# Patient Record
Sex: FEMALE | Race: WHITE | NOT HISPANIC OR LATINO | Employment: OTHER | URBAN - METROPOLITAN AREA
[De-identification: names, ages, dates, MRNs, and addresses within clinical notes are randomized per-mention and may not be internally consistent; named-entity substitution may affect disease eponyms.]

---

## 2018-03-14 ENCOUNTER — OFFICE VISIT (OUTPATIENT)
Dept: URGENT CARE | Facility: CLINIC | Age: 78
End: 2018-03-14
Payer: MEDICARE

## 2018-03-14 VITALS
SYSTOLIC BLOOD PRESSURE: 153 MMHG | RESPIRATION RATE: 18 BRPM | HEIGHT: 64 IN | HEART RATE: 103 BPM | TEMPERATURE: 99 F | OXYGEN SATURATION: 96 % | WEIGHT: 110 LBS | BODY MASS INDEX: 18.78 KG/M2 | DIASTOLIC BLOOD PRESSURE: 95 MMHG

## 2018-03-14 DIAGNOSIS — R06.2 WHEEZING: Primary | ICD-10-CM

## 2018-03-14 DIAGNOSIS — R03.0 ELEVATED BP WITHOUT DIAGNOSIS OF HYPERTENSION: ICD-10-CM

## 2018-03-14 PROCEDURE — 94640 AIRWAY INHALATION TREATMENT: CPT | Mod: S$GLB,,, | Performed by: FAMILY MEDICINE

## 2018-03-14 PROCEDURE — 99214 OFFICE O/P EST MOD 30 MIN: CPT | Mod: 25,S$GLB,, | Performed by: NURSE PRACTITIONER

## 2018-03-14 RX ORDER — ALBUTEROL SULFATE 0.83 MG/ML
2.5 SOLUTION RESPIRATORY (INHALATION)
Status: COMPLETED | OUTPATIENT
Start: 2018-03-14 | End: 2018-03-14

## 2018-03-14 RX ORDER — ALBUTEROL SULFATE 90 UG/1
2 AEROSOL, METERED RESPIRATORY (INHALATION) EVERY 6 HOURS PRN
Qty: 18 G | Refills: 0 | Status: SHIPPED | OUTPATIENT
Start: 2018-03-14 | End: 2018-07-05 | Stop reason: SDUPTHER

## 2018-03-14 RX ORDER — IPRATROPIUM BROMIDE 0.5 MG/2.5ML
0.5 SOLUTION RESPIRATORY (INHALATION)
Status: COMPLETED | OUTPATIENT
Start: 2018-03-14 | End: 2018-03-14

## 2018-03-14 RX ADMIN — ALBUTEROL SULFATE 2.5 MG: 0.83 SOLUTION RESPIRATORY (INHALATION) at 03:03

## 2018-03-14 RX ADMIN — IPRATROPIUM BROMIDE 0.5 MG: 0.5 SOLUTION RESPIRATORY (INHALATION) at 03:03

## 2018-03-14 NOTE — PATIENT INSTRUCTIONS
Bronchospasm (Adult)    Bronchospasm occurs when the airways (bronchial tubes) go into spasm and contract. This makes it hard to breathe and causes wheezing (a high-pitched whistling sound). Bronchospasm can also cause frequent coughing without wheezing.  Bronchospasm is due to irritation, inflammation, or allergic reaction of the airways. People with asthma get bronchospasm. However, not everyone with bronchospasm has asthma.  Being exposed to harmful fumes, a recent case of bronchitis, exercise, or a flare-up of chronic obstructive pulmonary disease (COPD) may cause the airways to spasm. An episode of bronchospasm may last 7 to 14 days. Medicine may be prescribed to relax the airways and prevent wheezing. Antibiotics will be prescribed only if your healthcare provider thinks there is a bacterial infection. Antibiotics do not help a viral infection.  Home care  · Drink lots of water or other fluids (at least 10 glasses a day) during an attack. This will loosen lung secretions and make it easier to breathe. If you have heart or kidney disease, check with your doctor before you drink extra fluids.  · Take prescribed medicine exactly at the times advised. If you take an inhaled medicine to help with breathing, do not use it more than once every 4 hours, unless told to do so. If prescribed an antibiotic or prednisone, take all of the medicine, even if you are feeling better after a few days.  · Do not smoke. Also avoid being exposed to secondhand smoke.  · If you were given an inhaler, use it exactly as directed. If you need to use it more often than prescribed, your condition may be getting worse. Contact your healthcare provider.  Follow-up care  Follow up with your healthcare provider, or as advised.  Note: If you are age 65 or older, have a chronic lung disease or condition that affects your immune system, or you smoke, we recommend getting pneumococcal vaccinations, as well as an influenza vaccination (flu shot)  every autumn. Ask your healthcare provider about this.  When to seek medical advice  Call your healthcare provider right away if any of these occur:  · You need to use your inhalers more often than usual.  · You develop a fever of 100.4°F (38°C) or higher.  · You are coughing up lots of dark-colored sputum (mucus).  · You do not start to improve within 24 hours.  Call 911, or get immediate medical care  Contact emergency services if any of these occur:  · Coughing up bloody sputum (mucus)  · Chest pain with each breath  · Increased wheezing or shortness of breath  Date Last Reviewed: 9/13/2015  © 5782-6035 [email or web address]. [address] [place] [address], [place] [address]. All rights reserved. This information is not intended as a substitute for professional medical care. Always follow your healthcare professional's instructions.    Please follow up with PCP

## 2018-03-14 NOTE — PROGRESS NOTES
"Subjective:       Patient ID: Sangita Logan is a 77 y.o. female.    Vitals:  height is 5' 4" (1.626 m) and weight is 49.9 kg (110 lb). Her temperature is 98.9 °F (37.2 °C). Her blood pressure is 153/95 (abnormal) and her pulse is 103. Her respiration is 18 and oxygen saturation is 96%.     Chief Complaint: Shortness of Breath (4 months ago)    Pt is c/o having SOB off and on for the last few months. Pt was seen at this clinic when it was [facility]. Pt denied having a cough, just a slight wheeze and SOB. Pt was prescribed Ventolin inhaler at her last visit and finally ran out of the medication. She want to get a refill on that medication if possible. Pt denied being seen by a specialist or a PCP. Pt denied having pneumonia, bronchitis, COPD and Asthma. Pt does smoke less than a 1/2 pack of cigarettes a day. Pt reports running out of the alb a week ago and feeling more SOB ever since.      Shortness of Breath   This is a chronic problem. The current episode started more than 1 month ago (4 months ago). The problem occurs intermittently. The problem has been unchanged. Associated symptoms include wheezing. Pertinent negatives include no abdominal pain, chest pain, ear pain, fever, headaches, leg swelling, sore throat or sputum production.     Review of Systems   Constitution: Negative for chills, fever and malaise/fatigue.   HENT: Negative for congestion, ear pain, hoarse voice and sore throat.    Eyes: Negative for discharge and redness.   Cardiovascular: Negative for chest pain, dyspnea on exertion and leg swelling.   Respiratory: Positive for shortness of breath and wheezing. Negative for cough and sputum production.    Musculoskeletal: Negative for myalgias.   Gastrointestinal: Negative for abdominal pain and nausea.   Neurological: Negative for headaches.       Objective:      Physical Exam   Constitutional: She is oriented to person, place, and time. She appears well-developed and well-nourished. She is " cooperative.  Non-toxic appearance. She does not appear ill. No distress.   HENT:   Head: Normocephalic and atraumatic.   Right Ear: Hearing, tympanic membrane, external ear and ear canal normal.   Left Ear: Hearing, tympanic membrane, external ear and ear canal normal.   Nose: Nose normal. No mucosal edema, rhinorrhea or nasal deformity. No epistaxis. Right sinus exhibits no maxillary sinus tenderness and no frontal sinus tenderness. Left sinus exhibits no maxillary sinus tenderness and no frontal sinus tenderness.   Mouth/Throat: Uvula is midline, oropharynx is clear and moist and mucous membranes are normal. No trismus in the jaw. Normal dentition. No uvula swelling. No posterior oropharyngeal erythema.   Eyes: Conjunctivae, EOM and lids are normal. Pupils are equal, round, and reactive to light. No scleral icterus.   Sclera clear bilat   Neck: Trachea normal, normal range of motion, full passive range of motion without pain and phonation normal. Neck supple.   Cardiovascular: Normal rate, regular rhythm, normal heart sounds, intact distal pulses and normal pulses.    Pulmonary/Chest: Effort normal. No accessory muscle usage. No respiratory distress. She has decreased breath sounds in the right upper field, the right middle field, the right lower field, the left upper field, the left middle field and the left lower field. She has wheezes (moderate insp and exp heard in all lung fileds) in the right upper field, the right middle field, the right lower field, the left upper field, the left middle field and the left lower field. She has no rhonchi. She has no rales.   Post duo neb: Lungs opened up and breath sounds are now audible with mild insp wheeze   Abdominal: Soft. Normal appearance and bowel sounds are normal. She exhibits no distension. There is no tenderness.   Musculoskeletal: Normal range of motion. She exhibits no edema or deformity.   Neurological: She is alert and oriented to person, place, and time.  She exhibits normal muscle tone. Coordination normal.   Skin: Skin is warm, dry and intact. She is not diaphoretic. No pallor.   Psychiatric: She has a normal mood and affect. Her speech is normal and behavior is normal. Judgment and thought content normal. Cognition and memory are normal.   Nursing note and vitals reviewed.      Assessment:       1. Wheezing    2. Elevated BP without diagnosis of hypertension        Plan:       Pt was told to follow up with PCP if symptoms worsen or dont improve  Wheezing  -     albuterol nebulizer solution 2.5 mg; Take 3 mLs (2.5 mg total) by nebulization one time.  -     ipratropium 0.02 % nebulizer solution 0.5 mg; Take 2.5 mLs (0.5 mg total) by nebulization one time.  -     albuterol 90 mcg/actuation inhaler; Inhale 2 puffs into the lungs every 6 (six) hours as needed for Wheezing. Rescue  Dispense: 18 g; Refill: 0    Elevated BP without diagnosis of hypertension    pt states she walked far to get to the clinic and that her BP is not normally elevated

## 2018-07-05 ENCOUNTER — OFFICE VISIT (OUTPATIENT)
Dept: URGENT CARE | Facility: CLINIC | Age: 78
End: 2018-07-05
Payer: MEDICARE

## 2018-07-05 VITALS
SYSTOLIC BLOOD PRESSURE: 150 MMHG | WEIGHT: 110 LBS | OXYGEN SATURATION: 97 % | HEIGHT: 64 IN | TEMPERATURE: 98 F | HEART RATE: 98 BPM | BODY MASS INDEX: 18.78 KG/M2 | RESPIRATION RATE: 19 BRPM | DIASTOLIC BLOOD PRESSURE: 92 MMHG

## 2018-07-05 DIAGNOSIS — R06.02 SOB (SHORTNESS OF BREATH): ICD-10-CM

## 2018-07-05 DIAGNOSIS — Z76.89 ENCOUNTER TO ESTABLISH CARE WITH NEW DOCTOR: ICD-10-CM

## 2018-07-05 DIAGNOSIS — R03.0 ELEVATED BLOOD PRESSURE READING WITHOUT DIAGNOSIS OF HYPERTENSION: ICD-10-CM

## 2018-07-05 DIAGNOSIS — R93.89 ABNORMAL CHEST X-RAY: ICD-10-CM

## 2018-07-05 DIAGNOSIS — R06.2 WHEEZING: Primary | ICD-10-CM

## 2018-07-05 DIAGNOSIS — F17.200 TOBACCO DEPENDENCE: ICD-10-CM

## 2018-07-05 PROCEDURE — 99214 OFFICE O/P EST MOD 30 MIN: CPT | Mod: S$GLB,,, | Performed by: FAMILY MEDICINE

## 2018-07-05 PROCEDURE — 94640 AIRWAY INHALATION TREATMENT: CPT | Mod: S$GLB,,, | Performed by: EMERGENCY MEDICINE

## 2018-07-05 PROCEDURE — 96372 THER/PROPH/DIAG INJ SC/IM: CPT | Mod: ,,, | Performed by: EMERGENCY MEDICINE

## 2018-07-05 RX ORDER — ALBUTEROL SULFATE 0.83 MG/ML
2.5 SOLUTION RESPIRATORY (INHALATION)
Status: COMPLETED | OUTPATIENT
Start: 2018-07-05 | End: 2018-07-05

## 2018-07-05 RX ORDER — ALBUTEROL SULFATE 90 UG/1
2 AEROSOL, METERED RESPIRATORY (INHALATION) EVERY 6 HOURS PRN
Qty: 18 G | Refills: 0 | Status: SHIPPED | OUTPATIENT
Start: 2018-07-05 | End: 2021-11-11

## 2018-07-05 RX ORDER — IPRATROPIUM BROMIDE 0.5 MG/2.5ML
0.5 SOLUTION RESPIRATORY (INHALATION)
Status: COMPLETED | OUTPATIENT
Start: 2018-07-05 | End: 2018-07-05

## 2018-07-05 RX ADMIN — IPRATROPIUM BROMIDE 0.5 MG: 0.5 SOLUTION RESPIRATORY (INHALATION) at 01:07

## 2018-07-05 RX ADMIN — ALBUTEROL SULFATE 2.5 MG: 0.83 SOLUTION RESPIRATORY (INHALATION) at 01:07

## 2018-07-05 NOTE — PATIENT INSTRUCTIONS
PLEASE READ YOUR DISCHARGE INSTRUCTIONS ENTIRELY AS IT CONTAINS IMPORTANT INFORMATION.    Use the albuterol for shortness of breath and wheezing.     Please go to the emergency room if you experience shortness of breath or wheezing not relieved with the inhaler, chest pain, funny heart beats, headache, blurred vision, weakness in one arm or leg, slurred speech, numbness, inability to walk or talk, confusion, or any new symptoms.     A referral to primary care and pulmonology has been placed for you. You will be contacted in the next day or two about scheduling an appointment. Please call [phone] if you are not contacted.     Get a blood pressure cuff at a drug store or take your blood pressure there and log the time and readings and bring with you to your doctor's appointment    A referral to the smoking cessation clinic has been placed for you, please consider quitting smoking.         You must understand that you have received an Urgent Care treatment only and that you may be released before all of your medical problems are known or treated.    Bronchitis with Wheezing (Viral or Bacterial: Adult)    Bronchitis is an infection of the air passages. It often occurs during a cold and is usually caused by a virus. Symptoms include cough with mucus (phlegm) and low-grade fever. This illness is contagious during the first few days and is spread through the air by coughing and sneezing, or by direct contact (touching the sick person and then touching your own eyes, nose, or mouth).  If there is a lot of inflammation, air flow is restricted. The air passages may also go into spasm, especially if you have asthma. This causes wheezing and difficulty breathing even in people who do not have asthma.  Bronchitis usually lasts 7 to 14 days. The wheezing should improve with treatment during the first week. An inhaler is often prescribed to relax the air passages and stop wheezing. Antibiotics will be prescribed if your  doctor thinks there is also a secondary bacterial infection.  Home care  · If symptoms are severe, rest at home for the first 2 to 3 days. When you go back to your usual activities, don't let yourself get too tired.  · Do not smoke. Also avoid being exposed to secondhand smoke.  · You may use over-the-counter medicine to control fever or pain, unless another medicine was prescribed. Note: If you have chronic liver or kidney disease or have ever had a stomach ulcer or gastrointestinal bleeding, talk with your healthcare provider before using these medicines. Also talk to your provider if you are taking medicine to prevent blood clots.) Aspirin should never be given to anyone younger than 18 years of age who is ill with a viral infection or fever. It may cause severe liver or brain damage.  · Your appetite may be poor, so a light diet is fine. Avoid dehydration by drinking 6 to 8 glasses of fluids per day (such as water, soft drinks, sports drinks, juices, tea, or soup). Extra fluids will help loosen secretions in the nose and lungs.  · Over-the-counter cough, cold, and sore-throat medicines will not shorten the length of the illness, but they may be helpful to reduce symptoms. (Note: Do not use decongestants if you have high blood pressure.)  · If you were given an inhaler, use it exactly as directed. If you need to use it more often than prescribed, your condition may be worsening. If this happens, contact your healthcare provider.  · If prescribed, finish all antibiotic medicine, even if you are feeling better after only a few days.  Follow-up care  Follow up with your healthcare provider, or as advised. If you had an X-ray or ECG (electrocardiogram), a specialist will review it. You will be notified of any new findings that may affect your care.  Note: If you are age 65 or older, or if you have a chronic lung disease or condition that affects your immune system, or you smoke, talk to your healthcare provider about  "having a pneumococcal vaccinations and a yearly influenza vaccination (flu shot).  When to seek medical advice  Call your healthcare provider right away if any of these occur:  · Fever of 100.4°F (38°C) or higher  · Coughing up increasing amounts of colored sputum  · Weakness, drowsiness, headache, facial pain, ear pain, or a stiff neck  Call 911, or get immediate medical care  Contact emergency services right away if any of these occur.  · Coughing up blood  · Worsening weakness, drowsiness, headache, or stiff neck  · Increased wheezing not helped with medication, shortness of breath, or pain with breathing  Date Last Reviewed: 9/13/2015 © 2000-2017 "[employer]. [address] [place] [address], [place] [address]. All rights reserved. This information is not intended as a substitute for professional medical care. Always follow your healthcare professional's instructions.        Risk Factors for Heart Disease  A risk factor is something that increases your chance of having heart disease. Heart disease (also called coronary artery disease) involves damage to the heart arteries. These blood vessels need to work well to provide the oxygen your heart needs to pump blood to the rest of your body. Things like smoking or high cholesterol levels can damage arteries. You cant control some risk factors, such as age and a family history of heart disease. But there are many things you can control to reduce your risk for heart disease.    Unhealthy cholesterol levels  Cholesterol is a fat-like substance in your blood. It can build up along the artery walls. This is called plaque. Over time, plaque narrows the arteries and reduces blood flow to your heart or brain. If a blood clot forms or a piece of the plaque breaks off, it can completely block the artery and cause a heart attack or stroke. Your risk of heart disease goes up if you have high levels of LDL ("bad") cholesterol or triglycerides (another fatty substance that " "can build up). Youre also at risk if you have low HDL cholesterol ("good") cholesterol. HDL helps clear the bad cholesterol away. You're at risk if you have:  HDL cholesterol 50 mg/dL or lower; LDL cholesterol 100 mg/dL; or triglycerides of 150 mg/dL or higher.  Smoking  This is the most important risk factor you can change. Smoking causes inflammation and damages the smooth muscle that lines the arteries making them less flexible. It also raises your blood pressure, causing further damage to the artery lining. Smoking also increases your risk that your blood may clot, block an artery, and cause a heart attack or stroke. Smoking also damages your lungs, which affects the delivery of oxygen to the body. If you smoke, you are 2 to 4 times more likely to develop coronary artery disease. If you smoke, it's never too late to help your heart. Ask your doctor about nicotine replacement products and smoking cessation support.  High blood pressure  High blood pressure occurs when blood pushes too hard against artery walls. This causes damage to the artery walls and the formation of scar tissue as it heals. This makes the arteries stiff and weak. Plaque sticks to the scarred tissue narrowing and hardening the arteries. High blood pressure also causes your heart to work harder to get blood out to the body. High blood pressure raises your risk of heart attack, also known as acute myocardial infarction, or AMI, and especially stroke. The brain tissue is especially sensitive to high blood pressure damage. You're at risk if your blood pressure is 120/80 or higher.  Negative emotions  Chronic stress, pent-up anger, and other negative emotions have been linked to heart disease. This occurs because stress increases the levels of a hormone that increase the demand on your heart. Over time, these emotions could raise your heart disease risk.  Metabolic syndrome  This is caused by a combination of certain risk factors. It puts you at " extra high risk of heart disease, stroke, and diabetes. You have metabolic syndrome if you have 3 or more of the following: low HDL cholesterol; high triglycerides; high blood pressure; high blood sugar; extra weight around the waist.  Diabetes  Diabetes occurs when you have high levels of sugar (glucose) in your blood. This can damage arteries if not kept under control. Having diabetes also makes you more likely to have a silent heart attack--one without any symptoms.  Excess weight  Excess weight makes other risk factors, such as diabetes, more likely. Excess weight around the waist or stomach increases your heart disease risk the most.  Lack of physical activity  When youre not active, youre more likely to develop diabetes, high blood pressure, high cholesterol levels, and excess weight.     Most people with heart disease have more than one risk factor.   Date Last Reviewed: 3/28/2016  © 6210-2167 [employer]. [address] [place] [address]. All rights reserved. This information is not intended as a substitute for professional medical care. Always follow your healthcare professional's instructions.        Chronic Lung Disease: Tips for Quitting Smoking  Cigarette smoke damages lung tissue and irritates airways, which makes breathing harder. Smoking also damages cilia (tiny hairs) in the airways, so the cilia cant do their job of clearing mucus, dirt, and germs from the lungs. Its never too late to quit smoking. Your health will start to improve on the same day you stub out your last cigarette.    You dont have to quit alone  You may be more likely to quit for good if you seek support from others.  · Talk with your healthcare provider about your plans to quit. Ask about medicines that can help. Some contain nicotine and some do not. Some are available by prescription. You can buy others over-the-counter. These medicines help control the desire to use tobacco and the uncomfortable  symptoms people have when they try to quit. Others gradually lessen the level of nicotine in the body. Your healthcare provider can tell you about all the choices available including:  ¨ Oral medicines such as bupropion or varenicline  ¨ Nicotine replacement therapy such as gum, lozenge, a patch, inhaler, or nasal spray  · Join a support group or get advice from an ex-smoker.  · Ask other smokers in your household to quit with you.   Tips for quitting smoking  There isnt one right way to stop smoking. Everyone quits in his or her own way. Some of these tips may help:  · Make a list of reasons you want to quit. Keep this list and read it often.  · Pick a date to quit smoking. Then stick to it.  · List the things that make you want to smoke. Think of ways to avoid these triggers.  · Set goals for yourself, such as going for a week without smoking. Reward yourself when you meet your goals.  · If you dont quit the first time, keep trying! Many people have to try more than once before they stop smoking for good.     For more information  · smokefree.gov/txus-lz-re-expert  · National [employer] [place] Smoking Quitline: 877-44U-QUIT (149-072-7668)   Date Last Reviewed: 5/1/2016  © 7448-6116 [employer]. [address] [place] [address], [place] [address]. All rights reserved. This information is not intended as a substitute for professional medical care. Always follow your healthcare professional's instructions.        Discharge Instructions: Taking Your Blood Pressure  Blood pressure is the force of blood as it moves from the heart through the blood vessels. You can take your own blood pressure reading using a digital monitor. Take readings as often as your healthcare provider instructs. Take your readings each time in the same way, using the same arm. Here are guidelines for taking your blood pressure.  The American Heart Association (AHA) recommends purchasing a blood pressure monitor that is validated and approved  by the Association for the Advancement of Medical Instrumentation, the [place] Hypertension Society, and the International Protocol for the Validation of Automated BP Measuring Devices. If the blood pressure monitor is for a senior adult, a pregnant woman, or a child, make certain it is validated for use with such a population. For the most reliable readings, the AHA recommends an automatic, cuff-style, upper arm (bicep) monitor. The readings from finger and wrist monitors are not as reliable as the upper arm monitor.        Step 1. Relax    · Wait at least a half hour after smoking, eating, or exercising. Do not drink coffee, tea, soda, or other caffeinated beverages before checking your blood pressure.   · Sit comfortably at a table. Place the monitor near you.  · Rest for a few minutes before you begin.        Step 2. Wrap the cuff    · Place your arm on the table, palm up. Put your arm in a position that is level with your heart. Wrap the cuff around your upper arm, about an inch above your elbow. Its best to wrap the cuff on bare skin, not over clothing.  · Make sure your cuff fits. If it doesnt wrap around your upper arm, order a larger cuff. A cuff that is too large or too small can result in an inaccurate blood pressure reading.           Step 3. Inflate the cuff    · Pump the cuff until the scale reads 200. If you have a self-inflating cuff, push the button that starts the pump.  · The cuff will tighten, then loosen.  · The numbers will change. When they stop changing, your blood pressure reading will appear.  · If you get a reading that is too high or too low for you, relax for a few minutes. Then do the test again.    Step 4. Write down the results  · Write down your blood pressure numbers. Jak the date and time. Keep your results in one place, such as a notebook.  · Remove the cuff from your arm. Turn off the machine.  · Take the readings with you to your medical appointments.  · If you start a new  blood pressure medicine, or change a blood pressure medicine dose, note the day you started the new drug or dosage on your blood pressure recording sheet. This will help your healthcare provider monitor the effect of medication changes.     Date Last Reviewed: 4/27/2016  © 6830-5464 [employer]. [address] [place] [address]. All rights reserved. This information is not intended as a substitute for professional medical care. Always follow your healthcare professional's instructions.

## 2018-07-05 NOTE — PROGRESS NOTES
"Subjective:       Patient ID: Sangita Logan is a 77 y.o. female.    Vitals:  height is 5' 4" (1.626 m) and weight is 49.9 kg (110 lb). Her oral temperature is 97.6 °F (36.4 °C). Her blood pressure is 150/92 (abnormal) and her pulse is 98. Her respiration is 19 and oxygen saturation is 97%.     Chief Complaint: Shortness of Breath (always sob and has been here before for same symptoms)    Pt states she is always short of breath unless she is taking the albuterol inhaler. She says she has been seen here before for the same reason. She does not have a primary doctor and has not seen a pcp in 5+ years. She has been out of the inhaler for 3 weeks. Sob not worse with exertion, no orthopnea, no leg swelling, no calf pain, no recent travel/hospitalizations/immobilizations. No chest pain, headache, blurred vision, palpitations. No fever or cough. She does smoke "but not a lot".       Shortness of Breath   This is a recurrent problem. The current episode started more than 1 year ago. The problem occurs constantly. Associated symptoms include wheezing. Pertinent negatives include no abdominal pain, chest pain, claudication, ear pain, fever, headaches, hemoptysis, leg swelling, orthopnea, sore throat or sputum production. Nothing aggravates the symptoms. Her past medical history is significant for chronic lung disease.     Review of Systems   Constitution: Negative for chills, fever and malaise/fatigue.   HENT: Negative for congestion, ear pain, hoarse voice and sore throat.    Eyes: Negative for discharge and redness.   Cardiovascular: Negative for chest pain, claudication, dyspnea on exertion, leg swelling, orthopnea and palpitations.   Respiratory: Positive for shortness of breath and wheezing. Negative for cough, hemoptysis and sputum production.    Musculoskeletal: Negative for myalgias.   Gastrointestinal: Negative for abdominal pain and nausea.   Neurological: Negative for headaches.       Objective:    "   Physical Exam   Constitutional: She is oriented to person, place, and time. She appears well-developed and well-nourished. She is cooperative.  Non-toxic appearance. She does not appear ill. No distress.   HENT:   Head: Normocephalic and atraumatic.   Right Ear: Hearing, tympanic membrane, external ear and ear canal normal.   Left Ear: Hearing, tympanic membrane, external ear and ear canal normal.   Nose: Nose normal. No mucosal edema, rhinorrhea or nasal deformity. No epistaxis. Right sinus exhibits no maxillary sinus tenderness and no frontal sinus tenderness. Left sinus exhibits no maxillary sinus tenderness and no frontal sinus tenderness.   Mouth/Throat: Uvula is midline, oropharynx is clear and moist and mucous membranes are normal. No trismus in the jaw. Normal dentition. No uvula swelling. No posterior oropharyngeal erythema.   Eyes: Conjunctivae and lids are normal. No scleral icterus.   Sclera clear bilat   Neck: Trachea normal, full passive range of motion without pain and phonation normal. Neck supple.   Cardiovascular: Normal rate, regular rhythm, normal heart sounds, intact distal pulses and normal pulses.    No murmur heard.  Pulses:       Radial pulses are 2+ on the right side, and 2+ on the left side.   Pulmonary/Chest: Effort normal. No accessory muscle usage. No tachypnea. No respiratory distress (able to speak in complete sentences without difficulty, no acute distress). She has decreased breath sounds. She has wheezes in the right lower field and the left lower field.   Post treatment: more air movement, still mild wheezing   Abdominal: Soft. Normal appearance and bowel sounds are normal. She exhibits no distension. There is no tenderness.   Musculoskeletal: Normal range of motion. She exhibits no edema or deformity.        Right lower leg: She exhibits no edema.        Left lower leg: She exhibits no edema.   Neurological: She is alert and oriented to person, place, and time. She exhibits  normal muscle tone. Coordination normal.   Skin: Skin is warm, dry and intact. She is not diaphoretic. No pallor.   Psychiatric: She has a normal mood and affect. Her speech is normal and behavior is normal. Judgment and thought content normal. Cognition and memory are normal.   Nursing note and vitals reviewed.    X-ray Chest Pa And Lateral    Result Date: 7/5/2018  EXAMINATION: XR CHEST PA AND LATERAL CLINICAL HISTORY: Shortness of breath TECHNIQUE: PA and lateral views of the chest were performed. COMPARISON: Chest radiograph 08/17/2015 FINDINGS: Heart is not enlarged.  Calcific atherosclerosis of the thoracic aorta.  Mediastinal contours and hilar regions are unchanged.  Pulmonary vasculature is within normal limits.  The lungs are symmetrically deeply inflated with slight increased lucency of the upper zones suggesting sequela of underlying COPD.  No focal consolidation, pleural effusion or pneumothorax.  Included osseous structures appear grossly stable without acute process seen.     Pulmonary hyperinflation suggesting sequela of underlying COPD, without focal consolidation.  Correlate with smoking or asthma history. Electronically signed by: Daron Harrison MD Date:    07/05/2018 Time:    13:47    Assessment:       1. Wheezing    2. SOB (shortness of breath)    3. Tobacco dependence    4. Elevated blood pressure reading without diagnosis of hypertension    5. Encounter to establish care with new doctor    6. Abnormal chest x-ray        Plan:         Wheezing  -     albuterol 90 mcg/actuation inhaler; Inhale 2 puffs into the lungs every 6 (six) hours as needed for Wheezing. Rescue  Dispense: 18 g; Refill: 0  -     Ambulatory referral to Pulmonology    SOB (shortness of breath)  -     albuterol nebulizer solution 2.5 mg; Take 3 mLs (2.5 mg total) by nebulization one time.  -     ipratropium 0.02 % nebulizer solution 0.5 mg; Take 2.5 mLs (0.5 mg total) by nebulization one time.  -     X-Ray Chest PA And Lateral;  Future; Expected date: 07/05/2018  -     albuterol 90 mcg/actuation inhaler; Inhale 2 puffs into the lungs every 6 (six) hours as needed for Wheezing. Rescue  Dispense: 18 g; Refill: 0  -     Ambulatory referral to Pulmonology    Tobacco dependence  -     Ambulatory referral to Smoking Cessation Program    Elevated blood pressure reading without diagnosis of hypertension  -     Ambulatory referral to Internal Medicine    Encounter to establish care with new doctor  -     Ambulatory referral to Internal Medicine    Abnormal chest x-ray  Comments:  hyperinflation - possible COPD  Orders:  -     Ambulatory referral to Pulmonology    Ddx included: CHF, COPD, pneumothorax, PE, pneumonia, bronchitis  Subjective improvement in sx post duoneb treatment. No difficulty breathing when walking around clinic, no acute distress.   Spoke with pt at length about HTN and results of her chest xray. Informed she has urgent referrals placed to primary care for bp management and pulmonology for copd evaluation. Will hold off on starting bp meds until seen by primary care as she is asx at this time and has never been diagnosed with HTN.   Advised pt smoking cessation program referral placed for her.   ER precautions discussed and pt understands.       Patient Instructions     PLEASE READ YOUR DISCHARGE INSTRUCTIONS ENTIRELY AS IT CONTAINS IMPORTANT INFORMATION.    Use the albuterol for shortness of breath and wheezing.     Please go to the emergency room if you experience shortness of breath or wheezing not relieved with the inhaler, chest pain, funny heart beats, headache, blurred vision, weakness in one arm or leg, slurred speech, numbness, inability to walk or talk, confusion, or any new symptoms.     A referral to primary care and pulmonology has been placed for you. You will be contacted in the next day or two about scheduling an appointment. Please call [phone] if you are not contacted.     Get a blood pressure cuff at a  drug store or take your blood pressure there and log the time and readings and bring with you to your doctor's appointment    A referral to the smoking cessation clinic has been placed for you, please consider quitting smoking.         You must understand that you have received an Urgent Care treatment only and that you may be released before all of your medical problems are known or treated.    Bronchitis with Wheezing (Viral or Bacterial: Adult)    Bronchitis is an infection of the air passages. It often occurs during a cold and is usually caused by a virus. Symptoms include cough with mucus (phlegm) and low-grade fever. This illness is contagious during the first few days and is spread through the air by coughing and sneezing, or by direct contact (touching the sick person and then touching your own eyes, nose, or mouth).  If there is a lot of inflammation, air flow is restricted. The air passages may also go into spasm, especially if you have asthma. This causes wheezing and difficulty breathing even in people who do not have asthma.  Bronchitis usually lasts 7 to 14 days. The wheezing should improve with treatment during the first week. An inhaler is often prescribed to relax the air passages and stop wheezing. Antibiotics will be prescribed if your doctor thinks there is also a secondary bacterial infection.  Home care  · If symptoms are severe, rest at home for the first 2 to 3 days. When you go back to your usual activities, don't let yourself get too tired.  · Do not smoke. Also avoid being exposed to secondhand smoke.  · You may use over-the-counter medicine to control fever or pain, unless another medicine was prescribed. Note: If you have chronic liver or kidney disease or have ever had a stomach ulcer or gastrointestinal bleeding, talk with your healthcare provider before using these medicines. Also talk to your provider if you are taking medicine to prevent blood clots.) Aspirin should never be given  to anyone younger than 18 years of age who is ill with a viral infection or fever. It may cause severe liver or brain damage.  · Your appetite may be poor, so a light diet is fine. Avoid dehydration by drinking 6 to 8 glasses of fluids per day (such as water, soft drinks, sports drinks, juices, tea, or soup). Extra fluids will help loosen secretions in the nose and lungs.  · Over-the-counter cough, cold, and sore-throat medicines will not shorten the length of the illness, but they may be helpful to reduce symptoms. (Note: Do not use decongestants if you have high blood pressure.)  · If you were given an inhaler, use it exactly as directed. If you need to use it more often than prescribed, your condition may be worsening. If this happens, contact your healthcare provider.  · If prescribed, finish all antibiotic medicine, even if you are feeling better after only a few days.  Follow-up care  Follow up with your healthcare provider, or as advised. If you had an X-ray or ECG (electrocardiogram), a specialist will review it. You will be notified of any new findings that may affect your care.  Note: If you are age 65 or older, or if you have a chronic lung disease or condition that affects your immune system, or you smoke, talk to your healthcare provider about having a pneumococcal vaccinations and a yearly influenza vaccination (flu shot).  When to seek medical advice  Call your healthcare provider right away if any of these occur:  · Fever of 100.4°F (38°C) or higher  · Coughing up increasing amounts of colored sputum  · Weakness, drowsiness, headache, facial pain, ear pain, or a stiff neck  Call 911, or get immediate medical care  Contact emergency services right away if any of these occur.  · Coughing up blood  · Worsening weakness, drowsiness, headache, or stiff neck  · Increased wheezing not helped with medication, shortness of breath, or pain with breathing  Date Last Reviewed: 9/13/2015  © 8751-8992 [employer]. [address] [place] [address], [place] [address]. All rights reserved. This information is not intended as a substitute for professional medical care. Always follow your healthcare professional's instructions.        Risk Factors for Heart Disease  A risk factor is something that increases your chance of having heart disease. Heart disease (also called coronary artery disease) involves damage to the heart arteries. These blood vessels need to work well to provide the oxygen your heart needs to pump blood to the rest of your body. Things like smoking or high cholesterol levels can damage arteries. You cant control some risk factors, such as age and a family history of heart disease. But there are many things you can control to reduce your risk for heart disease.    Unhealthy cholesterol levels  Cholesterol is a fat-like substance in your blood. It can build up along the artery walls. This is called plaque. Over time, plaque narrows the arteries and reduces blood flow to your heart or brain. If a blood clot forms or a piece of the plaque breaks off, it can completely block the artery and cause a heart attack or stroke. Your risk of heart disease goes up if you have high levels of LDL ("bad") cholesterol or triglycerides (another fatty substance that can build up). Youre also at risk if you have low HDL cholesterol ("good") cholesterol. HDL helps clear the bad cholesterol away. You're at risk if you have:  HDL cholesterol 50 mg/dL or lower; LDL cholesterol 100 mg/dL; or triglycerides of 150 mg/dL or higher.  Smoking  This is the most important risk factor you can change. Smoking causes inflammation and damages the smooth muscle that lines the arteries making them less flexible. It also raises your blood pressure, causing further damage to the artery lining. Smoking also increases your risk that your blood may clot, block an artery, and cause a heart attack or stroke. Smoking also damages your lungs, which " affects the delivery of oxygen to the body. If you smoke, you are 2 to 4 times more likely to develop coronary artery disease. If you smoke, it's never too late to help your heart. Ask your doctor about nicotine replacement products and smoking cessation support.  High blood pressure  High blood pressure occurs when blood pushes too hard against artery walls. This causes damage to the artery walls and the formation of scar tissue as it heals. This makes the arteries stiff and weak. Plaque sticks to the scarred tissue narrowing and hardening the arteries. High blood pressure also causes your heart to work harder to get blood out to the body. High blood pressure raises your risk of heart attack, also known as acute myocardial infarction, or AMI, and especially stroke. The brain tissue is especially sensitive to high blood pressure damage. You're at risk if your blood pressure is 120/80 or higher.  Negative emotions  Chronic stress, pent-up anger, and other negative emotions have been linked to heart disease. This occurs because stress increases the levels of a hormone that increase the demand on your heart. Over time, these emotions could raise your heart disease risk.  Metabolic syndrome  This is caused by a combination of certain risk factors. It puts you at extra high risk of heart disease, stroke, and diabetes. You have metabolic syndrome if you have 3 or more of the following: low HDL cholesterol; high triglycerides; high blood pressure; high blood sugar; extra weight around the waist.  Diabetes  Diabetes occurs when you have high levels of sugar (glucose) in your blood. This can damage arteries if not kept under control. Having diabetes also makes you more likely to have a silent heart attack--one without any symptoms.  Excess weight  Excess weight makes other risk factors, such as diabetes, more likely. Excess weight around the waist or stomach increases your heart disease risk the most.  Lack of physical  activity  When youre not active, youre more likely to develop diabetes, high blood pressure, high cholesterol levels, and excess weight.     Most people with heart disease have more than one risk factor.   Date Last Reviewed: 3/28/2016  © 0347-2559 [employer]. [address] [place] [address]. All rights reserved. This information is not intended as a substitute for professional medical care. Always follow your healthcare professional's instructions.        Chronic Lung Disease: Tips for Quitting Smoking  Cigarette smoke damages lung tissue and irritates airways, which makes breathing harder. Smoking also damages cilia (tiny hairs) in the airways, so the cilia cant do their job of clearing mucus, dirt, and germs from the lungs. Its never too late to quit smoking. Your health will start to improve on the same day you stub out your last cigarette.    You dont have to quit alone  You may be more likely to quit for good if you seek support from others.  · Talk with your healthcare provider about your plans to quit. Ask about medicines that can help. Some contain nicotine and some do not. Some are available by prescription. You can buy others over-the-counter. These medicines help control the desire to use tobacco and the uncomfortable symptoms people have when they try to quit. Others gradually lessen the level of nicotine in the body. Your healthcare provider can tell you about all the choices available including:  ¨ Oral medicines such as bupropion or varenicline  ¨ Nicotine replacement therapy such as gum, lozenge, a patch, inhaler, or nasal spray  · Join a support group or get advice from an ex-smoker.  · Ask other smokers in your household to quit with you.   Tips for quitting smoking  There isnt one right way to stop smoking. Everyone quits in his or her own way. Some of these tips may help:  · Make a list of reasons you want to quit. Keep this list and read it often.  · Pick a date  to quit smoking. Then stick to it.  · List the things that make you want to smoke. Think of ways to avoid these triggers.  · Set goals for yourself, such as going for a week without smoking. Reward yourself when you meet your goals.  · If you dont quit the first time, keep trying! Many people have to try more than once before they stop smoking for good.     For more information  · smokefree.gov/sarz-jw-rg-expert  · [place] [facility] [place] Smoking Quitline: 877-44U-QUIT (125-934-6840)   Date Last Reviewed: 5/1/2016 © 2000-2017 [employer]. [address] [place] [address], [place] [address]. All rights reserved. This information is not intended as a substitute for professional medical care. Always follow your healthcare professional's instructions.        Discharge Instructions: Taking Your Blood Pressure  Blood pressure is the force of blood as it moves from the heart through the blood vessels. You can take your own blood pressure reading using a digital monitor. Take readings as often as your healthcare provider instructs. Take your readings each time in the same way, using the same arm. Here are guidelines for taking your blood pressure.  The American Heart Association (AHA) recommends purchasing a blood pressure monitor that is validated and approved by the Association for the Advancement of Medical Instrumentation, the [place] Hypertension Society, and the International Protocol for the Validation of Automated BP Measuring Devices. If the blood pressure monitor is for a senior adult, a pregnant woman, or a child, make certain it is validated for use with such a population. For the most reliable readings, the AHA recommends an automatic, cuff-style, upper arm (bicep) monitor. The readings from finger and wrist monitors are not as reliable as the upper arm monitor.        Step 1. Relax    · Wait at least a half hour after smoking, eating, or exercising. Do not drink coffee, tea, soda, or other caffeinated  beverages before checking your blood pressure.   · Sit comfortably at a table. Place the monitor near you.  · Rest for a few minutes before you begin.        Step 2. Wrap the cuff    · Place your arm on the table, palm up. Put your arm in a position that is level with your heart. Wrap the cuff around your upper arm, about an inch above your elbow. Its best to wrap the cuff on bare skin, not over clothing.  · Make sure your cuff fits. If it doesnt wrap around your upper arm, order a larger cuff. A cuff that is too large or too small can result in an inaccurate blood pressure reading.           Step 3. Inflate the cuff    · Pump the cuff until the scale reads 200. If you have a self-inflating cuff, push the button that starts the pump.  · The cuff will tighten, then loosen.  · The numbers will change. When they stop changing, your blood pressure reading will appear.  · If you get a reading that is too high or too low for you, relax for a few minutes. Then do the test again.    Step 4. Write down the results  · Write down your blood pressure numbers. Jak the date and time. Keep your results in one place, such as a notebook.  · Remove the cuff from your arm. Turn off the machine.  · Take the readings with you to your medical appointments.  · If you start a new blood pressure medicine, or change a blood pressure medicine dose, note the day you started the new drug or dosage on your blood pressure recording sheet. This will help your healthcare provider monitor the effect of medication changes.     Date Last Reviewed: 4/27/2016 © 2000-2016 [employer]. [address] [place] [address], [place] [address]. All rights reserved. This information is not intended as a substitute for professional medical care. Always follow your healthcare professional's instructions.

## 2018-07-09 ENCOUNTER — TELEPHONE (OUTPATIENT)
Dept: OCCUPATIONAL MEDICINE | Facility: CLINIC | Age: 78
End: 2018-07-09

## 2018-07-11 ENCOUNTER — TELEPHONE (OUTPATIENT)
Dept: INTERNAL MEDICINE | Facility: CLINIC | Age: 78
End: 2018-07-11

## 2018-07-11 ENCOUNTER — OFFICE VISIT (OUTPATIENT)
Dept: INTERNAL MEDICINE | Facility: CLINIC | Age: 78
End: 2018-07-11
Payer: MEDICARE

## 2018-07-11 ENCOUNTER — LAB VISIT (OUTPATIENT)
Dept: LAB | Facility: HOSPITAL | Age: 78
End: 2018-07-11
Payer: MEDICARE

## 2018-07-11 VITALS
HEIGHT: 64 IN | DIASTOLIC BLOOD PRESSURE: 88 MMHG | TEMPERATURE: 98 F | HEART RATE: 91 BPM | SYSTOLIC BLOOD PRESSURE: 142 MMHG | BODY MASS INDEX: 19.12 KG/M2 | OXYGEN SATURATION: 98 % | WEIGHT: 112 LBS

## 2018-07-11 DIAGNOSIS — I10 HYPERTENSION, UNSPECIFIED TYPE: ICD-10-CM

## 2018-07-11 DIAGNOSIS — J44.9 CHRONIC OBSTRUCTIVE PULMONARY DISEASE, UNSPECIFIED COPD TYPE: ICD-10-CM

## 2018-07-11 DIAGNOSIS — I10 HYPERTENSION, UNSPECIFIED TYPE: Primary | ICD-10-CM

## 2018-07-11 LAB
ALBUMIN SERPL BCP-MCNC: 4.1 G/DL
ALP SERPL-CCNC: 133 U/L
ALT SERPL W/O P-5'-P-CCNC: 18 U/L
ANION GAP SERPL CALC-SCNC: 7 MMOL/L
AST SERPL-CCNC: 23 U/L
BASOPHILS # BLD AUTO: 0.01 K/UL
BASOPHILS NFR BLD: 0.2 %
BILIRUB SERPL-MCNC: 0.9 MG/DL
BUN SERPL-MCNC: 8 MG/DL
CALCIUM SERPL-MCNC: 9.7 MG/DL
CHLORIDE SERPL-SCNC: 102 MMOL/L
CO2 SERPL-SCNC: 31 MMOL/L
CREAT SERPL-MCNC: 0.8 MG/DL
DIFFERENTIAL METHOD: ABNORMAL
EOSINOPHIL # BLD AUTO: 0.1 K/UL
EOSINOPHIL NFR BLD: 0.9 %
ERYTHROCYTE [DISTWIDTH] IN BLOOD BY AUTOMATED COUNT: 12.5 %
EST. GFR  (AFRICAN AMERICAN): >60 ML/MIN/1.73 M^2
EST. GFR  (NON AFRICAN AMERICAN): >60 ML/MIN/1.73 M^2
GLUCOSE SERPL-MCNC: 106 MG/DL
HCT VFR BLD AUTO: 44.7 %
HGB BLD-MCNC: 15.2 G/DL
LYMPHOCYTES # BLD AUTO: 1.7 K/UL
LYMPHOCYTES NFR BLD: 30.1 %
MCH RBC QN AUTO: 32.6 PG
MCHC RBC AUTO-ENTMCNC: 34 G/DL
MCV RBC AUTO: 96 FL
MONOCYTES # BLD AUTO: 1.4 K/UL
MONOCYTES NFR BLD: 23.7 %
NEUTROPHILS # BLD AUTO: 2.5 K/UL
NEUTROPHILS NFR BLD: 44.1 %
PLATELET # BLD AUTO: 137 K/UL
PMV BLD AUTO: 11.9 FL
POTASSIUM SERPL-SCNC: 4 MMOL/L
PROT SERPL-MCNC: 7.1 G/DL
RBC # BLD AUTO: 4.66 M/UL
SODIUM SERPL-SCNC: 140 MMOL/L
WBC # BLD AUTO: 5.75 K/UL

## 2018-07-11 PROCEDURE — 99999 PR PBB SHADOW E&M-EST. PATIENT-LVL IV: CPT | Mod: PBBFAC,,, | Performed by: INTERNAL MEDICINE

## 2018-07-11 PROCEDURE — 99214 OFFICE O/P EST MOD 30 MIN: CPT | Mod: PBBFAC | Performed by: INTERNAL MEDICINE

## 2018-07-11 PROCEDURE — 36415 COLL VENOUS BLD VENIPUNCTURE: CPT

## 2018-07-11 PROCEDURE — 85025 COMPLETE CBC W/AUTO DIFF WBC: CPT

## 2018-07-11 PROCEDURE — 80053 COMPREHEN METABOLIC PANEL: CPT

## 2018-07-11 PROCEDURE — 99214 OFFICE O/P EST MOD 30 MIN: CPT | Mod: S$PBB,,, | Performed by: INTERNAL MEDICINE

## 2018-07-11 RX ORDER — LISINOPRIL 10 MG/1
10 TABLET ORAL DAILY
Qty: 90 TABLET | Refills: 3 | Status: SHIPPED | OUTPATIENT
Start: 2018-07-11 | End: 2019-07-03 | Stop reason: SDUPTHER

## 2018-07-11 NOTE — PROGRESS NOTES
"Subjective:       Patient ID: Sangita Logan is a 77 y.o. female.    Chief Complaint: Hypertension    Hypertension   This is a new problem. Episode onset: unknown. Progression since onset: unknown. The problem is uncontrolled. Associated symptoms include shortness of breath. Pertinent negatives include no chest pain, headaches, malaise/fatigue, orthopnea, palpitations, peripheral edema or PND. (Has "asthma" and CXR consistent with COPD) Risk factors for coronary artery disease include smoking/tobacco exposure. Past treatments include nothing.     Review of Systems   Constitutional: Negative for activity change, chills, fatigue, fever and malaise/fatigue.   HENT: Negative for congestion, ear pain, nosebleeds, postnasal drip, sinus pressure and sore throat.    Eyes: Negative.  Negative for visual disturbance.   Respiratory: Positive for shortness of breath. Negative for cough, chest tightness and wheezing.    Cardiovascular: Negative for chest pain, palpitations, orthopnea and PND.   Gastrointestinal: Negative for abdominal pain, diarrhea, nausea and vomiting.   Genitourinary: Negative for difficulty urinating, dysuria, frequency and urgency.   Musculoskeletal: Negative for arthralgias and neck stiffness.   Skin: Negative for rash.   Neurological: Negative for dizziness, weakness and headaches.   Psychiatric/Behavioral: Negative for sleep disturbance. The patient is not nervous/anxious.        Objective:      Physical Exam   Constitutional: She is oriented to person, place, and time. She appears well-developed and well-nourished.  Non-toxic appearance. No distress.   HENT:   Head: Normocephalic and atraumatic.   Right Ear: Tympanic membrane, external ear and ear canal normal.   Left Ear: Tympanic membrane, external ear and ear canal normal.   Eyes: EOM are normal. Pupils are equal, round, and reactive to light. No scleral icterus.   Neck: Normal range of motion. Neck supple. No thyromegaly present. "   Cardiovascular: Normal rate, regular rhythm and normal heart sounds.    Pulmonary/Chest: Effort normal. She has decreased breath sounds in the right lower field and the left lower field. She has wheezes in the right upper field, the right middle field and the right lower field. She has no rhonchi. She has no rales.   Abdominal: Soft. Bowel sounds are normal. She exhibits no mass. There is no tenderness. There is no rebound.   Musculoskeletal: Normal range of motion.   Lymphadenopathy:     She has no cervical adenopathy.   Neurological: She is alert and oriented to person, place, and time. She has normal reflexes. She displays normal reflexes. No cranial nerve deficit. She exhibits normal muscle tone. Coordination normal.   Skin: Skin is warm and dry.   Psychiatric: She has a normal mood and affect. Her behavior is normal.       Assessment:       1. Hypertension, unspecified type    2. Chronic obstructive pulmonary disease, unspecified COPD type        Plan:   [place] was seen today for hypertension.    Diagnoses and all orders for this visit:    Hypertension, unspecified type  -     CBC auto differential; Future  -     Comprehensive metabolic panel; Future    Chronic obstructive pulmonary disease, unspecified COPD type  -     Ambulatory consult to Pulmonology    Other orders  -     lisinopril 10 MG tablet; Take 1 tablet (10 mg total) by mouth once daily.

## 2018-08-07 ENCOUNTER — TELEPHONE (OUTPATIENT)
Dept: PULMONOLOGY | Facility: CLINIC | Age: 78
End: 2018-08-07

## 2018-08-07 DIAGNOSIS — R06.02 SOB (SHORTNESS OF BREATH): Primary | ICD-10-CM

## 2018-08-17 ENCOUNTER — HOSPITAL ENCOUNTER (OUTPATIENT)
Dept: PULMONOLOGY | Facility: CLINIC | Age: 78
Discharge: HOME | End: 2018-08-17
Payer: MEDICARE

## 2018-08-17 ENCOUNTER — OFFICE VISIT (OUTPATIENT)
Dept: PULMONOLOGY | Facility: CLINIC | Age: 78
End: 2018-08-17
Payer: MEDICARE

## 2018-08-17 VITALS
DIASTOLIC BLOOD PRESSURE: 84 MMHG | OXYGEN SATURATION: 99 % | HEART RATE: 91 BPM | HEIGHT: 63 IN | WEIGHT: 113 LBS | SYSTOLIC BLOOD PRESSURE: 152 MMHG | BODY MASS INDEX: 20.02 KG/M2

## 2018-08-17 DIAGNOSIS — J44.9 CHRONIC OBSTRUCTIVE PULMONARY DISEASE, UNSPECIFIED COPD TYPE: Primary | ICD-10-CM

## 2018-08-17 DIAGNOSIS — R06.02 SOB (SHORTNESS OF BREATH): ICD-10-CM

## 2018-08-17 LAB
PRE FEV1 FVC: 56
PRE FEV1: 1.08
PRE FVC: 1.93
PREDICTED FEV1 FVC: 77
PREDICTED FEV1: 1.96
PREDICTED FVC: 2.59

## 2018-08-17 PROCEDURE — 94010 BREATHING CAPACITY TEST: CPT | Mod: PBBFAC | Performed by: INTERNAL MEDICINE

## 2018-08-17 PROCEDURE — 94010 BREATHING CAPACITY TEST: CPT | Mod: 26,S$PBB,, | Performed by: INTERNAL MEDICINE

## 2018-08-17 PROCEDURE — 99204 OFFICE O/P NEW MOD 45 MIN: CPT | Mod: 25,S$PBB,, | Performed by: INTERNAL MEDICINE

## 2018-08-17 PROCEDURE — 99213 OFFICE O/P EST LOW 20 MIN: CPT | Mod: PBBFAC,25 | Performed by: INTERNAL MEDICINE

## 2018-08-17 PROCEDURE — 99999 PR PBB SHADOW E&M-EST. PATIENT-LVL III: CPT | Mod: PBBFAC,,, | Performed by: INTERNAL MEDICINE

## 2018-08-17 PROCEDURE — 94729 DIFFUSING CAPACITY: CPT | Mod: PBBFAC | Performed by: INTERNAL MEDICINE

## 2018-08-17 PROCEDURE — 94729 DIFFUSING CAPACITY: CPT | Mod: 26,S$PBB,, | Performed by: INTERNAL MEDICINE

## 2018-08-17 NOTE — LETTER
August 17, 2018      Sushant Griffith, NP  [address] [facility] [place] [address]           [facility]monary [facility]  [address]  [place] [address]  Phone: [phone]          Patient: Sangita Logan   MR Number: [id number]   YOB: 1940   Date of Visit: 8/17/2018       Dear Sushant Griffith:    Thank you for referring Sangita Logan to me for evaluation. Attached you will find relevant portions of my assessment and plan of care.    If you have questions, please do not hesitate to call me. I look forward to following Sangita Logan along with you.    Sincerely,    Barbara Trevino MD    Enclosure  CC:  No Recipients    If you would like to receive this communication electronically, please contact [email or web address] or [phone] to request more information on [employer] access.    For providers and/or their staff who would like to refer a patient to [facility], please contact us through our one-stop-shop provider referral line, [facility], at [phone].    If you feel you have received this communication in error or would no longer like to receive these types of communications, please e-mail [email or web address]

## 2018-08-17 NOTE — PROGRESS NOTES
Subjective:       Patient ID: Sangita Logan is a 77 y.o. female.    Chief Complaint: Shortness of Breath and Wheezing    77 year old female with sinus disease who presents for follow up for dyspnea on exertion. Patient is a current 1 pack day smoker(50+pack/years). Patient states she has been to urgent care multiple times for SOB.   She is currently on albuterol.   Urgent care suggested she see Pulmonary.   Some clear sputum production.   Non productive cough.   Denies hemoptysis or unintentional weight loss.   She has quit smoking many times but restarts.       Review of Systems   Constitutional: Negative for weight loss, weight gain, activity change and appetite change.   HENT: Negative for postnasal drip and congestion.    Respiratory: Positive for cough, sputum production and dyspnea on extertion. Negative for apnea and somnolence.    Cardiovascular: Negative for chest pain and leg swelling.   Endocrine: Negative for cold intolerance and heat intolerance.    Musculoskeletal: Negative for arthralgias and back pain.   Skin: Negative for rash.   Gastrointestinal: Negative for abdominal pain and abdominal distention.   Neurological: Negative for dizziness and headaches.   Psychiatric/Behavioral: Negative for confusion. The patient is not nervous/anxious.        ,  Past Medical History:   Diagnosis Date    Osteoporosis     Sinus disease      Past Surgical History:   Procedure Laterality Date    Breast implants removal  2012    SINUS SURGERY      TONSILLECTOMY      Age 1     Social History     Socioeconomic History    Marital status:      Spouse name: Not on file    Number of children: Not on file    Years of education: Not on file    Highest education level: Not on file   Social Needs    Financial resource strain: Not on file    Food insecurity - worry: Not on file    Food insecurity - inability: Not on file    Transportation needs - medical: Not on file    Transportation needs -  non-medical: Not on file   Occupational History    Not on file   Tobacco Use    Smoking status: Current Every Day Smoker     Packs/day: 0.25     Years: 30.00     Pack years: 7.50     Types: Cigarettes    Smokeless tobacco: Never Used    Tobacco comment: 2 packs a week   Substance and Sexual Activity    Alcohol use: Yes     Comment: socially    Drug use: Yes     Types: Marijuana    Sexual activity: Not on file   Other Topics Concern    Not on file   Social History Narrative    Not on file     Family History   Problem Relation Age of Onset    No Known Problems Mother     No Known Problems Father     Blindness Neg Hx     Glaucoma Neg Hx     Macular degeneration Neg Hx     Retinal detachment Neg Hx      Review of patient's allergies indicates:   Allergen Reactions    No known drug allergies        Objective:      Physical Exam   Constitutional: She is oriented to person, place, and time. She appears well-developed and well-nourished. No distress.   HENT:   Head: Normocephalic.   Nose: Nose normal.   Neck: Normal range of motion. Neck supple.   Cardiovascular: Normal rate and regular rhythm.   Pulmonary/Chest: Normal expansion, symmetric chest wall expansion and breath sounds normal. She has no wheezes. She has no rhonchi.   Abdominal: Soft. Bowel sounds are normal. She exhibits no distension. There is no tenderness.   Musculoskeletal: She exhibits no edema or deformity.   Neurological: She is alert and oriented to person, place, and time.   Skin: Skin is warm and dry. She is not diaphoretic.   Psychiatric: She has a normal mood and affect. Her behavior is normal.   Nursing note and vitals reviewed.    Personal Diagnostic Review  Pulmonary function tests: FEV1: 1.08  (55 % predicted), FVC:  1.93 (76 % predicted), FEV1/FVC:  56, DLCO: 14.2 (87 % predicted)  No flowsheet data found.      Assessment:       1. Chronic obstructive pulmonary disease, unspecified COPD type        Outpatient Encounter Medications  as of 8/17/2018   Medication Sig Dispense Refill    albuterol 90 mcg/actuation inhaler Inhale 2 puffs into the lungs every 6 (six) hours as needed for Wheezing. Rescue 18 g 0    lisinopril 10 MG tablet Take 1 tablet (10 mg total) by mouth once daily. 90 tablet 3    umeclidinium-vilanterol (ANORO ELLIPTA) 62.5-25 mcg/actuation DsDv Inhale 1 puff into the lungs once daily. Controller 1 each 5     No facility-administered encounter medications on file as of 8/17/2018.      No orders of the defined types were placed in this encounter.    Plan:       77 year old female with     1)COPD- Patient with moderate obstruction on spirometry with normal DLCO. Patient continues to smoke. Given her symptoms, patient is GOLD group B with persistent symptoms.   -LAMA/LABA(Anoro) started.   -Albuterol PRN    2)Smoking Cessation-  Patient does not want to enroll in smoking cessation at this time. Counsled on cessation.     Follow up in 6 months with PFTs and 6 minute walk.     Barbara Trevino MD

## 2019-01-04 ENCOUNTER — TELEPHONE (OUTPATIENT)
Dept: PULMONOLOGY | Facility: CLINIC | Age: 79
End: 2019-01-04

## 2019-01-04 NOTE — TELEPHONE ENCOUNTER
----- Message from Mimi Bermudez sent at 1/4/2019 12:49 PM CST -----  Contact:   Pt      [phone]  Needs Advice    Reason for call:   Schedule  An follow up appt with the          Communication Preference:  Phone     Additional Information:

## 2019-01-04 NOTE — TELEPHONE ENCOUNTER
Spoke with patient informed her that currently Dr. Trevino does not have any appointments at the moment and his 2019 schedule has not been completely open. Patient will be placed on the wait list for when something becomes available.

## 2019-01-25 DIAGNOSIS — R06.02 SOB (SHORTNESS OF BREATH): Primary | ICD-10-CM

## 2019-02-27 ENCOUNTER — OFFICE VISIT (OUTPATIENT)
Dept: PULMONOLOGY | Facility: CLINIC | Age: 79
End: 2019-02-27
Payer: MEDICARE

## 2019-02-27 ENCOUNTER — HOSPITAL ENCOUNTER (OUTPATIENT)
Dept: PULMONOLOGY | Facility: CLINIC | Age: 79
Discharge: HOME | End: 2019-02-27
Payer: MEDICARE

## 2019-02-27 VITALS
BODY MASS INDEX: 20.86 KG/M2 | SYSTOLIC BLOOD PRESSURE: 120 MMHG | DIASTOLIC BLOOD PRESSURE: 78 MMHG | WEIGHT: 117.75 LBS | BODY MASS INDEX: 20.73 KG/M2 | HEIGHT: 63 IN | OXYGEN SATURATION: 96 % | WEIGHT: 117 LBS | HEIGHT: 63 IN | HEART RATE: 90 BPM

## 2019-02-27 DIAGNOSIS — R06.02 SOB (SHORTNESS OF BREATH): ICD-10-CM

## 2019-02-27 DIAGNOSIS — J44.9 CHRONIC OBSTRUCTIVE PULMONARY DISEASE, UNSPECIFIED COPD TYPE: ICD-10-CM

## 2019-02-27 DIAGNOSIS — Z71.6 ENCOUNTER FOR TOBACCO USE CESSATION COUNSELING: ICD-10-CM

## 2019-02-27 LAB
POST FEV1 FVC: 0.57
POST FEV1: 1.3
POST FVC: 2.3
PRE FEV1 FVC: 58
PRE FEV1: 1.12
PRE FVC: 1.93
PREDICTED FEV1 FVC: 77
PREDICTED FEV1: 1.94
PREDICTED FVC: 2.57

## 2019-02-27 PROCEDURE — 99999 PR PBB SHADOW E&M-EST. PATIENT-LVL III: ICD-10-PCS | Mod: PBBFAC,,, | Performed by: INTERNAL MEDICINE

## 2019-02-27 PROCEDURE — 94060 EVALUATION OF WHEEZING: CPT | Mod: 26,S$PBB,, | Performed by: INTERNAL MEDICINE

## 2019-02-27 PROCEDURE — 94729 PR C02/MEMBANE DIFFUSE CAPACITY: ICD-10-PCS | Mod: 26,S$PBB,, | Performed by: INTERNAL MEDICINE

## 2019-02-27 PROCEDURE — 94618 PULMONARY STRESS TESTING: CPT | Mod: PBBFAC | Performed by: INTERNAL MEDICINE

## 2019-02-27 PROCEDURE — 99213 OFFICE O/P EST LOW 20 MIN: CPT | Mod: PBBFAC,25 | Performed by: INTERNAL MEDICINE

## 2019-02-27 PROCEDURE — 94060 PR EVAL OF BRONCHOSPASM: ICD-10-PCS | Mod: 26,S$PBB,, | Performed by: INTERNAL MEDICINE

## 2019-02-27 PROCEDURE — 99214 OFFICE O/P EST MOD 30 MIN: CPT | Mod: 25,S$PBB,, | Performed by: INTERNAL MEDICINE

## 2019-02-27 PROCEDURE — 94729 DIFFUSING CAPACITY: CPT | Mod: PBBFAC | Performed by: INTERNAL MEDICINE

## 2019-02-27 PROCEDURE — 94729 DIFFUSING CAPACITY: CPT | Mod: 26,S$PBB,, | Performed by: INTERNAL MEDICINE

## 2019-02-27 PROCEDURE — 94060 EVALUATION OF WHEEZING: CPT | Mod: PBBFAC | Performed by: INTERNAL MEDICINE

## 2019-02-27 PROCEDURE — 99406 BEHAV CHNG SMOKING 3-10 MIN: CPT | Mod: S$PBB,,, | Performed by: INTERNAL MEDICINE

## 2019-02-27 PROCEDURE — 99406 PR TOBACCO USE CESSATION INTERMEDIATE 3-10 MINUTES: ICD-10-PCS | Mod: S$PBB,,, | Performed by: INTERNAL MEDICINE

## 2019-02-27 PROCEDURE — 99214 PR OFFICE/OUTPT VISIT, EST, LEVL IV, 30-39 MIN: ICD-10-PCS | Mod: 25,S$PBB,, | Performed by: INTERNAL MEDICINE

## 2019-02-27 PROCEDURE — 99999 PR PBB SHADOW E&M-EST. PATIENT-LVL III: CPT | Mod: PBBFAC,,, | Performed by: INTERNAL MEDICINE

## 2019-02-27 NOTE — PROGRESS NOTES
"Subjective:       Patient ID: Sangita Logan is a 78 y.o. female.    Chief Complaint: Follow-up and COPD    77 year old female with sinus disease who presents for follow up for dyspnea on exertion. Patient is a current 1 pack day smoker(50+pack/years). Patient states she has been to urgent care multiple times for SOB.   She is currently on albuterol.   Urgent care suggested she see Pulmonary.   Some clear sputum production.   Non productive cough.   Denies hemoptysis or unintentional weight loss.   She has quit smoking many times but restarts.     Interval hx: Patient doing well. No respiratory complaints.   She continues to smoke 3 cigarettes a day.   Anoro ran out 1 week ago.   Denies dyspnea on exertion.       Review of Systems   Respiratory: Negative for sputum production, shortness of breath, wheezing and dyspnea on extertion.        Objective:       Vitals:    02/27/19 1043   BP: 120/78   Pulse: 90   SpO2: 96%   Weight: 53.1 kg (117 lb)   Height: 5' 3" (1.6 m)       Physical Exam   Constitutional: She appears well-developed and well-nourished.   Cardiovascular: Normal rate and regular rhythm.   Pulmonary/Chest: Normal expansion, symmetric chest wall expansion and breath sounds normal. She has no wheezes. She has no rhonchi.   Nursing note and vitals reviewed.    Personal Diagnostic Review  Pulmonary function tests: Moderate obstruction. Normal DLCO. Similar to her last PFT in August 2018.   No desaturation on 6 minute walk.   No flowsheet data found.      Assessment:       No diagnosis found.    Outpatient Encounter Medications as of 2/27/2019   Medication Sig Dispense Refill    lisinopril 10 MG tablet Take 1 tablet (10 mg total) by mouth once daily. 90 tablet 3    albuterol 90 mcg/actuation inhaler Inhale 2 puffs into the lungs every 6 (six) hours as needed for Wheezing. Rescue 18 g 0    umeclidinium-vilanterol (ANORO ELLIPTA) 62.5-25 mcg/actuation DsDv Inhale 1 puff into the lungs once daily. " Controller 1 each 5    [DISCONTINUED] umeclidinium-vilanterol (ANORO ELLIPTA) 62.5-25 mcg/actuation DsDv Inhale 1 puff into the lungs once daily. Controller 1 each 5     No facility-administered encounter medications on file as of 2/27/2019.      No orders of the defined types were placed in this encounter.      Plan:       COPD (chronic obstructive pulmonary disease)  Patient with stable moderate COPD. Patient continues to smoke but has decreased amount significantly.   -Continue Anoro.     Encounter for tobacco use cessation counseling  Patient continues to decrease amount of smoking. Down to 3 cigarettes.   Counseled for greater than 5 minutes.     Anoro refilled.     Follow up  1 year.     Barbara Trevino MD

## 2019-02-27 NOTE — ASSESSMENT & PLAN NOTE
Patient continues to decrease amount of smoking. Down to 3 cigarettes.   Counseled for greater than 5 minutes.

## 2019-02-27 NOTE — ASSESSMENT & PLAN NOTE
Patient with stable moderate COPD. Patient continues to smoke but has decreased amount significantly.   -Continue Anoro.

## 2019-02-27 NOTE — PROCEDURES
Sangita Logan is a 78 y.o.  female patient, who presents for a 6 minute walk test ordered by Barbara Trevino MD.  The diagnosis is Qualify for Oxygen; COPD..  The patient's BMI is 20.9 kg/m2.  Predicted distance (lower limit of normal) is 281.18 meters.      Test Results:    The test was completed without stopping.  The total time walked was 360 seconds.  During walking, the patient reported:  No complaints.  The patient used no assistive devices during testing.     02/27/2019---------Distance: 396.24 meters (1300 feet)     O2 Sat % Supplemental Oxygen Heart Rate Blood Pressure Rony Scale   Pre-exercise  (Resting) 97 % Room Air 86 bpm 150/72 mmHg 1   During Exercise 98 % Room Air 105 bpm 153/73 mmHg 2   Post-exercise  (Recovery) 98 % Room Air  93 bpm       Recovery Time:  68 seconds    Performing nurse/tech:  Narendra VARGHESE      PREVIOUS STUDY:   The patient has not had a previous study.      CLINICAL INTERPRETATION:  Six minute walk distance is 396.24 meters (1300 feet) with light dyspnea.  During exercise, there was no desaturation while breathing room air.  Both blood pressure and heart rate remained stable with walking.  Hypertension was present prior to exercise.  The patient did not report non-pulmonary symptoms during exercise.  No previous study performed.  Based upon age and body mass index, exercise capacity is normal.

## 2019-07-03 ENCOUNTER — OFFICE VISIT (OUTPATIENT)
Dept: URGENT CARE | Facility: CLINIC | Age: 79
End: 2019-07-03
Payer: MEDICARE

## 2019-07-03 VITALS
RESPIRATION RATE: 16 BRPM | SYSTOLIC BLOOD PRESSURE: 137 MMHG | HEART RATE: 98 BPM | BODY MASS INDEX: 20.73 KG/M2 | DIASTOLIC BLOOD PRESSURE: 85 MMHG | TEMPERATURE: 97 F | WEIGHT: 117 LBS | HEIGHT: 63 IN | OXYGEN SATURATION: 98 %

## 2019-07-03 DIAGNOSIS — L98.9 SKIN LESION OF FACE: ICD-10-CM

## 2019-07-03 DIAGNOSIS — T16.2XXA FOREIGN BODY IN AURICLE OF LEFT EAR, INITIAL ENCOUNTER: ICD-10-CM

## 2019-07-03 DIAGNOSIS — H61.22 IMPACTED CERUMEN OF LEFT EAR: Primary | ICD-10-CM

## 2019-07-03 PROCEDURE — 99214 OFFICE O/P EST MOD 30 MIN: CPT | Mod: 25,S$GLB,, | Performed by: NURSE PRACTITIONER

## 2019-07-03 PROCEDURE — 69209 EAR CERUMEN REMOVAL: ICD-10-PCS | Mod: LT,S$GLB,, | Performed by: NURSE PRACTITIONER

## 2019-07-03 PROCEDURE — 69209 REMOVE IMPACTED EAR WAX UNI: CPT | Mod: LT,S$GLB,, | Performed by: NURSE PRACTITIONER

## 2019-07-03 PROCEDURE — 99214 PR OFFICE/OUTPT VISIT, EST, LEVL IV, 30-39 MIN: ICD-10-PCS | Mod: 25,S$GLB,, | Performed by: NURSE PRACTITIONER

## 2019-07-03 NOTE — PATIENT INSTRUCTIONS
Earwax Removal    The ear canal makes earwax from the canals lining. The ears make wax to lubricate and protect the ear canal. The ear canal is the tube that connects the middle ear to the outside of the ear. The wax protects the ear from bacteria, infection, and damage from water or trauma.  The wax that forms in the canal naturally moves toward the outside of the ear and falls out. In some cases, the ear may make too much wax. If the wax causes problems or keeps the healthcare provider from seeing into the ear, the extra wax may be removed.  Too much wax can affect your hearing. It can cause itching. In rare cases, it can be painful. Earwax should not be removed unless it is causing a problem. You should not stick objects into your ear to remove wax unless told to do so by your healthcare provider.  Healthcare providers can remove earwax safely. It is important to stay still during the procedure to avoid damage to the ear canal. But removing earwax generally doesnt hurt. You will not usually need anesthesia or pain medicine when the provider removes the earwax.  A number of conditions lead to earwax buildup. These include some skin problems, a narrow ear canal, or ears that make too much earwax. Using cotton swabs in the canal pushes earwax deeper into the ear and contributes to the buildup of earwax.  Home care  · The healthcare provider may recommend mineral oil or an over-the-counter eardrop to use at home to soften the earwax. Use these products only if the provider recommends them. Use these products only if the provider recommends them. Carefully follow the instructions given.  · Dont use mineral oil or OTC eardrops if you might have an ear infection or a ruptured eardrum. Tell your healthcare provider right away if you have diabetes or an immune disorder.  · Dont use cotton swabs in your ears. Cotton swabs may push wax deeper into the ear canal or damage the eardrum. Use cotton gauze or a wet  washcloth  to gently remove wax on the outside of the ear and around the opening to the ear canal.  · Don't use any probing device or object such as cotton-tipped swabs or aster pins to clean the inside of your ears.  · Dont use ear candles to clean your ears. Candling can be dangerous. It can burn the ear canal. It can also make the condition worse instead of better.  · Dont use cold water to rinse the ear. This will make you dizzy. If your provider tells you to rinse your ear, use only warm water or follow his or her instructions.  · Check the ear for signs of infection or irritation listed below under When to seek medical advice.  Steps for using eardrops  1. Warm the medicine bottle by rubbing it between your hands for a few minutes.  2. Lie down on your side, with the affected ear up.  3. Place the recommended number of drops in the ear. Wet a cotton ball with the medicine. Gently put the cotton ball into the ear opening.  Follow-up care  Follow up with your healthcare provider, or as directed.  When to seek medical advice  Call the provider right away if you have:  · Ear pain that gets worse  · Fever of 100.4F°F (38°C) or higher, or as directed by your healthcare provider  · Worsening wax buildup  · Severe pain, dizziness, or nausea  · Bleeding from the ear  · Hearing problems  · Signs of irritation from the eardrops, such as burning, stinging, or swelling and tenderness  · Foul-smelling fluid draining from the ear  · Swelling, redness, or tenderness of the outer ear  · Headache, neck pain, or stiff neck  Date Last Reviewed: 3/22/2015  © 7249-9339 [employer]. [address] [place] [address], [place] [address]. All rights reserved. This information is not intended as a substitute for professional medical care. Always follow your healthcare professional's instructions.

## 2019-07-03 NOTE — PROGRESS NOTES
"Subjective:       Patient ID: Sangita Logan is a 78 y.o. female.    Vitals:  height is 5' 3" (1.6 m) and weight is 53.1 kg (117 lb). Her temperature is 97.2 °F (36.2 °C). Her blood pressure is 137/85 and her pulse is 98. Her respiration is 16 and oxygen saturation is 98%.     Chief Complaint: Ear Fullness    Pt is complaining of bilateral ear fullness. This occurred 2 months ago. She stated her ear is now tender. Denies using anything OTC.     She also also has a mole on her left cheek that has been changing. She noticed it a year ago. It started off as a black freckle and is now raised and growing.     Ear Fullness    There is pain in both ears. This is a new problem. The current episode started more than 1 month ago. The problem occurs constantly. The problem has been gradually worsening. There has been no fever. The pain is at a severity of 2/10. The pain is mild. Pertinent negatives include no abdominal pain, coughing, diarrhea, ear discharge, headaches, hearing loss, neck pain, rash, rhinorrhea, sore throat or vomiting. She has tried nothing for the symptoms. There is no history of a chronic ear infection, hearing loss or a tympanostomy tube.       Constitution: Negative for chills, fatigue and fever.   HENT: Negative for ear discharge, hearing loss, congestion and sore throat.         Ear fullness   Neck: Negative for neck pain and painful lymph nodes.   Cardiovascular: Negative for chest pain and leg swelling.   Eyes: Negative for double vision and blurred vision.   Respiratory: Negative for cough and shortness of breath.    Gastrointestinal: Negative for abdominal pain, nausea, vomiting and diarrhea.   Genitourinary: Negative for dysuria, frequency, urgency and history of kidney stones.   Musculoskeletal: Negative for joint pain, joint swelling, muscle cramps and muscle ache.   Skin: Positive for color change. Negative for pale, rash and bruising.   Allergic/Immunologic: Negative for seasonal " allergies.   Neurological: Negative for dizziness, history of vertigo, light-headedness, passing out and headaches.   Hematologic/Lymphatic: Negative for swollen lymph nodes.   Psychiatric/Behavioral: Negative for nervous/anxious, sleep disturbance and depression. The patient is not nervous/anxious.        Objective:      Physical Exam   Constitutional: She is oriented to person, place, and time. She appears well-developed and well-nourished. She is cooperative.  Non-toxic appearance. She does not appear ill. No distress.   HENT:   Head: Normocephalic and atraumatic.       Right Ear: Hearing, tympanic membrane, external ear and ear canal normal.   Left Ear: Hearing and external ear normal. There is drainage.   Nose: Nose normal. No mucosal edema, rhinorrhea or nasal deformity. No epistaxis. Right sinus exhibits no maxillary sinus tenderness and no frontal sinus tenderness. Left sinus exhibits no maxillary sinus tenderness and no frontal sinus tenderness.   Mouth/Throat: Uvula is midline, oropharynx is clear and moist and mucous membranes are normal. No trismus in the jaw. Normal dentition. No uvula swelling. No posterior oropharyngeal erythema.   Cerumen impaction of left canal   Eyes: Conjunctivae and lids are normal. No scleral icterus.   Sclera clear bilat   Neck: Trachea normal, full passive range of motion without pain and phonation normal. Neck supple.   Cardiovascular: Normal rate, regular rhythm, normal heart sounds, intact distal pulses and normal pulses.   Pulmonary/Chest: Effort normal and breath sounds normal. No respiratory distress.   Abdominal: Normal appearance.   Musculoskeletal: Normal range of motion. She exhibits no edema or deformity.   Neurological: She is alert and oriented to person, place, and time. She exhibits normal muscle tone. Coordination normal.   Skin: Skin is warm, dry and intact. She is not diaphoretic. No pallor.   Psychiatric: She has a normal mood and affect. Her speech is  normal and behavior is normal. Judgment and thought content normal. Cognition and memory are normal.   Nursing note and vitals reviewed.      Left canal free of debris/cerumen, but there is a gray mass still present we are unable to dislodge.  Referral placed to ENT for examination.     Assessment:       1. Impacted cerumen of left ear    2. Skin lesion of face    3. Foreign body in auricle of left ear, initial encounter        Plan:         Impacted cerumen of left ear  -     Ear wax removal    Skin lesion of face  -     Ambulatory referral to Dermatology    Foreign body in auricle of left ear, initial encounter  -     Ambulatory referral to ENT      Patient Instructions     Earwax Removal    The ear canal makes earwax from the canals lining. The ears make wax to lubricate and protect the ear canal. The ear canal is the tube that connects the middle ear to the outside of the ear. The wax protects the ear from bacteria, infection, and damage from water or trauma.  The wax that forms in the canal naturally moves toward the outside of the ear and falls out. In some cases, the ear may make too much wax. If the wax causes problems or keeps the healthcare provider from seeing into the ear, the extra wax may be removed.  Too much wax can affect your hearing. It can cause itching. In rare cases, it can be painful. Earwax should not be removed unless it is causing a problem. You should not stick objects into your ear to remove wax unless told to do so by your healthcare provider.  Healthcare providers can remove earwax safely. It is important to stay still during the procedure to avoid damage to the ear canal. But removing earwax generally doesnt hurt. You will not usually need anesthesia or pain medicine when the provider removes the earwax.  A number of conditions lead to earwax buildup. These include some skin problems, a narrow ear canal, or ears that make too much earwax. Using cotton swabs in the canal pushes earwax  deeper into the ear and contributes to the buildup of earwax.  Home care  · The healthcare provider may recommend mineral oil or an over-the-counter eardrop to use at home to soften the earwax. Use these products only if the provider recommends them. Use these products only if the provider recommends them. Carefully follow the instructions given.  · Dont use mineral oil or OTC eardrops if you might have an ear infection or a ruptured eardrum. Tell your healthcare provider right away if you have diabetes or an immune disorder.  · Dont use cotton swabs in your ears. Cotton swabs may push wax deeper into the ear canal or damage the eardrum. Use cotton gauze or a wet washcloth  to gently remove wax on the outside of the ear and around the opening to the ear canal.  · Don't use any probing device or object such as cotton-tipped swabs or aster pins to clean the inside of your ears.  · Dont use ear candles to clean your ears. Candling can be dangerous. It can burn the ear canal. It can also make the condition worse instead of better.  · Dont use cold water to rinse the ear. This will make you dizzy. If your provider tells you to rinse your ear, use only warm water or follow his or her instructions.  · Check the ear for signs of infection or irritation listed below under When to seek medical advice.  Steps for using eardrops  1. Warm the medicine bottle by rubbing it between your hands for a few minutes.  2. Lie down on your side, with the affected ear up.  3. Place the recommended number of drops in the ear. Wet a cotton ball with the medicine. Gently put the cotton ball into the ear opening.  Follow-up care  Follow up with your healthcare provider, or as directed.  When to seek medical advice  Call the provider right away if you have:  · Ear pain that gets worse  · Fever of 100.4F°F (38°C) or higher, or as directed by your healthcare provider  · Worsening wax buildup  · Severe pain, dizziness, or nausea  · Bleeding  from the ear  · Hearing problems  · Signs of irritation from the eardrops, such as burning, stinging, or swelling and tenderness  · Foul-smelling fluid draining from the ear  · Swelling, redness, or tenderness of the outer ear  · Headache, neck pain, or stiff neck  Date Last Reviewed: 3/22/2015  © 7874-7596 [employer]. [address] [place] [address]. All rights reserved. This information is not intended as a substitute for professional medical care. Always follow your healthcare professional's instructions.

## 2019-07-03 NOTE — PROCEDURES
"Ear Cerumen Removal  Date/Time: 7/3/2019 5:25 PM  Performed by: Buster Murillo NP  Authorized by: Buster Murillo NP     Time out: Immediately prior to procedure a "time out" was called to verify the correct patient, procedure, equipment, support staff and site/side marked as required.    Consent Done?:  Yes (Verbal)    Local anesthetic:  None  Ceruminolytics applied: Ceruminolytics applied prior to the procedure    Medication Used:  Debrox  Location details:  Left ear  Procedure type: irrigation    Cerumen  Removal Results:  Cerumen completely removed  Patient tolerance:  Patient tolerated the procedure well with no immediate complications     Cerumen removed from left canal, but gray mass still present and unable to dislodge.      "

## 2019-07-05 RX ORDER — LISINOPRIL 10 MG/1
TABLET ORAL
Qty: 90 TABLET | Refills: 0 | Status: SHIPPED | OUTPATIENT
Start: 2019-07-05 | End: 2020-06-05 | Stop reason: SDUPTHER

## 2019-07-06 ENCOUNTER — TELEPHONE (OUTPATIENT)
Dept: URGENT CARE | Facility: CLINIC | Age: 79
End: 2019-07-06

## 2019-07-30 ENCOUNTER — OFFICE VISIT (OUTPATIENT)
Dept: OTOLARYNGOLOGY | Facility: CLINIC | Age: 79
End: 2019-07-30
Payer: MEDICARE

## 2019-07-30 VITALS — HEIGHT: 63 IN | WEIGHT: 109.81 LBS | BODY MASS INDEX: 19.46 KG/M2

## 2019-07-30 DIAGNOSIS — H61.22 HEARING LOSS DUE TO CERUMEN IMPACTION, LEFT: Primary | ICD-10-CM

## 2019-07-30 PROCEDURE — 69210 EAR CERUMEN REMOVAL: ICD-10-PCS | Mod: S$PBB,,, | Performed by: OTOLARYNGOLOGY

## 2019-07-30 PROCEDURE — 99499 NO LOS: ICD-10-PCS | Mod: S$PBB,,, | Performed by: OTOLARYNGOLOGY

## 2019-07-30 PROCEDURE — 99999 PR PBB SHADOW E&M-EST. PATIENT-LVL II: CPT | Mod: PBBFAC,,, | Performed by: OTOLARYNGOLOGY

## 2019-07-30 PROCEDURE — 99999 PR PBB SHADOW E&M-EST. PATIENT-LVL II: ICD-10-PCS | Mod: PBBFAC,,, | Performed by: OTOLARYNGOLOGY

## 2019-07-30 PROCEDURE — 69210 REMOVE IMPACTED EAR WAX UNI: CPT | Mod: PBBFAC | Performed by: OTOLARYNGOLOGY

## 2019-07-30 PROCEDURE — 99499 UNLISTED E&M SERVICE: CPT | Mod: S$PBB,,, | Performed by: OTOLARYNGOLOGY

## 2019-07-30 PROCEDURE — 99212 OFFICE O/P EST SF 10 MIN: CPT | Mod: PBBFAC | Performed by: OTOLARYNGOLOGY

## 2019-07-30 NOTE — LETTER
July 30, 2019      Buster Murillo NP  [address]  [place] [address]  [facility] [address]           [facility] - Otorhinolaryngology  [address]  [facility] [address]  Phone: [phone]  Fax: [phone]          Patient: Sangita Logan   MR Number: [id number]   YOB: 1940   Date of Visit: 7/30/2019       Dear Buster Murillo:    Thank you for referring Sangita Logan to me for evaluation. Attached you will find relevant portions of my assessment and plan of care.    If you have questions, please do not hesitate to call me. I look forward to following Sangita Logan along with you.    Sincerely,    Candido Melendez MD    Enclosure  CC:  No Recipients    If you would like to receive this communication electronically, please contact [email or web address] or [phone] to request more information on [employer] access.    For providers and/or their staff who would like to refer a patient to [facility], please contact us through our one-stop-shop provider referral line, [facility], at [phone].    If you feel you have received this communication in error or would no longer like to receive these types of communications, please e-mail [email or web address]

## 2019-07-30 NOTE — PROCEDURES
Ear Cerumen Removal  Date/Time: 7/30/2019 8:49 AM  Performed by: Candido Melendez MD  Authorized by: Candido Melendez MD     Consent Done?:  Yes (Verbal)  Location details:  Left ear  Procedure type: curette    Cerumen  Removal Results:  Cerumen completely removed  Patient tolerance:  Patient tolerated the procedure well with no immediate complications     Binocular microscopy used to examine ear canal and tympanic membrane.  There was excess cerumen on the right side and an impaction of cerumen on the left.  This was removed using a curette and other microinstrumentation.

## 2019-08-01 ENCOUNTER — OFFICE VISIT (OUTPATIENT)
Dept: DERMATOLOGY | Facility: CLINIC | Age: 79
End: 2019-08-01
Payer: MEDICARE

## 2019-08-01 DIAGNOSIS — D22.9 BENIGN NEVUS: ICD-10-CM

## 2019-08-01 DIAGNOSIS — Z12.83 SCREENING EXAM FOR SKIN CANCER: ICD-10-CM

## 2019-08-01 DIAGNOSIS — D48.5 NEOPLASM OF UNCERTAIN BEHAVIOR OF SKIN: Primary | ICD-10-CM

## 2019-08-01 DIAGNOSIS — L81.4 LENTIGO: ICD-10-CM

## 2019-08-01 DIAGNOSIS — L57.0 AK (ACTINIC KERATOSIS): ICD-10-CM

## 2019-08-01 DIAGNOSIS — L82.1 SK (SEBORRHEIC KERATOSIS): ICD-10-CM

## 2019-08-01 PROCEDURE — 99999 PR PBB SHADOW E&M-EST. PATIENT-LVL II: CPT | Mod: PBBFAC,,, | Performed by: DERMATOLOGY

## 2019-08-01 PROCEDURE — 17000 DESTRUCT PREMALG LESION: CPT | Mod: 59,PBBFAC | Performed by: DERMATOLOGY

## 2019-08-01 PROCEDURE — 88305 TISSUE SPECIMEN TO PATHOLOGY, DERMATOLOGY: ICD-10-PCS | Mod: 26,,, | Performed by: PATHOLOGY

## 2019-08-01 PROCEDURE — 88305 TISSUE EXAM BY PATHOLOGIST: CPT | Performed by: PATHOLOGY

## 2019-08-01 PROCEDURE — 17000 PR DESTRUCTION(LASER SURGERY,CRYOSURGERY,CHEMOSURGERY),PREMALIGNANT LESIONS,FIRST LESION: ICD-10-PCS | Mod: 59,S$PBB,, | Performed by: DERMATOLOGY

## 2019-08-01 PROCEDURE — 88342 TISSUE SPECIMEN TO PATHOLOGY, DERMATOLOGY: ICD-10-PCS | Mod: 26,,, | Performed by: PATHOLOGY

## 2019-08-01 PROCEDURE — 88342 IMHCHEM/IMCYTCHM 1ST ANTB: CPT | Mod: 26,,, | Performed by: PATHOLOGY

## 2019-08-01 PROCEDURE — 99212 OFFICE O/P EST SF 10 MIN: CPT | Mod: PBBFAC,25 | Performed by: DERMATOLOGY

## 2019-08-01 PROCEDURE — 17003 DESTRUCT PREMALG LES 2-14: CPT | Mod: 59,PBBFAC | Performed by: DERMATOLOGY

## 2019-08-01 PROCEDURE — 88305 TISSUE EXAM BY PATHOLOGIST: CPT | Mod: 26,,, | Performed by: PATHOLOGY

## 2019-08-01 PROCEDURE — 17000 DESTRUCT PREMALG LESION: CPT | Mod: 59,S$PBB,, | Performed by: DERMATOLOGY

## 2019-08-01 PROCEDURE — 99999 PR PBB SHADOW E&M-EST. PATIENT-LVL II: ICD-10-PCS | Mod: PBBFAC,,, | Performed by: DERMATOLOGY

## 2019-08-01 PROCEDURE — 11102 TANGNTL BX SKIN SINGLE LES: CPT | Mod: PBBFAC,59 | Performed by: DERMATOLOGY

## 2019-08-01 PROCEDURE — 88342 IMHCHEM/IMCYTCHM 1ST ANTB: CPT | Performed by: PATHOLOGY

## 2019-08-01 PROCEDURE — 17003 DESTRUCT PREMALG LES 2-14: CPT | Mod: 59,S$PBB,, | Performed by: DERMATOLOGY

## 2019-08-01 PROCEDURE — 11102 TANGNTL BX SKIN SINGLE LES: CPT | Mod: S$PBB,,, | Performed by: DERMATOLOGY

## 2019-08-01 PROCEDURE — 11102 PR TANGENTIAL BIOPSY, SKIN, SINGLE LESION: ICD-10-PCS | Mod: S$PBB,,, | Performed by: DERMATOLOGY

## 2019-08-01 PROCEDURE — 17003 DESTRUCTION, PREMALIGNANT LESIONS; SECOND THROUGH 14 LESIONS: ICD-10-PCS | Mod: 59,S$PBB,, | Performed by: DERMATOLOGY

## 2019-08-01 PROCEDURE — 99202 PR OFFICE/OUTPT VISIT, NEW, LEVL II, 15-29 MIN: ICD-10-PCS | Mod: 25,S$PBB,, | Performed by: DERMATOLOGY

## 2019-08-01 PROCEDURE — 99202 OFFICE O/P NEW SF 15 MIN: CPT | Mod: 25,S$PBB,, | Performed by: DERMATOLOGY

## 2019-08-01 NOTE — PROGRESS NOTES
Subjective:       Patient ID:  Sangita Logan is a 78 y.o. female who presents for   Chief Complaint   Patient presents with    Skin Check     Patient complains of lesion(s)  Location: left infraorbital  Duration: 1 year  Symptoms: growing  Relieving factors/Previous treatments: none    No h/o nmsc    ++ sun exposure in youth    Wants UBSE        Review of Systems   Skin: Negative for daily sunscreen use, activity-related sunscreen use and recent sunburn.   Hematologic/Lymphatic: Does not bruise/bleed easily.        Objective:    Physical Exam   Constitutional: She appears well-developed and well-nourished. No distress.   Neurological: She is alert and oriented to person, place, and time. She is not disoriented.   Psychiatric: She has a normal mood and affect.   Skin:   Areas Examined (abnormalities noted in diagram):   Head / Face Inspection Performed  Neck Inspection Performed  Chest / Axilla Inspection Performed  Back Inspection Performed  RUE Inspected  LUE Inspection Performed  Nails and Digits Inspection Performed                       Diagram Legend     Erythematous scaling macule/papule c/w actinic keratosis       Vascular papule c/w angioma      Pigmented verrucoid papule/plaque c/w seborrheic keratosis      Yellow umbilicated papule c/w sebaceous hyperplasia      Irregularly shaped tan macule c/w lentigo     1-2 mm smooth white papules consistent with Milia      Movable subcutaneous cyst with punctum c/w epidermal inclusion cyst      Subcutaneous movable cyst c/w pilar cyst      Firm pink to brown papule c/w dermatofibroma      Pedunculated fleshy papule(s) c/w skin tag(s)      Evenly pigmented macule c/w junctional nevus     Mildly variegated pigmented, slightly irregular-bordered macule c/w mildly atypical nevus      Flesh colored to evenly pigmented papule c/w intradermal nevus       Pink pearly papule/plaque c/w basal cell carcinoma      Erythematous hyperkeratotic cursted plaque c/w SCC       Surgical scar with no sign of skin cancer recurrence      Open and closed comedones      Inflammatory papules and pustules      Verrucoid papule consistent consistent with wart     Erythematous eczematous patches and plaques     Dystrophic onycholytic nail with subungual debris c/w onychomycosis     Umbilicated papule    Erythematous-base heme-crusted tan verrucoid plaque consistent with inflamed seborrheic keratosis     Erythematous Silvery Scaling Plaque c/w Psoriasis     See annotation          Assessment / Plan:      Pathology Orders:     Normal Orders This Visit    Tissue Specimen To Pathology, Dermatology     Questions:    Directional Terms:  Other(comment)    Clinical Information:  r/o MM vs atypical nevus    Specific Site:  left post shoulder        Neoplasm of uncertain behavior of skin  -     Tissue Specimen To Pathology, Dermatology    Shave biopsy procedure note:    Shave biopsy performed after verbal consent including risk of infection, scar, recurrence, need for additional treatment of site. Area prepped with alcohol, anesthetized with approximately 1.0cc of 1% lidocaine with epinephrine. Lesional tissue shaved with razor blade. Hemostasis achieved with application of aluminum chloride followed by hyfrecation. No complications. Dressing applied. Wound care explained.        AK (actinic keratosis)  Cryosurgery Procedure Note    Verbal consent from the patient is obtained including, but not limited to, risk of hypopigmentation/hyperpigmentation, scar, recurrence of lesion. The patient is aware of the precancerous quality and need for treatment of these lesions. Liquid nitrogen cryosurgery is applied to the 4 actinic keratoses, as detailed in the physical exam, to produce a freeze injury. The patient is aware that blisters may form and is instructed on wound care with gentle cleansing and use of vaseline ointment to keep moist until healed. The patient is supplied a handout on cryosurgery and is  instructed to call if lesions do not completely resolve.      SK (seborrheic keratosis)  These are benign inherited growths without a malignant potential. Reassurance given to patient. No treatment is necessary.   Treatment of benign, asymptomatic lesions may be considered cosmetic.       Lentigo  This is a benign hyperpigmented sun induced lesion. Daily sun protection will reduce the number of new lesions. Treatment of these benign lesions are considered cosmetic.      Screening exam for skin cancer  Upper body skin examination performed today including at least 6 points as noted in physical examination. Suspicious lesions noted.      Benign nevus  Reassurance given to patient. No treatment is necessary.                Follow up in about 1 year (around 8/1/2020).

## 2019-08-01 NOTE — PATIENT INSTRUCTIONS
" Shave Biopsy Wound Care    Your doctor has performed a shave biopsy today.  A band aid and vaseline ointment has been placed over the site.  This should remain in place for 24 hours.  It is recommended that you keep the area dry for the first 24 hours.  After 24 hours, you may remove the band aid and wash the area with warm soap and water and apply Vaseline jelly.  Many patients prefer to use Neosporin or Bacitracin ointment.  This is acceptable; however, know that you can develop an allergy to this medication even if you have used it safely for years.  It is important to keep the area moist.  Letting it dry out and get air slows healing time, and will worsen the scar.  Band aid is optional after first 24 hours.      If you notice increasing redness, tenderness, pain, or yellow drainage at the biopsy site, please notify your doctor.  These are signs of an infection.    If your biopsy site is bleeding, apply firm pressure for 15 minutes straight.  Repeat for another 15 minutes, if it is still bleeding.   If the surgical site continues to bleed, then please contact your doctor.      For [facility] users:   You will receive a [facility] notification after the pathologist has finished reviewing your biopsy specimen. Pathology results, however, will not be released online so you will see a "no content" message. Once your dermatologist reviews and clinically correlates your biopsy results, you will either receive a letter in the mail with the results of a phone call from your doctor's office if further explanation or treatment is warranted.       [address] [place] [address]/ [phone] FAX/ [email or web address]      CRYOSURGERY      Your doctor has used a method called cryosurgery to treat your skin condition. Cryosurgery refers to the use of very cold substances to treat a variety of skin conditions such as warts, pre-skin cancers, molluscum contagiosum, sun spots, and several benign " growths. The substance we use in cryosurgery is liquid nitrogen and is so cold (-195 degrees Celsius) that is burns when administered.     Following treatment in the office, the skin may immediately burn and become red. You may find the area around the lesion is affected as well. It is sometimes necessary to treat not only the lesion, but a small area of the surrounding normal skin to achieve a good response.     A blister, and even a blood filled blister, may form after treatment.   This is a normal response. If the blister is painful, it is acceptable to sterilize a needle and with rubbing alcohol and gently pop the blister. It is important that you gently wash the area with soap and warm water as the blister fluid may contain wart virus if a wart was treated. Do no remove the roof of the blister.     The area treated can take anywhere from 1-3 weeks to heal. Healing time depends on the kind skin lesion treated, the location, and how aggressively the lesion was treated. It is recommended that the areas treated are covered with Vaseline or bacitracin ointment and a band-aid. If a band-aid is not practical, just ointment applied several times per day will do. Keeping these areas moist will speed the healing time.    Treatment with liquid nitrogen can leave a scar. In dark skin, it may be a light or dark scar, in light skin it may be a white or pink scar. These will generally fade with time, but may never go away completely.     If you have any concerns after your treatment, please feel free to call the office.       [facility] [place] [address]/ [phone] FAX/ [email or web address]    SEBORRHEIC KERATOSES        What causes seborrheic keratoses?    Seborrheic keratoses are harmless, common skin growths that first appear during adult life.  As time goes by, more growths appear.  Some persons have a very large number of them.  Seborrheic keratoses appear on both covered and uncovered parts  of the body; they are not caused by sunlight.  The tendency to develop seborrheic keratoses is inherited.    Seborrheic keratoses are harmless and never become malignant.  They begin as slightly raised, light brown spots.  Gradually they thicken and take on a rough wartlike surface.  They slowly darken and may turn black.  These color changes are harmless.  Seborrheic keratoses are superficial and look as if they were stuck on the skin.  Persons who have had several seborrheic keratoses can usually recognize this type of benign growth.  However, if you are concerned or unsure about any growth, consult me.    Treatment    Seborrheic keratoses can easily be removed in the office.  The only reason for removing a seborrheic keratosis is your wish to get rid of it.

## 2019-08-05 ENCOUNTER — PROCEDURE VISIT (OUTPATIENT)
Dept: DERMATOLOGY | Facility: CLINIC | Age: 79
End: 2019-08-05
Payer: MEDICARE

## 2019-08-05 DIAGNOSIS — Z41.1 ELECTIVE PROCEDURE FOR UNACCEPTABLE COSMETIC APPEARANCE: Primary | ICD-10-CM

## 2019-08-05 PROCEDURE — 17110 DESTRUCTION B9 LES UP TO 14: CPT | Mod: CSM,S$PBB,, | Performed by: DERMATOLOGY

## 2019-08-05 PROCEDURE — 17110 PR DESTRUCTION BENIGN LESIONS UP TO 14: ICD-10-PCS | Mod: CSM,S$PBB,, | Performed by: DERMATOLOGY

## 2019-08-05 PROCEDURE — 99499 NO LOS: ICD-10-PCS | Mod: ,,, | Performed by: DERMATOLOGY

## 2019-08-05 PROCEDURE — 99499 UNLISTED E&M SERVICE: CPT | Mod: ,,, | Performed by: DERMATOLOGY

## 2019-08-05 RX ORDER — MUPIROCIN 20 MG/G
OINTMENT TOPICAL
Qty: 30 G | Refills: 1 | Status: SHIPPED | OUTPATIENT
Start: 2019-08-05 | End: 2021-07-01

## 2019-08-05 NOTE — PROGRESS NOTES
Here for cosmetic destruction of seborrheic keratoses on left cheek.    Procedure note for destruction via hyfrecation and curettage:    Verbal consent obtained. Risks of recurrence and scarring discussed.   1 lesions cleaned with alcohol and anesthetized with 1% lidocaine with epinephrine. Areas then lightly hyfrecated and curetted to remove gross lesion. Hemostasis achieved with aluminum chloride application. No complications.   Areas dressed with Vaseline jelly and bandage. Wound care discussed. $100    Apply bactroban oint bid    Pt tolerated well    F/u prn

## 2019-08-05 NOTE — PATIENT INSTRUCTIONS

## 2020-04-01 ENCOUNTER — HOSPITAL ENCOUNTER (EMERGENCY)
Facility: HOSPITAL | Age: 80
Discharge: HOME | End: 2020-04-01
Attending: EMERGENCY MEDICINE
Payer: MEDICARE

## 2020-04-01 VITALS
OXYGEN SATURATION: 96 % | SYSTOLIC BLOOD PRESSURE: 162 MMHG | RESPIRATION RATE: 16 BRPM | BODY MASS INDEX: 19.31 KG/M2 | HEART RATE: 94 BPM | TEMPERATURE: 98 F | DIASTOLIC BLOOD PRESSURE: 82 MMHG | WEIGHT: 109 LBS

## 2020-04-01 DIAGNOSIS — R00.0 TACHYCARDIA: Primary | ICD-10-CM

## 2020-04-01 DIAGNOSIS — R00.2 PALPITATIONS: ICD-10-CM

## 2020-04-01 LAB
ALBUMIN SERPL BCP-MCNC: 4.3 G/DL (ref 3.5–5.2)
ALP SERPL-CCNC: 137 U/L (ref 55–135)
ALT SERPL W/O P-5'-P-CCNC: 22 U/L (ref 10–44)
ANION GAP SERPL CALC-SCNC: 15 MMOL/L (ref 8–16)
AST SERPL-CCNC: 30 U/L (ref 10–40)
BASOPHILS # BLD AUTO: 0.02 K/UL (ref 0–0.2)
BASOPHILS NFR BLD: 0.6 % (ref 0–1.9)
BILIRUB SERPL-MCNC: 0.4 MG/DL (ref 0.1–1)
BUN SERPL-MCNC: 6 MG/DL (ref 8–23)
CALCIUM SERPL-MCNC: 9.1 MG/DL (ref 8.7–10.5)
CHLORIDE SERPL-SCNC: 103 MMOL/L (ref 95–110)
CO2 SERPL-SCNC: 25 MMOL/L (ref 23–29)
CREAT SERPL-MCNC: 0.9 MG/DL (ref 0.5–1.4)
DIFFERENTIAL METHOD: ABNORMAL
EOSINOPHIL # BLD AUTO: 0 K/UL (ref 0–0.5)
EOSINOPHIL NFR BLD: 0 % (ref 0–8)
ERYTHROCYTE [DISTWIDTH] IN BLOOD BY AUTOMATED COUNT: 12.2 % (ref 11.5–14.5)
EST. GFR  (AFRICAN AMERICAN): >60 ML/MIN/1.73 M^2
EST. GFR  (NON AFRICAN AMERICAN): >60 ML/MIN/1.73 M^2
GLUCOSE SERPL-MCNC: 148 MG/DL (ref 70–110)
HCT VFR BLD AUTO: 47.7 % (ref 37–48.5)
HGB BLD-MCNC: 15.6 G/DL (ref 12–16)
IMM GRANULOCYTES # BLD AUTO: 0.03 K/UL (ref 0–0.04)
IMM GRANULOCYTES NFR BLD AUTO: 0.8 % (ref 0–0.5)
LYMPHOCYTES # BLD AUTO: 1.7 K/UL (ref 1–4.8)
LYMPHOCYTES NFR BLD: 46.5 % (ref 18–48)
MAGNESIUM SERPL-MCNC: 1.7 MG/DL (ref 1.6–2.6)
MCH RBC QN AUTO: 32.6 PG (ref 27–31)
MCHC RBC AUTO-ENTMCNC: 32.7 G/DL (ref 32–36)
MCV RBC AUTO: 100 FL (ref 82–98)
MONOCYTES # BLD AUTO: 0.8 K/UL (ref 0.3–1)
MONOCYTES NFR BLD: 23.3 % (ref 4–15)
NEUTROPHILS # BLD AUTO: 1 K/UL (ref 1.8–7.7)
NEUTROPHILS NFR BLD: 28.8 % (ref 38–73)
NRBC BLD-RTO: 0 /100 WBC
PLATELET # BLD AUTO: 155 K/UL (ref 150–350)
PMV BLD AUTO: 12.3 FL (ref 9.2–12.9)
POTASSIUM SERPL-SCNC: 3.7 MMOL/L (ref 3.5–5.1)
PROT SERPL-MCNC: 7.4 G/DL (ref 6–8.4)
RBC # BLD AUTO: 4.79 M/UL (ref 4–5.4)
SODIUM SERPL-SCNC: 143 MMOL/L (ref 136–145)
TSH SERPL DL<=0.005 MIU/L-ACNC: 3.59 UIU/ML (ref 0.4–4)
WBC # BLD AUTO: 3.61 K/UL (ref 3.9–12.7)

## 2020-04-01 PROCEDURE — 99291 PR CRITICAL CARE, E/M 30-74 MINUTES: ICD-10-PCS | Mod: ,,, | Performed by: EMERGENCY MEDICINE

## 2020-04-01 PROCEDURE — 93010 EKG 12-LEAD: ICD-10-PCS | Mod: 76,,, | Performed by: INTERNAL MEDICINE

## 2020-04-01 PROCEDURE — 83735 ASSAY OF MAGNESIUM: CPT

## 2020-04-01 PROCEDURE — 93005 ELECTROCARDIOGRAM TRACING: CPT

## 2020-04-01 PROCEDURE — 93010 ELECTROCARDIOGRAM REPORT: CPT | Mod: 76,,, | Performed by: INTERNAL MEDICINE

## 2020-04-01 PROCEDURE — 25000242 PHARM REV CODE 250 ALT 637 W/ HCPCS: Performed by: PHYSICIAN ASSISTANT

## 2020-04-01 PROCEDURE — 99291 CRITICAL CARE FIRST HOUR: CPT | Mod: ,,, | Performed by: EMERGENCY MEDICINE

## 2020-04-01 PROCEDURE — 85025 COMPLETE CBC W/AUTO DIFF WBC: CPT

## 2020-04-01 PROCEDURE — 99285 EMERGENCY DEPT VISIT HI MDM: CPT | Mod: 25

## 2020-04-01 PROCEDURE — 93010 ELECTROCARDIOGRAM REPORT: CPT | Mod: ,,, | Performed by: INTERNAL MEDICINE

## 2020-04-01 PROCEDURE — 25000003 PHARM REV CODE 250: Performed by: PHYSICIAN ASSISTANT

## 2020-04-01 PROCEDURE — 63600175 PHARM REV CODE 636 W HCPCS: Performed by: PHYSICIAN ASSISTANT

## 2020-04-01 PROCEDURE — 84443 ASSAY THYROID STIM HORMONE: CPT

## 2020-04-01 PROCEDURE — 80053 COMPREHEN METABOLIC PANEL: CPT

## 2020-04-01 RX ORDER — CHLORDIAZEPOXIDE HYDROCHLORIDE 25 MG/1
50 CAPSULE, GELATIN COATED ORAL ONCE
Status: COMPLETED | OUTPATIENT
Start: 2020-04-01 | End: 2020-04-01

## 2020-04-01 RX ORDER — METOPROLOL SUCCINATE 25 MG/1
25 TABLET, EXTENDED RELEASE ORAL DAILY
Qty: 30 TABLET | Refills: 0 | Status: SHIPPED | OUTPATIENT
Start: 2020-04-02 | End: 2020-06-05 | Stop reason: SDUPTHER

## 2020-04-01 RX ORDER — METOPROLOL SUCCINATE 25 MG/1
25 TABLET, EXTENDED RELEASE ORAL ONCE
Status: COMPLETED | OUTPATIENT
Start: 2020-04-01 | End: 2020-04-01

## 2020-04-01 RX ORDER — ALBUTEROL SULFATE 90 UG/1
2 AEROSOL, METERED RESPIRATORY (INHALATION) ONCE
Status: COMPLETED | OUTPATIENT
Start: 2020-04-01 | End: 2020-04-01

## 2020-04-01 RX ADMIN — CHLORDIAZEPOXIDE HYDROCHLORIDE 50 MG: 25 CAPSULE ORAL at 05:04

## 2020-04-01 RX ADMIN — METOPROLOL SUCCINATE 25 MG: 25 TABLET, EXTENDED RELEASE ORAL at 07:04

## 2020-04-01 RX ADMIN — ALBUTEROL SULFATE 2 PUFF: 90 AEROSOL, METERED RESPIRATORY (INHALATION) at 05:04

## 2020-04-01 RX ADMIN — SODIUM CHLORIDE 1000 ML: 0.9 INJECTION, SOLUTION INTRAVENOUS at 04:04

## 2020-04-01 NOTE — DISCHARGE INSTRUCTIONS
Stay hydrated by drinking 2 L of water on a daily basis.  Consider slowly decreasing the amount of alcohol over the course of the next few weeks.  You were given your 1st dose of beta-blocker to lower your heart rate in the emergency department.  Start taking metoprolol 25 mg on a daily basis starting tomorrow.  Cardiology will call you and schedule a follow-up appointment.  If you do not hear from them, please call and follow up closely for your palpitations.  Consider calling primary care physician and establishing care with a family doctor.  Return promptly to the emergency department for new or worsening symptoms.  No future appointments.  Our goal in the emergency department is to always give you outstanding care and exceptional service. You may receive a survey by mail or e-mail in the next week regarding your experience in our ED. We would greatly appreciate your completing and returning the survey. Your feedback provides us with a way to recognize our staff who give very good care and it helps us learn how to improve when your experience was below our aspiration of excellence.

## 2020-04-01 NOTE — ED PROVIDER NOTES
Encounter Date: 4/1/2020       History     Chief Complaint   Patient presents with    Tachycardia     ran out of medications; feeling shaky; denies covid symptoms     5:11 PM  Patient is 79-year-old female who presents the ED with tremors and palpitations.  Patient states this morning, she started with tremors.  She started feeling her heart pounding 2 hr ago.  Denies ever having any chest pain.  Has a history of COPD and endorses a chronic and at baseline dry cough.  Denies any shortness of breath.  No fever, chills, diaphoresis.  States she feels anxious.     Admits to almost daily alcohol use and drinks approximately 3-6 drinks on a daily basis for the past year.  Denies any increase or decrease use.    Smokes tobacco.   Denies any illicit drug use besides smoking marijuana.    Denies any new medications.    Has not had look lisinopril in 3 months.  Has not used her COPD inhaler in 3 weeks.        Review of patient's allergies indicates:   Allergen Reactions    No known drug allergies      Past Medical History:   Diagnosis Date    Osteoporosis     Sinus disease      Past Surgical History:   Procedure Laterality Date    Breast implants removal  2012    SINUS SURGERY      TONSILLECTOMY      Age 1     Family History   Problem Relation Age of Onset    No Known Problems Mother     No Known Problems Father     Blindness Neg Hx     Glaucoma Neg Hx     Macular degeneration Neg Hx     Retinal detachment Neg Hx      Social History     Tobacco Use    Smoking status: Current Every Day Smoker     Packs/day: 0.25     Years: 30.00     Pack years: 7.50     Types: Cigarettes    Smokeless tobacco: Never Used    Tobacco comment: 2 packs a week   Substance Use Topics    Alcohol use: Yes     Comment: socially    Drug use: Yes     Types: Marijuana     Review of Systems   Constitutional: Negative for chills and fever.   HENT: Negative for sore throat.    Respiratory: Positive for cough (baseline). Negative for  shortness of breath.    Cardiovascular: Positive for palpitations. Negative for chest pain.   Gastrointestinal: Negative for abdominal pain, nausea and vomiting.   Genitourinary: Negative for dysuria and frequency.   Musculoskeletal: Negative for back pain.   Skin: Negative for rash.   Neurological: Positive for tremors. Negative for weakness.   Hematological: Does not bruise/bleed easily.   Psychiatric/Behavioral: The patient is nervous/anxious.        Physical Exam     Initial Vitals [04/01/20 1551]   BP Pulse Resp Temp SpO2   (!) 157/90 (!) 180 20 98 °F (36.7 °C) 97 %      MAP       --         Physical Exam    Vitals reviewed.  Constitutional: She appears well-developed and well-nourished. She is not diaphoretic. She appears cachectic. No distress. Face mask in place.   HENT:   Head: Normocephalic and atraumatic.   Nose: Nose normal.   Eyes: Conjunctivae and EOM are normal.   Neck: Normal range of motion.   Cardiovascular: Tachycardia present.  Exam reveals no friction rub.    No murmur heard.  Pulmonary/Chest: No tachypnea. No respiratory distress. She has no decreased breath sounds. She has wheezes. She has no rales.   Abdominal: Soft. She exhibits no distension. There is no tenderness. There is no rigidity, no rebound and no guarding.   Musculoskeletal: Normal range of motion.   Neurological: She is alert and oriented to person, place, and time. She has normal strength.   Skin: Skin is warm and dry. No erythema. No pallor.   Decreased skin turgor.   Psychiatric: Thought content normal. Her mood appears anxious.         ED Course   Procedures  Labs Reviewed   CBC W/ AUTO DIFFERENTIAL - Abnormal; Notable for the following components:       Result Value    WBC 3.61 (*)     Mean Corpuscular Volume 100 (*)     Mean Corpuscular Hemoglobin 32.6 (*)     Immature Granulocytes 0.8 (*)     Gran # (ANC) 1.0 (*)     Gran% 28.8 (*)     Mono% 23.3 (*)     All other components within normal limits   COMPREHENSIVE METABOLIC  PANEL - Abnormal; Notable for the following components:    Glucose 148 (*)     BUN, Bld 6 (*)     Alkaline Phosphatase 137 (*)     All other components within normal limits   MAGNESIUM   TSH        ECG Results          EKG 12-lead (Final result)  Result time 04/02/20 08:17:48    Final result by Interface, Lab In [facility] (04/02/20 08:17:48)                 Narrative:    Test Reason : R00.0,    Vent. Rate : 105 BPM     Atrial Rate : 105 BPM     P-R Int : 136 ms          QRS Dur : 104 ms      QT Int : 370 ms       P-R-T Axes : 071 047 050 degrees     QTc Int : 489 ms    Sinus tachycardia with Premature atrial complexes  Right bundle branch block  Abnormal ECG  When compared with ECG of 01-APR-2020 15:54,  Sinus rhythm has replaced Atrial fibrillation  Vent. rate has decreased BY  73 BPM    Confirmed by ODALYS CHIRINOS MD ([id number]) on 4/2/2020 8:17:41 AM    Referred By: [email or web address]   SELF           Confirmed By:ODALYS CHIRINOS MD                             EKG 12-lead (Final result)  Result time 04/02/20 08:07:55    Final result by Interface, Lab In [facility] (04/02/20 08:07:55)                 Narrative:    Test Reason : R00.0,    Vent. Rate : 178 BPM     Atrial Rate : 178 BPM     P-R Int : 000 ms          QRS Dur : 136 ms      QT Int : 242 ms       P-R-T Axes : 000 -44 -03 degrees     QTc Int : 416 ms    Atrial fibrillation with rapid ventricular response  Left axis deviation  Right bundle branch block  Abnormal ECG  When compared with ECG of 17-AUG-2015 11:39,  Atrial fibrillation has replaced Sinus rhythm  Vent. rate has increased BY  89 BPM  QRS duration has increased  ST now depressed in Anterior leads    Confirmed by ODALYS CHIRINOS MD ([id number]) on 4/2/2020 8:07:46 AM    Referred By: [email or web address]   SELF           Confirmed By:ODALYS CHIRINOS MD                            Imaging Results          X-Ray Chest AP Portable (Final result)  Result time 04/01/20 17:26:23    Final result by Bruce Bonilla MD (04/01/20 17:26:23)                  Impression:      1. Grossly stable chronic interstitial findings, no large focal consolidation.      Electronically signed by: Bruce Bonilla MD  Date:    04/01/2020  Time:    17:26             Narrative:    EXAMINATION:  XR CHEST AP PORTABLE    CLINICAL HISTORY:  Palpitations    TECHNIQUE:  Single frontal view of the chest was performed.    COMPARISON:  07/05/2018    FINDINGS:  The cardiomediastinal silhouette is not enlarged noting calcification of the aorta..  There is no pleural effusion.  The trachea is midline.  The lungs are symmetrically expanded bilaterally with coarse interstitial attenuation, similar to the previous examination.  No large focal consolidation seen.  There is no pneumothorax.  The osseous structures are remarkable for degenerative changes noting osteopenia..                                 Medical Decision Making:   History:   Old Medical Records: I decided to obtain old medical records.  Old Records Summarized: records from clinic visits and records from previous admission(s).  Initial Assessment:   Patient is 79-year-old female who presents the ED with tremors and palpitations onset today.  Admits the chronic alcohol use, but denies any increase or decrease use.  Differential Diagnosis:   Includes but is not limited to dehydration, electrolyte abnormalities, anemia, DT, withdrawal, intoxication.  Clinical Tests:   Lab Tests: Reviewed and Ordered  Radiological Study: Ordered and Reviewed  Medical Tests: Ordered and Reviewed  ED Management:  Will initiate workup, hydrate, give Librium, and continue monitor.    Initial EKG with SVT at 178 beats per minute.  Right bundle branch block noted on previous EKG.    Patient spontaneously converted to sinus tachycardia around 110s during my history taking.  She briefly had SVT again for approximately 2 minutes before spontaneously converting back again into sinus tachycardia at around 100s beats per minute. Was AOx3 but felt anxious.      CBC with no leukocytosis.  No anemia.  CMP without electrolyte abnormalities.  No kidney injury.  No hyperbilirubinemia or transaminitis.  Magnesium within normal limits.  TSH within normal limits.  Chest x-ray shows grossly stable chronic interstitial findings.  No large focal consolidation.    Repeat eCG with sinus tach with PACs at 105 bpm. RBBB again seen. No STEMI.     Patient reassessed.  She reports improvement in her symptoms.  No new symptoms while here.  Her vitals remained stable.  She is in NAD.  Case was discussed with Cardiology on-call via spectra link.  He agrees and does not recommend that this patient needs inpatient evaluation or treatment.  He did recommend low-dose metoprolol daily and close follow-up via telemedicine if possible.  Ambulatory referral placed.  Patient was updated with results and plan of care.  I recommend that she decrease alcohol use over the next several weeks month and establish care with a PCP.  Return to ED precautions were given for signs and symptoms as discussed, and she voices her understanding.  All questions were answered.  Patient is comfortable with plan and stable for discharge.    Other:   I have discussed this case with another health care provider.    I have reviewed patient's chart and discussed this case with my supervising MD.     Mavis Troy PA-C  Emergent Department  [facility]  [facility] #[phone] or #[phone]                        No future appointments.             Clinical Impression:       ICD-10-CM ICD-9-CM   1. Tachycardia R00.0 785.0   2. Palpitations R00.2 785.1         Disposition:   Disposition: Discharged  Condition: Stable     ED Disposition Condition    Discharge Stable        ED Prescriptions     Medication Sig Dispense Start Date End Date Auth. Provider    metoprolol succinate (TOPROL-XL) 25 MG 24 hr tablet Take 1 tablet (25 mg total) by mouth once daily. 30 tablet 4/2/2020 5/2/2020 Mavis Troy PA-C     umeclidinium-vilanteroL (ANORO ELLIPTA) 62.5-25 mcg/actuation DsDv Inhale 1 puff into the lungs once daily. Controller 1 each 4/1/2020  Mavis Troy PA-C        Follow-up Information     Follow up With Specialties Details Why Contact Info Additional Information    [facility] - Cardiology Cardiology Schedule an appointment as soon as possible for a visit   [address] [address] [phone] 3rd floor    [facility] - Internal Medicine Internal Medicine Schedule an appointment as soon as possible for a visit   [address] [address] [phone] [facility] Bldg.    [facility] Emergency Medicine  If symptoms worsen [address] [address] [phone]                                      Mavis Troy PA-C  04/02/20 0945

## 2020-04-01 NOTE — ED TRIAGE NOTES
Virginia Gage Logan, a 79 y.o. female presents to the ED via personal transportation with CC palpitations. On arrival patient HR 170s. Connected to cont cardiac monitor, cont pulse ox, auto BP cuff. Edel TREVIZO at bedside. Care handoff.

## 2020-04-01 NOTE — ED NOTES
Pt identifiers Sangita Logan were checked and are correct  LOC: The patient is awake, alert, aware of environment with an appropriate affect. Oriented x4, speaking appropriately  APPEARANCE: Pt resting comfortably, in no acute distress, pt is clean and well groomed, clothing properly fastened  SKIN: Skin warm, dry and intact, normal skin turgor, moist mucus membranes  RESPIRATORY: Airway is open and patent, respirations are spontaneous, Coarse wheezing auscultated to all lung fields   CARDIAC: Radial pulses strong and irregular ,no peripheral edema noted, capillary refill < 3 seconds, bilateral radial pulses 2+  ABDOMEN: Soft, nontender, nondistended. Bowel sounds present to all four quad of abd on auscultation  NEUROLOGIC:  facial expression is symmetrical, patient moving all extremities spontaneously, normal sensation in all extremities when touched with a finger.  Follows all commands appropriately  MUSCULOSKELETAL: No obvious deformities.

## 2020-04-02 ENCOUNTER — PES CALL (OUTPATIENT)
Dept: ADMINISTRATIVE | Facility: CLINIC | Age: 80
End: 2020-04-02

## 2020-06-05 ENCOUNTER — OFFICE VISIT (OUTPATIENT)
Dept: CARDIOLOGY | Facility: CLINIC | Age: 80
End: 2020-06-05
Payer: MEDICARE

## 2020-06-05 VITALS
HEART RATE: 97 BPM | HEIGHT: 64 IN | BODY MASS INDEX: 18.33 KG/M2 | WEIGHT: 107.38 LBS | DIASTOLIC BLOOD PRESSURE: 81 MMHG | SYSTOLIC BLOOD PRESSURE: 140 MMHG

## 2020-06-05 DIAGNOSIS — I10 HYPERTENSION, UNSPECIFIED TYPE: ICD-10-CM

## 2020-06-05 DIAGNOSIS — R00.0 TACHYCARDIA: Primary | ICD-10-CM

## 2020-06-05 DIAGNOSIS — I48.0 PAROXYSMAL ATRIAL FIBRILLATION: ICD-10-CM

## 2020-06-05 PROCEDURE — 99999 PR PBB SHADOW E&M-EST. PATIENT-LVL IV: ICD-10-PCS | Mod: PBBFAC,GC,, | Performed by: STUDENT IN AN ORGANIZED HEALTH CARE EDUCATION/TRAINING PROGRAM

## 2020-06-05 PROCEDURE — 99204 PR OFFICE/OUTPT VISIT, NEW, LEVL IV, 45-59 MIN: ICD-10-PCS | Mod: 25,GC,S$GLB, | Performed by: STUDENT IN AN ORGANIZED HEALTH CARE EDUCATION/TRAINING PROGRAM

## 2020-06-05 PROCEDURE — 93000 EKG 12-LEAD: ICD-10-PCS | Mod: S$GLB,,, | Performed by: INTERNAL MEDICINE

## 2020-06-05 PROCEDURE — 93000 ELECTROCARDIOGRAM COMPLETE: CPT | Mod: S$GLB,,, | Performed by: INTERNAL MEDICINE

## 2020-06-05 PROCEDURE — 99204 OFFICE O/P NEW MOD 45 MIN: CPT | Mod: 25,GC,S$GLB, | Performed by: STUDENT IN AN ORGANIZED HEALTH CARE EDUCATION/TRAINING PROGRAM

## 2020-06-05 PROCEDURE — 99999 PR PBB SHADOW E&M-EST. PATIENT-LVL IV: CPT | Mod: PBBFAC,GC,, | Performed by: STUDENT IN AN ORGANIZED HEALTH CARE EDUCATION/TRAINING PROGRAM

## 2020-06-05 RX ORDER — METOPROLOL SUCCINATE 25 MG/1
25 TABLET, EXTENDED RELEASE ORAL DAILY
Qty: 90 TABLET | Refills: 3 | Status: SHIPPED | OUTPATIENT
Start: 2020-06-05 | End: 2020-12-24 | Stop reason: SDUPTHER

## 2020-06-05 RX ORDER — METOPROLOL SUCCINATE 25 MG/1
25 TABLET, EXTENDED RELEASE ORAL DAILY
Qty: 90 TABLET | Refills: 3 | Status: SHIPPED | OUTPATIENT
Start: 2020-06-05 | End: 2020-06-05 | Stop reason: SDUPTHER

## 2020-06-05 RX ORDER — LISINOPRIL 10 MG/1
10 TABLET ORAL DAILY
Qty: 90 TABLET | Refills: 3 | Status: SHIPPED | OUTPATIENT
Start: 2020-06-05 | End: 2020-12-24 | Stop reason: SDUPTHER

## 2020-06-05 NOTE — PROGRESS NOTES
Subjective:    Patient ID:  Sangita Logan is a 79 y.o. female who presents for evaluation of Tachycardia and Medication Management      78 y/o F with history of HTN, COPD, tobacco use, presenting for evaluation of AF w RVR v SVT on recent ED visit. Patient reports no known cardiac history though has 50 pack year smoking history , COPD, continues to smoke ~ 1/2 ppd. Presented to ED 4/1/2020 with complaints of palpitations and b/l UE tremors. ECG at that time with SVT , spontaneously converted. Conversion not captured on ECG. TSH wnl. Patient reports that she has been drinking a little more than usual (currently 2-3 drinks daily, usually 1-2). No events since that time. Denies chest pain, palpitations or shortness of breath. ECG today in clinic with NSR and chronic RBBB.      Review of Systems   Constitution: Negative. Negative for chills and fever.   HENT: Negative.    Eyes: Negative.    Cardiovascular: Negative for chest pain, claudication and paroxysmal nocturnal dyspnea.   Respiratory: Negative for cough, shortness of breath and wheezing.    Endocrine: Negative.    Hematologic/Lymphatic: Does not bruise/bleed easily.   Skin: Negative for nail changes and rash.   Musculoskeletal: Negative.  Negative for back pain.   Gastrointestinal: Negative for abdominal pain, melena, nausea and vomiting.   Neurological: Negative for dizziness and headaches.   Psychiatric/Behavioral: Negative for altered mental status, depression and substance abuse.   Allergic/Immunologic: Negative.         Objective:    Physical Exam   Constitutional: She is oriented to person, place, and time. She appears well-developed and well-nourished.   HENT:   Head: Normocephalic and atraumatic.   Eyes: Pupils are equal, round, and reactive to light. Conjunctivae and EOM are normal.   Neck: No JVD present.   Cardiovascular: Normal rate, regular rhythm, normal heart sounds and intact distal pulses. Exam reveals no gallop and no friction  rub.   No murmur heard.  Pulmonary/Chest: Effort normal. No stridor. She has no wheezes. She has no rales. She exhibits no tenderness.   Abdominal: Soft. Bowel sounds are normal. She exhibits no distension and no mass. There is no tenderness. There is no guarding.   Musculoskeletal: She exhibits no edema, tenderness or deformity.   Neurological: She is alert and oriented to person, place, and time.   Skin: Skin is warm and dry. No rash noted. No erythema.   Psychiatric: She has a normal mood and affect.     ECG 4/1/2020:  bpm, RBBB  ECG 6/5/2020: NSR, RBBB          Assessment:         78 y/o F with history of HTN, COPD, tobacco use, presenting for evaluation of AF w RVR v SVT on recent ED visit. Patient currently asymptomatic, currently in NSR.     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    Case discussed with attending, Dr. Quick with final attestation to follow.      Deshawn Mobley MD  PGY-4, Cardiology  Pager [phone]

## 2020-06-05 NOTE — PATIENT INSTRUCTIONS
- Will refill metoprolol and lisinopril at current dosages  - Please attempt carotid massage or bearing down if you feel palpitations again. If do not resolve with those, or worsening lightheadedness, dizziness or chest pain, please come to ER.

## 2020-06-09 NOTE — PROGRESS NOTES
I have personally seen and examined the patient. All labs and studies were reviewed. I agree with the fellows findings and plan as above. Discussed option of ablation if symptoms recur. Will continue with beta blocker for now.

## 2020-10-06 ENCOUNTER — HOSPITAL ENCOUNTER (EMERGENCY)
Facility: HOSPITAL | Age: 80
Discharge: HOME | End: 2020-10-06
Attending: EMERGENCY MEDICINE
Payer: MEDICARE

## 2020-10-06 VITALS
HEART RATE: 92 BPM | SYSTOLIC BLOOD PRESSURE: 136 MMHG | BODY MASS INDEX: 18.19 KG/M2 | WEIGHT: 106 LBS | DIASTOLIC BLOOD PRESSURE: 74 MMHG | TEMPERATURE: 98 F | RESPIRATION RATE: 18 BRPM | OXYGEN SATURATION: 100 %
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
## 2020-10-06 DIAGNOSIS — Z78.9 ALCOHOL USE: ICD-10-CM

## 2020-10-06 DIAGNOSIS — S20.222A CONTUSION OF LEFT SIDE OF BACK, INITIAL ENCOUNTER: Primary | ICD-10-CM

## 2020-10-06 DIAGNOSIS — S22.32XA CLOSED FRACTURE OF ONE RIB OF LEFT SIDE, INITIAL ENCOUNTER: ICD-10-CM

## 2020-10-06 DIAGNOSIS — R06.02 SOB (SHORTNESS OF BREATH): ICD-10-CM

## 2020-10-06 LAB
ALBUMIN SERPL BCP-MCNC: 3.9 G/DL (ref 3.5–5.2)
ALP SERPL-CCNC: 118 U/L (ref 55–135)
ALT SERPL W/O P-5'-P-CCNC: 31 U/L (ref 10–44)
ANION GAP SERPL CALC-SCNC: 12 MMOL/L (ref 8–16)
AST SERPL-CCNC: 37 U/L (ref 10–40)
BASOPHILS # BLD AUTO: 0.02 K/UL (ref 0–0.2)
BASOPHILS NFR BLD: 0.3 % (ref 0–1.9)
BILIRUB SERPL-MCNC: 1.1 MG/DL (ref 0.1–1)
BILIRUB UR QL STRIP: NEGATIVE
BUN SERPL-MCNC: 12 MG/DL (ref 8–23)
CALCIUM SERPL-MCNC: 9 MG/DL (ref 8.7–10.5)
CHLORIDE SERPL-SCNC: 96 MMOL/L (ref 95–110)
CLARITY UR REFRACT.AUTO: CLEAR
CO2 SERPL-SCNC: 26 MMOL/L (ref 23–29)
COLOR UR AUTO: YELLOW
CREAT SERPL-MCNC: 0.9 MG/DL (ref 0.5–1.4)
CTP QC/QA: YES
DIFFERENTIAL METHOD: ABNORMAL
EOSINOPHIL # BLD AUTO: 0 K/UL (ref 0–0.5)
EOSINOPHIL NFR BLD: 0 % (ref 0–8)
ERYTHROCYTE [DISTWIDTH] IN BLOOD BY AUTOMATED COUNT: 12.3 % (ref 11.5–14.5)
EST. GFR  (AFRICAN AMERICAN): >60 ML/MIN/1.73 M^2
EST. GFR  (NON AFRICAN AMERICAN): >60 ML/MIN/1.73 M^2
GLUCOSE SERPL-MCNC: 102 MG/DL (ref 70–110)
GLUCOSE UR QL STRIP: NEGATIVE
HCT VFR BLD AUTO: 39.6 % (ref 37–48.5)
HGB BLD-MCNC: 13.8 G/DL (ref 12–16)
HGB UR QL STRIP: NEGATIVE
IMM GRANULOCYTES # BLD AUTO: 0.13 K/UL (ref 0–0.04)
IMM GRANULOCYTES NFR BLD AUTO: 2 % (ref 0–0.5)
KETONES UR QL STRIP: NEGATIVE
LEUKOCYTE ESTERASE UR QL STRIP: NEGATIVE
LIPASE SERPL-CCNC: 36 U/L (ref 4–60)
LYMPHOCYTES # BLD AUTO: 2 K/UL (ref 1–4.8)
LYMPHOCYTES NFR BLD: 30.2 % (ref 18–48)
MCH RBC QN AUTO: 34.4 PG (ref 27–31)
MCHC RBC AUTO-ENTMCNC: 34.8 G/DL (ref 32–36)
MCV RBC AUTO: 99 FL (ref 82–98)
MONOCYTES # BLD AUTO: 1.9 K/UL (ref 0.3–1)
MONOCYTES NFR BLD: 28.1 % (ref 4–15)
NEUTROPHILS # BLD AUTO: 2.6 K/UL (ref 1.8–7.7)
NEUTROPHILS NFR BLD: 39.4 % (ref 38–73)
NITRITE UR QL STRIP: NEGATIVE
NRBC BLD-RTO: 0 /100 WBC
PH UR STRIP: 6 [PH] (ref 5–8)
PLATELET # BLD AUTO: 136 K/UL (ref 150–350)
PMV BLD AUTO: 12 FL (ref 9.2–12.9)
POTASSIUM SERPL-SCNC: 3.7 MMOL/L (ref 3.5–5.1)
PROT SERPL-MCNC: 6.8 G/DL (ref 6–8.4)
PROT UR QL STRIP: NEGATIVE
RBC # BLD AUTO: 4.01 M/UL (ref 4–5.4)
SARS-COV-2 RDRP RESP QL NAA+PROBE: NEGATIVE
SODIUM SERPL-SCNC: 134 MMOL/L (ref 136–145)
SP GR UR STRIP: 1.03 (ref 1–1.03)
TROPONIN I SERPL DL<=0.01 NG/ML-MCNC: <0.006 NG/ML (ref 0–0.03)
URN SPEC COLLECT METH UR: NORMAL
WBC # BLD AUTO: 6.58 K/UL (ref 3.9–12.7)

## 2020-10-06 PROCEDURE — 84484 ASSAY OF TROPONIN QUANT: CPT

## 2020-10-06 PROCEDURE — 25000242 PHARM REV CODE 250 ALT 637 W/ HCPCS: Performed by: PHYSICIAN ASSISTANT

## 2020-10-06 PROCEDURE — 99284 EMERGENCY DEPT VISIT MOD MDM: CPT | Mod: ,,, | Performed by: PHYSICIAN ASSISTANT

## 2020-10-06 PROCEDURE — 93010 EKG 12-LEAD: ICD-10-PCS | Mod: ,,, | Performed by: INTERNAL MEDICINE

## 2020-10-06 PROCEDURE — 99284 PR EMERGENCY DEPT VISIT,LEVEL IV: ICD-10-PCS | Mod: ,,, | Performed by: PHYSICIAN ASSISTANT

## 2020-10-06 PROCEDURE — 99285 EMERGENCY DEPT VISIT HI MDM: CPT | Mod: 25

## 2020-10-06 PROCEDURE — 83690 ASSAY OF LIPASE: CPT

## 2020-10-06 PROCEDURE — 80053 COMPREHEN METABOLIC PANEL: CPT

## 2020-10-06 PROCEDURE — 85025 COMPLETE CBC W/AUTO DIFF WBC: CPT

## 2020-10-06 PROCEDURE — 25500020 PHARM REV CODE 255: Performed by: EMERGENCY MEDICINE

## 2020-10-06 PROCEDURE — 94640 AIRWAY INHALATION TREATMENT: CPT

## 2020-10-06 PROCEDURE — 93010 ELECTROCARDIOGRAM REPORT: CPT | Mod: ,,, | Performed by: INTERNAL MEDICINE

## 2020-10-06 PROCEDURE — 81003 URINALYSIS AUTO W/O SCOPE: CPT

## 2020-10-06 PROCEDURE — U0002 COVID-19 LAB TEST NON-CDC: HCPCS | Performed by: PHYSICIAN ASSISTANT

## 2020-10-06 PROCEDURE — 93005 ELECTROCARDIOGRAM TRACING: CPT

## 2020-10-06 RX ORDER — IPRATROPIUM BROMIDE AND ALBUTEROL SULFATE 2.5; .5 MG/3ML; MG/3ML
3 SOLUTION RESPIRATORY (INHALATION)
Status: COMPLETED | OUTPATIENT
Start: 2020-10-06 | End: 2020-10-06

## 2020-10-06 RX ADMIN — IOHEXOL 75 ML: 350 INJECTION, SOLUTION INTRAVENOUS at 11:10

## 2020-10-06 RX ADMIN — IPRATROPIUM BROMIDE AND ALBUTEROL SULFATE 3 ML: .5; 2.5 SOLUTION RESPIRATORY (INHALATION) at 10:10

## 2020-10-06 NOTE — ED TRIAGE NOTES
"Sangita Logan, a 79 y.o. female presents to the ED w/ complaint of large hematoma to left flank. Patient unsure of how she got it.  Patient denies any pain with inspiration     Triage note:  Chief Complaint   Patient presents with    Back Pain     Pt states "I bumped my left side and its really sore".  Pt states pain has gradually worsened.  Denies known specific injury     Review of patient's allergies indicates:   Allergen Reactions    No known drug allergies      Past Medical History:   Diagnosis Date    Osteoporosis     Sinus disease      LOC: Patient name and date of birth verified. The patient is awake, alert and aware of environment with an appropriate affect, the patient is oriented x 3 and speaking appropriately.   APPEARANCE: Patient resting comfortably, patient is clean and well groomed, patient's clothing is properly fastened.  SKIN: The skin is warm and dry, color consistent with ethnicity, patient has normal skin turgor and dry mucus membranes, skin intact, no breakdown or back left flank bruising noted.  MUSCULOSKELETAL: Patient moving all extremities well, no obvious swelling or deformities noted.   RESPIRATORY: Respirations are spontaneous, patient has a normal effort and rate, no accessory muscle use noted.  CARDIAC: Patient has a normal rate and rhythm, no periphreal edema noted, capillary refill < 3 seconds.  ABDOMEN: Soft and non tender to palpation, no distention noted. Bowel sounds present in all four quadrants.  NEUROLOGIC: Eyes open spontaneously, behavior appropriate to situation, follows commands, facial expression symmetrical, bilateral hand grasp equal and even, purposeful motor response noted, normal sensation in all extremities when touched with a finger.      "

## 2020-10-06 NOTE — DISCHARGE INSTRUCTIONS
Use ice packs and Tylenol for your back pain and management of your rib fracture  Follow-up with your primary care provider for further management  Return to the emergency room for new, worsening, or concerning symptoms.

## 2020-10-06 NOTE — ED NOTES
"ED  (SW) entered pt's CCR area with permission. Pt is sitting in reclining chair reading a book. Pt confirmed identifiers. Pt's mood and affect are congruent. Pt's speech is clear. Pt is alert and oriented to person, place, day, situation. Pt appears well groomed. Clothes are neat.     Pt reports that she has a bruise on her back and is uncertain how it arrived. Pt reports that the "bruise just showed up in the morning about 4 days a go and the pain got worse this morning." Pt reports that she has not fallen recently and if she has she does not remember. Pt reports that she was home alone drinking the night before she discovered the bruise but does not remember falling or hitting anything.Pt lives in her home with her son. Conducted elderly abuse assessment no abuse indicated. Pt is independent in all ADLs.     ED Physician notified.     SW will follow up as needed.     - Eloise Wong, [facility]   [phone]            "

## 2020-10-06 NOTE — ED PROVIDER NOTES
"Encounter Date: 10/6/2020       History     Chief Complaint   Patient presents with    Back Pain     Pt states "I bumped my left side and its really sore".  Pt states pain has gradually worsened.  Denies known specific injury     HPI   Sangita Logan is a 79 y.o. female with medical history of HTN, tobacco use (6 cig/day), osteoporosis, sinus disease presenting to the ED with the chief complaint of back pain. Patient reports 4 days of left flank pain and ecchymosis. Reports concerns that she may have "bumped" into something but does not recall a specific injury. Reports worsening pain which prompted ED visit today. No recent MVA, falls. She denies blood thinner use. Additionally notes feeling acute on chronic SOB. Reports being out of her Anoro inhaler for the past few days. She denies fever, chest pain, cough, abdominal pain, nausea, vomiting, urinary or bowel movement changes.     Review of patient's allergies indicates:   Allergen Reactions    No known drug allergies      Past Medical History:   Diagnosis Date    Osteoporosis     Sinus disease      Past Surgical History:   Procedure Laterality Date    Breast implants removal  2012    SINUS SURGERY      TONSILLECTOMY      Age 1     Family History   Problem Relation Age of Onset    No Known Problems Mother     No Known Problems Father     Blindness Neg Hx     Glaucoma Neg Hx     Macular degeneration Neg Hx     Retinal detachment Neg Hx      Social History     Tobacco Use    Smoking status: Current Every Day Smoker     Packs/day: 0.25     Years: 30.00     Pack years: 7.50     Types: Cigarettes    Smokeless tobacco: Never Used    Tobacco comment: 2 packs a week   Substance Use Topics    Alcohol use: Yes     Comment: socially    Drug use: Yes     Types: Marijuana     Review of Systems   Constitutional: Negative for fever.   HENT: Negative for sore throat.    Respiratory: Positive for shortness of breath.    Cardiovascular: Negative for chest " pain.   Gastrointestinal: Negative for nausea.   Genitourinary: Positive for flank pain. Negative for dysuria.   Musculoskeletal: Negative for back pain.   Skin: Negative for rash.   Neurological: Negative for weakness.   Hematological: Does not bruise/bleed easily.       Physical Exam     Initial Vitals [10/06/20 0918]   BP Pulse Resp Temp SpO2   (!) 151/76 94 20 97.8 °F (36.6 °C) 96 %      MAP       --         Physical Exam    Constitutional: She appears well-developed and well-nourished. She is not diaphoretic. No distress.   HENT:   Head: Normocephalic and atraumatic.   Mouth/Throat: Oropharynx is clear and moist. No oropharyngeal exudate.   Eyes: EOM are normal. Pupils are equal, round, and reactive to light.   Neck: Normal range of motion. Neck supple.   Cardiovascular: Normal rate and regular rhythm.   Pulmonary/Chest: She has wheezes.   Speaking in short sentences   Abdominal: Soft. There is no abdominal tenderness.   Musculoskeletal:      Comments: No midline spinal tenderness. Full AROM and PROM of extremities.   Neurological: She is alert and oriented to person, place, and time. She has normal strength. No cranial nerve deficit or sensory deficit.   Skin: Skin is warm and dry. No rash noted. No erythema.   Large, tender ecchymosis to L flank. See picture below.           ED Course   Procedures  Labs Reviewed   CBC W/ AUTO DIFFERENTIAL - Abnormal; Notable for the following components:       Result Value    Mean Corpuscular Volume 99 (*)     Mean Corpuscular Hemoglobin 34.4 (*)     Platelets 136 (*)     Immature Granulocytes 2.0 (*)     Immature Grans (Abs) 0.13 (*)     Mono # 1.9 (*)     Mono% 28.1 (*)     All other components within normal limits   COMPREHENSIVE METABOLIC PANEL - Abnormal; Notable for the following components:    Sodium 134 (*)     Total Bilirubin 1.1 (*)     All other components within normal limits   LIPASE   URINALYSIS, REFLEX TO URINE CULTURE    Narrative:     Specimen Source->Urine    TROPONIN I   SARS-COV-2 RDRP GENE     EKG Readings: (Independently Interpreted)   Initial Reading: No STEMI. Previous EKG: Compared with most recent EKG Rhythm: Normal Sinus Rhythm. Heart Rate: 92. Conduction: RBBB.     ECG Results          EKG 12-lead (Final result)  Result time 10/06/20 16:30:29    Final result by Interface, Lab In [facility] (10/06/20 16:30:29)                 Narrative:    Test Reason : R06.02,    Vent. Rate : 082 BPM     Atrial Rate : 082 BPM     P-R Int : 144 ms          QRS Dur : 108 ms      QT Int : 416 ms       P-R-T Axes : 056 065 -20 degrees     QTc Int : 486 ms    Normal sinus rhythm  Right bundle branch block  Abnormal ECG  When compared with ECG of 05-JUN-2020 15:51,  No significant change was found  Confirmed by Lexy Quick MD ([id number]) on 10/6/2020 4:30:23 PM    Referred By: [email or web address]   SELF           Confirmed By:Lexy Quick MD                            Imaging Results          CT Abdomen Pelvis With Contrast (Final result)  Result time 10/06/20 11:28:42    Final result by Juaquin Sidhu MD (10/06/20 11:28:42)                 Impression:      Mild soft tissue swelling surrounding the left 11th rib laterally with possible nondisplaced fracture.  Correlate for any point tenderness in this location.    No CT evidence of solid organ injury.    Generalized osteopenia with multiple mild compression fractures which are likely remote although the lower lumbar levels were not included on prior exams.    Extensive diverticulosis without evidence of acute diverticulitis.    Extensive atherosclerosis with irregular calcified and noncalcified plaque of the abdominal aorta.    Right femoral hernia containing loops of bowel.  No bowel obstruction.      Electronically signed by: Juaquin Sidhu MD  Date:    10/06/2020  Time:    11:28             Narrative:    EXAMINATION:  CT ABDOMEN PELVIS WITH CONTRAST    CLINICAL HISTORY:  Abdominal trauma, blunt;    TECHNIQUE:  Low dose axial images, sagittal  and coronal reformations were obtained from the lung bases to the pubic symphysis following the IV administration of 75 mL of Omnipaque 350 and the oral administration of 30 mL of Omnipaque 350.    COMPARISON:  Radiograph of the same day    FINDINGS:  Visualized portions of the lung bases and heart show no acute abnormalities.  There are small fat containing Bochdalek hernias.    Liver is normal in contour and attenuation.  Gallbladder shows no radiopaque stones or inflammatory changes.  No biliary ductal dilatation.  Portal vein is patent.  Stomach, spleen, pancreas and adrenal glands demonstrate no significant abnormality.    Small and large bowel show no evidence of obstruction.  There is extensive colonic diverticulosis without evidence of acute diverticulitis.  Appendix demonstrates no significant abnormality.  Right femoral hernia containing loops of bowel.  No evidence of proximal bowel obstruction.    Kidneys are normal in contour and attenuation.  No hydronephrosis.  Urinary bladder, uterus and adnexal structures show no significant abnormalities.    Abdominal aorta demonstrates extensive calcified and noncalcified atherosclerosis with shaggy mural thrombus within the abdominal aorta.  Major aortic branch vessels are patent.    There is generalized osteopenia.  There is scoliosis and multiple mild compression fractures involving T12, L3 and L4.  The T12 and L3 fractures were likely present on prior radiograph of 2015.  There is some soft tissue thickening surrounding the left 11th rib laterally just posterior to the spleen with possible nondisplaced fracture.  No evidence of displaced fracture or bone destruction.                               X-Ray Chest AP Portable (Final result)  Result time 10/06/20 10:31:15    Final result by Leda Kim MD (10/06/20 10:31:15)                 Impression:      No acute disease identified in this patient with shortness of breath.  I suspect obstructive airways  disease and note calcific atherosclerosis in the thoracic aorta.      Electronically signed by: Leda Kim MD  Date:    10/06/2020  Time:    10:31             Narrative:    EXAMINATION:  XR CHEST AP PORTABLE    CLINICAL HISTORY:  Shortness of breath    TECHNIQUE:  Single frontal view of the chest was performed.    COMPARISON:  04/01/2020.  07/05/2018.    FINDINGS:  The patient has S shaped thoracolumbar scoliosis as observed previously.  This results in slight left posterior oblique rotation on current and prior studies.    Thoracic aorta is atherosclerotic and tortuous.  Metallic foreign body projects over the right scapula, also present on earlier studies dating back to at least 07/05/2018.    Cardiac silhouette and pulmonary vascular distribution reveal no convincing evidence of disease.    The lungs are deeply and symmetrically inflated similar to 07/05/2018.  Obstructive airways disease is not excluded.    I detect no acute pulmonary disease and no pleural fluid, cardiac decompensation, pneumothorax, pneumomediastinum, pneumoperitoneum or significant osseous abnormality.                                 Medical Decision Making:   History:   Old Medical Records: I decided to obtain old medical records.  Old Records Summarized: records from clinic visits.  Clinical Tests:   Lab Tests: Ordered and Reviewed  Radiological Study: Ordered and Reviewed  Medical Tests: Ordered and Reviewed  Other:   I have discussed this case with another health care provider.       <> Summary of the Discussion: Social Work       APC / Resident Notes:   79 y.o. female with medical history of HTN, tobacco use (6 cig/day), osteoporosis, sinus disease presenting to the ED c/o 4 days of left flank pain and ecchymosis. Denies recalling inciting trauma. Additionally notes SOB and being out of her Anoro inhaler for a few days. Reports feeling safe at home and denies concerns for abuse. DDx includes but not limited to contusion,  intra-abdominal hematoma, pancreatitis, thrombocytopenia, COPD exacerbation, cardiac arrhythmia, ACS, viral syndrome, pneumonia.      CT showing mild soft tissue swelling surrounding the left 11th rib laterally with possible nondisplaced fracture. Diffuse osteopenia and likely remote compression fractures noted. No emergent findings on laboratory work. SOB improved with duo-nebs and she is sleeping comfortably on reassessment. CXR suggestive of obstructive disease, no acute processes. Discussed with social work regarding her injury as she does not recall any specific trauma. SW evaluated and not concern for elder abuse. Patient believes she may have consumed too much ETOH during the football game the other day and must have sustained a fall. She is neurovascular intact and do not suspect head injury. Advised Tylenol and Ice packs for the contusion and rib fracture. Education regarding ETOH use provided. Advised outpatient follow-up with PCP for further evaluation. Patient expresses understanding and agreeable to the plan. Return to ED precautions given for new, worsening, or concerning symptoms. I have discussed the care of this patient with my supervising physician.         Attending Attestation:     Physician Attestation Statement for NP/PA:   I have conducted a face to face encounter with this patient in addition to the NP/PA, due to NP/PA Request    Other NP/PA Attestation Additions:      Medical Decision Making: Agree with PA note and management above.  Patient subsequently stated that she was drinking during football (she doesn't drink every day) and it is possible she injured herself.  No e/o head injury.  Pain controlled.  Safe for d/c with outpatient f/u.                            Clinical Impression:     ICD-10-CM ICD-9-CM   1. Contusion of left side of back, initial encounter  S20.222A 922.31   2. SOB (shortness of breath)  R06.02 786.05   3. Fall, initial encounter  W19.XXXA E888.9   4. Closed fracture  of one rib of left side, initial encounter  S22.32XA 807.01   5. Alcohol use  Z72.89 V49.89                      Disposition:   Disposition: Discharged  Condition: Stable     ED Disposition Condition    Discharge Stable        ED Prescriptions     None        Follow-up Information     Follow up With Specialties Details Why Contact Info    Ash Dumont MD Family Medicine   [address] [place] [id number]  [phone]                                         Daron Cavanaugh PA-C  10/06/20 1547       Jorge Kim MD  10/06/20 0817

## 2020-12-21 ENCOUNTER — HOSPITAL ENCOUNTER (EMERGENCY)
Facility: HOSPITAL | Age: 80
Discharge: HOME | End: 2020-12-21
Attending: EMERGENCY MEDICINE
Payer: MEDICARE

## 2020-12-21 VITALS
BODY MASS INDEX: 18.1 KG/M2 | TEMPERATURE: 99 F | WEIGHT: 106 LBS | DIASTOLIC BLOOD PRESSURE: 87 MMHG | SYSTOLIC BLOOD PRESSURE: 177 MMHG | RESPIRATION RATE: 20 BRPM | OXYGEN SATURATION: 96 % | HEIGHT: 64 IN | HEART RATE: 108 BPM

## 2020-12-21 DIAGNOSIS — R25.1 OCCASIONAL TREMORS: ICD-10-CM

## 2020-12-21 DIAGNOSIS — R00.0 TACHYCARDIA: ICD-10-CM

## 2020-12-21 LAB
ALBUMIN SERPL BCP-MCNC: 3.5 G/DL (ref 3.5–5.2)
ALP SERPL-CCNC: 173 U/L (ref 55–135)
ALT SERPL W/O P-5'-P-CCNC: 23 U/L (ref 10–44)
ANION GAP SERPL CALC-SCNC: 10 MMOL/L (ref 8–16)
AST SERPL-CCNC: 45 U/L (ref 10–40)
BASOPHILS # BLD AUTO: 0.01 K/UL (ref 0–0.2)
BASOPHILS NFR BLD: 0.3 % (ref 0–1.9)
BILIRUB SERPL-MCNC: 0.7 MG/DL (ref 0.1–1)
BUN SERPL-MCNC: 8 MG/DL (ref 8–23)
CALCIUM SERPL-MCNC: 8.6 MG/DL (ref 8.7–10.5)
CHLORIDE SERPL-SCNC: 103 MMOL/L (ref 95–110)
CO2 SERPL-SCNC: 26 MMOL/L (ref 23–29)
CREAT SERPL-MCNC: 0.8 MG/DL (ref 0.5–1.4)
DIFFERENTIAL METHOD: ABNORMAL
EOSINOPHIL # BLD AUTO: 0 K/UL (ref 0–0.5)
EOSINOPHIL NFR BLD: 0 % (ref 0–8)
ERYTHROCYTE [DISTWIDTH] IN BLOOD BY AUTOMATED COUNT: 12.3 % (ref 11.5–14.5)
EST. GFR  (AFRICAN AMERICAN): >60 ML/MIN/1.73 M^2
EST. GFR  (NON AFRICAN AMERICAN): >60 ML/MIN/1.73 M^2
GLUCOSE SERPL-MCNC: 108 MG/DL (ref 70–110)
HCT VFR BLD AUTO: 42.9 % (ref 37–48.5)
HGB BLD-MCNC: 14.4 G/DL (ref 12–16)
IMM GRANULOCYTES # BLD AUTO: 0.03 K/UL (ref 0–0.04)
IMM GRANULOCYTES NFR BLD AUTO: 0.9 % (ref 0–0.5)
LYMPHOCYTES # BLD AUTO: 0.7 K/UL (ref 1–4.8)
LYMPHOCYTES NFR BLD: 21 % (ref 18–48)
MCH RBC QN AUTO: 33.8 PG (ref 27–31)
MCHC RBC AUTO-ENTMCNC: 33.6 G/DL (ref 32–36)
MCV RBC AUTO: 101 FL (ref 82–98)
MONOCYTES # BLD AUTO: 1 K/UL (ref 0.3–1)
MONOCYTES NFR BLD: 28.2 % (ref 4–15)
NEUTROPHILS # BLD AUTO: 1.7 K/UL (ref 1.8–7.7)
NEUTROPHILS NFR BLD: 49.6 % (ref 38–73)
NRBC BLD-RTO: 0 /100 WBC
PLATELET # BLD AUTO: 123 K/UL (ref 150–350)
PMV BLD AUTO: 11.9 FL (ref 9.2–12.9)
POTASSIUM SERPL-SCNC: 4.3 MMOL/L (ref 3.5–5.1)
PROT SERPL-MCNC: 6.5 G/DL (ref 6–8.4)
RBC # BLD AUTO: 4.26 M/UL (ref 4–5.4)
SODIUM SERPL-SCNC: 139 MMOL/L (ref 136–145)
TSH SERPL DL<=0.005 MIU/L-ACNC: 2.42 UIU/ML (ref 0.4–4)
WBC # BLD AUTO: 3.48 K/UL (ref 3.9–12.7)

## 2020-12-21 PROCEDURE — 93010 ELECTROCARDIOGRAM REPORT: CPT | Mod: ,,, | Performed by: INTERNAL MEDICINE

## 2020-12-21 PROCEDURE — 99284 PR EMERGENCY DEPT VISIT,LEVEL IV: ICD-10-PCS | Mod: ,,, | Performed by: EMERGENCY MEDICINE

## 2020-12-21 PROCEDURE — 96374 THER/PROPH/DIAG INJ IV PUSH: CPT

## 2020-12-21 PROCEDURE — 84443 ASSAY THYROID STIM HORMONE: CPT

## 2020-12-21 PROCEDURE — 63600175 PHARM REV CODE 636 W HCPCS: Performed by: STUDENT IN AN ORGANIZED HEALTH CARE EDUCATION/TRAINING PROGRAM

## 2020-12-21 PROCEDURE — 93005 ELECTROCARDIOGRAM TRACING: CPT

## 2020-12-21 PROCEDURE — 80053 COMPREHEN METABOLIC PANEL: CPT

## 2020-12-21 PROCEDURE — 99284 EMERGENCY DEPT VISIT MOD MDM: CPT | Mod: 25

## 2020-12-21 PROCEDURE — 85025 COMPLETE CBC W/AUTO DIFF WBC: CPT

## 2020-12-21 PROCEDURE — 99284 EMERGENCY DEPT VISIT MOD MDM: CPT | Mod: ,,, | Performed by: EMERGENCY MEDICINE

## 2020-12-21 PROCEDURE — 93010 EKG 12-LEAD: ICD-10-PCS | Mod: ,,, | Performed by: INTERNAL MEDICINE

## 2020-12-21 RX ORDER — DIAZEPAM 10 MG/2ML
2 INJECTION INTRAMUSCULAR
Status: COMPLETED | OUTPATIENT
Start: 2020-12-21 | End: 2020-12-21

## 2020-12-21 RX ADMIN — DIAZEPAM 2 MG: 10 INJECTION, SOLUTION INTRAMUSCULAR; INTRAVENOUS at 12:12

## 2020-12-21 NOTE — ED NOTES
"80 F arrived to ED w/ Tremors (" I just shake all the time in my arms and I just figured I was old and thats why but yesterday they got worse"> Denies CP, SOB, dizziness, weakness, fever or chills) Pt alert and oriented x4, VSS,  Airway intact, respirations even and nonlabored, no bowel or bladder incontinence. +2 pulses to all four extremities, no edema or deformities noted. Pt remains on cardiac monitor, nibp, pulse oximeter.  Side rails up x 2, bed in lowest position and locked, call button within reach. Room door closed but curtain open.    Physical Exam  Constitutional:       Appearance: He is not toxic-appearing.  HENT:     Head: Normocephalic and atraumatic.     Mouth/Throat:     Mouth: Mucous membranes are moist.  Eyes:     Extraocular Movements: Extraocular movements intact.     Conjunctiva/sclera: Conjunctivae normal.     Pupils: Pupils are equal, round, and reactive to light.  Neck:     Vascular: No JVD.  Cardiovascular:     Rate and Rhythm: Normal rate and regular rhythm.     Heart sounds: Normal heart sounds. No murmur. No friction rub. No gallop.    Pulmonary:     Effort: Pulmonary effort is normal. No respiratory distress.     Breath sounds: Normal breath sounds. No wheezing or rales.  Abdominal:     General: Bowel sounds are normal. There is no distension.     Palpations: Abdomen is soft.     Tenderness: There is no tenderness reported. There is no guarding or rebound.     Hernia: No hernia is present.  Skin:     General: Skin is warm and dry.     Findings: No rash.  Neurological:     General: No focal deficit present.     Mental Status: He is alert and oriented to person, place, and time.     Cranial Nerves: No cranial nerve deficit.     Sensory: No sensory deficit.    "

## 2020-12-21 NOTE — DISCHARGE INSTRUCTIONS
It was a pleasure seeing you in the emergency department today.  Please follow up with neurology and your PCP concerning your tremors.

## 2020-12-21 NOTE — ED PROVIDER NOTES
"Encounter Date: 12/21/2020       History     Chief Complaint   Patient presents with    Tremors     " I just shake all the time in my arms and I just figured I was old and thats why but yesterday they got worse"> Denies CP, SOB, dizziness, weakness, fever or chills     Ms. Logan is an 80-year-old female, with past medical history of COPD , alcohol use disorder,  trigeminal neuralgia, palpitations and tremors who presents emergency department due to worsening tremors.  She states that she has had tremors for the past 6 months but this morning her tremors worsened. She states her hands were constantly shaking and the tremors worsened whenever she tried to use them. She was also having shaking in her legs and so she decided to come to emergency department.  She says the last time her tremors were very bothersome was in April when she was also having palpitations.  She had come to the emergency department at that time and was given metoprolol which she states she has been taking.  The medication had been helping her tremors until this morning where it did not seem to make a difference.  She states that she drinks 3 glasses of bourbon and coke 4 days a week and she last drank 2 days ago.  She denies any history of Parkinson's disease but has not seen her primary care doctor for the tremors.         Review of patient's allergies indicates:   Allergen Reactions    No known drug allergies      Past Medical History:   Diagnosis Date    Osteoporosis     Sinus disease      Past Surgical History:   Procedure Laterality Date    Breast implants removal  2012    SINUS SURGERY      TONSILLECTOMY      Age 1     Family History   Problem Relation Age of Onset    No Known Problems Mother     No Known Problems Father     Blindness Neg Hx     Glaucoma Neg Hx     Macular degeneration Neg Hx     Retinal detachment Neg Hx      Social History     Tobacco Use    Smoking status: Current Every Day Smoker     Packs/day: 0.25     " Years: 30.00     Pack years: 7.50     Types: Cigarettes    Smokeless tobacco: Never Used    Tobacco comment: 2 packs a week   Substance Use Topics    Alcohol use: Yes     Comment: socially    Drug use: Yes     Types: Marijuana     Review of Systems   Constitutional: Negative for activity change, appetite change, chills, fatigue and fever.   HENT: Negative for congestion, sinus pressure, sinus pain and sore throat.    Eyes: Negative for photophobia and visual disturbance.   Respiratory: Negative for cough, choking, chest tightness, shortness of breath and wheezing.    Cardiovascular: Negative for chest pain, palpitations and leg swelling.   Gastrointestinal: Negative for abdominal pain, constipation, diarrhea, nausea and vomiting.   Genitourinary: Negative for difficulty urinating, flank pain and urgency.   Musculoskeletal: Negative for arthralgias, back pain, gait problem, joint swelling and myalgias.   Neurological: Positive for tremors. Negative for dizziness, seizures, weakness, light-headedness and headaches.       Physical Exam     Initial Vitals [12/21/20 1012]   BP Pulse Resp Temp SpO2   (!) 177/87 108 20 98.8 °F (37.1 °C) 96 %      MAP       --         Physical Exam    Nursing note and vitals reviewed.  Constitutional: She appears well-developed and well-nourished. She is not diaphoretic. No distress.   HENT:   Head: Normocephalic and atraumatic.   Eyes: EOM are normal. Pupils are equal, round, and reactive to light.   Neck: Normal range of motion. No thyromegaly present. No tracheal deviation present. No JVD present.   Cardiovascular: Normal rate, regular rhythm, normal heart sounds and intact distal pulses. Exam reveals no gallop and no friction rub.    No murmur heard.  Pulmonary/Chest: Breath sounds normal. No stridor. No respiratory distress. She has no wheezes. She has no rhonchi. She has no rales. She exhibits no tenderness.   Abdominal: Soft. Bowel sounds are normal. She exhibits no distension  and no mass. There is no abdominal tenderness. There is no rebound and no guarding.   Musculoskeletal: Normal range of motion. No tenderness or edema.   Lymphadenopathy:     She has no cervical adenopathy.   Neurological: She is alert and oriented to person, place, and time. She has normal strength. She displays tremor. She displays no atrophy. No cranial nerve deficit or sensory deficit. She exhibits normal muscle tone. She displays a negative Romberg sign. Coordination and gait normal.   Bilateral tremors in upper and lower extremities noted  Tremor worse when asked to reach out to object.    Skin: Skin is warm. Capillary refill takes less than 2 seconds.         ED Course   Procedures  Labs Reviewed   CBC W/ AUTO DIFFERENTIAL - Abnormal; Notable for the following components:       Result Value    WBC 3.48 (*)      (*)     MCH 33.8 (*)     Platelets 123 (*)     Immature Granulocytes 0.9 (*)     Gran # (ANC) 1.7 (*)     Lymph # 0.7 (*)     Mono % 28.2 (*)     All other components within normal limits   COMPREHENSIVE METABOLIC PANEL - Abnormal; Notable for the following components:    Calcium 8.6 (*)     Alkaline Phosphatase 173 (*)     AST 45 (*)     All other components within normal limits   TSH          Imaging Results    None          Medical Decision Making:   Initial Assessment:   Ms. Logan is an 80-year-old female, with past medical history of COPD , alcohol use disorder,  trigeminal neuralgia, palpitations and tremors who presents emergency department due to worsening tremors.  Differential Diagnosis:   Essential tremor  Parkinson's disease  Alcohol withdrawal  Thyroid disease  Clinical Tests:   Lab Tests: Ordered and Reviewed  ED Management:  Patient was examined and was stable on exam.  Labs were ordered including a TSH which was normal, CMP which was significant for an elevated AST and CBC which was non-significant.  Patient's EKG did not show any acute findings.  She was given 2 mg of diazepam  and on reassessment he had tremors which had significantly improved.  An ambulatory referral was put in for Neurology.  Patient was advised to follow-up with neurology as well as her primary care doctor to further workup her tremors.  She was discharged in stable condition and return precautions were given.                    ED Course as of Dec 21 1345   Mon Dec 21, 2020   1248 TSH: 2.420 [GM]   1248 BUN: 8 [GM]   1248 Creatinine: 0.8 [GM]   1248 WBC(!): 3.48 [GM]   1248 Hemoglobin: 14.4 [GM]   1248 Hematocrit: 42.9 [GM]      ED Course User Index  [GM] Yolanda Lara MD            Clinical Impression:       ICD-10-CM ICD-9-CM   1. Occasional tremors  R25.1 781.0   2. Tachycardia  R00.0 785.0                          ED Disposition Condition    Discharge Stable        ED Prescriptions     None        Follow-up Information     Follow up With Specialties Details Why Contact Info    Ash Dumont MD Family Medicine Schedule an appointment as soon as possible for a visit in 3 days  [address] [place] [id number] [phone]                                         Rivas Lowe MD  Resident  12/21/20 0903

## 2020-12-24 ENCOUNTER — OFFICE VISIT (OUTPATIENT)
Dept: PRIMARY CARE CLINIC | Facility: CLINIC | Age: 80
End: 2020-12-24
Payer: MEDICARE

## 2020-12-24 VITALS
HEART RATE: 88 BPM | TEMPERATURE: 98 F | BODY MASS INDEX: 18.08 KG/M2 | OXYGEN SATURATION: 96 % | HEIGHT: 64 IN | DIASTOLIC BLOOD PRESSURE: 80 MMHG | WEIGHT: 105.94 LBS | SYSTOLIC BLOOD PRESSURE: 130 MMHG | RESPIRATION RATE: 18 BRPM

## 2020-12-24 DIAGNOSIS — I10 BENIGN ESSENTIAL HTN: ICD-10-CM

## 2020-12-24 DIAGNOSIS — F10.10 ALCOHOL ABUSE: ICD-10-CM

## 2020-12-24 DIAGNOSIS — Z76.89 ENCOUNTER TO ESTABLISH CARE: Primary | ICD-10-CM

## 2020-12-24 DIAGNOSIS — Z78.0 ASYMPTOMATIC MENOPAUSE: ICD-10-CM

## 2020-12-24 DIAGNOSIS — Z12.31 BREAST CANCER SCREENING BY MAMMOGRAM: ICD-10-CM

## 2020-12-24 DIAGNOSIS — R00.2 PALPITATIONS: ICD-10-CM

## 2020-12-24 DIAGNOSIS — Z23 NEED FOR VACCINATION: ICD-10-CM

## 2020-12-24 DIAGNOSIS — G25.2 RESTING TREMOR: ICD-10-CM

## 2020-12-24 PROCEDURE — 99214 OFFICE O/P EST MOD 30 MIN: CPT | Mod: S$GLB,,, | Performed by: STUDENT IN AN ORGANIZED HEALTH CARE EDUCATION/TRAINING PROGRAM

## 2020-12-24 PROCEDURE — 99999 PR PBB SHADOW E&M-EST. PATIENT-LVL V: CPT | Mod: PBBFAC,,, | Performed by: STUDENT IN AN ORGANIZED HEALTH CARE EDUCATION/TRAINING PROGRAM

## 2020-12-24 PROCEDURE — 99999 PR PBB SHADOW E&M-EST. PATIENT-LVL V: ICD-10-PCS | Mod: PBBFAC,,, | Performed by: STUDENT IN AN ORGANIZED HEALTH CARE EDUCATION/TRAINING PROGRAM

## 2020-12-24 PROCEDURE — 99214 PR OFFICE/OUTPT VISIT, EST, LEVL IV, 30-39 MIN: ICD-10-PCS | Mod: S$GLB,,, | Performed by: STUDENT IN AN ORGANIZED HEALTH CARE EDUCATION/TRAINING PROGRAM

## 2020-12-24 RX ORDER — LISINOPRIL 10 MG/1
10 TABLET ORAL DAILY
Qty: 90 TABLET | Refills: 3 | Status: ON HOLD | OUTPATIENT
Start: 2020-12-24 | End: 2021-10-28 | Stop reason: HOSPADM

## 2020-12-24 RX ORDER — METOPROLOL SUCCINATE 25 MG/1
25 TABLET, EXTENDED RELEASE ORAL DAILY
Qty: 90 TABLET | Refills: 3 | Status: CANCELLED | OUTPATIENT
Start: 2020-12-24 | End: 2021-01-23

## 2020-12-24 RX ORDER — METOPROLOL SUCCINATE 25 MG/1
25 TABLET, EXTENDED RELEASE ORAL DAILY
Qty: 90 TABLET | Refills: 3 | Status: SHIPPED | OUTPATIENT
Start: 2020-12-24 | End: 2021-02-05

## 2020-12-24 NOTE — PATIENT INSTRUCTIONS
Essential Tremor (ET)  What is essential tremor?  Essential tremor (ET) is a neurological disorder that causes your hands, head, trunk, voice, or legs to shake rhythmically. It is often confused with Parkinson disease.  ET is the most common trembling disorder that people have. Everyone has some ET, but the movements usually cannot be seen or felt. When tremors are noticeable, the condition is classified as ET.  ET is most common among people older than age 65, but it can affect people at any age.  What causes ET?  ET can occur in different people for different reasons:  · Familial essential tremor. In most people, the condition seems to be passed down from a parent to a child. If your parent has ET, there is a 50% chance that you or your children will inherit the gene responsible for the condition.  · Essential tremor related to another disorder. Sometimes, a tremor is a symptom of another neurological disorder, such as Parkinson disease or dystonia. Sometimes, ET is mistaken for these other diseases when they are not present. A healthcare providers careful diagnosis is extremely important.  The cause of ET isnt known. However, one theory suggests that your cerebellum and other parts of your brain are not communicating correctly. The cerebellum is a part of the brain that controls muscle coordination.  What are the symptoms of ET?  If you have ET, you will have shaking and trembling at different times and in different situations, but some characteristics are common to all. Here is what you might typically experience:  · Tremors occur when you move and are less noticeable when you rest.  · Certain medicines, caffeine, or stress can make your tremors worse.  · Tremor may improve with ingestion of a small amount of alcohol (such as wine)  · Tremors get worse as you age.  · Tremors dont affect both sides of your body in the same way.  Here are different signs of essential tremor:  · Tremors that are most obvious  in your hands  · Difficulty doing tasks with your hands, such as writing or using tools  · Shaking or quivering sound in your voice  · Uncontrollable head-nodding  · In rare instances, tremors in your legs or feet  How is ET diagnosed?  Your rapid, uncontrollable trembling, as well as questions about your medical and family history, can help your healthcare provider determine if you have familial ET. He or she will probably need to rule out other conditions that could cause shaking or trembling. For example, tremors could be symptoms of diseases, such as hyperthyroidism. Your healthcare provider might test you for those, as well.  In some cases, the tremors might be related to other factors. To find out for certain, your healthcare provider may have you try to:  · Abstain from heavy alcohol use; if youre an alcoholic, trembling is a common symptom.  · Cut out cigarette smoking.  · Avoid caffeine.  · Avoid certain medicines  How is ET treated?   Propanolol and primidone are 2 medicines often prescribed to treat ET. Propanolol blocks the stimulating action of neurotransmitters to calm your trembling. Primidone is a common antiseizure medicine that also controls the actions of neurotransmitters.  Gabapentin and topiramate are 2 other antiseizure medicines that are sometimes prescribed. In some cases, tranquilizers like alprazolam or clonazepam might be suggested.  For ET in your hands, botulinum toxin (Botox) injections have shown some promise in easing the trembling. They work by weakening the surrounding muscles around your hands. For severe tremors, a stimulating device (deep brain stimulator) surgically implanted in your brain may help.  Can ET be prevented?   The specific cause of ET is not known, so scientists are not sure how the condition can be prevented.  Living with ET  ET is usually not dangerous, but it can certainly be frustrating if you have to deal with it. Certain factors can make tremors worse, so  the following steps may help to decrease tremors:  · Avoid alcohol, cigarettes, and caffeine  · Avoid stressful situations as much as possible  · Use relaxation techniques, such as yoga, deep-breathing exercises, or biofeedback  · Check with your healthcare provider to see if any medicines youre taking could be making your tremors worse.  Talk with your healthcare provider about other options, such as surgery, if ET starts to affect your quality of life.  When should I call my healthcare provider?  If you have been diagnosed with ET, talk with your healthcare provider about when you might need to call. He or she will likely advise you to call if your tremors become worse, or if you develop new neurologic symptoms, such as numbness or weakness.  Key points about essential tremors  · ET is a neurological disorder that causes your hands, head, trunk, voice, or legs to shake rhythmically. The cause is not known, but it is often passed down from a parent to a child.  · ET is sometimes confused with other types of tremor, so getting the right diagnosis is important.  · Tremors tend to be worse during movement than when at rest. The tremors are usually not dangerous, but they can get worse over time.  · Avoiding things that might make tremors worse, such as stress, caffeine, and certain medicines, may be helpful.  · Medicines can also help control or limit tremors in some people. Severe tremors can sometimes be treated with surgery.  Next steps  Tips to help you get the most from a visit to your healthcare provider:  · Know the reason for your visit and what you want to happen.  · Before your visit, write down questions you want answered.  · Bring someone with you to help you ask questions and remember what your provider tells you.  · At the visit, write down the name of a new diagnosis, and any new medicines, treatments, or tests. Also write down any new instructions your provider gives you.  · Know why a new medicine or  treatment is prescribed, and how it will help you. Also know what the side effects are.  · Ask if your condition can be treated in other ways.  · Know why a test or procedure is recommended and what the results could mean.  · Know what to expect if you do not take the medicine or have the test or procedure.  · If you have a follow-up appointment, write down the date, time, and purpose for that visit.  · Know how you can contact your provider if you have questions.  © 8735-8932 [employer]. [address] [place] [address], [place] [address]. All rights reserved. This information is not intended as a substitute for professional medical care. Always follow your healthcare professional's instructions.

## 2020-12-24 NOTE — PROGRESS NOTES
Subjective:       Patient ID: Sangita Logan is a 80 y.o. female.    Chief Complaint: Tremors (pt said she has been having tremors went to ER, but they could not diganose why.)    HPI   80 y.o. female presents to [facility] with complaints of tremor. Weak and shaky at times in the past. Severe shakes when seen in ED 12/21/2020. Is on metoprolol for HTN in the past per patient, not for tremor. TSH was normal at the time. Last had alcohol 12/20/2020. Normally drinks about 4 days per week 3-4 bourbon and cokes in the evening. Has never had withdrawal symptoms in the past when stopping.    No family history of tremor or parkinson's disease. Tremor is always present, steady and doesn't change with activity. Feels like tremor is better in the evening and uncertain if related to drinking. Believes may make it a little. 12/21/2020 legs had tremor which is unusual for patient. Never misses metoprolol.      Allergies: NKDA  Medical History: HTN, COPD, tremor, alcohol use, trigeminal neuralgia, palpitations  Medications: albuterol inhaler, lisinopril 10 mg, metoprolol succinate 25 mg daily, MVI, Anoro ellipta 62.5-25 mcg/actuation daily  Family History: Breast cancer in sister, desires mammogram  Social History: Smoker, current. No desire to quit. Alcohol use up to 4 drinks four times weekly    Fasting?: Not fasting  Last tetanus vaccine?:   DEXA?:  Pneumococcal vaccine?:   Flu vaccine?: Done at [facility]        Review of Systems   Constitutional: Negative for chills, diaphoresis, fatigue and fever.   HENT: Negative for congestion, sinus pressure, sneezing and sore throat.    Respiratory: Negative for cough and shortness of breath.    Cardiovascular: Negative for chest pain and palpitations.   Gastrointestinal: Negative for abdominal pain, diarrhea, nausea and vomiting.   Skin: Negative for rash and wound.   Neurological: Negative for dizziness, weakness, numbness and headaches.       Objective:      Vitals:     "12/24/20 1111   BP: 130/80   BP Location: Right arm   Patient Position: Sitting   BP Method: Medium (Manual)   Pulse: 88   Resp: 18   Temp: 98 °F (36.7 °C)   TempSrc: Temporal   SpO2: 96%   Weight: 48.1 kg (105 lb 14.9 oz)   Height: 5' 4" (1.626 m)     Physical Exam        Lab Results   Component Value Date     12/21/2020    K 4.3 12/21/2020     12/21/2020    CO2 26 12/21/2020    BUN 8 12/21/2020    CREATININE 0.8 12/21/2020    ANIONGAP 10 12/21/2020     No results found for: HGBA1C  No results found for: BNP, BNPTRIAGEBLO    Lab Results   Component Value Date    WBC 3.48 (L) 12/21/2020    HGB 14.4 12/21/2020    HCT 42.9 12/21/2020     (L) 12/21/2020    GRAN 1.7 (L) 12/21/2020    GRAN 49.6 12/21/2020     No results found for: CHOL, HDL, LDLCALC, TRIG       Current Outpatient Medications:     albuterol 90 mcg/actuation inhaler, Inhale 2 puffs into the lungs every 6 (six) hours as needed for Wheezing. Rescue, Disp: 18 g, Rfl: 0    lisinopriL 10 MG tablet, Take 1 tablet (10 mg total) by mouth once daily., Disp: 90 tablet, Rfl: 3    metoprolol succinate (TOPROL-XL) 25 MG 24 hr tablet, Take 1 tablet (25 mg total) by mouth once daily., Disp: 90 tablet, Rfl: 3    multivitamin-Ca-iron-minerals (MULTIPLE VITAMIN, WOMENS) Tab, Take by mouth., Disp: , Rfl:     mupirocin (BACTROBAN) 2 % ointment, AAA left cheek bid, Disp: 30 g, Rfl: 1    umeclidinium-vilanteroL (ANORO ELLIPTA) 62.5-25 mcg/actuation DsDv, Inhale 1 puff into the lungs once daily. Controller, Disp: 1 each, Rfl: 3        Assessment:       1. Encounter to establish care    2. Resting tremor    3. Alcohol abuse    4. Breast cancer screening by mammogram    5. Need for vaccination    6. Benign essential HTN    7. Palpitations    8. Asymptomatic menopause           Plan:   Encounter to establish care    Resting tremor  -     Ambulatory referral/consult to Neurology; Future; Expected date: 12/31/2020  - Explained potential causes and likely " essential tremor with improvement in evening when alcohol is used  - Possible relation to recent alcohol cessation and informed patient about risks associated with abrupt cessation  - Will refer to Neurology with no concerning neurologic findings on exam today    Alcohol abuse  -     Echo Color Flow Doppler? Yes; Future  - Informed patient cardiomegaly secondary to chronic alcohol abuse increases risk for electrical conductance abnormalities  - EKG recent ED visit reviewed and Holter Monitor ordered    Breast cancer screening by mammogram  -     Mammo Digital Screening Bilat; Future; Expected date: 12/24/2020    Need for vaccination  -     Will obtain following Holidays as patient does not want any unwell feelings during [place]  - Risk vs benefit discussed with patient    Benign essential HTN  -     lisinopriL 10 MG tablet; Take 1 tablet (10 mg total) by mouth once daily.  Dispense: 90 tablet; Refill: 3  -     metoprolol succinate (TOPROL-XL) 25 MG 24 hr tablet; Take 1 tablet (25 mg total) by mouth once daily.  Dispense: 90 tablet; Refill: 3    Palpitations  -     Echo Color Flow Doppler? Yes; Future  -     Holter monitor - 48 hour; Future  - TSH, CMP, CBC recent ED visit reviewed without identifiable causative factor    Asymptomatic menopause  -     DXA Bone Density Spine And Hip; Future; Expected date: 12/24/2020    RTC in one month for health maintenance and vaccines

## 2021-01-11 ENCOUNTER — TELEPHONE (OUTPATIENT)
Dept: NEUROLOGY | Facility: CLINIC | Age: 81
End: 2021-01-11

## 2021-01-14 ENCOUNTER — IMMUNIZATION (OUTPATIENT)
Dept: PRIMARY CARE CLINIC | Facility: CLINIC | Age: 81
End: 2021-01-14
Payer: MEDICARE

## 2021-01-14 ENCOUNTER — HOSPITAL ENCOUNTER (OUTPATIENT)
Dept: RADIOLOGY | Facility: HOSPITAL | Age: 81
Discharge: HOME | End: 2021-01-14
Attending: STUDENT IN AN ORGANIZED HEALTH CARE EDUCATION/TRAINING PROGRAM
Payer: MEDICARE

## 2021-01-14 VITALS — BODY MASS INDEX: 17.93 KG/M2 | HEIGHT: 64 IN | WEIGHT: 105 LBS

## 2021-01-14 DIAGNOSIS — Z12.31 BREAST CANCER SCREENING BY MAMMOGRAM: ICD-10-CM

## 2021-01-14 DIAGNOSIS — Z23 NEED FOR VACCINATION: ICD-10-CM

## 2021-01-14 PROCEDURE — 77067 SCR MAMMO BI INCL CAD: CPT | Mod: TC,PN

## 2021-01-14 PROCEDURE — 91300 COVID-19, MRNA, LNP-S, PF, 30 MCG/0.3 ML DOSE VACCINE: CPT | Mod: PBBFAC | Performed by: EMERGENCY MEDICINE

## 2021-01-14 PROCEDURE — 77067 SCR MAMMO BI INCL CAD: CPT | Mod: 26,,, | Performed by: RADIOLOGY

## 2021-01-14 PROCEDURE — 77067 MAMMO DIGITAL SCREENING BILAT: ICD-10-PCS | Mod: 26,,, | Performed by: RADIOLOGY

## 2021-01-19 ENCOUNTER — TELEPHONE (OUTPATIENT)
Dept: PRIMARY CARE CLINIC | Facility: CLINIC | Age: 81
End: 2021-01-19

## 2021-01-21 ENCOUNTER — NURSE TRIAGE (OUTPATIENT)
Dept: ADMINISTRATIVE | Facility: CLINIC | Age: 81
End: 2021-01-21

## 2021-01-21 DIAGNOSIS — J44.9 CHRONIC OBSTRUCTIVE PULMONARY DISEASE, UNSPECIFIED COPD TYPE: Primary | ICD-10-CM

## 2021-01-21 RX ORDER — UMECLIDINIUM BROMIDE AND VILANTEROL TRIFENATATE 62.5; 25 UG/1; UG/1
1 POWDER RESPIRATORY (INHALATION) DAILY
Qty: 1 EACH | Refills: 1 | Status: SHIPPED | OUTPATIENT
Start: 2021-01-21 | End: 2021-03-15 | Stop reason: SDUPTHER

## 2021-01-25 DIAGNOSIS — Z13.820 SCREENING FOR OSTEOPOROSIS: Primary | ICD-10-CM

## 2021-01-25 RX ORDER — ALENDRONATE SODIUM 70 MG/1
70 TABLET ORAL
Qty: 12 TABLET | Refills: 3 | Status: SHIPPED | OUTPATIENT
Start: 2021-01-25 | End: 2021-11-11

## 2021-02-04 ENCOUNTER — IMMUNIZATION (OUTPATIENT)
Dept: PRIMARY CARE CLINIC | Facility: CLINIC | Age: 81
End: 2021-02-04
Payer: MEDICARE

## 2021-02-04 DIAGNOSIS — Z23 NEED FOR VACCINATION: Primary | ICD-10-CM

## 2021-02-04 PROCEDURE — 91300 COVID-19, MRNA, LNP-S, PF, 30 MCG/0.3 ML DOSE VACCINE: CPT | Mod: PBBFAC | Performed by: EMERGENCY MEDICINE

## 2021-02-04 PROCEDURE — 0002A COVID-19, MRNA, LNP-S, PF, 30 MCG/0.3 ML DOSE VACCINE: CPT | Mod: PBBFAC | Performed by: EMERGENCY MEDICINE

## 2021-02-05 ENCOUNTER — OFFICE VISIT (OUTPATIENT)
Dept: PRIMARY CARE CLINIC | Facility: CLINIC | Age: 81
End: 2021-02-05
Payer: MEDICARE

## 2021-02-05 VITALS
RESPIRATION RATE: 19 BRPM | HEIGHT: 64 IN | SYSTOLIC BLOOD PRESSURE: 124 MMHG | BODY MASS INDEX: 18.01 KG/M2 | OXYGEN SATURATION: 96 % | HEART RATE: 87 BPM | WEIGHT: 105.5 LBS | TEMPERATURE: 99 F | DIASTOLIC BLOOD PRESSURE: 78 MMHG

## 2021-02-05 DIAGNOSIS — M81.0 AGE-RELATED OSTEOPOROSIS WITHOUT CURRENT PATHOLOGICAL FRACTURE: ICD-10-CM

## 2021-02-05 DIAGNOSIS — I49.1 PAC (PREMATURE ATRIAL CONTRACTION): ICD-10-CM

## 2021-02-05 DIAGNOSIS — G25.0 ESSENTIAL TREMOR: Primary | ICD-10-CM

## 2021-02-05 PROCEDURE — 99214 OFFICE O/P EST MOD 30 MIN: CPT | Mod: S$GLB,,, | Performed by: STUDENT IN AN ORGANIZED HEALTH CARE EDUCATION/TRAINING PROGRAM

## 2021-02-05 PROCEDURE — 3078F DIAST BP <80 MM HG: CPT | Mod: CPTII,S$GLB,, | Performed by: STUDENT IN AN ORGANIZED HEALTH CARE EDUCATION/TRAINING PROGRAM

## 2021-02-05 PROCEDURE — 1101F PT FALLS ASSESS-DOCD LE1/YR: CPT | Mod: CPTII,S$GLB,, | Performed by: STUDENT IN AN ORGANIZED HEALTH CARE EDUCATION/TRAINING PROGRAM

## 2021-02-05 PROCEDURE — 3074F PR MOST RECENT SYSTOLIC BLOOD PRESSURE < 130 MM HG: ICD-10-PCS | Mod: CPTII,S$GLB,, | Performed by: STUDENT IN AN ORGANIZED HEALTH CARE EDUCATION/TRAINING PROGRAM

## 2021-02-05 PROCEDURE — 1126F PR PAIN SEVERITY QUANTIFIED, NO PAIN PRESENT: ICD-10-PCS | Mod: S$GLB,,, | Performed by: STUDENT IN AN ORGANIZED HEALTH CARE EDUCATION/TRAINING PROGRAM

## 2021-02-05 PROCEDURE — 1159F MED LIST DOCD IN RCRD: CPT | Mod: S$GLB,,, | Performed by: STUDENT IN AN ORGANIZED HEALTH CARE EDUCATION/TRAINING PROGRAM

## 2021-02-05 PROCEDURE — 99214 PR OFFICE/OUTPT VISIT, EST, LEVL IV, 30-39 MIN: ICD-10-PCS | Mod: S$GLB,,, | Performed by: STUDENT IN AN ORGANIZED HEALTH CARE EDUCATION/TRAINING PROGRAM

## 2021-02-05 PROCEDURE — 3074F SYST BP LT 130 MM HG: CPT | Mod: CPTII,S$GLB,, | Performed by: STUDENT IN AN ORGANIZED HEALTH CARE EDUCATION/TRAINING PROGRAM

## 2021-02-05 PROCEDURE — 3288F PR FALLS RISK ASSESSMENT DOCUMENTED: ICD-10-PCS | Mod: CPTII,S$GLB,, | Performed by: STUDENT IN AN ORGANIZED HEALTH CARE EDUCATION/TRAINING PROGRAM

## 2021-02-05 PROCEDURE — 1126F AMNT PAIN NOTED NONE PRSNT: CPT | Mod: S$GLB,,, | Performed by: STUDENT IN AN ORGANIZED HEALTH CARE EDUCATION/TRAINING PROGRAM

## 2021-02-05 PROCEDURE — 3078F PR MOST RECENT DIASTOLIC BLOOD PRESSURE < 80 MM HG: ICD-10-PCS | Mod: CPTII,S$GLB,, | Performed by: STUDENT IN AN ORGANIZED HEALTH CARE EDUCATION/TRAINING PROGRAM

## 2021-02-05 PROCEDURE — 3288F FALL RISK ASSESSMENT DOCD: CPT | Mod: CPTII,S$GLB,, | Performed by: STUDENT IN AN ORGANIZED HEALTH CARE EDUCATION/TRAINING PROGRAM

## 2021-02-05 PROCEDURE — 1101F PR PT FALLS ASSESS DOC 0-1 FALLS W/OUT INJ PAST YR: ICD-10-PCS | Mod: CPTII,S$GLB,, | Performed by: STUDENT IN AN ORGANIZED HEALTH CARE EDUCATION/TRAINING PROGRAM

## 2021-02-05 PROCEDURE — 1159F PR MEDICATION LIST DOCUMENTED IN MEDICAL RECORD: ICD-10-PCS | Mod: S$GLB,,, | Performed by: STUDENT IN AN ORGANIZED HEALTH CARE EDUCATION/TRAINING PROGRAM

## 2021-02-05 PROCEDURE — 99999 PR PBB SHADOW E&M-EST. PATIENT-LVL IV: CPT | Mod: PBBFAC,,, | Performed by: STUDENT IN AN ORGANIZED HEALTH CARE EDUCATION/TRAINING PROGRAM

## 2021-02-05 PROCEDURE — 99999 PR PBB SHADOW E&M-EST. PATIENT-LVL IV: ICD-10-PCS | Mod: PBBFAC,,, | Performed by: STUDENT IN AN ORGANIZED HEALTH CARE EDUCATION/TRAINING PROGRAM

## 2021-02-05 RX ORDER — METOPROLOL SUCCINATE 50 MG/1
50 TABLET, EXTENDED RELEASE ORAL DAILY
Qty: 30 TABLET | Refills: 11 | Status: ON HOLD | OUTPATIENT
Start: 2021-02-05 | End: 2021-10-28 | Stop reason: HOSPADM

## 2021-03-15 DIAGNOSIS — J44.9 CHRONIC OBSTRUCTIVE PULMONARY DISEASE, UNSPECIFIED COPD TYPE: ICD-10-CM

## 2021-03-15 RX ORDER — UMECLIDINIUM BROMIDE AND VILANTEROL TRIFENATATE 62.5; 25 UG/1; UG/1
1 POWDER RESPIRATORY (INHALATION) DAILY
Qty: 1 EACH | Refills: 1 | Status: SHIPPED | OUTPATIENT
Start: 2021-03-15 | End: 2021-03-15

## 2021-03-15 RX ORDER — UMECLIDINIUM BROMIDE AND VILANTEROL TRIFENATATE 62.5; 25 UG/1; UG/1
1 POWDER RESPIRATORY (INHALATION) DAILY
Qty: 60 EACH | Refills: 5 | Status: SHIPPED | OUTPATIENT
Start: 2021-03-15 | End: 2021-03-16 | Stop reason: SDUPTHER

## 2021-03-16 DIAGNOSIS — J44.9 CHRONIC OBSTRUCTIVE PULMONARY DISEASE, UNSPECIFIED COPD TYPE: ICD-10-CM

## 2021-03-16 RX ORDER — UMECLIDINIUM BROMIDE AND VILANTEROL TRIFENATATE 62.5; 25 UG/1; UG/1
1 POWDER RESPIRATORY (INHALATION) DAILY
Qty: 60 EACH | Refills: 5 | Status: SHIPPED | OUTPATIENT
Start: 2021-03-16 | End: 2021-03-17

## 2021-03-17 ENCOUNTER — TELEPHONE (OUTPATIENT)
Dept: PRIMARY CARE CLINIC | Facility: CLINIC | Age: 81
End: 2021-03-17

## 2021-03-17 DIAGNOSIS — J44.9 CHRONIC OBSTRUCTIVE PULMONARY DISEASE, UNSPECIFIED COPD TYPE: ICD-10-CM

## 2021-03-17 RX ORDER — TIOTROPIUM BROMIDE AND OLODATEROL 3.124; 2.736 UG/1; UG/1
2 SPRAY, METERED RESPIRATORY (INHALATION) DAILY
Qty: 4 G | Refills: 11 | Status: SHIPPED | OUTPATIENT
Start: 2021-03-17 | End: 2021-04-19 | Stop reason: SDUPTHER

## 2021-04-19 DIAGNOSIS — J44.9 CHRONIC OBSTRUCTIVE PULMONARY DISEASE, UNSPECIFIED COPD TYPE: ICD-10-CM

## 2021-04-19 RX ORDER — TIOTROPIUM BROMIDE AND OLODATEROL 3.124; 2.736 UG/1; UG/1
2 SPRAY, METERED RESPIRATORY (INHALATION) DAILY
Qty: 4 G | Refills: 11 | Status: SHIPPED | OUTPATIENT
Start: 2021-04-19 | End: 2021-05-05

## 2021-05-05 ENCOUNTER — OFFICE VISIT (OUTPATIENT)
Dept: PRIMARY CARE CLINIC | Facility: CLINIC | Age: 81
End: 2021-05-05
Payer: MEDICARE

## 2021-05-05 VITALS
OXYGEN SATURATION: 98 % | RESPIRATION RATE: 16 BRPM | DIASTOLIC BLOOD PRESSURE: 74 MMHG | WEIGHT: 105.81 LBS | TEMPERATURE: 99 F | HEART RATE: 80 BPM | SYSTOLIC BLOOD PRESSURE: 136 MMHG | HEIGHT: 64 IN | BODY MASS INDEX: 18.06 KG/M2

## 2021-05-05 DIAGNOSIS — Z13.6 ENCOUNTER FOR SCREENING FOR CARDIOVASCULAR DISORDERS: ICD-10-CM

## 2021-05-05 DIAGNOSIS — R40.4 TRANSIENT ALTERATION OF AWARENESS: Primary | ICD-10-CM

## 2021-05-05 DIAGNOSIS — R41.3 MEMORY CHANGES: ICD-10-CM

## 2021-05-05 DIAGNOSIS — J44.9 CHRONIC OBSTRUCTIVE PULMONARY DISEASE, UNSPECIFIED COPD TYPE: ICD-10-CM

## 2021-05-05 PROCEDURE — 99214 PR OFFICE/OUTPT VISIT, EST, LEVL IV, 30-39 MIN: ICD-10-PCS | Mod: S$GLB,,, | Performed by: STUDENT IN AN ORGANIZED HEALTH CARE EDUCATION/TRAINING PROGRAM

## 2021-05-05 PROCEDURE — 1101F PT FALLS ASSESS-DOCD LE1/YR: CPT | Mod: CPTII,S$GLB,, | Performed by: STUDENT IN AN ORGANIZED HEALTH CARE EDUCATION/TRAINING PROGRAM

## 2021-05-05 PROCEDURE — 1101F PR PT FALLS ASSESS DOC 0-1 FALLS W/OUT INJ PAST YR: ICD-10-PCS | Mod: CPTII,S$GLB,, | Performed by: STUDENT IN AN ORGANIZED HEALTH CARE EDUCATION/TRAINING PROGRAM

## 2021-05-05 PROCEDURE — 99999 PR PBB SHADOW E&M-EST. PATIENT-LVL V: CPT | Mod: PBBFAC,,, | Performed by: STUDENT IN AN ORGANIZED HEALTH CARE EDUCATION/TRAINING PROGRAM

## 2021-05-05 PROCEDURE — 1126F PR PAIN SEVERITY QUANTIFIED, NO PAIN PRESENT: ICD-10-PCS | Mod: S$GLB,,, | Performed by: STUDENT IN AN ORGANIZED HEALTH CARE EDUCATION/TRAINING PROGRAM

## 2021-05-05 PROCEDURE — 1159F MED LIST DOCD IN RCRD: CPT | Mod: S$GLB,,, | Performed by: STUDENT IN AN ORGANIZED HEALTH CARE EDUCATION/TRAINING PROGRAM

## 2021-05-05 PROCEDURE — 3288F FALL RISK ASSESSMENT DOCD: CPT | Mod: CPTII,S$GLB,, | Performed by: STUDENT IN AN ORGANIZED HEALTH CARE EDUCATION/TRAINING PROGRAM

## 2021-05-05 PROCEDURE — 99214 OFFICE O/P EST MOD 30 MIN: CPT | Mod: S$GLB,,, | Performed by: STUDENT IN AN ORGANIZED HEALTH CARE EDUCATION/TRAINING PROGRAM

## 2021-05-05 PROCEDURE — 1159F PR MEDICATION LIST DOCUMENTED IN MEDICAL RECORD: ICD-10-PCS | Mod: S$GLB,,, | Performed by: STUDENT IN AN ORGANIZED HEALTH CARE EDUCATION/TRAINING PROGRAM

## 2021-05-05 PROCEDURE — 99999 PR PBB SHADOW E&M-EST. PATIENT-LVL V: ICD-10-PCS | Mod: PBBFAC,,, | Performed by: STUDENT IN AN ORGANIZED HEALTH CARE EDUCATION/TRAINING PROGRAM

## 2021-05-05 PROCEDURE — 99499 RISK ADDL DX/OHS AUDIT: ICD-10-PCS | Mod: S$GLB,,, | Performed by: STUDENT IN AN ORGANIZED HEALTH CARE EDUCATION/TRAINING PROGRAM

## 2021-05-05 PROCEDURE — 1126F AMNT PAIN NOTED NONE PRSNT: CPT | Mod: S$GLB,,, | Performed by: STUDENT IN AN ORGANIZED HEALTH CARE EDUCATION/TRAINING PROGRAM

## 2021-05-05 PROCEDURE — 99499 UNLISTED E&M SERVICE: CPT | Mod: S$GLB,,, | Performed by: STUDENT IN AN ORGANIZED HEALTH CARE EDUCATION/TRAINING PROGRAM

## 2021-05-05 PROCEDURE — 3288F PR FALLS RISK ASSESSMENT DOCUMENTED: ICD-10-PCS | Mod: CPTII,S$GLB,, | Performed by: STUDENT IN AN ORGANIZED HEALTH CARE EDUCATION/TRAINING PROGRAM

## 2021-05-05 RX ORDER — FLUTICASONE FUROATE AND VILANTEROL 100; 25 UG/1; UG/1
1 POWDER RESPIRATORY (INHALATION) DAILY
Qty: 30 EACH | Refills: 11 | Status: SHIPPED | OUTPATIENT
Start: 2021-05-05 | End: 2021-07-22 | Stop reason: SDUPTHER

## 2021-05-14 ENCOUNTER — OFFICE VISIT (OUTPATIENT)
Dept: NEUROLOGY | Facility: CLINIC | Age: 81
End: 2021-05-14
Payer: MEDICARE

## 2021-05-14 DIAGNOSIS — R41.9 COGNITIVE COMPLAINTS: Primary | ICD-10-CM

## 2021-05-14 PROCEDURE — 98968 PH1 ASSMT&MGMT NQHP 21-30: CPT | Mod: 95,,, | Performed by: PSYCHIATRY & NEUROLOGY

## 2021-05-14 PROCEDURE — 98968 PR NONPHYSICIAN TELEPHONE ASSESSMENT 21-30 MIN: ICD-10-PCS | Mod: 95,,, | Performed by: PSYCHIATRY & NEUROLOGY

## 2021-05-14 PROCEDURE — 99499 UNLISTED E&M SERVICE: CPT | Mod: 95,,, | Performed by: PSYCHIATRY & NEUROLOGY

## 2021-05-14 PROCEDURE — 99499 NO LOS: ICD-10-PCS | Mod: 95,,, | Performed by: PSYCHIATRY & NEUROLOGY

## 2021-05-17 DIAGNOSIS — N17.9 AKI (ACUTE KIDNEY INJURY): Primary | ICD-10-CM

## 2021-06-11 ENCOUNTER — OFFICE VISIT (OUTPATIENT)
Dept: NEUROLOGY | Facility: CLINIC | Age: 81
End: 2021-06-11
Payer: MEDICARE

## 2021-06-11 DIAGNOSIS — R41.3 MEMORY CHANGES: ICD-10-CM

## 2021-06-11 DIAGNOSIS — R40.4 TRANSIENT ALTERATION OF AWARENESS: ICD-10-CM

## 2021-06-11 DIAGNOSIS — R41.9 COGNITIVE COMPLAINTS: ICD-10-CM

## 2021-06-11 PROCEDURE — 96132 NRPSYC TST EVAL PHYS/QHP 1ST: CPT | Mod: S$GLB,,, | Performed by: PSYCHIATRY & NEUROLOGY

## 2021-06-11 PROCEDURE — 96133 NRPSYC TST EVAL PHYS/QHP EA: CPT | Mod: S$GLB,,, | Performed by: PSYCHIATRY & NEUROLOGY

## 2021-06-11 PROCEDURE — 99499 UNLISTED E&M SERVICE: CPT | Mod: S$GLB,,, | Performed by: PSYCHIATRY & NEUROLOGY

## 2021-06-11 PROCEDURE — 96138 PSYCL/NRPSYC TECH 1ST: CPT | Mod: S$GLB,,, | Performed by: PSYCHIATRY & NEUROLOGY

## 2021-06-11 PROCEDURE — 96139 PSYCL/NRPSYC TST TECH EA: CPT | Mod: S$GLB,,, | Performed by: PSYCHIATRY & NEUROLOGY

## 2021-06-11 PROCEDURE — 96138 PR PSYCH/NEUROPSYCH TEST ADMIN/SCORING, BY TECH, 2+ TESTS, 1ST 30 MIN: ICD-10-PCS | Mod: S$GLB,,, | Performed by: PSYCHIATRY & NEUROLOGY

## 2021-06-11 PROCEDURE — 96132 PR NEUROPSYCHOLOGIC TEST EVAL SVCS, 1ST HR: ICD-10-PCS | Mod: S$GLB,,, | Performed by: PSYCHIATRY & NEUROLOGY

## 2021-06-11 PROCEDURE — 96139 PR PSYCH/NEUROPSYCH TEST ADMIN/SCORING, BY TECH, 2+ TESTS, EA ADDTL 30 MIN: ICD-10-PCS | Mod: S$GLB,,, | Performed by: PSYCHIATRY & NEUROLOGY

## 2021-06-11 PROCEDURE — 96133 PR NEUROPSYCHOLOGIC TEST EVAL SVCS, EA ADDTL HR: ICD-10-PCS | Mod: S$GLB,,, | Performed by: PSYCHIATRY & NEUROLOGY

## 2021-06-11 PROCEDURE — 99499 NO LOS: ICD-10-PCS | Mod: S$GLB,,, | Performed by: PSYCHIATRY & NEUROLOGY

## 2021-06-22 ENCOUNTER — TELEPHONE (OUTPATIENT)
Dept: NEUROLOGY | Facility: CLINIC | Age: 81
End: 2021-06-22

## 2021-06-22 ENCOUNTER — OFFICE VISIT (OUTPATIENT)
Dept: NEUROLOGY | Facility: CLINIC | Age: 81
End: 2021-06-22
Payer: MEDICARE

## 2021-06-22 DIAGNOSIS — R41.9 COGNITIVE COMPLAINTS: ICD-10-CM

## 2021-06-22 DIAGNOSIS — R41.3 MEMORY CHANGES: ICD-10-CM

## 2021-06-22 DIAGNOSIS — R40.4 TRANSIENT ALTERATION OF AWARENESS: Primary | ICD-10-CM

## 2021-06-22 PROCEDURE — 99499 NO LOS: ICD-10-PCS | Mod: 95,,, | Performed by: PSYCHIATRY & NEUROLOGY

## 2021-06-22 PROCEDURE — 99499 UNLISTED E&M SERVICE: CPT | Mod: 95,,, | Performed by: PSYCHIATRY & NEUROLOGY

## 2021-07-01 ENCOUNTER — OFFICE VISIT (OUTPATIENT)
Dept: PRIMARY CARE CLINIC | Facility: CLINIC | Age: 81
End: 2021-07-01
Payer: MEDICARE

## 2021-07-01 VITALS
WEIGHT: 109 LBS | BODY MASS INDEX: 18.61 KG/M2 | RESPIRATION RATE: 18 BRPM | HEART RATE: 79 BPM | TEMPERATURE: 98 F | HEIGHT: 64 IN | OXYGEN SATURATION: 98 % | DIASTOLIC BLOOD PRESSURE: 84 MMHG | SYSTOLIC BLOOD PRESSURE: 136 MMHG

## 2021-07-01 DIAGNOSIS — R40.4 TRANSIENT ALTERATION OF AWARENESS: ICD-10-CM

## 2021-07-01 DIAGNOSIS — Z09 FOLLOW UP: Primary | ICD-10-CM

## 2021-07-01 DIAGNOSIS — Z78.9 ALCOHOL USE: ICD-10-CM

## 2021-07-01 PROCEDURE — 3288F PR FALLS RISK ASSESSMENT DOCUMENTED: ICD-10-PCS | Mod: CPTII,S$GLB,, | Performed by: STUDENT IN AN ORGANIZED HEALTH CARE EDUCATION/TRAINING PROGRAM

## 2021-07-01 PROCEDURE — 1126F AMNT PAIN NOTED NONE PRSNT: CPT | Mod: S$GLB,,, | Performed by: STUDENT IN AN ORGANIZED HEALTH CARE EDUCATION/TRAINING PROGRAM

## 2021-07-01 PROCEDURE — 99213 OFFICE O/P EST LOW 20 MIN: CPT | Mod: S$GLB,,, | Performed by: STUDENT IN AN ORGANIZED HEALTH CARE EDUCATION/TRAINING PROGRAM

## 2021-07-01 PROCEDURE — 99213 PR OFFICE/OUTPT VISIT, EST, LEVL III, 20-29 MIN: ICD-10-PCS | Mod: S$GLB,,, | Performed by: STUDENT IN AN ORGANIZED HEALTH CARE EDUCATION/TRAINING PROGRAM

## 2021-07-01 PROCEDURE — 1101F PT FALLS ASSESS-DOCD LE1/YR: CPT | Mod: CPTII,S$GLB,, | Performed by: STUDENT IN AN ORGANIZED HEALTH CARE EDUCATION/TRAINING PROGRAM

## 2021-07-01 PROCEDURE — 99999 PR PBB SHADOW E&M-EST. PATIENT-LVL IV: CPT | Mod: PBBFAC,,, | Performed by: STUDENT IN AN ORGANIZED HEALTH CARE EDUCATION/TRAINING PROGRAM

## 2021-07-01 PROCEDURE — 3288F FALL RISK ASSESSMENT DOCD: CPT | Mod: CPTII,S$GLB,, | Performed by: STUDENT IN AN ORGANIZED HEALTH CARE EDUCATION/TRAINING PROGRAM

## 2021-07-01 PROCEDURE — 1159F PR MEDICATION LIST DOCUMENTED IN MEDICAL RECORD: ICD-10-PCS | Mod: S$GLB,,, | Performed by: STUDENT IN AN ORGANIZED HEALTH CARE EDUCATION/TRAINING PROGRAM

## 2021-07-01 PROCEDURE — 99999 PR PBB SHADOW E&M-EST. PATIENT-LVL IV: ICD-10-PCS | Mod: PBBFAC,,, | Performed by: STUDENT IN AN ORGANIZED HEALTH CARE EDUCATION/TRAINING PROGRAM

## 2021-07-01 PROCEDURE — 1159F MED LIST DOCD IN RCRD: CPT | Mod: S$GLB,,, | Performed by: STUDENT IN AN ORGANIZED HEALTH CARE EDUCATION/TRAINING PROGRAM

## 2021-07-01 PROCEDURE — 1126F PR PAIN SEVERITY QUANTIFIED, NO PAIN PRESENT: ICD-10-PCS | Mod: S$GLB,,, | Performed by: STUDENT IN AN ORGANIZED HEALTH CARE EDUCATION/TRAINING PROGRAM

## 2021-07-01 PROCEDURE — 1101F PR PT FALLS ASSESS DOC 0-1 FALLS W/OUT INJ PAST YR: ICD-10-PCS | Mod: CPTII,S$GLB,, | Performed by: STUDENT IN AN ORGANIZED HEALTH CARE EDUCATION/TRAINING PROGRAM

## 2021-07-22 DIAGNOSIS — J44.9 CHRONIC OBSTRUCTIVE PULMONARY DISEASE, UNSPECIFIED COPD TYPE: ICD-10-CM

## 2021-07-22 RX ORDER — FLUTICASONE FUROATE AND VILANTEROL 100; 25 UG/1; UG/1
1 POWDER RESPIRATORY (INHALATION) DAILY
Qty: 30 EACH | Refills: 11 | Status: SHIPPED | OUTPATIENT
Start: 2021-07-22 | End: 2022-07-13 | Stop reason: SDUPTHER

## 2021-10-21 ENCOUNTER — HOSPITAL ENCOUNTER (INPATIENT)
Facility: HOSPITAL | Age: 81
LOS: 7 days | Discharge: HOME | DRG: 378 | End: 2021-10-28
Attending: EMERGENCY MEDICINE | Admitting: EMERGENCY MEDICINE
Payer: MEDICARE

## 2021-10-21 DIAGNOSIS — J44.9 CHRONIC OBSTRUCTIVE PULMONARY DISEASE, UNSPECIFIED COPD TYPE: Chronic | ICD-10-CM

## 2021-10-21 DIAGNOSIS — I74.10 AORTIC MURAL THROMBUS: ICD-10-CM

## 2021-10-21 DIAGNOSIS — R07.9 CHEST PAIN: ICD-10-CM

## 2021-10-21 DIAGNOSIS — I10 PRIMARY HYPERTENSION: Chronic | ICD-10-CM

## 2021-10-21 DIAGNOSIS — R94.31 QT PROLONGATION: ICD-10-CM

## 2021-10-21 DIAGNOSIS — K57.32 DIVERTICULITIS OF LARGE INTESTINE WITHOUT PERFORATION OR ABSCESS WITHOUT BLEEDING: Primary | ICD-10-CM

## 2021-10-21 PROBLEM — F10.90 ALCOHOL USE: Chronic | Status: ACTIVE | Noted: 2021-10-21

## 2021-10-21 PROBLEM — Z78.9 ALCOHOL USE: Chronic | Status: ACTIVE | Noted: 2021-10-21

## 2021-10-21 LAB
ALBUMIN SERPL BCP-MCNC: 3.5 G/DL (ref 3.5–5.2)
ALP SERPL-CCNC: 132 U/L (ref 55–135)
ALT SERPL W/O P-5'-P-CCNC: 14 U/L (ref 10–44)
ANION GAP SERPL CALC-SCNC: 12 MMOL/L (ref 8–16)
AST SERPL-CCNC: 31 U/L (ref 10–40)
BASOPHILS # BLD AUTO: 0.03 K/UL (ref 0–0.2)
BASOPHILS NFR BLD: 0.2 % (ref 0–1.9)
BILIRUB SERPL-MCNC: 0.8 MG/DL (ref 0.1–1)
BILIRUB UR QL STRIP: NEGATIVE
BUN SERPL-MCNC: 10 MG/DL (ref 6–30)
BUN SERPL-MCNC: 8 MG/DL (ref 8–23)
CALCIUM SERPL-MCNC: 9.1 MG/DL (ref 8.7–10.5)
CHLORIDE SERPL-SCNC: 101 MMOL/L (ref 95–110)
CHLORIDE SERPL-SCNC: 101 MMOL/L (ref 95–110)
CLARITY UR REFRACT.AUTO: CLEAR
CO2 SERPL-SCNC: 25 MMOL/L (ref 23–29)
COLOR UR AUTO: ABNORMAL
CREAT SERPL-MCNC: 0.8 MG/DL (ref 0.5–1.4)
CREAT SERPL-MCNC: 0.8 MG/DL (ref 0.5–1.4)
CTP QC/QA: YES
DIFFERENTIAL METHOD: ABNORMAL
EOSINOPHIL # BLD AUTO: 0 K/UL (ref 0–0.5)
EOSINOPHIL NFR BLD: 0.1 % (ref 0–8)
ERYTHROCYTE [DISTWIDTH] IN BLOOD BY AUTOMATED COUNT: 12.3 % (ref 11.5–14.5)
EST. GFR  (AFRICAN AMERICAN): >60 ML/MIN/1.73 M^2
EST. GFR  (NON AFRICAN AMERICAN): >60 ML/MIN/1.73 M^2
GLUCOSE SERPL-MCNC: 139 MG/DL (ref 70–110)
GLUCOSE SERPL-MCNC: 140 MG/DL (ref 70–110)
GLUCOSE UR QL STRIP: NEGATIVE
HCT VFR BLD AUTO: 39.2 % (ref 37–48.5)
HCT VFR BLD CALC: 38 %PCV (ref 36–54)
HGB BLD-MCNC: 13.1 G/DL (ref 12–16)
HGB UR QL STRIP: NEGATIVE
IMM GRANULOCYTES # BLD AUTO: 0.22 K/UL (ref 0–0.04)
IMM GRANULOCYTES NFR BLD AUTO: 1.7 % (ref 0–0.5)
KETONES UR QL STRIP: ABNORMAL
LACTATE SERPL-SCNC: 2.3 MMOL/L (ref 0.5–2.2)
LACTATE SERPL-SCNC: 2.4 MMOL/L (ref 0.5–2.2)
LEUKOCYTE ESTERASE UR QL STRIP: NEGATIVE
LIPASE SERPL-CCNC: 22 U/L (ref 4–60)
LYMPHOCYTES # BLD AUTO: 0.7 K/UL (ref 1–4.8)
LYMPHOCYTES NFR BLD: 5.5 % (ref 18–48)
MCH RBC QN AUTO: 33.6 PG (ref 27–31)
MCHC RBC AUTO-ENTMCNC: 33.4 G/DL (ref 32–36)
MCV RBC AUTO: 101 FL (ref 82–98)
MONOCYTES # BLD AUTO: 2.1 K/UL (ref 0.3–1)
MONOCYTES NFR BLD: 16.2 % (ref 4–15)
NEUTROPHILS # BLD AUTO: 10 K/UL (ref 1.8–7.7)
NEUTROPHILS NFR BLD: 76.3 % (ref 38–73)
NITRITE UR QL STRIP: NEGATIVE
NRBC BLD-RTO: 0 /100 WBC
PH UR STRIP: 5 [PH] (ref 5–8)
PLATELET # BLD AUTO: 129 K/UL (ref 150–450)
PMV BLD AUTO: 12 FL (ref 9.2–12.9)
POC IONIZED CALCIUM: 1.04 MMOL/L (ref 1.06–1.42)
POC TCO2 (MEASURED): 26 MMOL/L (ref 23–29)
POTASSIUM BLD-SCNC: 4.3 MMOL/L (ref 3.5–5.1)
POTASSIUM SERPL-SCNC: 3.7 MMOL/L (ref 3.5–5.1)
PROT SERPL-MCNC: 6.4 G/DL (ref 6–8.4)
PROT UR QL STRIP: NEGATIVE
RBC # BLD AUTO: 3.9 M/UL (ref 4–5.4)
SAMPLE: ABNORMAL
SARS-COV-2 RDRP RESP QL NAA+PROBE: NEGATIVE
SODIUM BLD-SCNC: 138 MMOL/L (ref 136–145)
SODIUM SERPL-SCNC: 138 MMOL/L (ref 136–145)
SP GR UR STRIP: 1.02 (ref 1–1.03)
URN SPEC COLLECT METH UR: ABNORMAL
WBC # BLD AUTO: 13.11 K/UL (ref 3.9–12.7)

## 2021-10-21 PROCEDURE — 25000003 PHARM REV CODE 250: Mod: HCNC

## 2021-10-21 PROCEDURE — 80053 COMPREHEN METABOLIC PANEL: CPT | Mod: HCNC | Performed by: EMERGENCY MEDICINE

## 2021-10-21 PROCEDURE — 96376 TX/PRO/DX INJ SAME DRUG ADON: CPT | Mod: HCNC

## 2021-10-21 PROCEDURE — 99285 PR EMERGENCY DEPT VISIT,LEVEL V: ICD-10-PCS | Mod: CS,,, | Performed by: EMERGENCY MEDICINE

## 2021-10-21 PROCEDURE — 87040 BLOOD CULTURE FOR BACTERIA: CPT | Mod: 59,HCNC | Performed by: EMERGENCY MEDICINE

## 2021-10-21 PROCEDURE — U0002 COVID-19 LAB TEST NON-CDC: HCPCS | Mod: HCNC | Performed by: EMERGENCY MEDICINE

## 2021-10-21 PROCEDURE — 63600175 PHARM REV CODE 636 W HCPCS: Mod: HCNC

## 2021-10-21 PROCEDURE — 99285 EMERGENCY DEPT VISIT HI MDM: CPT | Mod: 25,HCNC

## 2021-10-21 PROCEDURE — 99223 PR INITIAL HOSPITAL CARE,LEVL III: ICD-10-PCS | Mod: HCNC,AI,GC, | Performed by: STUDENT IN AN ORGANIZED HEALTH CARE EDUCATION/TRAINING PROGRAM

## 2021-10-21 PROCEDURE — 96375 TX/PRO/DX INJ NEW DRUG ADDON: CPT | Mod: HCNC

## 2021-10-21 PROCEDURE — 25000003 PHARM REV CODE 250: Mod: HCNC | Performed by: EMERGENCY MEDICINE

## 2021-10-21 PROCEDURE — 99223 1ST HOSP IP/OBS HIGH 75: CPT | Mod: HCNC,AI,GC, | Performed by: STUDENT IN AN ORGANIZED HEALTH CARE EDUCATION/TRAINING PROGRAM

## 2021-10-21 PROCEDURE — 83605 ASSAY OF LACTIC ACID: CPT | Mod: HCNC | Performed by: EMERGENCY MEDICINE

## 2021-10-21 PROCEDURE — 99285 EMERGENCY DEPT VISIT HI MDM: CPT | Mod: CS,,, | Performed by: EMERGENCY MEDICINE

## 2021-10-21 PROCEDURE — 85025 COMPLETE CBC W/AUTO DIFF WBC: CPT | Mod: HCNC | Performed by: EMERGENCY MEDICINE

## 2021-10-21 PROCEDURE — 25000003 PHARM REV CODE 250: Mod: HCNC | Performed by: STUDENT IN AN ORGANIZED HEALTH CARE EDUCATION/TRAINING PROGRAM

## 2021-10-21 PROCEDURE — 96374 THER/PROPH/DIAG INJ IV PUSH: CPT | Mod: HCNC

## 2021-10-21 PROCEDURE — 63600175 PHARM REV CODE 636 W HCPCS: Mod: HCNC | Performed by: EMERGENCY MEDICINE

## 2021-10-21 PROCEDURE — 83690 ASSAY OF LIPASE: CPT | Mod: HCNC | Performed by: EMERGENCY MEDICINE

## 2021-10-21 PROCEDURE — 25500020 PHARM REV CODE 255: Mod: HCNC | Performed by: EMERGENCY MEDICINE

## 2021-10-21 PROCEDURE — 20600001 HC STEP DOWN PRIVATE ROOM: Mod: HCNC

## 2021-10-21 PROCEDURE — 81003 URINALYSIS AUTO W/O SCOPE: CPT | Mod: HCNC | Performed by: EMERGENCY MEDICINE

## 2021-10-21 RX ORDER — ONDANSETRON 8 MG/1
8 TABLET, ORALLY DISINTEGRATING ORAL EVERY 8 HOURS PRN
Status: DISCONTINUED | OUTPATIENT
Start: 2021-10-21 | End: 2021-10-28 | Stop reason: HOSPADM

## 2021-10-21 RX ORDER — HYDROCODONE BITARTRATE AND ACETAMINOPHEN 5; 325 MG/1; MG/1
1 TABLET ORAL EVERY 6 HOURS PRN
Status: DISCONTINUED | OUTPATIENT
Start: 2021-10-21 | End: 2021-10-28

## 2021-10-21 RX ORDER — ALBUTEROL SULFATE 90 UG/1
2 AEROSOL, METERED RESPIRATORY (INHALATION) EVERY 6 HOURS PRN
Status: DISCONTINUED | OUTPATIENT
Start: 2021-10-21 | End: 2021-10-21

## 2021-10-21 RX ORDER — IBUPROFEN 200 MG
1 TABLET ORAL DAILY
Status: DISCONTINUED | OUTPATIENT
Start: 2021-10-22 | End: 2021-10-28 | Stop reason: HOSPADM

## 2021-10-21 RX ORDER — SODIUM CHLORIDE 0.9 % (FLUSH) 0.9 %
10 SYRINGE (ML) INJECTION EVERY 12 HOURS PRN
Status: DISCONTINUED | OUTPATIENT
Start: 2021-10-21 | End: 2021-10-28 | Stop reason: HOSPADM

## 2021-10-21 RX ORDER — MORPHINE SULFATE 4 MG/ML
4 INJECTION, SOLUTION INTRAMUSCULAR; INTRAVENOUS
Status: COMPLETED | OUTPATIENT
Start: 2021-10-21 | End: 2021-10-21

## 2021-10-21 RX ORDER — ACETAMINOPHEN 325 MG/1
650 TABLET ORAL EVERY 8 HOURS PRN
Status: DISCONTINUED | OUTPATIENT
Start: 2021-10-21 | End: 2021-10-21

## 2021-10-21 RX ORDER — PROMETHAZINE HYDROCHLORIDE 12.5 MG/1
25 TABLET ORAL EVERY 6 HOURS PRN
Status: DISCONTINUED | OUTPATIENT
Start: 2021-10-21 | End: 2021-10-28 | Stop reason: HOSPADM

## 2021-10-21 RX ORDER — NALOXONE HCL 0.4 MG/ML
0.02 VIAL (ML) INJECTION
Status: DISCONTINUED | OUTPATIENT
Start: 2021-10-21 | End: 2021-10-28 | Stop reason: HOSPADM

## 2021-10-21 RX ORDER — POTASSIUM CHLORIDE 20 MEQ/1
40 TABLET, EXTENDED RELEASE ORAL ONCE
Status: COMPLETED | OUTPATIENT
Start: 2021-10-21 | End: 2021-10-21

## 2021-10-21 RX ORDER — MORPHINE SULFATE 2 MG/ML
2 INJECTION, SOLUTION INTRAMUSCULAR; INTRAVENOUS
Status: COMPLETED | OUTPATIENT
Start: 2021-10-21 | End: 2021-10-21

## 2021-10-21 RX ORDER — TALC
6 POWDER (GRAM) TOPICAL NIGHTLY PRN
Status: DISCONTINUED | OUTPATIENT
Start: 2021-10-21 | End: 2021-10-21

## 2021-10-21 RX ORDER — MORPHINE SULFATE 2 MG/ML
2 INJECTION, SOLUTION INTRAMUSCULAR; INTRAVENOUS EVERY 4 HOURS PRN
Status: DISCONTINUED | OUTPATIENT
Start: 2021-10-21 | End: 2021-10-28

## 2021-10-21 RX ORDER — SODIUM CHLORIDE 0.9 % (FLUSH) 0.9 %
10 SYRINGE (ML) INJECTION
Status: DISCONTINUED | OUTPATIENT
Start: 2021-10-21 | End: 2021-10-28 | Stop reason: HOSPADM

## 2021-10-21 RX ORDER — METOPROLOL SUCCINATE 50 MG/1
50 TABLET, EXTENDED RELEASE ORAL DAILY
Status: DISCONTINUED | OUTPATIENT
Start: 2021-10-22 | End: 2021-10-26

## 2021-10-21 RX ORDER — TALC
6 POWDER (GRAM) TOPICAL NIGHTLY PRN
Status: DISCONTINUED | OUTPATIENT
Start: 2021-10-21 | End: 2021-10-28 | Stop reason: HOSPADM

## 2021-10-21 RX ORDER — ALBUTEROL SULFATE 90 UG/1
2 AEROSOL, METERED RESPIRATORY (INHALATION) EVERY 6 HOURS PRN
Status: DISCONTINUED | OUTPATIENT
Start: 2021-10-21 | End: 2021-10-28 | Stop reason: HOSPADM

## 2021-10-21 RX ORDER — FLUTICASONE FUROATE AND VILANTEROL 100; 25 UG/1; UG/1
1 POWDER RESPIRATORY (INHALATION) DAILY
Status: DISCONTINUED | OUTPATIENT
Start: 2021-10-22 | End: 2021-10-28 | Stop reason: HOSPADM

## 2021-10-21 RX ORDER — ONDANSETRON 2 MG/ML
4 INJECTION INTRAMUSCULAR; INTRAVENOUS
Status: COMPLETED | OUTPATIENT
Start: 2021-10-21 | End: 2021-10-21

## 2021-10-21 RX ORDER — ENOXAPARIN SODIUM 100 MG/ML
40 INJECTION SUBCUTANEOUS EVERY 24 HOURS
Status: DISCONTINUED | OUTPATIENT
Start: 2021-10-21 | End: 2021-10-24

## 2021-10-21 RX ORDER — LISINOPRIL 10 MG/1
10 TABLET ORAL DAILY
Status: DISCONTINUED | OUTPATIENT
Start: 2021-10-22 | End: 2021-10-26

## 2021-10-21 RX ORDER — ACETAMINOPHEN 325 MG/1
650 TABLET ORAL EVERY 4 HOURS PRN
Status: DISCONTINUED | OUTPATIENT
Start: 2021-10-21 | End: 2021-10-28 | Stop reason: HOSPADM

## 2021-10-21 RX ADMIN — ONDANSETRON 4 MG: 2 INJECTION INTRAMUSCULAR; INTRAVENOUS at 05:10

## 2021-10-21 RX ADMIN — SODIUM CHLORIDE, SODIUM LACTATE, POTASSIUM CHLORIDE, AND CALCIUM CHLORIDE 1000 ML: .6; .31; .03; .02 INJECTION, SOLUTION INTRAVENOUS at 11:10

## 2021-10-21 RX ADMIN — MORPHINE SULFATE 2 MG: 2 INJECTION, SOLUTION INTRAMUSCULAR; INTRAVENOUS at 05:10

## 2021-10-21 RX ADMIN — HYDROCODONE BITARTRATE AND ACETAMINOPHEN 1 TABLET: 5; 325 TABLET ORAL at 11:10

## 2021-10-21 RX ADMIN — IOHEXOL 75 ML: 350 INJECTION, SOLUTION INTRAVENOUS at 05:10

## 2021-10-21 RX ADMIN — MORPHINE SULFATE 4 MG: 4 INJECTION INTRAVENOUS at 06:10

## 2021-10-21 RX ADMIN — PIPERACILLIN AND TAZOBACTAM 4.5 G: 4; .5 INJECTION, POWDER, LYOPHILIZED, FOR SOLUTION INTRAVENOUS; PARENTERAL at 06:10

## 2021-10-21 RX ADMIN — SODIUM CHLORIDE, SODIUM LACTATE, POTASSIUM CHLORIDE, AND CALCIUM CHLORIDE 1482 ML: .6; .31; .03; .02 INJECTION, SOLUTION INTRAVENOUS at 06:10

## 2021-10-21 RX ADMIN — POTASSIUM CHLORIDE 40 MEQ: 1500 TABLET, EXTENDED RELEASE ORAL at 07:10

## 2021-10-22 LAB
ALBUMIN SERPL BCP-MCNC: 2.8 G/DL (ref 3.5–5.2)
ALP SERPL-CCNC: 102 U/L (ref 55–135)
ALT SERPL W/O P-5'-P-CCNC: 14 U/L (ref 10–44)
ANION GAP SERPL CALC-SCNC: 9 MMOL/L (ref 8–16)
AST SERPL-CCNC: 22 U/L (ref 10–40)
BASOPHILS # BLD AUTO: 0.02 K/UL (ref 0–0.2)
BASOPHILS NFR BLD: 0 % (ref 0–1.9)
BASOPHILS NFR BLD: 0.1 % (ref 0–1.9)
BILIRUB SERPL-MCNC: 0.9 MG/DL (ref 0.1–1)
BUN SERPL-MCNC: 8 MG/DL (ref 8–23)
CALCIUM SERPL-MCNC: 8.5 MG/DL (ref 8.7–10.5)
CHLORIDE SERPL-SCNC: 100 MMOL/L (ref 95–110)
CO2 SERPL-SCNC: 25 MMOL/L (ref 23–29)
CREAT SERPL-MCNC: 0.8 MG/DL (ref 0.5–1.4)
DIFFERENTIAL METHOD: ABNORMAL
DIFFERENTIAL METHOD: ABNORMAL
EOSINOPHIL # BLD AUTO: 0 K/UL (ref 0–0.5)
EOSINOPHIL NFR BLD: 0 % (ref 0–8)
EOSINOPHIL NFR BLD: 0 % (ref 0–8)
ERYTHROCYTE [DISTWIDTH] IN BLOOD BY AUTOMATED COUNT: 12.5 % (ref 11.5–14.5)
ERYTHROCYTE [DISTWIDTH] IN BLOOD BY AUTOMATED COUNT: 12.5 % (ref 11.5–14.5)
EST. GFR  (AFRICAN AMERICAN): >60 ML/MIN/1.73 M^2
EST. GFR  (NON AFRICAN AMERICAN): >60 ML/MIN/1.73 M^2
GLUCOSE SERPL-MCNC: 109 MG/DL (ref 70–110)
HCT VFR BLD AUTO: 31.8 % (ref 37–48.5)
HCT VFR BLD AUTO: 33.6 % (ref 37–48.5)
HGB BLD-MCNC: 11 G/DL (ref 12–16)
HGB BLD-MCNC: 11 G/DL (ref 12–16)
IMM GRANULOCYTES # BLD AUTO: 0.18 K/UL (ref 0–0.04)
IMM GRANULOCYTES # BLD AUTO: ABNORMAL K/UL (ref 0–0.04)
IMM GRANULOCYTES NFR BLD AUTO: 0.9 % (ref 0–0.5)
IMM GRANULOCYTES NFR BLD AUTO: ABNORMAL % (ref 0–0.5)
LYMPHOCYTES # BLD AUTO: 1.1 K/UL (ref 1–4.8)
LYMPHOCYTES NFR BLD: 10 % (ref 18–48)
LYMPHOCYTES NFR BLD: 5.9 % (ref 18–48)
MAGNESIUM SERPL-MCNC: 1.5 MG/DL (ref 1.6–2.6)
MCH RBC QN AUTO: 33 PG (ref 27–31)
MCH RBC QN AUTO: 34.1 PG (ref 27–31)
MCHC RBC AUTO-ENTMCNC: 32.7 G/DL (ref 32–36)
MCHC RBC AUTO-ENTMCNC: 34.6 G/DL (ref 32–36)
MCV RBC AUTO: 101 FL (ref 82–98)
MCV RBC AUTO: 99 FL (ref 82–98)
MONOCYTES # BLD AUTO: 5.3 K/UL (ref 0.3–1)
MONOCYTES NFR BLD: 22 % (ref 4–15)
MONOCYTES NFR BLD: 27.5 % (ref 4–15)
NEUTROPHILS # BLD AUTO: 12.6 K/UL (ref 1.8–7.7)
NEUTROPHILS NFR BLD: 65.6 % (ref 38–73)
NEUTROPHILS NFR BLD: 68 % (ref 38–73)
NRBC BLD-RTO: 0 /100 WBC
NRBC BLD-RTO: 0 /100 WBC
PHOSPHATE SERPL-MCNC: 2.7 MG/DL (ref 2.7–4.5)
PLATELET # BLD AUTO: 111 K/UL (ref 150–450)
PLATELET # BLD AUTO: 111 K/UL (ref 150–450)
PLATELET BLD QL SMEAR: ABNORMAL
PLATELET BLD QL SMEAR: ABNORMAL
PMV BLD AUTO: 12 FL (ref 9.2–12.9)
PMV BLD AUTO: 12.2 FL (ref 9.2–12.9)
POCT GLUCOSE: 124 MG/DL (ref 70–110)
POCT GLUCOSE: 125 MG/DL (ref 70–110)
POCT GLUCOSE: 138 MG/DL (ref 70–110)
POTASSIUM SERPL-SCNC: 4 MMOL/L (ref 3.5–5.1)
PROT SERPL-MCNC: 5.3 G/DL (ref 6–8.4)
RBC # BLD AUTO: 3.23 M/UL (ref 4–5.4)
RBC # BLD AUTO: 3.33 M/UL (ref 4–5.4)
SMUDGE CELLS BLD QL SMEAR: PRESENT
SODIUM SERPL-SCNC: 134 MMOL/L (ref 136–145)
TOXIC GRANULES BLD QL SMEAR: PRESENT
WBC # BLD AUTO: 15.77 K/UL (ref 3.9–12.7)
WBC # BLD AUTO: 19.17 K/UL (ref 3.9–12.7)
WBC TOXIC VACUOLES BLD QL SMEAR: PRESENT

## 2021-10-22 PROCEDURE — 36415 COLL VENOUS BLD VENIPUNCTURE: CPT | Mod: HCNC | Performed by: STUDENT IN AN ORGANIZED HEALTH CARE EDUCATION/TRAINING PROGRAM

## 2021-10-22 PROCEDURE — 84100 ASSAY OF PHOSPHORUS: CPT | Mod: HCNC

## 2021-10-22 PROCEDURE — 85025 COMPLETE CBC W/AUTO DIFF WBC: CPT | Mod: 91,HCNC

## 2021-10-22 PROCEDURE — 25000003 PHARM REV CODE 250: Mod: HCNC

## 2021-10-22 PROCEDURE — 63600175 PHARM REV CODE 636 W HCPCS: Mod: HCNC

## 2021-10-22 PROCEDURE — 99232 SBSQ HOSP IP/OBS MODERATE 35: CPT | Mod: HCNC,GC,, | Performed by: STUDENT IN AN ORGANIZED HEALTH CARE EDUCATION/TRAINING PROGRAM

## 2021-10-22 PROCEDURE — 80053 COMPREHEN METABOLIC PANEL: CPT | Mod: HCNC

## 2021-10-22 PROCEDURE — 99232 PR SUBSEQUENT HOSPITAL CARE,LEVL II: ICD-10-PCS | Mod: HCNC,GC,, | Performed by: STUDENT IN AN ORGANIZED HEALTH CARE EDUCATION/TRAINING PROGRAM

## 2021-10-22 PROCEDURE — 36415 COLL VENOUS BLD VENIPUNCTURE: CPT | Mod: HCNC

## 2021-10-22 PROCEDURE — 83735 ASSAY OF MAGNESIUM: CPT | Mod: HCNC

## 2021-10-22 PROCEDURE — 20600001 HC STEP DOWN PRIVATE ROOM: Mod: HCNC

## 2021-10-22 PROCEDURE — 85025 COMPLETE CBC W/AUTO DIFF WBC: CPT | Mod: HCNC | Performed by: STUDENT IN AN ORGANIZED HEALTH CARE EDUCATION/TRAINING PROGRAM

## 2021-10-22 RX ORDER — MAGNESIUM SULFATE HEPTAHYDRATE 40 MG/ML
2 INJECTION, SOLUTION INTRAVENOUS ONCE
Status: COMPLETED | OUTPATIENT
Start: 2021-10-22 | End: 2021-10-22

## 2021-10-22 RX ORDER — SODIUM CHLORIDE, SODIUM LACTATE, POTASSIUM CHLORIDE, CALCIUM CHLORIDE 600; 310; 30; 20 MG/100ML; MG/100ML; MG/100ML; MG/100ML
INJECTION, SOLUTION INTRAVENOUS CONTINUOUS
Status: DISCONTINUED | OUTPATIENT
Start: 2021-10-22 | End: 2021-10-24

## 2021-10-22 RX ADMIN — LISINOPRIL 10 MG: 10 TABLET ORAL at 08:10

## 2021-10-22 RX ADMIN — HYDROCODONE BITARTRATE AND ACETAMINOPHEN 1 TABLET: 5; 325 TABLET ORAL at 04:10

## 2021-10-22 RX ADMIN — HYDROCODONE BITARTRATE AND ACETAMINOPHEN 1 TABLET: 5; 325 TABLET ORAL at 08:10

## 2021-10-22 RX ADMIN — PIPERACILLIN AND TAZOBACTAM 4.5 G: 4; .5 INJECTION, POWDER, LYOPHILIZED, FOR SOLUTION INTRAVENOUS; PARENTERAL at 12:10

## 2021-10-22 RX ADMIN — MAGNESIUM SULFATE IN WATER 2 G: 40 INJECTION, SOLUTION INTRAVENOUS at 08:10

## 2021-10-22 RX ADMIN — PIPERACILLIN AND TAZOBACTAM 4.5 G: 4; .5 INJECTION, POWDER, LYOPHILIZED, FOR SOLUTION INTRAVENOUS; PARENTERAL at 03:10

## 2021-10-22 RX ADMIN — PIPERACILLIN AND TAZOBACTAM 4.5 G: 4; .5 INJECTION, POWDER, LYOPHILIZED, FOR SOLUTION INTRAVENOUS; PARENTERAL at 09:10

## 2021-10-22 RX ADMIN — METOPROLOL SUCCINATE 50 MG: 50 TABLET, EXTENDED RELEASE ORAL at 08:10

## 2021-10-23 PROBLEM — K57.33 DIVERTICULITIS OF LARGE INTESTINE WITHOUT PERFORATION OR ABSCESS WITH BLEEDING: Status: ACTIVE | Noted: 2021-10-21

## 2021-10-23 LAB
ALBUMIN SERPL BCP-MCNC: 2.6 G/DL (ref 3.5–5.2)
ALP SERPL-CCNC: 116 U/L (ref 55–135)
ALT SERPL W/O P-5'-P-CCNC: 13 U/L (ref 10–44)
ANION GAP SERPL CALC-SCNC: 13 MMOL/L (ref 8–16)
AST SERPL-CCNC: 27 U/L (ref 10–40)
BASOPHILS # BLD AUTO: 0.01 K/UL (ref 0–0.2)
BASOPHILS NFR BLD: 0.1 % (ref 0–1.9)
BILIRUB SERPL-MCNC: 0.9 MG/DL (ref 0.1–1)
BUN SERPL-MCNC: 6 MG/DL (ref 8–23)
CALCIUM SERPL-MCNC: 8.5 MG/DL (ref 8.7–10.5)
CHLORIDE SERPL-SCNC: 96 MMOL/L (ref 95–110)
CO2 SERPL-SCNC: 23 MMOL/L (ref 23–29)
CREAT SERPL-MCNC: 0.7 MG/DL (ref 0.5–1.4)
DIFFERENTIAL METHOD: ABNORMAL
EOSINOPHIL # BLD AUTO: 0 K/UL (ref 0–0.5)
EOSINOPHIL NFR BLD: 0 % (ref 0–8)
ERYTHROCYTE [DISTWIDTH] IN BLOOD BY AUTOMATED COUNT: 12.5 % (ref 11.5–14.5)
EST. GFR  (AFRICAN AMERICAN): >60 ML/MIN/1.73 M^2
EST. GFR  (NON AFRICAN AMERICAN): >60 ML/MIN/1.73 M^2
GLUCOSE SERPL-MCNC: 94 MG/DL (ref 70–110)
HCT VFR BLD AUTO: 32.9 % (ref 37–48.5)
HGB BLD-MCNC: 10.9 G/DL (ref 12–16)
IMM GRANULOCYTES # BLD AUTO: 0.71 K/UL (ref 0–0.04)
IMM GRANULOCYTES NFR BLD AUTO: 3.9 % (ref 0–0.5)
LYMPHOCYTES # BLD AUTO: 1.3 K/UL (ref 1–4.8)
LYMPHOCYTES NFR BLD: 7.3 % (ref 18–48)
MAGNESIUM SERPL-MCNC: 1.9 MG/DL (ref 1.6–2.6)
MCH RBC QN AUTO: 33.7 PG (ref 27–31)
MCHC RBC AUTO-ENTMCNC: 33.1 G/DL (ref 32–36)
MCV RBC AUTO: 102 FL (ref 82–98)
MONOCYTES # BLD AUTO: 3.7 K/UL (ref 0.3–1)
MONOCYTES NFR BLD: 20.1 % (ref 4–15)
NEUTROPHILS # BLD AUTO: 12.7 K/UL (ref 1.8–7.7)
NEUTROPHILS NFR BLD: 68.6 % (ref 38–73)
NRBC BLD-RTO: 0 /100 WBC
PHOSPHATE SERPL-MCNC: 2.4 MG/DL (ref 2.7–4.5)
PLATELET # BLD AUTO: 93 K/UL (ref 150–450)
PLATELET BLD QL SMEAR: ABNORMAL
PMV BLD AUTO: 12.3 FL (ref 9.2–12.9)
POTASSIUM SERPL-SCNC: 3.8 MMOL/L (ref 3.5–5.1)
PROT SERPL-MCNC: 5.3 G/DL (ref 6–8.4)
RBC # BLD AUTO: 3.23 M/UL (ref 4–5.4)
SODIUM SERPL-SCNC: 132 MMOL/L (ref 136–145)
WBC # BLD AUTO: 18.42 K/UL (ref 3.9–12.7)

## 2021-10-23 PROCEDURE — 83735 ASSAY OF MAGNESIUM: CPT | Mod: HCNC

## 2021-10-23 PROCEDURE — 20600001 HC STEP DOWN PRIVATE ROOM: Mod: HCNC

## 2021-10-23 PROCEDURE — 25000003 PHARM REV CODE 250: Mod: HCNC

## 2021-10-23 PROCEDURE — 25000242 PHARM REV CODE 250 ALT 637 W/ HCPCS: Mod: HCNC

## 2021-10-23 PROCEDURE — 63600175 PHARM REV CODE 636 W HCPCS: Mod: HCNC | Performed by: STUDENT IN AN ORGANIZED HEALTH CARE EDUCATION/TRAINING PROGRAM

## 2021-10-23 PROCEDURE — 99232 PR SUBSEQUENT HOSPITAL CARE,LEVL II: ICD-10-PCS | Mod: HCNC,GC,, | Performed by: STUDENT IN AN ORGANIZED HEALTH CARE EDUCATION/TRAINING PROGRAM

## 2021-10-23 PROCEDURE — 25000003 PHARM REV CODE 250: Mod: HCNC | Performed by: STUDENT IN AN ORGANIZED HEALTH CARE EDUCATION/TRAINING PROGRAM

## 2021-10-23 PROCEDURE — 99232 SBSQ HOSP IP/OBS MODERATE 35: CPT | Mod: HCNC,GC,, | Performed by: STUDENT IN AN ORGANIZED HEALTH CARE EDUCATION/TRAINING PROGRAM

## 2021-10-23 PROCEDURE — 80053 COMPREHEN METABOLIC PANEL: CPT | Mod: HCNC

## 2021-10-23 PROCEDURE — 84100 ASSAY OF PHOSPHORUS: CPT | Mod: HCNC

## 2021-10-23 PROCEDURE — 27000207 HC ISOLATION: Mod: HCNC

## 2021-10-23 PROCEDURE — 94640 AIRWAY INHALATION TREATMENT: CPT | Mod: HCNC

## 2021-10-23 PROCEDURE — 63600175 PHARM REV CODE 636 W HCPCS: Mod: HCNC

## 2021-10-23 PROCEDURE — 36415 COLL VENOUS BLD VENIPUNCTURE: CPT | Mod: HCNC

## 2021-10-23 PROCEDURE — 85025 COMPLETE CBC W/AUTO DIFF WBC: CPT | Mod: HCNC

## 2021-10-23 RX ADMIN — HYDROCODONE BITARTRATE AND ACETAMINOPHEN 1 TABLET: 5; 325 TABLET ORAL at 09:10

## 2021-10-23 RX ADMIN — METOPROLOL SUCCINATE 50 MG: 50 TABLET, EXTENDED RELEASE ORAL at 09:10

## 2021-10-23 RX ADMIN — HYDROCODONE BITARTRATE AND ACETAMINOPHEN 1 TABLET: 5; 325 TABLET ORAL at 08:10

## 2021-10-23 RX ADMIN — PIPERACILLIN AND TAZOBACTAM 4.5 G: 4; .5 INJECTION, POWDER, LYOPHILIZED, FOR SOLUTION INTRAVENOUS; PARENTERAL at 04:10

## 2021-10-23 RX ADMIN — FLUTICASONE FUROATE AND VILANTEROL TRIFENATATE 1 PUFF: 100; 25 POWDER RESPIRATORY (INHALATION) at 09:10

## 2021-10-23 RX ADMIN — PIPERACILLIN AND TAZOBACTAM 4.5 G: 4; .5 INJECTION, POWDER, LYOPHILIZED, FOR SOLUTION INTRAVENOUS; PARENTERAL at 08:10

## 2021-10-23 RX ADMIN — PIPERACILLIN AND TAZOBACTAM 4.5 G: 4; .5 INJECTION, POWDER, LYOPHILIZED, FOR SOLUTION INTRAVENOUS; PARENTERAL at 12:10

## 2021-10-23 RX ADMIN — LISINOPRIL 10 MG: 10 TABLET ORAL at 09:10

## 2021-10-23 RX ADMIN — VANCOMYCIN HYDROCHLORIDE 1250 MG: 1.25 INJECTION, POWDER, LYOPHILIZED, FOR SOLUTION INTRAVENOUS at 09:10

## 2021-10-24 LAB
ALBUMIN SERPL BCP-MCNC: 2.4 G/DL (ref 3.5–5.2)
ALP SERPL-CCNC: 99 U/L (ref 55–135)
ALT SERPL W/O P-5'-P-CCNC: 9 U/L (ref 10–44)
ANION GAP SERPL CALC-SCNC: 10 MMOL/L (ref 8–16)
ANISOCYTOSIS BLD QL SMEAR: SLIGHT
AST SERPL-CCNC: 19 U/L (ref 10–40)
BASO STIPL BLD QL SMEAR: ABNORMAL
BASOPHILS # BLD AUTO: ABNORMAL K/UL (ref 0–0.2)
BASOPHILS NFR BLD: 0 % (ref 0–1.9)
BILIRUB SERPL-MCNC: 0.5 MG/DL (ref 0.1–1)
BUN SERPL-MCNC: 8 MG/DL (ref 8–23)
CALCIUM SERPL-MCNC: 8.4 MG/DL (ref 8.7–10.5)
CHLORIDE SERPL-SCNC: 99 MMOL/L (ref 95–110)
CO2 SERPL-SCNC: 24 MMOL/L (ref 23–29)
CREAT SERPL-MCNC: 1.3 MG/DL (ref 0.5–1.4)
DIFFERENTIAL METHOD: ABNORMAL
EOSINOPHIL # BLD AUTO: ABNORMAL K/UL (ref 0–0.5)
EOSINOPHIL NFR BLD: 0 % (ref 0–8)
ERYTHROCYTE [DISTWIDTH] IN BLOOD BY AUTOMATED COUNT: 12.5 % (ref 11.5–14.5)
EST. GFR  (AFRICAN AMERICAN): 44.1 ML/MIN/1.73 M^2
EST. GFR  (NON AFRICAN AMERICAN): 38.3 ML/MIN/1.73 M^2
GLUCOSE SERPL-MCNC: 107 MG/DL (ref 70–110)
HCT VFR BLD AUTO: 31.8 % (ref 37–48.5)
HGB BLD-MCNC: 11 G/DL (ref 12–16)
IMM GRANULOCYTES # BLD AUTO: ABNORMAL K/UL (ref 0–0.04)
IMM GRANULOCYTES NFR BLD AUTO: ABNORMAL % (ref 0–0.5)
LYMPHOCYTES # BLD AUTO: ABNORMAL K/UL (ref 1–4.8)
LYMPHOCYTES NFR BLD: 9 % (ref 18–48)
MAGNESIUM SERPL-MCNC: 1.9 MG/DL (ref 1.6–2.6)
MCH RBC QN AUTO: 34.5 PG (ref 27–31)
MCHC RBC AUTO-ENTMCNC: 34.6 G/DL (ref 32–36)
MCV RBC AUTO: 100 FL (ref 82–98)
MONOCYTES # BLD AUTO: ABNORMAL K/UL (ref 0.3–1)
MONOCYTES NFR BLD: 5 % (ref 4–15)
NEUTROPHILS NFR BLD: 83 % (ref 38–73)
NEUTS BAND NFR BLD MANUAL: 3 %
NRBC BLD-RTO: 0 /100 WBC
PHOSPHATE SERPL-MCNC: 2.5 MG/DL (ref 2.7–4.5)
PLATELET # BLD AUTO: 100 K/UL (ref 150–450)
PLATELET BLD QL SMEAR: ABNORMAL
PMV BLD AUTO: 12.1 FL (ref 9.2–12.9)
POLYCHROMASIA BLD QL SMEAR: ABNORMAL
POTASSIUM SERPL-SCNC: 3.8 MMOL/L (ref 3.5–5.1)
PROT SERPL-MCNC: 5.1 G/DL (ref 6–8.4)
RBC # BLD AUTO: 3.19 M/UL (ref 4–5.4)
SODIUM SERPL-SCNC: 133 MMOL/L (ref 136–145)
WBC # BLD AUTO: 13.79 K/UL (ref 3.9–12.7)

## 2021-10-24 PROCEDURE — 25000242 PHARM REV CODE 250 ALT 637 W/ HCPCS: Mod: HCNC

## 2021-10-24 PROCEDURE — 85027 COMPLETE CBC AUTOMATED: CPT | Mod: HCNC

## 2021-10-24 PROCEDURE — 80053 COMPREHEN METABOLIC PANEL: CPT | Mod: HCNC

## 2021-10-24 PROCEDURE — 27000207 HC ISOLATION: Mod: HCNC

## 2021-10-24 PROCEDURE — 85007 BL SMEAR W/DIFF WBC COUNT: CPT | Mod: HCNC

## 2021-10-24 PROCEDURE — 25000003 PHARM REV CODE 250: Mod: HCNC

## 2021-10-24 PROCEDURE — 83735 ASSAY OF MAGNESIUM: CPT | Mod: HCNC

## 2021-10-24 PROCEDURE — 99232 SBSQ HOSP IP/OBS MODERATE 35: CPT | Mod: HCNC,GC,, | Performed by: STUDENT IN AN ORGANIZED HEALTH CARE EDUCATION/TRAINING PROGRAM

## 2021-10-24 PROCEDURE — 99232 PR SUBSEQUENT HOSPITAL CARE,LEVL II: ICD-10-PCS | Mod: HCNC,GC,, | Performed by: STUDENT IN AN ORGANIZED HEALTH CARE EDUCATION/TRAINING PROGRAM

## 2021-10-24 PROCEDURE — 63600175 PHARM REV CODE 636 W HCPCS: Mod: HCNC | Performed by: STUDENT IN AN ORGANIZED HEALTH CARE EDUCATION/TRAINING PROGRAM

## 2021-10-24 PROCEDURE — 20600001 HC STEP DOWN PRIVATE ROOM: Mod: HCNC

## 2021-10-24 PROCEDURE — 36415 COLL VENOUS BLD VENIPUNCTURE: CPT | Mod: HCNC

## 2021-10-24 PROCEDURE — 63600175 PHARM REV CODE 636 W HCPCS: Mod: HCNC

## 2021-10-24 PROCEDURE — 25000003 PHARM REV CODE 250: Mod: HCNC | Performed by: STUDENT IN AN ORGANIZED HEALTH CARE EDUCATION/TRAINING PROGRAM

## 2021-10-24 PROCEDURE — 84100 ASSAY OF PHOSPHORUS: CPT | Mod: HCNC

## 2021-10-24 RX ORDER — SODIUM,POTASSIUM PHOSPHATES 280-250MG
2 POWDER IN PACKET (EA) ORAL ONCE
Status: COMPLETED | OUTPATIENT
Start: 2021-10-24 | End: 2021-10-24

## 2021-10-24 RX ORDER — ENOXAPARIN SODIUM 100 MG/ML
30 INJECTION SUBCUTANEOUS EVERY 24 HOURS
Status: DISCONTINUED | OUTPATIENT
Start: 2021-10-24 | End: 2021-10-28 | Stop reason: HOSPADM

## 2021-10-24 RX ADMIN — LISINOPRIL 10 MG: 10 TABLET ORAL at 08:10

## 2021-10-24 RX ADMIN — FLUTICASONE FUROATE AND VILANTEROL TRIFENATATE 1 PUFF: 100; 25 POWDER RESPIRATORY (INHALATION) at 08:10

## 2021-10-24 RX ADMIN — METOPROLOL SUCCINATE 50 MG: 50 TABLET, EXTENDED RELEASE ORAL at 08:10

## 2021-10-24 RX ADMIN — PIPERACILLIN AND TAZOBACTAM 4.5 G: 4; .5 INJECTION, POWDER, LYOPHILIZED, FOR SOLUTION INTRAVENOUS; PARENTERAL at 03:10

## 2021-10-24 RX ADMIN — POTASSIUM & SODIUM PHOSPHATES POWDER PACK 280-160-250 MG 2 PACKET: 280-160-250 PACK at 08:10

## 2021-10-24 RX ADMIN — PIPERACILLIN AND TAZOBACTAM 4.5 G: 4; .5 INJECTION, POWDER, LYOPHILIZED, FOR SOLUTION INTRAVENOUS; PARENTERAL at 06:10

## 2021-10-24 RX ADMIN — HYDROCODONE BITARTRATE AND ACETAMINOPHEN 1 TABLET: 5; 325 TABLET ORAL at 06:10

## 2021-10-24 RX ADMIN — PIPERACILLIN AND TAZOBACTAM 4.5 G: 4; .5 INJECTION, POWDER, LYOPHILIZED, FOR SOLUTION INTRAVENOUS; PARENTERAL at 11:10

## 2021-10-24 RX ADMIN — HYDROCODONE BITARTRATE AND ACETAMINOPHEN 1 TABLET: 5; 325 TABLET ORAL at 08:10

## 2021-10-24 RX ADMIN — ENOXAPARIN SODIUM 30 MG: 30 INJECTION, SOLUTION INTRAVENOUS; SUBCUTANEOUS at 05:10

## 2021-10-25 LAB
ALBUMIN SERPL BCP-MCNC: 2.4 G/DL (ref 3.5–5.2)
ALP SERPL-CCNC: 98 U/L (ref 55–135)
ALT SERPL W/O P-5'-P-CCNC: 11 U/L (ref 10–44)
ANION GAP SERPL CALC-SCNC: 10 MMOL/L (ref 8–16)
ANION GAP SERPL CALC-SCNC: 10 MMOL/L (ref 8–16)
ANISOCYTOSIS BLD QL SMEAR: SLIGHT
AST SERPL-CCNC: 16 U/L (ref 10–40)
BACTERIA BLD CULT: ABNORMAL
BASOPHILS # BLD AUTO: ABNORMAL K/UL (ref 0–0.2)
BASOPHILS NFR BLD: 0 % (ref 0–1.9)
BILIRUB SERPL-MCNC: 0.4 MG/DL (ref 0.1–1)
BUN SERPL-MCNC: 9 MG/DL (ref 8–23)
BUN SERPL-MCNC: 9 MG/DL (ref 8–23)
CALCIUM SERPL-MCNC: 7.9 MG/DL (ref 8.7–10.5)
CALCIUM SERPL-MCNC: 8 MG/DL (ref 8.7–10.5)
CHLORIDE SERPL-SCNC: 100 MMOL/L (ref 95–110)
CHLORIDE SERPL-SCNC: 101 MMOL/L (ref 95–110)
CO2 SERPL-SCNC: 21 MMOL/L (ref 23–29)
CO2 SERPL-SCNC: 24 MMOL/L (ref 23–29)
CREAT SERPL-MCNC: 1.7 MG/DL (ref 0.5–1.4)
CREAT SERPL-MCNC: 1.8 MG/DL (ref 0.5–1.4)
DIFFERENTIAL METHOD: ABNORMAL
EOSINOPHIL # BLD AUTO: ABNORMAL K/UL (ref 0–0.5)
EOSINOPHIL NFR BLD: 0 % (ref 0–8)
ERYTHROCYTE [DISTWIDTH] IN BLOOD BY AUTOMATED COUNT: 12.4 % (ref 11.5–14.5)
EST. GFR  (AFRICAN AMERICAN): 29.8 ML/MIN/1.73 M^2
EST. GFR  (AFRICAN AMERICAN): 31.9 ML/MIN/1.73 M^2
EST. GFR  (NON AFRICAN AMERICAN): 25.8 ML/MIN/1.73 M^2
EST. GFR  (NON AFRICAN AMERICAN): 27.7 ML/MIN/1.73 M^2
GLUCOSE SERPL-MCNC: 132 MG/DL (ref 70–110)
GLUCOSE SERPL-MCNC: 98 MG/DL (ref 70–110)
HCT VFR BLD AUTO: 34.6 % (ref 37–48.5)
HGB BLD-MCNC: 11.5 G/DL (ref 12–16)
HYPOCHROMIA BLD QL SMEAR: ABNORMAL
IMM GRANULOCYTES # BLD AUTO: ABNORMAL K/UL (ref 0–0.04)
IMM GRANULOCYTES NFR BLD AUTO: ABNORMAL % (ref 0–0.5)
LYMPHOCYTES # BLD AUTO: ABNORMAL K/UL (ref 1–4.8)
LYMPHOCYTES NFR BLD: 6 % (ref 18–48)
MAGNESIUM SERPL-MCNC: 1.7 MG/DL (ref 1.6–2.6)
MCH RBC QN AUTO: 33.7 PG (ref 27–31)
MCHC RBC AUTO-ENTMCNC: 33.2 G/DL (ref 32–36)
MCV RBC AUTO: 102 FL (ref 82–98)
MONOCYTES # BLD AUTO: ABNORMAL K/UL (ref 0.3–1)
MONOCYTES NFR BLD: 12 % (ref 4–15)
NEUTROPHILS NFR BLD: 79 % (ref 38–73)
NEUTS BAND NFR BLD MANUAL: 3 %
NRBC BLD-RTO: 0 /100 WBC
OVALOCYTES BLD QL SMEAR: ABNORMAL
PHOSPHATE SERPL-MCNC: 3.7 MG/DL (ref 2.7–4.5)
PLATELET # BLD AUTO: 128 K/UL (ref 150–450)
PMV BLD AUTO: 12.3 FL (ref 9.2–12.9)
POIKILOCYTOSIS BLD QL SMEAR: SLIGHT
POLYCHROMASIA BLD QL SMEAR: ABNORMAL
POTASSIUM SERPL-SCNC: 3.5 MMOL/L (ref 3.5–5.1)
POTASSIUM SERPL-SCNC: 3.8 MMOL/L (ref 3.5–5.1)
PROT SERPL-MCNC: 4.9 G/DL (ref 6–8.4)
RBC # BLD AUTO: 3.41 M/UL (ref 4–5.4)
SODIUM SERPL-SCNC: 132 MMOL/L (ref 136–145)
SODIUM SERPL-SCNC: 134 MMOL/L (ref 136–145)
WBC # BLD AUTO: 11.27 K/UL (ref 3.9–12.7)

## 2021-10-25 PROCEDURE — 63600175 PHARM REV CODE 636 W HCPCS: Mod: HCNC

## 2021-10-25 PROCEDURE — 94761 N-INVAS EAR/PLS OXIMETRY MLT: CPT | Mod: HCNC

## 2021-10-25 PROCEDURE — 99232 SBSQ HOSP IP/OBS MODERATE 35: CPT | Mod: HCNC,GC,, | Performed by: STUDENT IN AN ORGANIZED HEALTH CARE EDUCATION/TRAINING PROGRAM

## 2021-10-25 PROCEDURE — 99232 PR SUBSEQUENT HOSPITAL CARE,LEVL II: ICD-10-PCS | Mod: HCNC,GC,, | Performed by: STUDENT IN AN ORGANIZED HEALTH CARE EDUCATION/TRAINING PROGRAM

## 2021-10-25 PROCEDURE — 36415 COLL VENOUS BLD VENIPUNCTURE: CPT | Mod: HCNC

## 2021-10-25 PROCEDURE — 83735 ASSAY OF MAGNESIUM: CPT | Mod: HCNC

## 2021-10-25 PROCEDURE — 20600001 HC STEP DOWN PRIVATE ROOM: Mod: HCNC

## 2021-10-25 PROCEDURE — 94640 AIRWAY INHALATION TREATMENT: CPT | Mod: HCNC

## 2021-10-25 PROCEDURE — 80053 COMPREHEN METABOLIC PANEL: CPT | Mod: HCNC

## 2021-10-25 PROCEDURE — 80048 BASIC METABOLIC PNL TOTAL CA: CPT | Mod: HCNC

## 2021-10-25 PROCEDURE — 84100 ASSAY OF PHOSPHORUS: CPT | Mod: HCNC

## 2021-10-25 PROCEDURE — 63600175 PHARM REV CODE 636 W HCPCS: Mod: HCNC | Performed by: STUDENT IN AN ORGANIZED HEALTH CARE EDUCATION/TRAINING PROGRAM

## 2021-10-25 PROCEDURE — 85027 COMPLETE CBC AUTOMATED: CPT | Mod: HCNC

## 2021-10-25 PROCEDURE — 25000003 PHARM REV CODE 250: Mod: HCNC

## 2021-10-25 PROCEDURE — 85007 BL SMEAR W/DIFF WBC COUNT: CPT | Mod: HCNC

## 2021-10-25 RX ORDER — MAGNESIUM SULFATE HEPTAHYDRATE 40 MG/ML
2 INJECTION, SOLUTION INTRAVENOUS ONCE
Status: COMPLETED | OUTPATIENT
Start: 2021-10-25 | End: 2021-10-25

## 2021-10-25 RX ADMIN — FLUTICASONE FUROATE AND VILANTEROL TRIFENATATE 1 PUFF: 100; 25 POWDER RESPIRATORY (INHALATION) at 09:10

## 2021-10-25 RX ADMIN — PIPERACILLIN AND TAZOBACTAM 4.5 G: 4; .5 INJECTION, POWDER, LYOPHILIZED, FOR SOLUTION INTRAVENOUS; PARENTERAL at 09:10

## 2021-10-25 RX ADMIN — METOPROLOL SUCCINATE 50 MG: 50 TABLET, EXTENDED RELEASE ORAL at 09:10

## 2021-10-25 RX ADMIN — PIPERACILLIN AND TAZOBACTAM 4.5 G: 4; .5 INJECTION, POWDER, LYOPHILIZED, FOR SOLUTION INTRAVENOUS; PARENTERAL at 12:10

## 2021-10-25 RX ADMIN — HYDROCODONE BITARTRATE AND ACETAMINOPHEN 1 TABLET: 5; 325 TABLET ORAL at 04:10

## 2021-10-25 RX ADMIN — MAGNESIUM SULFATE IN WATER 2 G: 40 INJECTION, SOLUTION INTRAVENOUS at 09:10

## 2021-10-25 RX ADMIN — LISINOPRIL 10 MG: 10 TABLET ORAL at 09:10

## 2021-10-25 RX ADMIN — SODIUM CHLORIDE, SODIUM LACTATE, POTASSIUM CHLORIDE, AND CALCIUM CHLORIDE 1000 ML: .6; .31; .03; .02 INJECTION, SOLUTION INTRAVENOUS at 04:10

## 2021-10-25 RX ADMIN — HYDROCODONE BITARTRATE AND ACETAMINOPHEN 1 TABLET: 5; 325 TABLET ORAL at 08:10

## 2021-10-25 RX ADMIN — SODIUM CHLORIDE, SODIUM LACTATE, POTASSIUM CHLORIDE, AND CALCIUM CHLORIDE 1000 ML: .6; .31; .03; .02 INJECTION, SOLUTION INTRAVENOUS at 09:10

## 2021-10-25 RX ADMIN — PIPERACILLIN AND TAZOBACTAM 4.5 G: 4; .5 INJECTION, POWDER, LYOPHILIZED, FOR SOLUTION INTRAVENOUS; PARENTERAL at 04:10

## 2021-10-25 RX ADMIN — ENOXAPARIN SODIUM 30 MG: 30 INJECTION, SOLUTION INTRAVENOUS; SUBCUTANEOUS at 04:10

## 2021-10-26 LAB
ALBUMIN SERPL BCP-MCNC: 2.1 G/DL (ref 3.5–5.2)
ALP SERPL-CCNC: 85 U/L (ref 55–135)
ALT SERPL W/O P-5'-P-CCNC: 7 U/L (ref 10–44)
ANION GAP SERPL CALC-SCNC: 9 MMOL/L (ref 8–16)
ANISOCYTOSIS BLD QL SMEAR: SLIGHT
AST SERPL-CCNC: 14 U/L (ref 10–40)
BACTERIA BLD CULT: NORMAL
BASOPHILS # BLD AUTO: ABNORMAL K/UL (ref 0–0.2)
BASOPHILS NFR BLD: 0 % (ref 0–1.9)
BILIRUB SERPL-MCNC: 0.3 MG/DL (ref 0.1–1)
BUN SERPL-MCNC: 7 MG/DL (ref 8–23)
CALCIUM SERPL-MCNC: 8.2 MG/DL (ref 8.7–10.5)
CHLORIDE SERPL-SCNC: 102 MMOL/L (ref 95–110)
CO2 SERPL-SCNC: 22 MMOL/L (ref 23–29)
CREAT SERPL-MCNC: 1.6 MG/DL (ref 0.5–1.4)
DIFFERENTIAL METHOD: ABNORMAL
EOSINOPHIL # BLD AUTO: ABNORMAL K/UL (ref 0–0.5)
EOSINOPHIL NFR BLD: 0 % (ref 0–8)
ERYTHROCYTE [DISTWIDTH] IN BLOOD BY AUTOMATED COUNT: 12.4 % (ref 11.5–14.5)
EST. GFR  (AFRICAN AMERICAN): 34.3 ML/MIN/1.73 M^2
EST. GFR  (NON AFRICAN AMERICAN): 29.8 ML/MIN/1.73 M^2
GLUCOSE SERPL-MCNC: 118 MG/DL (ref 70–110)
HCT VFR BLD AUTO: 32.7 % (ref 37–48.5)
HGB BLD-MCNC: 10.7 G/DL (ref 12–16)
HYPOCHROMIA BLD QL SMEAR: ABNORMAL
IMM GRANULOCYTES # BLD AUTO: ABNORMAL K/UL (ref 0–0.04)
IMM GRANULOCYTES NFR BLD AUTO: ABNORMAL % (ref 0–0.5)
LYMPHOCYTES # BLD AUTO: ABNORMAL K/UL (ref 1–4.8)
LYMPHOCYTES NFR BLD: 16 % (ref 18–48)
MAGNESIUM SERPL-MCNC: 2 MG/DL (ref 1.6–2.6)
MCH RBC QN AUTO: 33.4 PG (ref 27–31)
MCHC RBC AUTO-ENTMCNC: 32.7 G/DL (ref 32–36)
MCV RBC AUTO: 102 FL (ref 82–98)
MONOCYTES # BLD AUTO: ABNORMAL K/UL (ref 0.3–1)
MONOCYTES NFR BLD: 10 % (ref 4–15)
MYELOCYTES NFR BLD MANUAL: 2 %
NEUTROPHILS NFR BLD: 72 % (ref 38–73)
NRBC BLD-RTO: 0 /100 WBC
OVALOCYTES BLD QL SMEAR: ABNORMAL
PHOSPHATE SERPL-MCNC: 3.3 MG/DL (ref 2.7–4.5)
PLATELET # BLD AUTO: 125 K/UL (ref 150–450)
PLATELET BLD QL SMEAR: ABNORMAL
PMV BLD AUTO: 11.8 FL (ref 9.2–12.9)
POIKILOCYTOSIS BLD QL SMEAR: SLIGHT
POLYCHROMASIA BLD QL SMEAR: ABNORMAL
POTASSIUM SERPL-SCNC: 3.5 MMOL/L (ref 3.5–5.1)
PROT SERPL-MCNC: 4.7 G/DL (ref 6–8.4)
RBC # BLD AUTO: 3.2 M/UL (ref 4–5.4)
SODIUM SERPL-SCNC: 133 MMOL/L (ref 136–145)
WBC # BLD AUTO: 8.9 K/UL (ref 3.9–12.7)

## 2021-10-26 PROCEDURE — 99232 SBSQ HOSP IP/OBS MODERATE 35: CPT | Mod: HCNC,GC,, | Performed by: STUDENT IN AN ORGANIZED HEALTH CARE EDUCATION/TRAINING PROGRAM

## 2021-10-26 PROCEDURE — 25000003 PHARM REV CODE 250: Mod: HCNC | Performed by: STUDENT IN AN ORGANIZED HEALTH CARE EDUCATION/TRAINING PROGRAM

## 2021-10-26 PROCEDURE — 63600175 PHARM REV CODE 636 W HCPCS: Mod: HCNC

## 2021-10-26 PROCEDURE — 36415 COLL VENOUS BLD VENIPUNCTURE: CPT | Mod: HCNC

## 2021-10-26 PROCEDURE — 63600175 PHARM REV CODE 636 W HCPCS: Mod: HCNC | Performed by: STUDENT IN AN ORGANIZED HEALTH CARE EDUCATION/TRAINING PROGRAM

## 2021-10-26 PROCEDURE — 80053 COMPREHEN METABOLIC PANEL: CPT | Mod: HCNC

## 2021-10-26 PROCEDURE — 25000003 PHARM REV CODE 250: Mod: HCNC

## 2021-10-26 PROCEDURE — 93010 ELECTROCARDIOGRAM REPORT: CPT | Mod: HCNC,,, | Performed by: INTERNAL MEDICINE

## 2021-10-26 PROCEDURE — 84100 ASSAY OF PHOSPHORUS: CPT | Mod: HCNC

## 2021-10-26 PROCEDURE — 93005 ELECTROCARDIOGRAM TRACING: CPT | Mod: HCNC

## 2021-10-26 PROCEDURE — 93010 EKG 12-LEAD: ICD-10-PCS | Mod: HCNC,,, | Performed by: INTERNAL MEDICINE

## 2021-10-26 PROCEDURE — 85027 COMPLETE CBC AUTOMATED: CPT | Mod: HCNC

## 2021-10-26 PROCEDURE — 83735 ASSAY OF MAGNESIUM: CPT | Mod: HCNC

## 2021-10-26 PROCEDURE — 99232 PR SUBSEQUENT HOSPITAL CARE,LEVL II: ICD-10-PCS | Mod: HCNC,GC,, | Performed by: STUDENT IN AN ORGANIZED HEALTH CARE EDUCATION/TRAINING PROGRAM

## 2021-10-26 PROCEDURE — 20600001 HC STEP DOWN PRIVATE ROOM: Mod: HCNC

## 2021-10-26 PROCEDURE — 85007 BL SMEAR W/DIFF WBC COUNT: CPT | Mod: HCNC

## 2021-10-26 RX ORDER — AMLODIPINE BESYLATE 5 MG/1
5 TABLET ORAL DAILY
Status: DISCONTINUED | OUTPATIENT
Start: 2021-10-26 | End: 2021-10-27

## 2021-10-26 RX ORDER — AMOXICILLIN AND CLAVULANATE POTASSIUM 500; 125 MG/1; MG/1
1 TABLET, FILM COATED ORAL 2 TIMES DAILY
Status: DISCONTINUED | OUTPATIENT
Start: 2021-10-26 | End: 2021-10-27

## 2021-10-26 RX ORDER — MUPIROCIN 20 MG/G
OINTMENT TOPICAL 2 TIMES DAILY
Status: DISCONTINUED | OUTPATIENT
Start: 2021-10-26 | End: 2021-10-28 | Stop reason: HOSPADM

## 2021-10-26 RX ORDER — POTASSIUM CHLORIDE 20 MEQ/1
40 TABLET, EXTENDED RELEASE ORAL ONCE
Status: COMPLETED | OUTPATIENT
Start: 2021-10-26 | End: 2021-10-26

## 2021-10-26 RX ORDER — CARVEDILOL 12.5 MG/1
12.5 TABLET ORAL 2 TIMES DAILY
Status: DISCONTINUED | OUTPATIENT
Start: 2021-10-26 | End: 2021-10-28 | Stop reason: HOSPADM

## 2021-10-26 RX ADMIN — PIPERACILLIN AND TAZOBACTAM 4.5 G: 4; .5 INJECTION, POWDER, LYOPHILIZED, FOR SOLUTION INTRAVENOUS; PARENTERAL at 12:10

## 2021-10-26 RX ADMIN — SODIUM CHLORIDE, SODIUM LACTATE, POTASSIUM CHLORIDE, AND CALCIUM CHLORIDE 1000 ML: .6; .31; .03; .02 INJECTION, SOLUTION INTRAVENOUS at 07:10

## 2021-10-26 RX ADMIN — AMLODIPINE BESYLATE 5 MG: 5 TABLET ORAL at 10:10

## 2021-10-26 RX ADMIN — METOPROLOL SUCCINATE 50 MG: 50 TABLET, EXTENDED RELEASE ORAL at 08:10

## 2021-10-26 RX ADMIN — PIPERACILLIN AND TAZOBACTAM 4.5 G: 4; .5 INJECTION, POWDER, LYOPHILIZED, FOR SOLUTION INTRAVENOUS; PARENTERAL at 05:10

## 2021-10-26 RX ADMIN — FLUTICASONE FUROATE AND VILANTEROL TRIFENATATE 1 PUFF: 100; 25 POWDER RESPIRATORY (INHALATION) at 09:10

## 2021-10-26 RX ADMIN — POTASSIUM CHLORIDE 40 MEQ: 1500 TABLET, EXTENDED RELEASE ORAL at 07:10

## 2021-10-26 RX ADMIN — CARVEDILOL 12.5 MG: 12.5 TABLET, FILM COATED ORAL at 09:10

## 2021-10-26 RX ADMIN — HYDROCODONE BITARTRATE AND ACETAMINOPHEN 1 TABLET: 5; 325 TABLET ORAL at 09:10

## 2021-10-26 RX ADMIN — MUPIROCIN: 20 OINTMENT TOPICAL at 08:10

## 2021-10-26 RX ADMIN — ENOXAPARIN SODIUM 30 MG: 30 INJECTION, SOLUTION INTRAVENOUS; SUBCUTANEOUS at 05:10

## 2021-10-26 RX ADMIN — AMOXICILLIN AND CLAVULANATE POTASSIUM 500 MG: 500; 125 TABLET, FILM COATED ORAL at 09:10

## 2021-10-26 RX ADMIN — MUPIROCIN: 20 OINTMENT TOPICAL at 09:10

## 2021-10-27 LAB
ALBUMIN SERPL BCP-MCNC: 2.3 G/DL (ref 3.5–5.2)
ALP SERPL-CCNC: 86 U/L (ref 55–135)
ALT SERPL W/O P-5'-P-CCNC: 11 U/L (ref 10–44)
ANION GAP SERPL CALC-SCNC: 10 MMOL/L (ref 8–16)
ANION GAP SERPL CALC-SCNC: 8 MMOL/L (ref 8–16)
ANISOCYTOSIS BLD QL SMEAR: SLIGHT
AST SERPL-CCNC: 22 U/L (ref 10–40)
BASOPHILS # BLD AUTO: ABNORMAL K/UL (ref 0–0.2)
BASOPHILS NFR BLD: 0 % (ref 0–1.9)
BILIRUB SERPL-MCNC: 0.3 MG/DL (ref 0.1–1)
BUN SERPL-MCNC: 7 MG/DL (ref 8–23)
BUN SERPL-MCNC: 8 MG/DL (ref 8–23)
CALCIUM SERPL-MCNC: 8.4 MG/DL (ref 8.7–10.5)
CALCIUM SERPL-MCNC: 8.7 MG/DL (ref 8.7–10.5)
CHLORIDE SERPL-SCNC: 102 MMOL/L (ref 95–110)
CHLORIDE SERPL-SCNC: 99 MMOL/L (ref 95–110)
CO2 SERPL-SCNC: 24 MMOL/L (ref 23–29)
CO2 SERPL-SCNC: 28 MMOL/L (ref 23–29)
CREAT SERPL-MCNC: 1.4 MG/DL (ref 0.5–1.4)
CREAT SERPL-MCNC: 1.6 MG/DL (ref 0.5–1.4)
DIFFERENTIAL METHOD: ABNORMAL
EOSINOPHIL # BLD AUTO: ABNORMAL K/UL (ref 0–0.5)
EOSINOPHIL NFR BLD: 0 % (ref 0–8)
ERYTHROCYTE [DISTWIDTH] IN BLOOD BY AUTOMATED COUNT: 12.5 % (ref 11.5–14.5)
EST. GFR  (AFRICAN AMERICAN): 34.3 ML/MIN/1.73 M^2
EST. GFR  (AFRICAN AMERICAN): 40.4 ML/MIN/1.73 M^2
EST. GFR  (NON AFRICAN AMERICAN): 29.8 ML/MIN/1.73 M^2
EST. GFR  (NON AFRICAN AMERICAN): 35 ML/MIN/1.73 M^2
GLUCOSE SERPL-MCNC: 103 MG/DL (ref 70–110)
GLUCOSE SERPL-MCNC: 124 MG/DL (ref 70–110)
HCT VFR BLD AUTO: 33 % (ref 37–48.5)
HGB BLD-MCNC: 11.1 G/DL (ref 12–16)
HYPOCHROMIA BLD QL SMEAR: ABNORMAL
IMM GRANULOCYTES # BLD AUTO: ABNORMAL K/UL (ref 0–0.04)
IMM GRANULOCYTES NFR BLD AUTO: ABNORMAL % (ref 0–0.5)
LYMPHOCYTES # BLD AUTO: ABNORMAL K/UL (ref 1–4.8)
LYMPHOCYTES NFR BLD: 18 % (ref 18–48)
MAGNESIUM SERPL-MCNC: 1.8 MG/DL (ref 1.6–2.6)
MCH RBC QN AUTO: 33.3 PG (ref 27–31)
MCHC RBC AUTO-ENTMCNC: 33.6 G/DL (ref 32–36)
MCV RBC AUTO: 99 FL (ref 82–98)
METAMYELOCYTES NFR BLD MANUAL: 2 %
MONOCYTES # BLD AUTO: ABNORMAL K/UL (ref 0.3–1)
MONOCYTES NFR BLD: 10 % (ref 4–15)
MYELOCYTES NFR BLD MANUAL: 3 %
NEUTROPHILS NFR BLD: 66 % (ref 38–73)
NEUTS BAND NFR BLD MANUAL: 1 %
NRBC BLD-RTO: 0 /100 WBC
OVALOCYTES BLD QL SMEAR: ABNORMAL
PHOSPHATE SERPL-MCNC: 3.3 MG/DL (ref 2.7–4.5)
PLATELET # BLD AUTO: 137 K/UL (ref 150–450)
PMV BLD AUTO: 11.7 FL (ref 9.2–12.9)
POIKILOCYTOSIS BLD QL SMEAR: SLIGHT
POLYCHROMASIA BLD QL SMEAR: ABNORMAL
POTASSIUM SERPL-SCNC: 3.8 MMOL/L (ref 3.5–5.1)
POTASSIUM SERPL-SCNC: 4 MMOL/L (ref 3.5–5.1)
PROT SERPL-MCNC: 5.2 G/DL (ref 6–8.4)
RBC # BLD AUTO: 3.33 M/UL (ref 4–5.4)
SODIUM SERPL-SCNC: 135 MMOL/L (ref 136–145)
SODIUM SERPL-SCNC: 136 MMOL/L (ref 136–145)
WBC # BLD AUTO: 8.33 K/UL (ref 3.9–12.7)

## 2021-10-27 PROCEDURE — 80053 COMPREHEN METABOLIC PANEL: CPT | Mod: HCNC

## 2021-10-27 PROCEDURE — 20600001 HC STEP DOWN PRIVATE ROOM: Mod: HCNC

## 2021-10-27 PROCEDURE — 99232 SBSQ HOSP IP/OBS MODERATE 35: CPT | Mod: HCNC,GC,, | Performed by: STUDENT IN AN ORGANIZED HEALTH CARE EDUCATION/TRAINING PROGRAM

## 2021-10-27 PROCEDURE — 99232 PR SUBSEQUENT HOSPITAL CARE,LEVL II: ICD-10-PCS | Mod: HCNC,GC,, | Performed by: STUDENT IN AN ORGANIZED HEALTH CARE EDUCATION/TRAINING PROGRAM

## 2021-10-27 PROCEDURE — 25000003 PHARM REV CODE 250: Mod: HCNC | Performed by: STUDENT IN AN ORGANIZED HEALTH CARE EDUCATION/TRAINING PROGRAM

## 2021-10-27 PROCEDURE — 84100 ASSAY OF PHOSPHORUS: CPT | Mod: HCNC

## 2021-10-27 PROCEDURE — 80048 BASIC METABOLIC PNL TOTAL CA: CPT | Mod: HCNC

## 2021-10-27 PROCEDURE — 36415 COLL VENOUS BLD VENIPUNCTURE: CPT | Mod: HCNC

## 2021-10-27 PROCEDURE — 63600175 PHARM REV CODE 636 W HCPCS: Mod: HCNC | Performed by: STUDENT IN AN ORGANIZED HEALTH CARE EDUCATION/TRAINING PROGRAM

## 2021-10-27 PROCEDURE — 63600175 PHARM REV CODE 636 W HCPCS: Mod: HCNC

## 2021-10-27 PROCEDURE — 85007 BL SMEAR W/DIFF WBC COUNT: CPT | Mod: HCNC

## 2021-10-27 PROCEDURE — 25000003 PHARM REV CODE 250: Mod: HCNC

## 2021-10-27 PROCEDURE — 83735 ASSAY OF MAGNESIUM: CPT | Mod: HCNC

## 2021-10-27 PROCEDURE — 85027 COMPLETE CBC AUTOMATED: CPT | Mod: HCNC

## 2021-10-27 RX ORDER — AMOXICILLIN AND CLAVULANATE POTASSIUM 250; 62.5 MG/5ML; MG/5ML
500 POWDER, FOR SUSPENSION ORAL EVERY 12 HOURS
Status: DISCONTINUED | OUTPATIENT
Start: 2021-10-27 | End: 2021-10-28 | Stop reason: HOSPADM

## 2021-10-27 RX ORDER — AMLODIPINE BESYLATE 10 MG/1
10 TABLET ORAL DAILY
Status: DISCONTINUED | OUTPATIENT
Start: 2021-10-27 | End: 2021-10-28 | Stop reason: HOSPADM

## 2021-10-27 RX ADMIN — Medication 6 MG: at 09:10

## 2021-10-27 RX ADMIN — CARVEDILOL 12.5 MG: 12.5 TABLET, FILM COATED ORAL at 09:10

## 2021-10-27 RX ADMIN — AMOXICILLIN AND CLAVULANATE POTASSIUM 500 MG: 250; 62.5 POWDER, FOR SUSPENSION ORAL at 09:10

## 2021-10-27 RX ADMIN — AMLODIPINE BESYLATE 5 MG: 5 TABLET ORAL at 07:10

## 2021-10-27 RX ADMIN — MUPIROCIN: 20 OINTMENT TOPICAL at 09:10

## 2021-10-27 RX ADMIN — ENOXAPARIN SODIUM 30 MG: 30 INJECTION, SOLUTION INTRAVENOUS; SUBCUTANEOUS at 06:10

## 2021-10-27 RX ADMIN — SODIUM CHLORIDE, SODIUM LACTATE, POTASSIUM CHLORIDE, AND CALCIUM CHLORIDE 500 ML: .6; .31; .03; .02 INJECTION, SOLUTION INTRAVENOUS at 11:10

## 2021-10-27 RX ADMIN — ACETAMINOPHEN 650 MG: 325 TABLET ORAL at 07:10

## 2021-10-27 RX ADMIN — AMLODIPINE BESYLATE 10 MG: 10 TABLET ORAL at 09:10

## 2021-10-27 RX ADMIN — MUPIROCIN: 20 OINTMENT TOPICAL at 11:10

## 2021-10-27 RX ADMIN — HYDROCODONE BITARTRATE AND ACETAMINOPHEN 1 TABLET: 5; 325 TABLET ORAL at 09:10

## 2021-10-27 RX ADMIN — AMOXICILLIN AND CLAVULANATE POTASSIUM 500 MG: 500; 125 TABLET, FILM COATED ORAL at 09:10

## 2021-10-27 RX ADMIN — CARVEDILOL 12.5 MG: 12.5 TABLET, FILM COATED ORAL at 07:10

## 2021-10-28 VITALS
HEART RATE: 71 BPM | HEIGHT: 64 IN | BODY MASS INDEX: 20.47 KG/M2 | WEIGHT: 119.94 LBS | OXYGEN SATURATION: 97 % | SYSTOLIC BLOOD PRESSURE: 158 MMHG | RESPIRATION RATE: 20 BRPM | TEMPERATURE: 98 F | DIASTOLIC BLOOD PRESSURE: 85 MMHG

## 2021-10-28 LAB
ALBUMIN SERPL BCP-MCNC: 2.5 G/DL (ref 3.5–5.2)
ALP SERPL-CCNC: 95 U/L (ref 55–135)
ALT SERPL W/O P-5'-P-CCNC: 16 U/L (ref 10–44)
ANION GAP SERPL CALC-SCNC: 10 MMOL/L (ref 8–16)
ANISOCYTOSIS BLD QL SMEAR: SLIGHT
AST SERPL-CCNC: 26 U/L (ref 10–40)
BASOPHILS # BLD AUTO: ABNORMAL K/UL (ref 0–0.2)
BASOPHILS NFR BLD: 0 % (ref 0–1.9)
BILIRUB SERPL-MCNC: 0.2 MG/DL (ref 0.1–1)
BUN SERPL-MCNC: 8 MG/DL (ref 8–23)
CALCIUM SERPL-MCNC: 8.7 MG/DL (ref 8.7–10.5)
CHLORIDE SERPL-SCNC: 101 MMOL/L (ref 95–110)
CO2 SERPL-SCNC: 25 MMOL/L (ref 23–29)
CREAT SERPL-MCNC: 1.2 MG/DL (ref 0.5–1.4)
DIFFERENTIAL METHOD: ABNORMAL
EOSINOPHIL # BLD AUTO: ABNORMAL K/UL (ref 0–0.5)
EOSINOPHIL NFR BLD: 1 % (ref 0–8)
ERYTHROCYTE [DISTWIDTH] IN BLOOD BY AUTOMATED COUNT: 12.4 % (ref 11.5–14.5)
EST. GFR  (AFRICAN AMERICAN): 48.6 ML/MIN/1.73 M^2
EST. GFR  (NON AFRICAN AMERICAN): 42.2 ML/MIN/1.73 M^2
GLUCOSE SERPL-MCNC: 96 MG/DL (ref 70–110)
HCT VFR BLD AUTO: 34.6 % (ref 37–48.5)
HGB BLD-MCNC: 11.5 G/DL (ref 12–16)
HYPOCHROMIA BLD QL SMEAR: ABNORMAL
IMM GRANULOCYTES # BLD AUTO: ABNORMAL K/UL (ref 0–0.04)
IMM GRANULOCYTES NFR BLD AUTO: ABNORMAL % (ref 0–0.5)
LYMPHOCYTES # BLD AUTO: ABNORMAL K/UL (ref 1–4.8)
LYMPHOCYTES NFR BLD: 19 % (ref 18–48)
MAGNESIUM SERPL-MCNC: 1.7 MG/DL (ref 1.6–2.6)
MCH RBC QN AUTO: 33.5 PG (ref 27–31)
MCHC RBC AUTO-ENTMCNC: 33.2 G/DL (ref 32–36)
MCV RBC AUTO: 101 FL (ref 82–98)
MONOCYTES # BLD AUTO: ABNORMAL K/UL (ref 0.3–1)
MONOCYTES NFR BLD: 18 % (ref 4–15)
MYELOCYTES NFR BLD MANUAL: 5 %
NEUTROPHILS NFR BLD: 57 % (ref 38–73)
NRBC BLD-RTO: 0 /100 WBC
OVALOCYTES BLD QL SMEAR: ABNORMAL
PHOSPHATE SERPL-MCNC: 4 MG/DL (ref 2.7–4.5)
PLATELET # BLD AUTO: 142 K/UL (ref 150–450)
PLATELET BLD QL SMEAR: ABNORMAL
PMV BLD AUTO: 12.1 FL (ref 9.2–12.9)
POIKILOCYTOSIS BLD QL SMEAR: SLIGHT
POLYCHROMASIA BLD QL SMEAR: ABNORMAL
POTASSIUM SERPL-SCNC: 4.2 MMOL/L (ref 3.5–5.1)
PROT SERPL-MCNC: 5.6 G/DL (ref 6–8.4)
RBC # BLD AUTO: 3.43 M/UL (ref 4–5.4)
SODIUM SERPL-SCNC: 136 MMOL/L (ref 136–145)
WBC # BLD AUTO: 7.82 K/UL (ref 3.9–12.7)

## 2021-10-28 PROCEDURE — 94640 AIRWAY INHALATION TREATMENT: CPT | Mod: HCNC

## 2021-10-28 PROCEDURE — 80053 COMPREHEN METABOLIC PANEL: CPT | Mod: HCNC

## 2021-10-28 PROCEDURE — 36415 COLL VENOUS BLD VENIPUNCTURE: CPT | Mod: HCNC

## 2021-10-28 PROCEDURE — 83735 ASSAY OF MAGNESIUM: CPT | Mod: HCNC

## 2021-10-28 PROCEDURE — 99900035 HC TECH TIME PER 15 MIN (STAT): Mod: HCNC

## 2021-10-28 PROCEDURE — 25000003 PHARM REV CODE 250: Mod: HCNC

## 2021-10-28 PROCEDURE — 25000003 PHARM REV CODE 250: Mod: HCNC | Performed by: STUDENT IN AN ORGANIZED HEALTH CARE EDUCATION/TRAINING PROGRAM

## 2021-10-28 PROCEDURE — 94761 N-INVAS EAR/PLS OXIMETRY MLT: CPT | Mod: HCNC

## 2021-10-28 PROCEDURE — 1111F DSCHRG MED/CURRENT MED MERGE: CPT | Mod: HCNC,CPTII,GC, | Performed by: STUDENT IN AN ORGANIZED HEALTH CARE EDUCATION/TRAINING PROGRAM

## 2021-10-28 PROCEDURE — 1111F PR DISCHARGE MEDS RECONCILED W/ CURRENT OUTPATIENT MED LIST: ICD-10-PCS | Mod: HCNC,CPTII,GC, | Performed by: STUDENT IN AN ORGANIZED HEALTH CARE EDUCATION/TRAINING PROGRAM

## 2021-10-28 PROCEDURE — 99239 PR HOSPITAL DISCHARGE DAY,>30 MIN: ICD-10-PCS | Mod: HCNC,GC,, | Performed by: STUDENT IN AN ORGANIZED HEALTH CARE EDUCATION/TRAINING PROGRAM

## 2021-10-28 PROCEDURE — 85027 COMPLETE CBC AUTOMATED: CPT | Mod: HCNC

## 2021-10-28 PROCEDURE — 84100 ASSAY OF PHOSPHORUS: CPT | Mod: HCNC

## 2021-10-28 PROCEDURE — 99239 HOSP IP/OBS DSCHRG MGMT >30: CPT | Mod: HCNC,GC,, | Performed by: STUDENT IN AN ORGANIZED HEALTH CARE EDUCATION/TRAINING PROGRAM

## 2021-10-28 PROCEDURE — 85007 BL SMEAR W/DIFF WBC COUNT: CPT | Mod: HCNC

## 2021-10-28 RX ORDER — CARVEDILOL 12.5 MG/1
12.5 TABLET ORAL 2 TIMES DAILY
Qty: 60 TABLET | Refills: 2 | Status: SHIPPED | OUTPATIENT
Start: 2021-10-28 | End: 2021-12-23

## 2021-10-28 RX ORDER — AMLODIPINE BESYLATE 10 MG/1
10 TABLET ORAL DAILY
Qty: 30 TABLET | Refills: 2 | Status: SHIPPED | OUTPATIENT
Start: 2021-10-29 | End: 2022-03-24

## 2021-10-28 RX ORDER — AMOXICILLIN AND CLAVULANATE POTASSIUM 250; 62.5 MG/5ML; MG/5ML
500 POWDER, FOR SUSPENSION ORAL EVERY 12 HOURS
Qty: 100 ML | Refills: 0 | Status: SHIPPED | OUTPATIENT
Start: 2021-10-28 | End: 2021-11-01 | Stop reason: SDUPTHER

## 2021-10-28 RX ORDER — CALCIUM CARBONATE 200(500)MG
500 TABLET,CHEWABLE ORAL 2 TIMES DAILY PRN
Status: DISCONTINUED | OUTPATIENT
Start: 2021-10-28 | End: 2021-10-28 | Stop reason: HOSPADM

## 2021-10-28 RX ADMIN — CALCIUM CARBONATE (ANTACID) CHEW TAB 500 MG 500 MG: 500 CHEW TAB at 08:10

## 2021-10-28 RX ADMIN — CARVEDILOL 12.5 MG: 12.5 TABLET, FILM COATED ORAL at 08:10

## 2021-10-28 RX ADMIN — AMOXICILLIN AND CLAVULANATE POTASSIUM 500 MG: 250; 62.5 POWDER, FOR SUSPENSION ORAL at 08:10

## 2021-10-28 RX ADMIN — AMLODIPINE BESYLATE 10 MG: 10 TABLET ORAL at 08:10

## 2021-10-28 RX ADMIN — MUPIROCIN: 20 OINTMENT TOPICAL at 08:10

## 2021-10-28 RX ADMIN — FLUTICASONE FUROATE AND VILANTEROL TRIFENATATE 1 PUFF: 100; 25 POWDER RESPIRATORY (INHALATION) at 08:10

## 2021-11-01 ENCOUNTER — OUTPATIENT CASE MANAGEMENT (OUTPATIENT)
Dept: ADMINISTRATIVE | Facility: OTHER | Age: 81
End: 2021-11-01
Payer: MEDICARE

## 2021-11-01 ENCOUNTER — TELEPHONE (OUTPATIENT)
Dept: PRIMARY CARE CLINIC | Facility: CLINIC | Age: 81
End: 2021-11-01
Payer: MEDICARE

## 2021-11-01 RX ORDER — AMOXICILLIN AND CLAVULANATE POTASSIUM 250; 62.5 MG/5ML; MG/5ML
500 POWDER, FOR SUSPENSION ORAL EVERY 12 HOURS
Qty: 50 ML | Refills: 0 | Status: SHIPPED | OUTPATIENT
Start: 2021-11-01 | End: 2021-11-04

## 2021-11-04 ENCOUNTER — OFFICE VISIT (OUTPATIENT)
Dept: PRIMARY CARE CLINIC | Facility: CLINIC | Age: 81
End: 2021-11-04
Payer: MEDICAID

## 2021-11-04 VITALS
BODY MASS INDEX: 19.61 KG/M2 | OXYGEN SATURATION: 99 % | HEART RATE: 90 BPM | HEIGHT: 64 IN | SYSTOLIC BLOOD PRESSURE: 136 MMHG | RESPIRATION RATE: 20 BRPM | WEIGHT: 114.88 LBS | TEMPERATURE: 98 F | DIASTOLIC BLOOD PRESSURE: 68 MMHG

## 2021-11-04 DIAGNOSIS — K52.9 CHRONIC DIARRHEA: ICD-10-CM

## 2021-11-04 DIAGNOSIS — I48.0 PAROXYSMAL ATRIAL FIBRILLATION: ICD-10-CM

## 2021-11-04 DIAGNOSIS — Z09 HOSPITAL DISCHARGE FOLLOW-UP: Primary | ICD-10-CM

## 2021-11-04 DIAGNOSIS — Z78.9 ALCOHOL USE: ICD-10-CM

## 2021-11-04 DIAGNOSIS — D64.9 ANEMIA, UNSPECIFIED TYPE: ICD-10-CM

## 2021-11-04 DIAGNOSIS — K57.33 DIVERTICULITIS OF LARGE INTESTINE WITHOUT PERFORATION OR ABSCESS WITH BLEEDING: ICD-10-CM

## 2021-11-04 DIAGNOSIS — I49.9 CARDIAC ARRHYTHMIA, UNSPECIFIED CARDIAC ARRHYTHMIA TYPE: ICD-10-CM

## 2021-11-04 DIAGNOSIS — F10.20 ALCOHOL DEPENDENCE, UNCOMPLICATED: ICD-10-CM

## 2021-11-04 DIAGNOSIS — J44.9 CHRONIC OBSTRUCTIVE PULMONARY DISEASE, UNSPECIFIED COPD TYPE: Chronic | ICD-10-CM

## 2021-11-04 PROBLEM — Z71.6 ENCOUNTER FOR TOBACCO USE CESSATION COUNSELING: Status: RESOLVED | Noted: 2019-02-27 | Resolved: 2021-11-04

## 2021-11-04 PROCEDURE — 1101F PR PT FALLS ASSESS DOC 0-1 FALLS W/OUT INJ PAST YR: ICD-10-PCS | Mod: HCNC,CPTII,S$GLB, | Performed by: NURSE PRACTITIONER

## 2021-11-04 PROCEDURE — 1159F MED LIST DOCD IN RCRD: CPT | Mod: HCNC,CPTII,S$GLB, | Performed by: NURSE PRACTITIONER

## 2021-11-04 PROCEDURE — 3288F FALL RISK ASSESSMENT DOCD: CPT | Mod: HCNC,CPTII,S$GLB, | Performed by: NURSE PRACTITIONER

## 2021-11-04 PROCEDURE — 1126F PR PAIN SEVERITY QUANTIFIED, NO PAIN PRESENT: ICD-10-PCS | Mod: HCNC,CPTII,S$GLB, | Performed by: NURSE PRACTITIONER

## 2021-11-04 PROCEDURE — 1126F AMNT PAIN NOTED NONE PRSNT: CPT | Mod: HCNC,CPTII,S$GLB, | Performed by: NURSE PRACTITIONER

## 2021-11-04 PROCEDURE — 99214 OFFICE O/P EST MOD 30 MIN: CPT | Mod: HCNC,S$GLB,, | Performed by: NURSE PRACTITIONER

## 2021-11-04 PROCEDURE — 1101F PT FALLS ASSESS-DOCD LE1/YR: CPT | Mod: HCNC,CPTII,S$GLB, | Performed by: NURSE PRACTITIONER

## 2021-11-04 PROCEDURE — 3078F PR MOST RECENT DIASTOLIC BLOOD PRESSURE < 80 MM HG: ICD-10-PCS | Mod: HCNC,CPTII,S$GLB, | Performed by: NURSE PRACTITIONER

## 2021-11-04 PROCEDURE — 3288F PR FALLS RISK ASSESSMENT DOCUMENTED: ICD-10-PCS | Mod: HCNC,CPTII,S$GLB, | Performed by: NURSE PRACTITIONER

## 2021-11-04 PROCEDURE — 1159F PR MEDICATION LIST DOCUMENTED IN MEDICAL RECORD: ICD-10-PCS | Mod: HCNC,CPTII,S$GLB, | Performed by: NURSE PRACTITIONER

## 2021-11-04 PROCEDURE — 99499 UNLISTED E&M SERVICE: CPT | Mod: S$PBB,,, | Performed by: NURSE PRACTITIONER

## 2021-11-04 PROCEDURE — 1111F PR DISCHARGE MEDS RECONCILED W/ CURRENT OUTPATIENT MED LIST: ICD-10-PCS | Mod: HCNC,CPTII,S$GLB, | Performed by: NURSE PRACTITIONER

## 2021-11-04 PROCEDURE — 3078F DIAST BP <80 MM HG: CPT | Mod: HCNC,CPTII,S$GLB, | Performed by: NURSE PRACTITIONER

## 2021-11-04 PROCEDURE — 3075F SYST BP GE 130 - 139MM HG: CPT | Mod: HCNC,CPTII,S$GLB, | Performed by: NURSE PRACTITIONER

## 2021-11-04 PROCEDURE — 1111F DSCHRG MED/CURRENT MED MERGE: CPT | Mod: HCNC,CPTII,S$GLB, | Performed by: NURSE PRACTITIONER

## 2021-11-04 PROCEDURE — 1160F PR REVIEW ALL MEDS BY PRESCRIBER/CLIN PHARMACIST DOCUMENTED: ICD-10-PCS | Mod: HCNC,CPTII,S$GLB, | Performed by: NURSE PRACTITIONER

## 2021-11-04 PROCEDURE — 99214 PR OFFICE/OUTPT VISIT, EST, LEVL IV, 30-39 MIN: ICD-10-PCS | Mod: HCNC,S$GLB,, | Performed by: NURSE PRACTITIONER

## 2021-11-04 PROCEDURE — 3075F PR MOST RECENT SYSTOLIC BLOOD PRESS GE 130-139MM HG: ICD-10-PCS | Mod: HCNC,CPTII,S$GLB, | Performed by: NURSE PRACTITIONER

## 2021-11-04 PROCEDURE — 1160F RVW MEDS BY RX/DR IN RCRD: CPT | Mod: HCNC,CPTII,S$GLB, | Performed by: NURSE PRACTITIONER

## 2021-11-04 PROCEDURE — 99999 PR PBB SHADOW E&M-EST. PATIENT-LVL IV: ICD-10-PCS | Mod: PBBFAC,HCNC,, | Performed by: NURSE PRACTITIONER

## 2021-11-04 PROCEDURE — 99999 PR PBB SHADOW E&M-EST. PATIENT-LVL IV: CPT | Mod: PBBFAC,HCNC,, | Performed by: NURSE PRACTITIONER

## 2021-11-04 PROCEDURE — 99499 RISK ADDL DX/OHS AUDIT: ICD-10-PCS | Mod: S$PBB,,, | Performed by: NURSE PRACTITIONER

## 2021-11-07 ENCOUNTER — HOSPITAL ENCOUNTER (EMERGENCY)
Facility: HOSPITAL | Age: 81
Discharge: HOME | End: 2021-11-07
Attending: EMERGENCY MEDICINE
Payer: MEDICARE

## 2021-11-07 VITALS
HEIGHT: 64 IN | OXYGEN SATURATION: 92 % | RESPIRATION RATE: 22 BRPM | WEIGHT: 105 LBS | SYSTOLIC BLOOD PRESSURE: 126 MMHG | DIASTOLIC BLOOD PRESSURE: 69 MMHG | TEMPERATURE: 98 F | HEART RATE: 104 BPM | BODY MASS INDEX: 17.93 KG/M2

## 2021-11-07 DIAGNOSIS — R19.7 DIARRHEA, UNSPECIFIED TYPE: ICD-10-CM

## 2021-11-07 DIAGNOSIS — N17.9 AKI (ACUTE KIDNEY INJURY): ICD-10-CM

## 2021-11-07 DIAGNOSIS — J44.1 COPD EXACERBATION: Primary | ICD-10-CM

## 2021-11-07 DIAGNOSIS — R06.02 SOB (SHORTNESS OF BREATH): ICD-10-CM

## 2021-11-07 LAB
ANISOCYTOSIS BLD QL SMEAR: SLIGHT
BACTERIA #/AREA URNS AUTO: ABNORMAL /HPF
BASOPHILS # BLD AUTO: ABNORMAL K/UL (ref 0–0.2)
BASOPHILS NFR BLD: 0 % (ref 0–1.9)
BILIRUB UR QL STRIP: NEGATIVE
BUN SERPL-MCNC: 26 MG/DL (ref 6–30)
BUN SERPL-MCNC: 27 MG/DL (ref 6–30)
CHLORIDE SERPL-SCNC: 96 MMOL/L (ref 95–110)
CHLORIDE SERPL-SCNC: 97 MMOL/L (ref 95–110)
CLARITY UR REFRACT.AUTO: ABNORMAL
COLOR UR AUTO: YELLOW
CREAT SERPL-MCNC: 1.9 MG/DL (ref 0.5–1.4)
CREAT SERPL-MCNC: 2 MG/DL (ref 0.5–1.4)
CTP QC/QA: YES
DIFFERENTIAL METHOD: ABNORMAL
EOSINOPHIL # BLD AUTO: ABNORMAL K/UL (ref 0–0.5)
EOSINOPHIL NFR BLD: 1 % (ref 0–8)
ERYTHROCYTE [DISTWIDTH] IN BLOOD BY AUTOMATED COUNT: 12.1 % (ref 11.5–14.5)
GLUCOSE SERPL-MCNC: 125 MG/DL (ref 70–110)
GLUCOSE SERPL-MCNC: 144 MG/DL (ref 70–110)
GLUCOSE UR QL STRIP: NEGATIVE
HCT VFR BLD AUTO: 35 % (ref 37–48.5)
HCT VFR BLD CALC: 30 %PCV (ref 36–54)
HCT VFR BLD CALC: 34 %PCV (ref 36–54)
HGB BLD-MCNC: 11.8 G/DL (ref 12–16)
HGB UR QL STRIP: NEGATIVE
HYALINE CASTS UR QL AUTO: 3 /LPF
HYPOCHROMIA BLD QL SMEAR: ABNORMAL
IMM GRANULOCYTES # BLD AUTO: ABNORMAL K/UL (ref 0–0.04)
IMM GRANULOCYTES NFR BLD AUTO: ABNORMAL % (ref 0–0.5)
KETONES UR QL STRIP: NEGATIVE
LEUKOCYTE ESTERASE UR QL STRIP: NEGATIVE
LYMPHOCYTES # BLD AUTO: ABNORMAL K/UL (ref 1–4.8)
LYMPHOCYTES NFR BLD: 3 % (ref 18–48)
MCH RBC QN AUTO: 32.9 PG (ref 27–31)
MCHC RBC AUTO-ENTMCNC: 33.7 G/DL (ref 32–36)
MCV RBC AUTO: 98 FL (ref 82–98)
METAMYELOCYTES NFR BLD MANUAL: 1 %
MICROSCOPIC COMMENT: ABNORMAL
MONOCYTES # BLD AUTO: ABNORMAL K/UL (ref 0.3–1)
MONOCYTES NFR BLD: 11 % (ref 4–15)
MYELOCYTES NFR BLD MANUAL: 1 %
NEUTROPHILS # BLD AUTO: ABNORMAL K/UL (ref 1.8–7.7)
NEUTROPHILS NFR BLD: 78 % (ref 38–73)
NEUTS BAND NFR BLD MANUAL: 5 %
NITRITE UR QL STRIP: NEGATIVE
NON-SQ EPI CELLS #/AREA URNS AUTO: 1 /HPF
NRBC BLD-RTO: 0 /100 WBC
OVALOCYTES BLD QL SMEAR: ABNORMAL
PH UR STRIP: 5 [PH] (ref 5–8)
PLATELET # BLD AUTO: 187 K/UL (ref 150–450)
PLATELET BLD QL SMEAR: ABNORMAL
PMV BLD AUTO: 12.4 FL (ref 9.2–12.9)
POC IONIZED CALCIUM: 1.05 MMOL/L (ref 1.06–1.42)
POC IONIZED CALCIUM: 1.1 MMOL/L (ref 1.06–1.42)
POC TCO2 (MEASURED): 23 MMOL/L (ref 23–29)
POC TCO2 (MEASURED): 24 MMOL/L (ref 23–29)
POIKILOCYTOSIS BLD QL SMEAR: SLIGHT
POLYCHROMASIA BLD QL SMEAR: ABNORMAL
POTASSIUM BLD-SCNC: 3.7 MMOL/L (ref 3.5–5.1)
POTASSIUM BLD-SCNC: 3.7 MMOL/L (ref 3.5–5.1)
PROT UR QL STRIP: ABNORMAL
RBC # BLD AUTO: 3.59 M/UL (ref 4–5.4)
RBC #/AREA URNS AUTO: 2 /HPF (ref 0–4)
SAMPLE: ABNORMAL
SAMPLE: ABNORMAL
SARS-COV-2 RDRP RESP QL NAA+PROBE: NEGATIVE
SODIUM BLD-SCNC: 133 MMOL/L (ref 136–145)
SODIUM BLD-SCNC: 133 MMOL/L (ref 136–145)
SP GR UR STRIP: 1.01 (ref 1–1.03)
SQUAMOUS #/AREA URNS AUTO: 1 /HPF
URN SPEC COLLECT METH UR: ABNORMAL
WBC # BLD AUTO: 14.32 K/UL (ref 3.9–12.7)
WBC #/AREA URNS AUTO: 6 /HPF (ref 0–5)

## 2021-11-07 PROCEDURE — 99285 EMERGENCY DEPT VISIT HI MDM: CPT | Mod: CS,,, | Performed by: EMERGENCY MEDICINE

## 2021-11-07 PROCEDURE — 93010 ELECTROCARDIOGRAM REPORT: CPT | Mod: HCNC,,, | Performed by: INTERNAL MEDICINE

## 2021-11-07 PROCEDURE — 96360 HYDRATION IV INFUSION INIT: CPT | Mod: HCNC

## 2021-11-07 PROCEDURE — 25000242 PHARM REV CODE 250 ALT 637 W/ HCPCS: Mod: HCNC | Performed by: EMERGENCY MEDICINE

## 2021-11-07 PROCEDURE — 99285 PR EMERGENCY DEPT VISIT,LEVEL V: ICD-10-PCS | Mod: CS,,, | Performed by: EMERGENCY MEDICINE

## 2021-11-07 PROCEDURE — 99285 EMERGENCY DEPT VISIT HI MDM: CPT | Mod: 25,HCNC

## 2021-11-07 PROCEDURE — 93010 EKG 12-LEAD: ICD-10-PCS | Mod: HCNC,,, | Performed by: INTERNAL MEDICINE

## 2021-11-07 PROCEDURE — 80047 BASIC METABLC PNL IONIZED CA: CPT | Mod: HCNC

## 2021-11-07 PROCEDURE — 81001 URINALYSIS AUTO W/SCOPE: CPT | Mod: HCNC | Performed by: EMERGENCY MEDICINE

## 2021-11-07 PROCEDURE — 93005 ELECTROCARDIOGRAM TRACING: CPT | Mod: HCNC

## 2021-11-07 PROCEDURE — 94640 AIRWAY INHALATION TREATMENT: CPT | Mod: HCNC

## 2021-11-07 PROCEDURE — 85025 COMPLETE CBC W/AUTO DIFF WBC: CPT | Mod: HCNC | Performed by: EMERGENCY MEDICINE

## 2021-11-07 PROCEDURE — U0002 COVID-19 LAB TEST NON-CDC: HCPCS | Mod: HCNC | Performed by: EMERGENCY MEDICINE

## 2021-11-07 PROCEDURE — 96361 HYDRATE IV INFUSION ADD-ON: CPT | Mod: HCNC

## 2021-11-07 PROCEDURE — 85007 BL SMEAR W/DIFF WBC COUNT: CPT | Mod: HCNC | Performed by: EMERGENCY MEDICINE

## 2021-11-07 PROCEDURE — 85027 COMPLETE CBC AUTOMATED: CPT | Mod: HCNC | Performed by: EMERGENCY MEDICINE

## 2021-11-07 PROCEDURE — 63600175 PHARM REV CODE 636 W HCPCS: Mod: HCNC | Performed by: EMERGENCY MEDICINE

## 2021-11-07 RX ORDER — IPRATROPIUM BROMIDE AND ALBUTEROL SULFATE 2.5; .5 MG/3ML; MG/3ML
3 SOLUTION RESPIRATORY (INHALATION)
Status: COMPLETED | OUTPATIENT
Start: 2021-11-07 | End: 2021-11-07

## 2021-11-07 RX ORDER — PREDNISONE 20 MG/1
40 TABLET ORAL DAILY
Qty: 10 TABLET | Refills: 0 | Status: SHIPPED | OUTPATIENT
Start: 2021-11-07 | End: 2021-11-07 | Stop reason: SDUPTHER

## 2021-11-07 RX ORDER — PREDNISONE 20 MG/1
40 TABLET ORAL
Status: COMPLETED | OUTPATIENT
Start: 2021-11-07 | End: 2021-11-07

## 2021-11-07 RX ORDER — PREDNISONE 20 MG/1
40 TABLET ORAL DAILY
Qty: 10 TABLET | Refills: 0 | Status: SHIPPED | OUTPATIENT
Start: 2021-11-07 | End: 2021-11-11

## 2021-11-07 RX ORDER — ALBUTEROL SULFATE 90 UG/1
1-2 AEROSOL, METERED RESPIRATORY (INHALATION) EVERY 6 HOURS PRN
Qty: 8 G | Refills: 0 | Status: SHIPPED | OUTPATIENT
Start: 2021-11-07 | End: 2021-12-23

## 2021-11-07 RX ADMIN — IPRATROPIUM BROMIDE AND ALBUTEROL SULFATE 3 ML: .5; 3 SOLUTION RESPIRATORY (INHALATION) at 06:11

## 2021-11-07 RX ADMIN — PREDNISONE 40 MG: 20 TABLET ORAL at 05:11

## 2021-11-07 RX ADMIN — SODIUM CHLORIDE, SODIUM LACTATE, POTASSIUM CHLORIDE, AND CALCIUM CHLORIDE 1000 ML: .6; .31; .03; .02 INJECTION, SOLUTION INTRAVENOUS at 06:11

## 2021-11-11 ENCOUNTER — OFFICE VISIT (OUTPATIENT)
Dept: PRIMARY CARE CLINIC | Facility: CLINIC | Age: 81
End: 2021-11-11
Payer: MEDICARE

## 2021-11-11 VITALS
OXYGEN SATURATION: 97 % | SYSTOLIC BLOOD PRESSURE: 108 MMHG | BODY MASS INDEX: 20.19 KG/M2 | HEART RATE: 85 BPM | TEMPERATURE: 98 F | WEIGHT: 118.25 LBS | RESPIRATION RATE: 20 BRPM | DIASTOLIC BLOOD PRESSURE: 62 MMHG | HEIGHT: 64 IN

## 2021-11-11 DIAGNOSIS — Z23 NEED FOR VACCINATION: ICD-10-CM

## 2021-11-11 DIAGNOSIS — J44.9 CHRONIC OBSTRUCTIVE PULMONARY DISEASE, UNSPECIFIED COPD TYPE: Primary | ICD-10-CM

## 2021-11-11 DIAGNOSIS — J44.1 COPD EXACERBATION: ICD-10-CM

## 2021-11-11 PROCEDURE — 1159F MED LIST DOCD IN RCRD: CPT | Mod: HCNC,CPTII,S$GLB, | Performed by: STUDENT IN AN ORGANIZED HEALTH CARE EDUCATION/TRAINING PROGRAM

## 2021-11-11 PROCEDURE — 99214 PR OFFICE/OUTPT VISIT, EST, LEVL IV, 30-39 MIN: ICD-10-PCS | Mod: 25,HCNC,S$GLB, | Performed by: STUDENT IN AN ORGANIZED HEALTH CARE EDUCATION/TRAINING PROGRAM

## 2021-11-11 PROCEDURE — 90694 VACC AIIV4 NO PRSRV 0.5ML IM: CPT | Mod: HCNC,S$GLB,, | Performed by: STUDENT IN AN ORGANIZED HEALTH CARE EDUCATION/TRAINING PROGRAM

## 2021-11-11 PROCEDURE — 99214 OFFICE O/P EST MOD 30 MIN: CPT | Mod: 25,HCNC,S$GLB, | Performed by: STUDENT IN AN ORGANIZED HEALTH CARE EDUCATION/TRAINING PROGRAM

## 2021-11-11 PROCEDURE — 3074F SYST BP LT 130 MM HG: CPT | Mod: HCNC,CPTII,S$GLB, | Performed by: STUDENT IN AN ORGANIZED HEALTH CARE EDUCATION/TRAINING PROGRAM

## 2021-11-11 PROCEDURE — 1101F PT FALLS ASSESS-DOCD LE1/YR: CPT | Mod: HCNC,CPTII,S$GLB, | Performed by: STUDENT IN AN ORGANIZED HEALTH CARE EDUCATION/TRAINING PROGRAM

## 2021-11-11 PROCEDURE — 1160F PR REVIEW ALL MEDS BY PRESCRIBER/CLIN PHARMACIST DOCUMENTED: ICD-10-PCS | Mod: HCNC,CPTII,S$GLB, | Performed by: STUDENT IN AN ORGANIZED HEALTH CARE EDUCATION/TRAINING PROGRAM

## 2021-11-11 PROCEDURE — 1111F DSCHRG MED/CURRENT MED MERGE: CPT | Mod: HCNC,CPTII,S$GLB, | Performed by: STUDENT IN AN ORGANIZED HEALTH CARE EDUCATION/TRAINING PROGRAM

## 2021-11-11 PROCEDURE — 3288F PR FALLS RISK ASSESSMENT DOCUMENTED: ICD-10-PCS | Mod: HCNC,CPTII,S$GLB, | Performed by: STUDENT IN AN ORGANIZED HEALTH CARE EDUCATION/TRAINING PROGRAM

## 2021-11-11 PROCEDURE — G0008 ADMIN INFLUENZA VIRUS VAC: HCPCS | Mod: HCNC,S$GLB,, | Performed by: STUDENT IN AN ORGANIZED HEALTH CARE EDUCATION/TRAINING PROGRAM

## 2021-11-11 PROCEDURE — 3078F DIAST BP <80 MM HG: CPT | Mod: HCNC,CPTII,S$GLB, | Performed by: STUDENT IN AN ORGANIZED HEALTH CARE EDUCATION/TRAINING PROGRAM

## 2021-11-11 PROCEDURE — 3288F FALL RISK ASSESSMENT DOCD: CPT | Mod: HCNC,CPTII,S$GLB, | Performed by: STUDENT IN AN ORGANIZED HEALTH CARE EDUCATION/TRAINING PROGRAM

## 2021-11-11 PROCEDURE — 99999 PR PBB SHADOW E&M-EST. PATIENT-LVL IV: CPT | Mod: PBBFAC,HCNC,, | Performed by: STUDENT IN AN ORGANIZED HEALTH CARE EDUCATION/TRAINING PROGRAM

## 2021-11-11 PROCEDURE — 99999 PR PBB SHADOW E&M-EST. PATIENT-LVL IV: ICD-10-PCS | Mod: PBBFAC,HCNC,, | Performed by: STUDENT IN AN ORGANIZED HEALTH CARE EDUCATION/TRAINING PROGRAM

## 2021-11-11 PROCEDURE — G0008 FLU VACCINE - QUADRIVALENT - ADJUVANTED: ICD-10-PCS | Mod: HCNC,S$GLB,, | Performed by: STUDENT IN AN ORGANIZED HEALTH CARE EDUCATION/TRAINING PROGRAM

## 2021-11-11 PROCEDURE — 1111F PR DISCHARGE MEDS RECONCILED W/ CURRENT OUTPATIENT MED LIST: ICD-10-PCS | Mod: HCNC,CPTII,S$GLB, | Performed by: STUDENT IN AN ORGANIZED HEALTH CARE EDUCATION/TRAINING PROGRAM

## 2021-11-11 PROCEDURE — 1160F RVW MEDS BY RX/DR IN RCRD: CPT | Mod: HCNC,CPTII,S$GLB, | Performed by: STUDENT IN AN ORGANIZED HEALTH CARE EDUCATION/TRAINING PROGRAM

## 2021-11-11 PROCEDURE — 1126F PR PAIN SEVERITY QUANTIFIED, NO PAIN PRESENT: ICD-10-PCS | Mod: HCNC,CPTII,S$GLB, | Performed by: STUDENT IN AN ORGANIZED HEALTH CARE EDUCATION/TRAINING PROGRAM

## 2021-11-11 PROCEDURE — 3074F PR MOST RECENT SYSTOLIC BLOOD PRESSURE < 130 MM HG: ICD-10-PCS | Mod: HCNC,CPTII,S$GLB, | Performed by: STUDENT IN AN ORGANIZED HEALTH CARE EDUCATION/TRAINING PROGRAM

## 2021-11-11 PROCEDURE — 1126F AMNT PAIN NOTED NONE PRSNT: CPT | Mod: HCNC,CPTII,S$GLB, | Performed by: STUDENT IN AN ORGANIZED HEALTH CARE EDUCATION/TRAINING PROGRAM

## 2021-11-11 PROCEDURE — 1101F PR PT FALLS ASSESS DOC 0-1 FALLS W/OUT INJ PAST YR: ICD-10-PCS | Mod: HCNC,CPTII,S$GLB, | Performed by: STUDENT IN AN ORGANIZED HEALTH CARE EDUCATION/TRAINING PROGRAM

## 2021-11-11 PROCEDURE — 3078F PR MOST RECENT DIASTOLIC BLOOD PRESSURE < 80 MM HG: ICD-10-PCS | Mod: HCNC,CPTII,S$GLB, | Performed by: STUDENT IN AN ORGANIZED HEALTH CARE EDUCATION/TRAINING PROGRAM

## 2021-11-11 PROCEDURE — 1159F PR MEDICATION LIST DOCUMENTED IN MEDICAL RECORD: ICD-10-PCS | Mod: HCNC,CPTII,S$GLB, | Performed by: STUDENT IN AN ORGANIZED HEALTH CARE EDUCATION/TRAINING PROGRAM

## 2021-11-11 PROCEDURE — 90694 FLU VACCINE - QUADRIVALENT - ADJUVANTED: ICD-10-PCS | Mod: HCNC,S$GLB,, | Performed by: STUDENT IN AN ORGANIZED HEALTH CARE EDUCATION/TRAINING PROGRAM

## 2021-11-11 RX ORDER — IPRATROPIUM BROMIDE AND ALBUTEROL SULFATE 2.5; .5 MG/3ML; MG/3ML
3 SOLUTION RESPIRATORY (INHALATION) EVERY 6 HOURS PRN
Qty: 75 ML | Refills: 0 | Status: SHIPPED | OUTPATIENT
Start: 2021-11-11 | End: 2021-12-23

## 2021-11-11 RX ORDER — DOXYCYCLINE 100 MG/1
100 CAPSULE ORAL EVERY 12 HOURS
Qty: 20 CAPSULE | Refills: 0 | Status: CANCELLED | OUTPATIENT
Start: 2021-11-11 | End: 2021-11-21

## 2021-11-11 RX ORDER — DOXYCYCLINE 25 MG/5ML
100 POWDER, FOR SUSPENSION ORAL 2 TIMES DAILY
Qty: 280 ML | Refills: 0 | Status: SHIPPED | OUTPATIENT
Start: 2021-11-11 | End: 2021-11-18

## 2021-11-11 RX ORDER — PREDNISONE 20 MG/1
40 TABLET ORAL DAILY
Qty: 10 TABLET | Refills: 0 | Status: SHIPPED | OUTPATIENT
Start: 2021-11-11 | End: 2021-12-23

## 2021-11-12 ENCOUNTER — IMMUNIZATION (OUTPATIENT)
Dept: PRIMARY CARE CLINIC | Facility: CLINIC | Age: 81
End: 2021-11-12
Payer: MEDICARE

## 2021-11-12 DIAGNOSIS — Z23 NEED FOR VACCINATION: Primary | ICD-10-CM

## 2021-11-12 PROCEDURE — 0004A COVID-19, MRNA, LNP-S, PF, 30 MCG/0.3 ML DOSE VACCINE: CPT | Mod: HCNC,PBBFAC | Performed by: EMERGENCY MEDICINE

## 2021-12-07 ENCOUNTER — OFFICE VISIT (OUTPATIENT)
Dept: CARDIOLOGY | Facility: CLINIC | Age: 81
End: 2021-12-07
Payer: MEDICARE

## 2021-12-07 VITALS
SYSTOLIC BLOOD PRESSURE: 125 MMHG | HEIGHT: 64 IN | DIASTOLIC BLOOD PRESSURE: 62 MMHG | HEART RATE: 73 BPM | WEIGHT: 107.69 LBS | BODY MASS INDEX: 18.39 KG/M2

## 2021-12-07 DIAGNOSIS — J41.0 SIMPLE CHRONIC BRONCHITIS: ICD-10-CM

## 2021-12-07 DIAGNOSIS — I49.9 CARDIAC ARRHYTHMIA, UNSPECIFIED CARDIAC ARRHYTHMIA TYPE: ICD-10-CM

## 2021-12-07 DIAGNOSIS — I74.10 AORTIC MURAL THROMBUS: Primary | ICD-10-CM

## 2021-12-07 DIAGNOSIS — I48.0 PAROXYSMAL ATRIAL FIBRILLATION: ICD-10-CM

## 2021-12-07 DIAGNOSIS — I10 PRIMARY HYPERTENSION: Chronic | ICD-10-CM

## 2021-12-07 PROCEDURE — 99215 OFFICE O/P EST HI 40 MIN: CPT | Mod: HCNC,S$GLB,, | Performed by: NURSE PRACTITIONER

## 2021-12-07 PROCEDURE — 99999 PR PBB SHADOW E&M-EST. PATIENT-LVL IV: ICD-10-PCS | Mod: PBBFAC,HCNC,, | Performed by: NURSE PRACTITIONER

## 2021-12-07 PROCEDURE — 99999 PR PBB SHADOW E&M-EST. PATIENT-LVL IV: CPT | Mod: PBBFAC,HCNC,, | Performed by: NURSE PRACTITIONER

## 2021-12-07 PROCEDURE — 99215 PR OFFICE/OUTPT VISIT, EST, LEVL V, 40-54 MIN: ICD-10-PCS | Mod: HCNC,S$GLB,, | Performed by: NURSE PRACTITIONER

## 2021-12-07 RX ORDER — DOXYCYCLINE 40 MG/1
25 CAPSULE ORAL 2 TIMES DAILY
COMMUNITY
End: 2021-12-23

## 2021-12-15 ENCOUNTER — PATIENT OUTREACH (OUTPATIENT)
Dept: ADMINISTRATIVE | Facility: OTHER | Age: 81
End: 2021-12-15
Payer: MEDICARE

## 2021-12-21 ENCOUNTER — OFFICE VISIT (OUTPATIENT)
Dept: CARDIOLOGY | Facility: CLINIC | Age: 81
End: 2021-12-21
Payer: MEDICARE

## 2021-12-21 VITALS
BODY MASS INDEX: 18.33 KG/M2 | OXYGEN SATURATION: 98 % | WEIGHT: 107.38 LBS | HEART RATE: 81 BPM | HEIGHT: 64 IN | SYSTOLIC BLOOD PRESSURE: 141 MMHG | DIASTOLIC BLOOD PRESSURE: 69 MMHG

## 2021-12-21 DIAGNOSIS — I74.10 AORTIC MURAL THROMBUS: Primary | ICD-10-CM

## 2021-12-21 DIAGNOSIS — I10 HYPERTENSION, UNSPECIFIED TYPE: Chronic | ICD-10-CM

## 2021-12-21 PROCEDURE — 99999 PR PBB SHADOW E&M-EST. PATIENT-LVL IV: CPT | Mod: PBBFAC,HCNC,, | Performed by: NURSE PRACTITIONER

## 2021-12-21 PROCEDURE — 99214 OFFICE O/P EST MOD 30 MIN: CPT | Mod: HCNC,S$GLB,, | Performed by: NURSE PRACTITIONER

## 2021-12-21 PROCEDURE — 99214 PR OFFICE/OUTPT VISIT, EST, LEVL IV, 30-39 MIN: ICD-10-PCS | Mod: HCNC,S$GLB,, | Performed by: NURSE PRACTITIONER

## 2021-12-21 PROCEDURE — 99999 PR PBB SHADOW E&M-EST. PATIENT-LVL IV: ICD-10-PCS | Mod: PBBFAC,HCNC,, | Performed by: NURSE PRACTITIONER

## 2021-12-23 ENCOUNTER — OFFICE VISIT (OUTPATIENT)
Dept: PRIMARY CARE CLINIC | Facility: CLINIC | Age: 81
End: 2021-12-23
Payer: MEDICARE

## 2021-12-23 VITALS
SYSTOLIC BLOOD PRESSURE: 120 MMHG | DIASTOLIC BLOOD PRESSURE: 76 MMHG | RESPIRATION RATE: 18 BRPM | HEIGHT: 64 IN | WEIGHT: 107.94 LBS | OXYGEN SATURATION: 97 % | HEART RATE: 78 BPM | BODY MASS INDEX: 18.43 KG/M2

## 2021-12-23 DIAGNOSIS — N18.4 CKD (CHRONIC KIDNEY DISEASE) STAGE 4, GFR 15-29 ML/MIN: ICD-10-CM

## 2021-12-23 DIAGNOSIS — F17.200 SMOKER: ICD-10-CM

## 2021-12-23 DIAGNOSIS — R53.1 WEAKNESS: ICD-10-CM

## 2021-12-23 DIAGNOSIS — J44.9 CHRONIC OBSTRUCTIVE PULMONARY DISEASE, UNSPECIFIED COPD TYPE: Primary | ICD-10-CM

## 2021-12-23 DIAGNOSIS — R53.83 FATIGUE, UNSPECIFIED TYPE: ICD-10-CM

## 2021-12-23 DIAGNOSIS — R06.2 WHEEZING: ICD-10-CM

## 2021-12-23 PROCEDURE — 99499 RISK ADDL DX/OHS AUDIT: ICD-10-PCS | Mod: S$GLB,,, | Performed by: STUDENT IN AN ORGANIZED HEALTH CARE EDUCATION/TRAINING PROGRAM

## 2021-12-23 PROCEDURE — 99999 PR PBB SHADOW E&M-EST. PATIENT-LVL IV: ICD-10-PCS | Mod: PBBFAC,HCNC,, | Performed by: STUDENT IN AN ORGANIZED HEALTH CARE EDUCATION/TRAINING PROGRAM

## 2021-12-23 PROCEDURE — 3288F PR FALLS RISK ASSESSMENT DOCUMENTED: ICD-10-PCS | Mod: HCNC,CPTII,S$GLB, | Performed by: STUDENT IN AN ORGANIZED HEALTH CARE EDUCATION/TRAINING PROGRAM

## 2021-12-23 PROCEDURE — 3074F PR MOST RECENT SYSTOLIC BLOOD PRESSURE < 130 MM HG: ICD-10-PCS | Mod: HCNC,CPTII,S$GLB, | Performed by: STUDENT IN AN ORGANIZED HEALTH CARE EDUCATION/TRAINING PROGRAM

## 2021-12-23 PROCEDURE — 1101F PT FALLS ASSESS-DOCD LE1/YR: CPT | Mod: HCNC,CPTII,S$GLB, | Performed by: STUDENT IN AN ORGANIZED HEALTH CARE EDUCATION/TRAINING PROGRAM

## 2021-12-23 PROCEDURE — 99214 PR OFFICE/OUTPT VISIT, EST, LEVL IV, 30-39 MIN: ICD-10-PCS | Mod: HCNC,S$GLB,, | Performed by: STUDENT IN AN ORGANIZED HEALTH CARE EDUCATION/TRAINING PROGRAM

## 2021-12-23 PROCEDURE — 1126F PR PAIN SEVERITY QUANTIFIED, NO PAIN PRESENT: ICD-10-PCS | Mod: HCNC,CPTII,S$GLB, | Performed by: STUDENT IN AN ORGANIZED HEALTH CARE EDUCATION/TRAINING PROGRAM

## 2021-12-23 PROCEDURE — 1101F PR PT FALLS ASSESS DOC 0-1 FALLS W/OUT INJ PAST YR: ICD-10-PCS | Mod: HCNC,CPTII,S$GLB, | Performed by: STUDENT IN AN ORGANIZED HEALTH CARE EDUCATION/TRAINING PROGRAM

## 2021-12-23 PROCEDURE — 3078F PR MOST RECENT DIASTOLIC BLOOD PRESSURE < 80 MM HG: ICD-10-PCS | Mod: HCNC,CPTII,S$GLB, | Performed by: STUDENT IN AN ORGANIZED HEALTH CARE EDUCATION/TRAINING PROGRAM

## 2021-12-23 PROCEDURE — 1126F AMNT PAIN NOTED NONE PRSNT: CPT | Mod: HCNC,CPTII,S$GLB, | Performed by: STUDENT IN AN ORGANIZED HEALTH CARE EDUCATION/TRAINING PROGRAM

## 2021-12-23 PROCEDURE — 3288F FALL RISK ASSESSMENT DOCD: CPT | Mod: HCNC,CPTII,S$GLB, | Performed by: STUDENT IN AN ORGANIZED HEALTH CARE EDUCATION/TRAINING PROGRAM

## 2021-12-23 PROCEDURE — 3074F SYST BP LT 130 MM HG: CPT | Mod: HCNC,CPTII,S$GLB, | Performed by: STUDENT IN AN ORGANIZED HEALTH CARE EDUCATION/TRAINING PROGRAM

## 2021-12-23 PROCEDURE — 1160F PR REVIEW ALL MEDS BY PRESCRIBER/CLIN PHARMACIST DOCUMENTED: ICD-10-PCS | Mod: HCNC,CPTII,S$GLB, | Performed by: STUDENT IN AN ORGANIZED HEALTH CARE EDUCATION/TRAINING PROGRAM

## 2021-12-23 PROCEDURE — 3078F DIAST BP <80 MM HG: CPT | Mod: HCNC,CPTII,S$GLB, | Performed by: STUDENT IN AN ORGANIZED HEALTH CARE EDUCATION/TRAINING PROGRAM

## 2021-12-23 PROCEDURE — 99999 PR PBB SHADOW E&M-EST. PATIENT-LVL IV: CPT | Mod: PBBFAC,HCNC,, | Performed by: STUDENT IN AN ORGANIZED HEALTH CARE EDUCATION/TRAINING PROGRAM

## 2021-12-23 PROCEDURE — 1159F PR MEDICATION LIST DOCUMENTED IN MEDICAL RECORD: ICD-10-PCS | Mod: HCNC,CPTII,S$GLB, | Performed by: STUDENT IN AN ORGANIZED HEALTH CARE EDUCATION/TRAINING PROGRAM

## 2021-12-23 PROCEDURE — 99499 UNLISTED E&M SERVICE: CPT | Mod: S$GLB,,, | Performed by: STUDENT IN AN ORGANIZED HEALTH CARE EDUCATION/TRAINING PROGRAM

## 2021-12-23 PROCEDURE — 99214 OFFICE O/P EST MOD 30 MIN: CPT | Mod: HCNC,S$GLB,, | Performed by: STUDENT IN AN ORGANIZED HEALTH CARE EDUCATION/TRAINING PROGRAM

## 2021-12-23 PROCEDURE — 1160F RVW MEDS BY RX/DR IN RCRD: CPT | Mod: HCNC,CPTII,S$GLB, | Performed by: STUDENT IN AN ORGANIZED HEALTH CARE EDUCATION/TRAINING PROGRAM

## 2021-12-23 PROCEDURE — 1159F MED LIST DOCD IN RCRD: CPT | Mod: HCNC,CPTII,S$GLB, | Performed by: STUDENT IN AN ORGANIZED HEALTH CARE EDUCATION/TRAINING PROGRAM

## 2021-12-23 RX ORDER — ALBUTEROL SULFATE 90 UG/1
1-2 AEROSOL, METERED RESPIRATORY (INHALATION) EVERY 6 HOURS PRN
Qty: 8 G | Refills: 0
Start: 2021-12-23 | End: 2022-04-30 | Stop reason: SDUPTHER

## 2021-12-23 RX ORDER — CARVEDILOL 12.5 MG/1
12.5 TABLET ORAL 2 TIMES DAILY
Qty: 60 TABLET | Refills: 2 | Status: SHIPPED | OUTPATIENT
Start: 2021-12-23 | End: 2021-12-23

## 2022-01-20 ENCOUNTER — OFFICE VISIT (OUTPATIENT)
Dept: PRIMARY CARE CLINIC | Facility: CLINIC | Age: 82
End: 2022-01-20
Payer: MEDICARE

## 2022-01-20 VITALS
SYSTOLIC BLOOD PRESSURE: 100 MMHG | DIASTOLIC BLOOD PRESSURE: 58 MMHG | BODY MASS INDEX: 17.93 KG/M2 | OXYGEN SATURATION: 94 % | HEART RATE: 73 BPM | WEIGHT: 105.06 LBS | HEIGHT: 64 IN | RESPIRATION RATE: 18 BRPM

## 2022-01-20 DIAGNOSIS — I74.10 AORTIC MURAL THROMBUS: ICD-10-CM

## 2022-01-20 DIAGNOSIS — Z00.00 ANNUAL PHYSICAL EXAM: Primary | ICD-10-CM

## 2022-01-20 DIAGNOSIS — G25.2 RESTING TREMOR: ICD-10-CM

## 2022-01-20 DIAGNOSIS — Z86.79 HISTORY OF ATRIAL FIBRILLATION: ICD-10-CM

## 2022-01-20 DIAGNOSIS — I10 BENIGN ESSENTIAL HTN: ICD-10-CM

## 2022-01-20 PROCEDURE — 3288F FALL RISK ASSESSMENT DOCD: CPT | Mod: HCNC,CPTII,S$GLB, | Performed by: STUDENT IN AN ORGANIZED HEALTH CARE EDUCATION/TRAINING PROGRAM

## 2022-01-20 PROCEDURE — 99214 PR OFFICE/OUTPT VISIT, EST, LEVL IV, 30-39 MIN: ICD-10-PCS | Mod: HCNC,S$GLB,, | Performed by: STUDENT IN AN ORGANIZED HEALTH CARE EDUCATION/TRAINING PROGRAM

## 2022-01-20 PROCEDURE — 3078F DIAST BP <80 MM HG: CPT | Mod: HCNC,CPTII,S$GLB, | Performed by: STUDENT IN AN ORGANIZED HEALTH CARE EDUCATION/TRAINING PROGRAM

## 2022-01-20 PROCEDURE — 99214 OFFICE O/P EST MOD 30 MIN: CPT | Mod: HCNC,S$GLB,, | Performed by: STUDENT IN AN ORGANIZED HEALTH CARE EDUCATION/TRAINING PROGRAM

## 2022-01-20 PROCEDURE — 99499 RISK ADDL DX/OHS AUDIT: ICD-10-PCS | Mod: HCNC,S$GLB,, | Performed by: STUDENT IN AN ORGANIZED HEALTH CARE EDUCATION/TRAINING PROGRAM

## 2022-01-20 PROCEDURE — 99999 PR PBB SHADOW E&M-EST. PATIENT-LVL III: CPT | Mod: PBBFAC,HCNC,, | Performed by: STUDENT IN AN ORGANIZED HEALTH CARE EDUCATION/TRAINING PROGRAM

## 2022-01-20 PROCEDURE — 1160F PR REVIEW ALL MEDS BY PRESCRIBER/CLIN PHARMACIST DOCUMENTED: ICD-10-PCS | Mod: HCNC,CPTII,S$GLB, | Performed by: STUDENT IN AN ORGANIZED HEALTH CARE EDUCATION/TRAINING PROGRAM

## 2022-01-20 PROCEDURE — 99999 PR PBB SHADOW E&M-EST. PATIENT-LVL III: ICD-10-PCS | Mod: PBBFAC,HCNC,, | Performed by: STUDENT IN AN ORGANIZED HEALTH CARE EDUCATION/TRAINING PROGRAM

## 2022-01-20 PROCEDURE — 3288F PR FALLS RISK ASSESSMENT DOCUMENTED: ICD-10-PCS | Mod: HCNC,CPTII,S$GLB, | Performed by: STUDENT IN AN ORGANIZED HEALTH CARE EDUCATION/TRAINING PROGRAM

## 2022-01-20 PROCEDURE — 3074F PR MOST RECENT SYSTOLIC BLOOD PRESSURE < 130 MM HG: ICD-10-PCS | Mod: HCNC,CPTII,S$GLB, | Performed by: STUDENT IN AN ORGANIZED HEALTH CARE EDUCATION/TRAINING PROGRAM

## 2022-01-20 PROCEDURE — 1101F PT FALLS ASSESS-DOCD LE1/YR: CPT | Mod: HCNC,CPTII,S$GLB, | Performed by: STUDENT IN AN ORGANIZED HEALTH CARE EDUCATION/TRAINING PROGRAM

## 2022-01-20 PROCEDURE — 3078F PR MOST RECENT DIASTOLIC BLOOD PRESSURE < 80 MM HG: ICD-10-PCS | Mod: HCNC,CPTII,S$GLB, | Performed by: STUDENT IN AN ORGANIZED HEALTH CARE EDUCATION/TRAINING PROGRAM

## 2022-01-20 PROCEDURE — 99499 UNLISTED E&M SERVICE: CPT | Mod: HCNC,S$GLB,, | Performed by: STUDENT IN AN ORGANIZED HEALTH CARE EDUCATION/TRAINING PROGRAM

## 2022-01-20 PROCEDURE — 1159F PR MEDICATION LIST DOCUMENTED IN MEDICAL RECORD: ICD-10-PCS | Mod: HCNC,CPTII,S$GLB, | Performed by: STUDENT IN AN ORGANIZED HEALTH CARE EDUCATION/TRAINING PROGRAM

## 2022-01-20 PROCEDURE — 1126F PR PAIN SEVERITY QUANTIFIED, NO PAIN PRESENT: ICD-10-PCS | Mod: HCNC,CPTII,S$GLB, | Performed by: STUDENT IN AN ORGANIZED HEALTH CARE EDUCATION/TRAINING PROGRAM

## 2022-01-20 PROCEDURE — 1101F PR PT FALLS ASSESS DOC 0-1 FALLS W/OUT INJ PAST YR: ICD-10-PCS | Mod: HCNC,CPTII,S$GLB, | Performed by: STUDENT IN AN ORGANIZED HEALTH CARE EDUCATION/TRAINING PROGRAM

## 2022-01-20 PROCEDURE — 1159F MED LIST DOCD IN RCRD: CPT | Mod: HCNC,CPTII,S$GLB, | Performed by: STUDENT IN AN ORGANIZED HEALTH CARE EDUCATION/TRAINING PROGRAM

## 2022-01-20 PROCEDURE — 93010 EKG 12-LEAD: ICD-10-PCS | Mod: HCNC,S$GLB,, | Performed by: INTERNAL MEDICINE

## 2022-01-20 PROCEDURE — 1126F AMNT PAIN NOTED NONE PRSNT: CPT | Mod: HCNC,CPTII,S$GLB, | Performed by: STUDENT IN AN ORGANIZED HEALTH CARE EDUCATION/TRAINING PROGRAM

## 2022-01-20 PROCEDURE — 1160F RVW MEDS BY RX/DR IN RCRD: CPT | Mod: HCNC,CPTII,S$GLB, | Performed by: STUDENT IN AN ORGANIZED HEALTH CARE EDUCATION/TRAINING PROGRAM

## 2022-01-20 PROCEDURE — 3074F SYST BP LT 130 MM HG: CPT | Mod: HCNC,CPTII,S$GLB, | Performed by: STUDENT IN AN ORGANIZED HEALTH CARE EDUCATION/TRAINING PROGRAM

## 2022-01-20 PROCEDURE — 93005 ELECTROCARDIOGRAM TRACING: CPT | Mod: HCNC,PN | Performed by: STUDENT IN AN ORGANIZED HEALTH CARE EDUCATION/TRAINING PROGRAM

## 2022-01-20 PROCEDURE — 93010 ELECTROCARDIOGRAM REPORT: CPT | Mod: HCNC,S$GLB,, | Performed by: INTERNAL MEDICINE

## 2022-01-20 RX ORDER — CARVEDILOL 12.5 MG/1
1 TABLET ORAL 2 TIMES DAILY
COMMUNITY
Start: 2021-12-23 | End: 2022-01-20 | Stop reason: SDUPTHER

## 2022-01-20 RX ORDER — CARVEDILOL 6.25 MG/1
6.25 TABLET ORAL 2 TIMES DAILY
Qty: 180 TABLET | Refills: 3 | Status: SHIPPED | OUTPATIENT
Start: 2022-01-20 | End: 2022-03-24 | Stop reason: SDUPTHER

## 2022-01-20 NOTE — PATIENT INSTRUCTIONS
Patient Education       Fatigue   About this topic   Fatigue is a strong feeling of being tired and weak. This often happens after doing activities that are very hard to do. Then, you don't have much energy to do other things. Just as your body can be fatigued, your mind can as well. You might have trouble being able to think clearly about things. Some people have little desire to do anything. Fatigue is normal when you have had physical and mental activity. Stress can also cause fatigue. Other causes of fatigue can be the flu or other health problems, drugs, or sleep problems.  Fatigue can also be a sign of a more serious problem. Some of these are:  · Low red blood cells  · Anxiety or worrying too much  · Low mood  · Always being in pain  · Problems with your thyroid, liver, or kidneys  · Drug or alcohol use  · Health problems like cancer, arthritis, and heart or lung disease  Most of the time, fatigue will get better after a few days of rest.     What are the causes?   · Lifestyle causes like lack of sleep, working too much, unhealthy habits, or getting too little or too much exercise  · Emotional causes like stress, low mood, grief, or being bored  · Medical causes like illnesses or certain drugs that you take for those problems  What are the main signs?   · Feeling tired or sleepy  · Having no energy or feeling weak  · Feeling worn out and needing to rest a lot  · Not caring about work and other activities  How does the doctor diagnose this health problem?   Your doctor will take your history and do an exam. The doctor will ask you questions about how you feel. The doctor may order:  · Lab tests  · X-ray  · CT scan  · Electrocardiogram (ECG)  How does the doctor treat this health problem?   Your doctor will need to find out what is causing the problem to treat it. In many cases, more rest, sleep, a good diet, and less stress may help. Sometimes, the problem is caused by a more serious illness or problem. Your  doctor will work to find the cause. Your doctor may send you to an expert to help with low mood or worry.  What drugs may be needed?   The doctor may order drugs to:  · Give extra vitamins and minerals  · Treat the problem that causes the fatigue  What problems could happen?   · Body's normal defenses or immune system are lowered  · Not able to do the things you often do  · Problems with sex  · Not feeling hungry  · Not being able to act as fast or do things as well. This could cause accidents when driving, at work, or at home.  · Headaches, feeling angry, and not able to think clearly about things. These could cause you to not make good decisions.  · Trouble with your memory or concentration  · Having problems walking and exercising  · Falling asleep during the day  · Having problems seeing or seeing things that are not really there  · Feeling like not doing things  What can be done to prevent this health problem?   · Learn to cope well with your work and stress.  · Do relaxation exercises.  · Try to get enough sleep at night.  · Eat healthy foods. Don't eat foods that have a lot of sugar.  · Limit your alcohol intake  Where can I learn more?   Centers for Disease Control and Prevention  https://www.cdc.gov/me-cfs/about/index.html   National [place] on Aging  https://www.[place]is [place].nih.gov/health/fatigue-older-adults   NHS Choices  http://www.nhs.uk/livewell/tiredness-and-fatigue/pages/tiredness-and-fatigue.aspx   Last Reviewed Date   2021-02-24  Consumer Information Use and Disclaimer   This information is not specific medical advice and does not replace information you receive from your health care provider. This is only a brief summary of general information. It does NOT include all information about conditions, illnesses, injuries, tests, procedures, treatments, therapies, discharge instructions or life-style choices that may apply to you. You must talk with your health care provider for complete information about  your health and treatment options. This information should not be used to decide whether or not to accept your health care providers advice, instructions or recommendations. Only your health care provider has the knowledge and training to provide advice that is right for you.  Copyright   Copyright © 2021 [employer] and its affiliates and/or licensors. All rights reserved.

## 2022-01-20 NOTE — PROGRESS NOTES
"Subjective:       Patient ID: Sangita Logan is a 81 y.o. female.    Chief Complaint: No chief complaint on file.    HPI:  81 y.o. female presents to [facility] with complaints of     1/25/2022 will see nephrologist. Has address, forgot physician's name. Suspect patient will see Dr. Beckford at provided location.    Does not recall diagnosis of atrial fibrillation in the past, but 4/1/2020 EKG cody file does illustrate finding.    [id number] score 4, for age and HTN with a. Fib  HASBLED score 3, elevated risk for bleed for HTN, CKD, age > 65    Review of Systems   Constitutional: Positive for fatigue. Negative for chills, diaphoresis and fever.   HENT: Negative for congestion, sinus pressure, sneezing and sore throat.    Respiratory: Negative for cough and shortness of breath.    Cardiovascular: Negative for chest pain and palpitations.   Gastrointestinal: Negative for abdominal pain, diarrhea, nausea and vomiting.   Musculoskeletal: Negative for arthralgias, joint swelling and myalgias.   Skin: Negative for rash and wound.   Neurological: Positive for weakness. Negative for dizziness and light-headedness.       Objective:      Vitals:    01/20/22 0941   BP: (!) 100/58   BP Location: Right arm   Patient Position: Sitting   BP Method: Small (Manual)   Pulse: 73   Resp: 18   SpO2: (!) 94%   Weight: 47.7 kg (105 lb 0.8 oz)   Height: 5' 4" (1.626 m)     Physical Exam  Vitals reviewed.   Constitutional:       General: She is not in acute distress.     Appearance: Normal appearance. She is not ill-appearing.   HENT:      Head: Normocephalic and atraumatic.   Eyes:      General:         Right eye: No discharge.         Left eye: No discharge.      Conjunctiva/sclera: Conjunctivae normal.   Cardiovascular:      Rate and Rhythm: Normal rate and regular rhythm.      Pulses: Normal pulses.      Heart sounds: Normal heart sounds.   Pulmonary:      Effort: Pulmonary effort is normal.      Breath sounds: Normal breath " sounds.   Musculoskeletal:         General: No deformity.   Skin:     General: Skin is warm and dry.      Coloration: Skin is not jaundiced.   Neurological:      General: No focal deficit present.      Mental Status: She is alert and oriented to person, place, and time.   Psychiatric:         Mood and Affect: Mood normal.         Behavior: Behavior normal.             Lab Results   Component Value Date     12/10/2021    K 3.9 12/10/2021     12/10/2021    CO2 24 12/10/2021    BUN 10 12/10/2021    CREATININE 1.6 (H) 12/10/2021    ANIONGAP 12 12/10/2021     No results found for: HGBA1C  No results found for: BNP, BNPTRIAGEBLO    Lab Results   Component Value Date    WBC 6.79 12/10/2021    HGB 10.5 (L) 12/10/2021    HCT 31.9 (L) 12/10/2021    HCT 30 (L) 11/07/2021     12/10/2021    GRAN 55.0 12/10/2021    GRAN Test Not Performed 11/07/2021     Lab Results   Component Value Date    CHOL 170 05/11/2021    HDL 55 05/11/2021    LDLCALC 98.2 05/11/2021    TRIG 84 05/11/2021          Current Outpatient Medications:     albuterol (PROVENTIL/VENTOLIN HFA) 90 mcg/actuation inhaler, Inhale 1-2 puffs into the lungs every 6 (six) hours as needed for Wheezing. Rescue, Disp: 8 g, Rfl: 0    amLODIPine (NORVASC) 10 MG tablet, Take 1 tablet (10 mg total) by mouth once daily., Disp: 30 tablet, Rfl: 2    fluticasone furoate-vilanteroL (BREO) 100-25 mcg/dose diskus inhaler, Inhale 1 puff into the lungs once daily. Controller, Disp: 30 each, Rfl: 11    multivitamin-Ca-iron-minerals (MULTIPLE VITAMIN, WOMENS) Tab, Take by mouth., Disp: , Rfl:     carvediloL (COREG) 6.25 MG tablet, Take 1 tablet (6.25 mg total) by mouth 2 (two) times daily., Disp: 180 tablet, Rfl: 3        Assessment:       1. Annual physical exam    2. Benign essential HTN    3. History of atrial fibrillation    4. Aortic mural thrombus    5. Resting tremor           Plan:       Annual physical exam    History of atrial fibrillation  Aortic mural  thrombus  Benign essential HTN  -     carvediloL (COREG) 6.25 MG tablet; Take 1 tablet (6.25 mg total) by mouth 2 (two) times daily.  Dispense: 180 tablet; Refill: 3  - Will lower dose of carvedilol at this time with current symptoms and low-normal blood pressure  - EKG to evaluate for atrial fibrillation at this time  - [id number] score 4, HASBLED score of 3. Will await guidance from vascular surgery for concurrent aortic mural thrombus  - If experiencing palpitations or sudden onset shortness of breath/weakness, present to ED/Urgent care for further evaluation  - With decrease in carvedilol will repeat EKG in 2 weeks with nurse visit to ensure finding of atrial fibrillation not present    Resting tremor  -     carvediloL (COREG) 6.25 MG tablet; Take 1 tablet (6.25 mg total) by mouth 2 (two) times daily.  Dispense: 180 tablet; Refill: 3    Other orders  -     Cancel: Holter monitor - 48 hour; Future    RTC in 1 month

## 2022-01-20 NOTE — PROGRESS NOTES
Patient, Sangita Logan (MRN #[id number]), presented with a recent Estimated Glumerular Filtration Rate (EGFR) between 15 and 29 consistent with the definition of chronic kidney disease stage 4 (ICD10 - N18.4).    eGFR if non    Date Value Ref Range Status   12/10/2021 29.8 (A) >60 mL/min/1.73 m^2 Final     Comment:     Calculation used to obtain the estimated glomerular filtration  rate (eGFR) is the CKD-EPI equation.          The patient's chronic kidney disease stage 4 was monitored, evaluated, addressed and/or treated. This addendum to the medical record is made on 01/20/2022.

## 2022-02-03 ENCOUNTER — CLINICAL SUPPORT (OUTPATIENT)
Dept: PRIMARY CARE CLINIC | Facility: CLINIC | Age: 82
End: 2022-02-03
Payer: MEDICARE

## 2022-03-24 ENCOUNTER — OFFICE VISIT (OUTPATIENT)
Dept: PRIMARY CARE CLINIC | Facility: CLINIC | Age: 82
End: 2022-03-24
Payer: MEDICARE

## 2022-03-24 VITALS
DIASTOLIC BLOOD PRESSURE: 72 MMHG | BODY MASS INDEX: 17.82 KG/M2 | WEIGHT: 104.38 LBS | HEIGHT: 64 IN | OXYGEN SATURATION: 95 % | RESPIRATION RATE: 18 BRPM | TEMPERATURE: 98 F | HEART RATE: 90 BPM | SYSTOLIC BLOOD PRESSURE: 97 MMHG

## 2022-03-24 DIAGNOSIS — R53.83 FATIGUE, UNSPECIFIED TYPE: Primary | ICD-10-CM

## 2022-03-24 DIAGNOSIS — I47.10 SUPRAVENTRICULAR TACHYCARDIA, PAROXYSMAL: ICD-10-CM

## 2022-03-24 DIAGNOSIS — I10 BENIGN ESSENTIAL HTN: ICD-10-CM

## 2022-03-24 DIAGNOSIS — I10 PRIMARY HYPERTENSION: ICD-10-CM

## 2022-03-24 DIAGNOSIS — J44.9 CHRONIC OBSTRUCTIVE PULMONARY DISEASE, UNSPECIFIED COPD TYPE: ICD-10-CM

## 2022-03-24 PROCEDURE — 3078F PR MOST RECENT DIASTOLIC BLOOD PRESSURE < 80 MM HG: ICD-10-PCS | Mod: CPTII,S$GLB,, | Performed by: STUDENT IN AN ORGANIZED HEALTH CARE EDUCATION/TRAINING PROGRAM

## 2022-03-24 PROCEDURE — 1101F PT FALLS ASSESS-DOCD LE1/YR: CPT | Mod: CPTII,S$GLB,, | Performed by: STUDENT IN AN ORGANIZED HEALTH CARE EDUCATION/TRAINING PROGRAM

## 2022-03-24 PROCEDURE — 3074F SYST BP LT 130 MM HG: CPT | Mod: CPTII,S$GLB,, | Performed by: STUDENT IN AN ORGANIZED HEALTH CARE EDUCATION/TRAINING PROGRAM

## 2022-03-24 PROCEDURE — 99214 OFFICE O/P EST MOD 30 MIN: CPT | Mod: S$GLB,,, | Performed by: STUDENT IN AN ORGANIZED HEALTH CARE EDUCATION/TRAINING PROGRAM

## 2022-03-24 PROCEDURE — 99999 PR PBB SHADOW E&M-EST. PATIENT-LVL IV: ICD-10-PCS | Mod: PBBFAC,,, | Performed by: STUDENT IN AN ORGANIZED HEALTH CARE EDUCATION/TRAINING PROGRAM

## 2022-03-24 PROCEDURE — 1160F PR REVIEW ALL MEDS BY PRESCRIBER/CLIN PHARMACIST DOCUMENTED: ICD-10-PCS | Mod: CPTII,S$GLB,, | Performed by: STUDENT IN AN ORGANIZED HEALTH CARE EDUCATION/TRAINING PROGRAM

## 2022-03-24 PROCEDURE — 3288F PR FALLS RISK ASSESSMENT DOCUMENTED: ICD-10-PCS | Mod: CPTII,S$GLB,, | Performed by: STUDENT IN AN ORGANIZED HEALTH CARE EDUCATION/TRAINING PROGRAM

## 2022-03-24 PROCEDURE — 3074F PR MOST RECENT SYSTOLIC BLOOD PRESSURE < 130 MM HG: ICD-10-PCS | Mod: CPTII,S$GLB,, | Performed by: STUDENT IN AN ORGANIZED HEALTH CARE EDUCATION/TRAINING PROGRAM

## 2022-03-24 PROCEDURE — 1159F MED LIST DOCD IN RCRD: CPT | Mod: CPTII,S$GLB,, | Performed by: STUDENT IN AN ORGANIZED HEALTH CARE EDUCATION/TRAINING PROGRAM

## 2022-03-24 PROCEDURE — 3078F DIAST BP <80 MM HG: CPT | Mod: CPTII,S$GLB,, | Performed by: STUDENT IN AN ORGANIZED HEALTH CARE EDUCATION/TRAINING PROGRAM

## 2022-03-24 PROCEDURE — 1159F PR MEDICATION LIST DOCUMENTED IN MEDICAL RECORD: ICD-10-PCS | Mod: CPTII,S$GLB,, | Performed by: STUDENT IN AN ORGANIZED HEALTH CARE EDUCATION/TRAINING PROGRAM

## 2022-03-24 PROCEDURE — 99499 RISK ADDL DX/OHS AUDIT: ICD-10-PCS | Mod: HCNC,S$GLB,, | Performed by: STUDENT IN AN ORGANIZED HEALTH CARE EDUCATION/TRAINING PROGRAM

## 2022-03-24 PROCEDURE — 3288F FALL RISK ASSESSMENT DOCD: CPT | Mod: CPTII,S$GLB,, | Performed by: STUDENT IN AN ORGANIZED HEALTH CARE EDUCATION/TRAINING PROGRAM

## 2022-03-24 PROCEDURE — 99214 PR OFFICE/OUTPT VISIT, EST, LEVL IV, 30-39 MIN: ICD-10-PCS | Mod: S$GLB,,, | Performed by: STUDENT IN AN ORGANIZED HEALTH CARE EDUCATION/TRAINING PROGRAM

## 2022-03-24 PROCEDURE — 1160F RVW MEDS BY RX/DR IN RCRD: CPT | Mod: CPTII,S$GLB,, | Performed by: STUDENT IN AN ORGANIZED HEALTH CARE EDUCATION/TRAINING PROGRAM

## 2022-03-24 PROCEDURE — 99999 PR PBB SHADOW E&M-EST. PATIENT-LVL IV: CPT | Mod: PBBFAC,,, | Performed by: STUDENT IN AN ORGANIZED HEALTH CARE EDUCATION/TRAINING PROGRAM

## 2022-03-24 PROCEDURE — 99499 UNLISTED E&M SERVICE: CPT | Mod: HCNC,S$GLB,, | Performed by: STUDENT IN AN ORGANIZED HEALTH CARE EDUCATION/TRAINING PROGRAM

## 2022-03-24 PROCEDURE — 1126F PR PAIN SEVERITY QUANTIFIED, NO PAIN PRESENT: ICD-10-PCS | Mod: CPTII,S$GLB,, | Performed by: STUDENT IN AN ORGANIZED HEALTH CARE EDUCATION/TRAINING PROGRAM

## 2022-03-24 PROCEDURE — 1126F AMNT PAIN NOTED NONE PRSNT: CPT | Mod: CPTII,S$GLB,, | Performed by: STUDENT IN AN ORGANIZED HEALTH CARE EDUCATION/TRAINING PROGRAM

## 2022-03-24 PROCEDURE — 1101F PR PT FALLS ASSESS DOC 0-1 FALLS W/OUT INJ PAST YR: ICD-10-PCS | Mod: CPTII,S$GLB,, | Performed by: STUDENT IN AN ORGANIZED HEALTH CARE EDUCATION/TRAINING PROGRAM

## 2022-03-24 RX ORDER — CARVEDILOL 3.12 MG/1
3.12 TABLET ORAL 2 TIMES DAILY
Qty: 30 TABLET | Refills: 11 | Status: SHIPPED | OUTPATIENT
Start: 2022-03-24 | End: 2022-12-14 | Stop reason: SDUPTHER

## 2022-03-24 RX ORDER — AMLODIPINE BESYLATE 5 MG/1
5 TABLET ORAL DAILY
Qty: 30 TABLET | Refills: 2 | Status: SHIPPED | OUTPATIENT
Start: 2022-03-24 | End: 2022-03-24

## 2022-03-24 NOTE — PROGRESS NOTES
"Subjective:       Patient ID: Sangita Logan is a 81 y.o. female.    Chief Complaint: Follow-up and Fatigue      HPI:  81 y.o. female presents to [facility] for follow-up and for fatigue and weakness    Patient reports about 1 year ago began to experience weakness. Was high energy prior, and new weakness is scaring her.     Had holter 1 year ago. Hasn't been on amlodipine for some time and BP today low normal    Review of Systems   Constitutional: Positive for fatigue. Negative for chills, diaphoresis and fever.   HENT: Negative for congestion, sinus pressure, sneezing and sore throat.    Respiratory: Positive for shortness of breath (intermittent). Negative for cough.    Cardiovascular: Negative for chest pain and palpitations.   Gastrointestinal: Negative for abdominal pain, diarrhea, nausea and vomiting.   Musculoskeletal: Negative for joint swelling and myalgias.   Neurological: Positive for weakness (intermittent). Negative for dizziness.       Objective:      Vitals:    03/24/22 1455   BP: 97/72   BP Location: Left arm   Patient Position: Sitting   BP Method: Small (Manual)   Pulse: 90   Resp: 18   Temp: 97.8 °F (36.6 °C)   TempSrc: Oral   SpO2: 95%   Weight: 47.3 kg (104 lb 6.2 oz)   Height: 5' 4" (1.626 m)     Physical Exam  Vitals reviewed.   Constitutional:       General: She is not in acute distress.     Appearance: Normal appearance. She is not ill-appearing.   HENT:      Head: Normocephalic and atraumatic.   Eyes:      General:         Right eye: No discharge.         Left eye: No discharge.      Conjunctiva/sclera: Conjunctivae normal.   Cardiovascular:      Rate and Rhythm: Normal rate and regular rhythm.   Pulmonary:      Effort: Pulmonary effort is normal.   Musculoskeletal:         General: No deformity.   Skin:     General: Skin is warm and dry.      Coloration: Skin is not jaundiced.   Neurological:      General: No focal deficit present.      Mental Status: She is alert and oriented " to person, place, and time.   Psychiatric:         Mood and Affect: Mood normal.         Behavior: Behavior normal.             Lab Results   Component Value Date     (L) 01/21/2022    K 5.2 (H) 01/21/2022    CL 99 01/21/2022    CO2 23 01/21/2022    BUN 9 01/21/2022    CREATININE 1.2 01/21/2022    ANIONGAP 13 01/21/2022     No results found for: HGBA1C  No results found for: BNP, BNPTRIAGEBLO    Lab Results   Component Value Date    WBC 8.21 01/21/2022    HGB 15.5 01/21/2022    HCT 49.8 (H) 01/21/2022    HCT 30 (L) 11/07/2021     01/21/2022    GRAN 4.7 01/21/2022    GRAN 57.7 01/21/2022     Lab Results   Component Value Date    CHOL 170 05/11/2021    HDL 55 05/11/2021    LDLCALC 98.2 05/11/2021    TRIG 84 05/11/2021          Current Outpatient Medications:     albuterol (PROVENTIL/VENTOLIN HFA) 90 mcg/actuation inhaler, Inhale 1-2 puffs into the lungs every 6 (six) hours as needed for Wheezing. Rescue, Disp: 8 g, Rfl: 0    fluticasone furoate-vilanteroL (BREO) 100-25 mcg/dose diskus inhaler, Inhale 1 puff into the lungs once daily. Controller, Disp: 30 each, Rfl: 11    multivitamin-Ca-iron-minerals (MULTIPLE VITAMIN, WOMENS) Tab, Take by mouth., Disp: , Rfl:     carvediloL (COREG) 3.125 MG tablet, Take 1 tablet (3.125 mg total) by mouth 2 (two) times daily., Disp: 30 tablet, Rfl: 11        Assessment:       1. Fatigue, unspecified type    2. Primary hypertension    3. Benign essential HTN    4. Resting tremor    5. Supraventricular tachycardia, paroxysmal    6. CKD (chronic kidney disease) stage 4, GFR 15-29 ml/min    7. Chronic obstructive pulmonary disease, unspecified COPD type           Plan:       Fatigue, unspecified type  Supraventricular tachycardia, paroxysmal  -     Ambulatory referral/consult to Cardiac Electrophysiology; Future; Expected date: 03/31/2022  -     Comprehensive Metabolic Panel; Future; Expected date: 03/24/2022  -     carvediloL (COREG) 3.125 MG tablet; Take 1 tablet (3.125  mg total) by mouth 2 (two) times daily.  Dispense: 30 tablet; Refill: 11  - With low normal BP today and symptoms. Will discontinue amlodipine and lower dose of carvedilol  - Keep f/u with electrophysiology for evaluation  - Recent CBC and TSH reviewed, likely not contributing    Primary hypertension  -     carvediloL (COREG) 3.125 MG tablet; Take 1 tablet (3.125 mg total) by mouth 2 (two) times daily.  Dispense: 30 tablet; Refill: 11    Chronic obstructive pulmonary disease, unspecified COPD type  -     NEBULIZER FOR HOME USE    RTC in 3 months

## 2022-03-25 DIAGNOSIS — I49.8 OTHER SPECIFIED CARDIAC ARRHYTHMIAS: Primary | ICD-10-CM

## 2022-03-29 ENCOUNTER — TELEPHONE (OUTPATIENT)
Dept: ELECTROPHYSIOLOGY | Facility: CLINIC | Age: 82
End: 2022-03-29
Payer: MEDICARE

## 2022-03-29 ENCOUNTER — TELEPHONE (OUTPATIENT)
Dept: NEUROLOGY | Facility: CLINIC | Age: 82
End: 2022-03-29
Payer: MEDICARE

## 2022-03-29 NOTE — TELEPHONE ENCOUNTER
----- Message from Cassia Page sent at 3/29/2022  8:16 AM CDT -----  Contact: [phone]  Type:  Needs Medical Advice    Who Called: pt called  Would the patient rather a call back or a response via [facility]? Call back   Best Call Back Number: [phone]  Additional Information: pt could not make today's appt. No other appt coming up to reschedule pt. Please call pt to advice

## 2022-03-29 NOTE — TELEPHONE ENCOUNTER
Called pt again because Dr. Garcia has a procedure to do and will not have time to see pt. Pt agreed to be rescheduled to 3/31 for 2 pm. She verbalized understanding of appt change.

## 2022-03-29 NOTE — TELEPHONE ENCOUNTER
Appt rescheduled for Dr Yan next avail.   [facility]  Nephrology Progress Note    Antonietta Barker Attending:  Elyssa Bolanos MD       Assessment and Plan:    1) ESRD- due to diabetes; PD has been held due to recent placement of PEG and has been on HD over last approx 2 weeks.  Now has yeast in PD flu 02/17/2019    CO2 26.0 02/17/2019     02/17/2019    CA 7.8 02/17/2019    ALB 1.5 02/17/2019    ALKPHO 305 02/17/2019    BILT 0.4 02/17/2019    TP 6.8 02/17/2019    AST 11 02/17/2019    ALT 13 02/17/2019    PGLU 171 02/16/2019       Imaging:   All guillermo rectal suppository 650 mg 650 mg Rectal Q6H PRN   fentaNYL citrate (SUBLIMAZE) 1,000 mcg in sodium chloride 0.9% 100 mL infusion  mcg/hr Intravenous Continuous PRN   PEG 3350 (MIRALAX) powder packet 17 g 17 g Oral Daily PRN   bisacodyl (DULCOLAX) rec

## 2022-04-01 ENCOUNTER — HOSPITAL ENCOUNTER (OUTPATIENT)
Dept: RADIOLOGY | Facility: HOSPITAL | Age: 82
Discharge: HOME | End: 2022-04-01
Attending: INTERNAL MEDICINE
Payer: MEDICARE

## 2022-04-01 DIAGNOSIS — E87.1 HYPO-OSMOLALITY AND HYPONATREMIA: ICD-10-CM

## 2022-04-01 DIAGNOSIS — E87.5 HYPERKALEMIA: ICD-10-CM

## 2022-04-01 DIAGNOSIS — N18.32 CHRONIC KIDNEY DISEASE, STAGE 3B: ICD-10-CM

## 2022-04-01 PROCEDURE — 76770 US EXAM ABDO BACK WALL COMP: CPT | Mod: 26,,, | Performed by: RADIOLOGY

## 2022-04-01 PROCEDURE — 76770 US RETROPERITONEAL COMPLETE: ICD-10-PCS | Mod: 26,,, | Performed by: RADIOLOGY

## 2022-04-01 PROCEDURE — 76770 US EXAM ABDO BACK WALL COMP: CPT | Mod: TC

## 2022-04-19 ENCOUNTER — TELEPHONE (OUTPATIENT)
Dept: PRIMARY CARE CLINIC | Facility: CLINIC | Age: 82
End: 2022-04-19
Payer: MEDICARE

## 2022-04-19 NOTE — TELEPHONE ENCOUNTER
----- Message from All Ness sent at 4/19/2022  2:31 PM CDT -----  Regarding: Cardiac Electrophysiology  The patient no show (2) and canceled (1), please call the patient before I reschedule this appointment. Thank you!

## 2022-05-04 ENCOUNTER — TELEPHONE (OUTPATIENT)
Dept: PRIMARY CARE CLINIC | Facility: CLINIC | Age: 82
End: 2022-05-04
Payer: MEDICARE

## 2022-05-04 DIAGNOSIS — R91.8 MULTIPLE PULMONARY NODULES: Primary | ICD-10-CM

## 2022-05-04 NOTE — TELEPHONE ENCOUNTER
Needs repeat chest CT for follow-up surveillance on previously noted pulmonary nodules.  Please schedule

## 2022-05-13 ENCOUNTER — IMMUNIZATION (OUTPATIENT)
Dept: PRIMARY CARE CLINIC | Facility: CLINIC | Age: 82
End: 2022-05-13
Payer: MEDICARE

## 2022-05-13 DIAGNOSIS — Z23 NEED FOR VACCINATION: Primary | ICD-10-CM

## 2022-05-13 DIAGNOSIS — R93.89 ABNORMAL CT OF THE CHEST: Primary | ICD-10-CM

## 2022-05-13 PROCEDURE — 91305 COVID-19, MRNA, LNP-S, PF, 30 MCG/0.3 ML DOSE VACCINE (PFIZER): CPT | Mod: PBBFAC | Performed by: FAMILY MEDICINE

## 2022-05-30 ENCOUNTER — OFFICE VISIT (OUTPATIENT)
Dept: ELECTROPHYSIOLOGY | Facility: CLINIC | Age: 82
End: 2022-05-30
Payer: MEDICARE

## 2022-05-30 ENCOUNTER — HOSPITAL ENCOUNTER (OUTPATIENT)
Dept: CARDIOLOGY | Facility: CLINIC | Age: 82
Discharge: HOME | End: 2022-05-30
Payer: MEDICARE

## 2022-05-30 ENCOUNTER — TELEPHONE (OUTPATIENT)
Dept: ELECTROPHYSIOLOGY | Facility: CLINIC | Age: 82
End: 2022-05-30
Payer: MEDICARE

## 2022-05-30 VITALS
BODY MASS INDEX: 18.4 KG/M2 | HEART RATE: 75 BPM | SYSTOLIC BLOOD PRESSURE: 141 MMHG | WEIGHT: 107.81 LBS | HEIGHT: 64 IN | DIASTOLIC BLOOD PRESSURE: 74 MMHG

## 2022-05-30 DIAGNOSIS — I47.10 SVT (SUPRAVENTRICULAR TACHYCARDIA): Primary | ICD-10-CM

## 2022-05-30 DIAGNOSIS — I10 PRIMARY HYPERTENSION: ICD-10-CM

## 2022-05-30 DIAGNOSIS — E78.2 MIXED HYPERLIPIDEMIA: ICD-10-CM

## 2022-05-30 DIAGNOSIS — R63.6 UNDERWEIGHT: ICD-10-CM

## 2022-05-30 DIAGNOSIS — I74.10 AORTIC MURAL THROMBUS: ICD-10-CM

## 2022-05-30 DIAGNOSIS — Z72.0 TOBACCO USE: ICD-10-CM

## 2022-05-30 DIAGNOSIS — J44.9 CHRONIC OBSTRUCTIVE PULMONARY DISEASE, UNSPECIFIED COPD TYPE: ICD-10-CM

## 2022-05-30 DIAGNOSIS — I45.10 RBBB: ICD-10-CM

## 2022-05-30 DIAGNOSIS — K57.33 DIVERTICULITIS OF LARGE INTESTINE WITHOUT PERFORATION OR ABSCESS WITH BLEEDING: ICD-10-CM

## 2022-05-30 DIAGNOSIS — G50.0 TRIGEMINAL NEURALGIA OF RIGHT SIDE OF FACE: ICD-10-CM

## 2022-05-30 DIAGNOSIS — F10.10 ALCOHOL ABUSE: ICD-10-CM

## 2022-05-30 DIAGNOSIS — M81.0 AGE-RELATED OSTEOPOROSIS WITHOUT CURRENT PATHOLOGICAL FRACTURE: ICD-10-CM

## 2022-05-30 DIAGNOSIS — I49.8 OTHER SPECIFIED CARDIAC ARRHYTHMIAS: ICD-10-CM

## 2022-05-30 PROBLEM — I48.0 PAROXYSMAL ATRIAL FIBRILLATION: Status: RESOLVED | Noted: 2021-11-04 | Resolved: 2022-05-30

## 2022-05-30 PROCEDURE — 3077F PR MOST RECENT SYSTOLIC BLOOD PRESSURE >= 140 MM HG: ICD-10-PCS | Mod: CPTII,S$GLB,, | Performed by: STUDENT IN AN ORGANIZED HEALTH CARE EDUCATION/TRAINING PROGRAM

## 2022-05-30 PROCEDURE — 93010 ELECTROCARDIOGRAM REPORT: CPT | Mod: S$GLB,,, | Performed by: INTERNAL MEDICINE

## 2022-05-30 PROCEDURE — 3078F DIAST BP <80 MM HG: CPT | Mod: CPTII,S$GLB,, | Performed by: STUDENT IN AN ORGANIZED HEALTH CARE EDUCATION/TRAINING PROGRAM

## 2022-05-30 PROCEDURE — 93005 ELECTROCARDIOGRAM TRACING: CPT | Mod: S$GLB,,, | Performed by: STUDENT IN AN ORGANIZED HEALTH CARE EDUCATION/TRAINING PROGRAM

## 2022-05-30 PROCEDURE — 99999 PR PBB SHADOW E&M-EST. PATIENT-LVL III: CPT | Mod: PBBFAC,,, | Performed by: STUDENT IN AN ORGANIZED HEALTH CARE EDUCATION/TRAINING PROGRAM

## 2022-05-30 PROCEDURE — 1101F PT FALLS ASSESS-DOCD LE1/YR: CPT | Mod: CPTII,S$GLB,, | Performed by: STUDENT IN AN ORGANIZED HEALTH CARE EDUCATION/TRAINING PROGRAM

## 2022-05-30 PROCEDURE — 1126F PR PAIN SEVERITY QUANTIFIED, NO PAIN PRESENT: ICD-10-PCS | Mod: CPTII,S$GLB,, | Performed by: STUDENT IN AN ORGANIZED HEALTH CARE EDUCATION/TRAINING PROGRAM

## 2022-05-30 PROCEDURE — 3078F PR MOST RECENT DIASTOLIC BLOOD PRESSURE < 80 MM HG: ICD-10-PCS | Mod: CPTII,S$GLB,, | Performed by: STUDENT IN AN ORGANIZED HEALTH CARE EDUCATION/TRAINING PROGRAM

## 2022-05-30 PROCEDURE — 3288F PR FALLS RISK ASSESSMENT DOCUMENTED: ICD-10-PCS | Mod: CPTII,S$GLB,, | Performed by: STUDENT IN AN ORGANIZED HEALTH CARE EDUCATION/TRAINING PROGRAM

## 2022-05-30 PROCEDURE — 93005 RHYTHM STRIP: ICD-10-PCS | Mod: S$GLB,,, | Performed by: STUDENT IN AN ORGANIZED HEALTH CARE EDUCATION/TRAINING PROGRAM

## 2022-05-30 PROCEDURE — 1159F MED LIST DOCD IN RCRD: CPT | Mod: CPTII,S$GLB,, | Performed by: STUDENT IN AN ORGANIZED HEALTH CARE EDUCATION/TRAINING PROGRAM

## 2022-05-30 PROCEDURE — 99999 PR PBB SHADOW E&M-EST. PATIENT-LVL III: ICD-10-PCS | Mod: PBBFAC,,, | Performed by: STUDENT IN AN ORGANIZED HEALTH CARE EDUCATION/TRAINING PROGRAM

## 2022-05-30 PROCEDURE — 3077F SYST BP >= 140 MM HG: CPT | Mod: CPTII,S$GLB,, | Performed by: STUDENT IN AN ORGANIZED HEALTH CARE EDUCATION/TRAINING PROGRAM

## 2022-05-30 PROCEDURE — 1159F PR MEDICATION LIST DOCUMENTED IN MEDICAL RECORD: ICD-10-PCS | Mod: CPTII,S$GLB,, | Performed by: STUDENT IN AN ORGANIZED HEALTH CARE EDUCATION/TRAINING PROGRAM

## 2022-05-30 PROCEDURE — 1101F PR PT FALLS ASSESS DOC 0-1 FALLS W/OUT INJ PAST YR: ICD-10-PCS | Mod: CPTII,S$GLB,, | Performed by: STUDENT IN AN ORGANIZED HEALTH CARE EDUCATION/TRAINING PROGRAM

## 2022-05-30 PROCEDURE — 3288F FALL RISK ASSESSMENT DOCD: CPT | Mod: CPTII,S$GLB,, | Performed by: STUDENT IN AN ORGANIZED HEALTH CARE EDUCATION/TRAINING PROGRAM

## 2022-05-30 PROCEDURE — 99205 OFFICE O/P NEW HI 60 MIN: CPT | Mod: S$GLB,,, | Performed by: STUDENT IN AN ORGANIZED HEALTH CARE EDUCATION/TRAINING PROGRAM

## 2022-05-30 PROCEDURE — 93010 RHYTHM STRIP: ICD-10-PCS | Mod: S$GLB,,, | Performed by: INTERNAL MEDICINE

## 2022-05-30 PROCEDURE — 99205 PR OFFICE/OUTPT VISIT, NEW, LEVL V, 60-74 MIN: ICD-10-PCS | Mod: S$GLB,,, | Performed by: STUDENT IN AN ORGANIZED HEALTH CARE EDUCATION/TRAINING PROGRAM

## 2022-05-30 PROCEDURE — 1126F AMNT PAIN NOTED NONE PRSNT: CPT | Mod: CPTII,S$GLB,, | Performed by: STUDENT IN AN ORGANIZED HEALTH CARE EDUCATION/TRAINING PROGRAM

## 2022-05-30 NOTE — TELEPHONE ENCOUNTER
Called pt to see if she could possibly come in earlier. Pt stated she could come for 12:30 ekg and 1 pm appt time. Before ending call, she verbalized understanding.

## 2022-05-30 NOTE — PROGRESS NOTES
Electrophysiology Clinic Note    Reason for new patient visit: Evaluation and recommendations regarding a prior ECG from 4/1/2020 revealing an episode of SVT, likely AVNRT, with underlying RBBB.     PRESENTING HISTORY:     History of Present Illness:  Ms. Sangita Logan is a pietro 81-year-old woman who presents today for evaluation and recommendations regarding a prior ECG from 4/1/2020 revealing an episode of SVT with RBBB. She has a past medical history significant for an episode of SVT 4/1/2020, hypertension, hyperlipidemia, aortic mural thrombus, COPD, right sided trigeminal neuralgia, diverticulitis, history of alcohol abuse, active tobacco use, osteoporosis, and underweight BMI.     She is seen in general cardiology clinic with Kobe Mobley and [facility], and was most recently seen 6/5/2020. At that time, she was seen in follow-up after a presentation to the ED with complaints of tremulousness and palpitations, with an episode of SVT captured on ECG. On review of her ECG from that date, she demonstrates a likely short RP tachycardia with  ms in the setting of underlying RBBB at a rate of 178 bpm. This rhythm was reported to have lasted a matter of minutes and spontaneously converted to sinus prior to the ability to administer adenosine. At that event, she had been drinking a significant amount of alcohol, and there was concern for early alcohol withdrawal. She remained in sinus tachycardia, with gradual return to normal rates and was discharged home following administration of Librium and initiation of metoprolol. Since her discharge in 2020, she denies any subsequent events with palpitations similar to her prior event. She has been modified to carvedilol 3.125mg po BID. She has no known cardiac history other than her underlying chronic RBBB and hypertension, although she has a 50 pack year smoking history with COPD and continues to smoke approximately 0.5 ppd, and has a history of alcohol abuse  with current consumption of approximately 2-3 mixed drinks per day.    Ms. Logan presents to clinic today with her son. In discussion with Ms. Logan today, she tells me that she is feeling overall quite well. She reports periods of exercise intolerance and generalized fatigue lasting for a matter of hours, occasionally lasting the whole day, since October, 2022. In October she had a episodes of diverticulitis and although she denies any further abdominal symptoms, she has continued to report fatigue. She denies any episodes of dizziness, lightheadedness, syncope/presyncope, chest pain or chest discomfort, palpitations or racing heart rate, nausea or vomiting, orthopnea, lower extremity edema, or PND. She continues to experience periods of diarrhea. She is able to walk a mile on flat ground without limitation. She can climb 1 flight of stairs prior to needing to take a break, but would have shortness of breath and fatigue. She lives alone and is independent of all ADLs, with her adult sons nearby and very invested in her care.     Review of Systems:  Review of Systems   Constitutional: Positive for fatigue. Negative for activity change.   HENT: Negative for nasal congestion, nosebleeds, postnasal drip, rhinorrhea, sinus pressure/congestion, sneezing and sore throat.    Respiratory: Positive for shortness of breath. Negative for apnea, cough, chest tightness and wheezing.    Cardiovascular: Negative for chest pain, palpitations and leg swelling.   Gastrointestinal: Positive for diarrhea. Negative for abdominal distention, abdominal pain, blood in stool, change in bowel habit, constipation, nausea, vomiting and change in bowel habit.   Genitourinary: Negative for dysuria and hematuria.   Musculoskeletal: Positive for arthralgias and back pain. Negative for gait problem.   Neurological: Negative for dizziness, seizures, syncope, weakness, light-headedness, headaches, disturbances in coordination and coordination  difficulties.   Hematological: Does not bruise/bleed easily.        PAST HISTORY:     Past Medical History:   Diagnosis Date    HTN (hypertension) 10/21/2021    Osteoporosis     Sinus disease    - Episode of SVT with underlying RBBB 4/1/2020  - Hypertension  - Hyperlipidemia  - Aortic mural thrombus  - COPD  - Right sided trigeminal neuralgia  - Diverticulitis  - History of alcohol abuse  - Active tobacco use  - Osteoporosis  - Underweight BMI    Past Surgical History:   Procedure Laterality Date    AUGMENTATION OF BREAST Bilateral     received at age 23, removed at age 76    Breast implants removal  2012    SINUS SURGERY      TONSILLECTOMY      Age 1       Family History:  Family History   Problem Relation Age of Onset    No Known Problems Mother     No Known Problems Father     Breast cancer Sister         dx in 30s    Blindness Neg Hx     Glaucoma Neg Hx     Macular degeneration Neg Hx     Retinal detachment Neg Hx        Social History:  She  reports that she has been smoking cigarettes. She has a 15.00 pack-year smoking history. She has never used smokeless tobacco. She reports current alcohol use of about 3.0 standard drinks of alcohol per week. She reports current drug use. Drug: Marijuana.      MEDICATIONS & ALLERGIES:     Review of patient's allergies indicates:   Allergen Reactions    No known drug allergies        Current Outpatient Medications on File Prior to Visit   Medication Sig Dispense Refill    albuterol (PROVENTIL/VENTOLIN HFA) 90 mcg/actuation inhaler Inhale 1-2 puffs into the lungs every 6 (six) hours as needed for Wheezing. Rescue 18 g 0    carvediloL (COREG) 3.125 MG tablet Take 1 tablet (3.125 mg total) by mouth 2 (two) times daily. 30 tablet 11    fluticasone furoate-vilanteroL (BREO) 100-25 mcg/dose diskus inhaler Inhale 1 puff into the lungs once daily. Controller 30 each 11    multivitamin-Ca-iron-minerals (MULTIPLE VITAMIN, WOMENS) Tab Take by mouth.       No current  "facility-administered medications on file prior to visit.        OBJECTIVE:     Vital Signs:  BP (!) 141/74   Pulse 75   Ht 5' 4" (1.626 m)   Wt 48.9 kg (107 lb 12.9 oz)   BMI 18.50 kg/m²     Physical Exam:  Physical Exam  Constitutional:       General: She is not in acute distress.     Appearance: Normal appearance. She is not ill-appearing or diaphoretic.      Comments: Well-appearing elderly woman in NAD, thin.    HENT:      Head: Normocephalic and atraumatic.      Comments: Mask worn in clinic today in the setting of COVID precautions.   Eyes:      Pupils: Pupils are equal, round, and reactive to light.   Cardiovascular:      Rate and Rhythm: Normal rate and regular rhythm.      Pulses: Normal pulses.      Heart sounds: Normal heart sounds. No murmur heard.    No friction rub. No gallop.   Pulmonary:      Effort: Pulmonary effort is normal. No respiratory distress.      Breath sounds: Normal breath sounds. No wheezing, rhonchi or rales.   Chest:      Chest wall: No tenderness.   Abdominal:      General: There is no distension.      Palpations: Abdomen is soft.      Tenderness: There is no abdominal tenderness.   Musculoskeletal:         General: No swelling or tenderness.      Cervical back: Normal range of motion.      Right lower leg: No edema.      Left lower leg: No edema.   Skin:     General: Skin is warm and dry.      Findings: No erythema, lesion or rash.   Neurological:      General: No focal deficit present.      Mental Status: She is alert and oriented to person, place, and time. Mental status is at baseline.      Motor: No weakness.      Gait: Gait normal.   Psychiatric:         Mood and Affect: Mood normal.         Behavior: Behavior normal.        Laboratory Data:  Lab Results   Component Value Date    WBC 7.22 04/30/2022    HGB 13.2 04/30/2022    HCT 39.6 04/30/2022     (H) 04/30/2022     04/30/2022     Lab Results   Component Value Date     (H) 04/30/2022     " 04/30/2022    K 4.4 04/30/2022     04/30/2022    CO2 23 04/30/2022    BUN 11 04/30/2022    CREATININE 1.1 04/30/2022    CALCIUM 9.2 04/30/2022    MG 2.0 12/10/2021     No results found for: INR, PROTIME    Pertinent Cardiac Data:  ECG: Normal sinus rhythm with rate of 75 bpm,  ms, RBBB with  ms, QT/QTc 410/457 ms.     48-Hour Holter Monitor - 12/30/2020:  · The total recordered time was 47 hours and 59 minutes and this is a technically adequate study.  · The patient remained in NSR with an average HR of 92 bpm (range: bpm).  · There were very rare ventricular ectopic beats with only 1 present during this recording. No ventricular tachyarrhythmias were present.  · There were very frequent supraventricular ectopic beats with a total of 10,307 (3.9%) and 57 nonspecific supraventricular tachyarrhythmia, the longest of 5 beats at heart rate of 194 beats per minute. No sustained supraventricular tachyarrhythmias or atrial fibrillation were present.  · There were no significant sinoatrial, AV blocks or pauses.  · The diary was not returned.    Resting 2D Transthoracic Echocardiogram - 12/10/2021:  · The left ventricle is normal in size with mild concentric hypertrophy and normal systolic function.  · The estimated ejection fraction is 55%.  · Grade I left ventricular diastolic dysfunction.  · Normal right ventricular size with normal right ventricular systolic function.  · Mild aortic regurgitation.  · Mild mitral regurgitation.  · Mild tricuspid regurgitation.  · Normal central venous pressure (3 mmHg).  · The estimated PA systolic pressure is 21 mmHg.        ASSESSMENT & PLAN:   Ms. Sangita Logan is a pietro 81-year-old woman who presents today for evaluation and recommendations regarding a prior ECG from 4/1/2020 revealing an episode of SVT with RBBB. She has a past medical history significant for an episode of SVT 4/1/2020, hypertension, hyperlipidemia, aortic mural thrombus, COPD,  right sided trigeminal neuralgia, diverticulitis, history of alcohol abuse, active tobacco use, osteoporosis, and underweight BMI.     - We discussed the fact that SVT is an umbrella term that may refer to several atrial arrhythmias, including atrial tachycardia, AVNRT, or potentially AVRT. Based on ECG review of her event from 4/1/2020, she had a short RP tachycardia with  ms in the setting of underlying RBBB at a rate of 178 bpm. This rhythm was reported to have lasted a matter of minutes and spontaneously converted to sinus prior to the ability to administer adenosine. At that event, she had been drinking a significant amount of alcohol, and there was concern for early alcohol withdrawal. She has not had any further captured SVT events, and denies any recurrent palpitations since that time.   - We will obtain a 48-hour Holter monitor for ongoing surveillance.   - She continues to drink approximately 2-3 mixed drinks per day and is not interested in attempts to cut down or cessation. We discussed not abruptly discontinuing alcohol.   - She remains on carvedilol 3.125mg po BID in the setting of her hypertension with COPD. Rather than initiate metoprolol therapy given her poor pulmonary function, a CCB may be better tolerated in the event she requires harsh suppression.   - We discussed EP study with catheter ablation should she have another event of sustained SVT, should she develop new symptoms, or should her monitor reveal a high burden of atrial arrhythmia. She declines intervention at present, as she has had one event of SVT from 4/1/2020.  - We discussed efforts at smoking cessation; however, the patient does not feel motivated to cut down or discontinue smoking at this time.     This patient will return to clinic with me in six months with a 48-hour Holter monitor, general cardiology, and PCP visits in the interim. All questions and concerns were addressed at this encounter.     Signing Physician:        A Tamia Garcia MD  Electrophysiology Attending

## 2022-06-10 ENCOUNTER — CLINICAL SUPPORT (OUTPATIENT)
Dept: CARDIOLOGY | Facility: HOSPITAL | Age: 82
End: 2022-06-10
Attending: STUDENT IN AN ORGANIZED HEALTH CARE EDUCATION/TRAINING PROGRAM
Payer: MEDICARE

## 2022-06-10 DIAGNOSIS — I47.10 SVT (SUPRAVENTRICULAR TACHYCARDIA): ICD-10-CM

## 2022-06-10 PROCEDURE — 93227 XTRNL ECG REC<48 HR R&I: CPT | Mod: ,,, | Performed by: INTERNAL MEDICINE

## 2022-06-10 PROCEDURE — 93227 HOLTER MONITOR - 48 HOUR (CUPID ONLY): ICD-10-PCS | Mod: ,,, | Performed by: INTERNAL MEDICINE

## 2022-06-10 PROCEDURE — 93226 XTRNL ECG REC<48 HR SCAN A/R: CPT

## 2022-06-14 LAB
OHS CV EVENT MONITOR DAY: 0
OHS CV HOLTER LENGTH DECIMAL HOURS: 48
OHS CV HOLTER LENGTH HOURS: 48
OHS CV HOLTER LENGTH MINUTES: 0
OHS CV HOLTER SINUS AVERAGE HR: 81
OHS CV HOLTER SINUS MAX HR: 179
OHS CV HOLTER SINUS MIN HR: 61

## 2022-06-23 ENCOUNTER — OFFICE VISIT (OUTPATIENT)
Dept: PRIMARY CARE CLINIC | Facility: CLINIC | Age: 82
End: 2022-06-23
Payer: MEDICARE

## 2022-06-23 VITALS
BODY MASS INDEX: 17.96 KG/M2 | DIASTOLIC BLOOD PRESSURE: 70 MMHG | TEMPERATURE: 97 F | SYSTOLIC BLOOD PRESSURE: 128 MMHG | WEIGHT: 105.19 LBS | HEIGHT: 64 IN | HEART RATE: 82 BPM | OXYGEN SATURATION: 98 % | RESPIRATION RATE: 18 BRPM

## 2022-06-23 DIAGNOSIS — S29.012A STRAIN OF THORACIC BACK REGION: Primary | ICD-10-CM

## 2022-06-23 DIAGNOSIS — N18.32 STAGE 3B CHRONIC KIDNEY DISEASE: ICD-10-CM

## 2022-06-23 PROCEDURE — 1101F PR PT FALLS ASSESS DOC 0-1 FALLS W/OUT INJ PAST YR: ICD-10-PCS | Mod: CPTII,S$GLB,, | Performed by: STUDENT IN AN ORGANIZED HEALTH CARE EDUCATION/TRAINING PROGRAM

## 2022-06-23 PROCEDURE — 1125F AMNT PAIN NOTED PAIN PRSNT: CPT | Mod: CPTII,S$GLB,, | Performed by: STUDENT IN AN ORGANIZED HEALTH CARE EDUCATION/TRAINING PROGRAM

## 2022-06-23 PROCEDURE — 3078F PR MOST RECENT DIASTOLIC BLOOD PRESSURE < 80 MM HG: ICD-10-PCS | Mod: CPTII,S$GLB,, | Performed by: STUDENT IN AN ORGANIZED HEALTH CARE EDUCATION/TRAINING PROGRAM

## 2022-06-23 PROCEDURE — 3074F SYST BP LT 130 MM HG: CPT | Mod: CPTII,S$GLB,, | Performed by: STUDENT IN AN ORGANIZED HEALTH CARE EDUCATION/TRAINING PROGRAM

## 2022-06-23 PROCEDURE — 1101F PT FALLS ASSESS-DOCD LE1/YR: CPT | Mod: CPTII,S$GLB,, | Performed by: STUDENT IN AN ORGANIZED HEALTH CARE EDUCATION/TRAINING PROGRAM

## 2022-06-23 PROCEDURE — 3078F DIAST BP <80 MM HG: CPT | Mod: CPTII,S$GLB,, | Performed by: STUDENT IN AN ORGANIZED HEALTH CARE EDUCATION/TRAINING PROGRAM

## 2022-06-23 PROCEDURE — 1159F MED LIST DOCD IN RCRD: CPT | Mod: CPTII,S$GLB,, | Performed by: STUDENT IN AN ORGANIZED HEALTH CARE EDUCATION/TRAINING PROGRAM

## 2022-06-23 PROCEDURE — 1160F RVW MEDS BY RX/DR IN RCRD: CPT | Mod: CPTII,S$GLB,, | Performed by: STUDENT IN AN ORGANIZED HEALTH CARE EDUCATION/TRAINING PROGRAM

## 2022-06-23 PROCEDURE — 99499 RISK ADDL DX/OHS AUDIT: ICD-10-PCS | Mod: HCNC,S$GLB,, | Performed by: STUDENT IN AN ORGANIZED HEALTH CARE EDUCATION/TRAINING PROGRAM

## 2022-06-23 PROCEDURE — 3288F FALL RISK ASSESSMENT DOCD: CPT | Mod: CPTII,S$GLB,, | Performed by: STUDENT IN AN ORGANIZED HEALTH CARE EDUCATION/TRAINING PROGRAM

## 2022-06-23 PROCEDURE — 99214 OFFICE O/P EST MOD 30 MIN: CPT | Mod: S$GLB,,, | Performed by: STUDENT IN AN ORGANIZED HEALTH CARE EDUCATION/TRAINING PROGRAM

## 2022-06-23 PROCEDURE — 1125F PR PAIN SEVERITY QUANTIFIED, PAIN PRESENT: ICD-10-PCS | Mod: CPTII,S$GLB,, | Performed by: STUDENT IN AN ORGANIZED HEALTH CARE EDUCATION/TRAINING PROGRAM

## 2022-06-23 PROCEDURE — 1160F PR REVIEW ALL MEDS BY PRESCRIBER/CLIN PHARMACIST DOCUMENTED: ICD-10-PCS | Mod: CPTII,S$GLB,, | Performed by: STUDENT IN AN ORGANIZED HEALTH CARE EDUCATION/TRAINING PROGRAM

## 2022-06-23 PROCEDURE — 3074F PR MOST RECENT SYSTOLIC BLOOD PRESSURE < 130 MM HG: ICD-10-PCS | Mod: CPTII,S$GLB,, | Performed by: STUDENT IN AN ORGANIZED HEALTH CARE EDUCATION/TRAINING PROGRAM

## 2022-06-23 PROCEDURE — 99214 PR OFFICE/OUTPT VISIT, EST, LEVL IV, 30-39 MIN: ICD-10-PCS | Mod: S$GLB,,, | Performed by: STUDENT IN AN ORGANIZED HEALTH CARE EDUCATION/TRAINING PROGRAM

## 2022-06-23 PROCEDURE — 99999 PR PBB SHADOW E&M-EST. PATIENT-LVL III: ICD-10-PCS | Mod: PBBFAC,,, | Performed by: STUDENT IN AN ORGANIZED HEALTH CARE EDUCATION/TRAINING PROGRAM

## 2022-06-23 PROCEDURE — 99999 PR PBB SHADOW E&M-EST. PATIENT-LVL III: CPT | Mod: PBBFAC,,, | Performed by: STUDENT IN AN ORGANIZED HEALTH CARE EDUCATION/TRAINING PROGRAM

## 2022-06-23 PROCEDURE — 1159F PR MEDICATION LIST DOCUMENTED IN MEDICAL RECORD: ICD-10-PCS | Mod: CPTII,S$GLB,, | Performed by: STUDENT IN AN ORGANIZED HEALTH CARE EDUCATION/TRAINING PROGRAM

## 2022-06-23 PROCEDURE — 3288F PR FALLS RISK ASSESSMENT DOCUMENTED: ICD-10-PCS | Mod: CPTII,S$GLB,, | Performed by: STUDENT IN AN ORGANIZED HEALTH CARE EDUCATION/TRAINING PROGRAM

## 2022-06-23 PROCEDURE — 99499 UNLISTED E&M SERVICE: CPT | Mod: HCNC,S$GLB,, | Performed by: STUDENT IN AN ORGANIZED HEALTH CARE EDUCATION/TRAINING PROGRAM

## 2022-06-23 RX ORDER — ACETAMINOPHEN 500 MG
1000 TABLET ORAL EVERY 6 HOURS PRN
Qty: 40 TABLET | Refills: 0 | Status: SHIPPED | OUTPATIENT
Start: 2022-06-23 | End: 2023-11-16

## 2022-06-23 NOTE — PROGRESS NOTES
"Subjective:       Patient ID: Sangita Logan is a 81 y.o. female.    Chief Complaint: Follow-up (3 month follow up)      HPI:  81 y.o. female presents to [facility] here for 3 month follow-up    Acute concerns?: Patient reports fall off of bar stool over the weekend onto curb across back. Feels very sore at this time.    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 No loss of consciousness.      History of MI, renal disease, or GI bleed/ulcer?: Not to her knowledge (CKD stage 3b on chart)      Review of Systems   Constitutional: Negative for chills, diaphoresis, fatigue and fever.   HENT: Negative for congestion, sinus pressure, sneezing and sore throat.    Respiratory: Negative for cough and shortness of breath.    Cardiovascular: Negative for chest pain and palpitations.   Gastrointestinal: Negative for abdominal pain, diarrhea, nausea and vomiting.   Musculoskeletal: Positive for back pain. Negative for joint swelling and myalgias.   Skin: Negative for rash and wound.       Objective:      Vitals:    06/23/22 1443   BP: 128/70   BP Location: Left arm   Patient Position: Sitting   BP Method: Medium (Manual)   Pulse: 82   Resp: 18   Temp: 97.1 °F (36.2 °C)   TempSrc: Oral   SpO2: 98%   Weight: 47.7 kg (105 lb 2.6 oz)   Height: 5' 4" (1.626 m)     Physical Exam  Vitals reviewed.   Constitutional:       General: She is not in acute distress.     Appearance: Normal appearance. She is not ill-appearing.   HENT:      Head: Normocephalic and atraumatic.   Eyes:      General:         Right eye: No discharge.         Left " eye: No discharge.      Conjunctiva/sclera: Conjunctivae normal.   Cardiovascular:      Rate and Rhythm: Normal rate.   Pulmonary:      Effort: Pulmonary effort is normal.   Musculoskeletal:         General: No deformity.      Thoracic back: Spasms and tenderness present. No swelling, edema, deformity, signs of trauma or lacerations. Normal range of motion. No scoliosis.        Back:       Comments: Paraspinal tenderness in red. Pain with extension, left lateral flexion and bilateral rotation of back   Skin:     Coloration: Skin is not jaundiced or pale.   Neurological:      General: No focal deficit present.      Mental Status: She is alert and oriented to person, place, and time.   Psychiatric:         Mood and Affect: Mood normal.         Behavior: Behavior normal.             Lab Results   Component Value Date     04/30/2022    K 4.4 04/30/2022     04/30/2022    CO2 23 04/30/2022    BUN 11 04/30/2022    CREATININE 1.1 04/30/2022    ANIONGAP 14 04/30/2022     No results found for: HGBA1C  No results found for: BNP, BNPTRIAGEBLO    Lab Results   Component Value Date    WBC 7.22 04/30/2022    HGB 13.2 04/30/2022    HCT 39.6 04/30/2022    HCT 30 (L) 11/07/2021     04/30/2022    GRAN 3.5 04/30/2022    GRAN 48.7 04/30/2022     Lab Results   Component Value Date    CHOL 170 05/11/2021    HDL 55 05/11/2021    LDLCALC 98.2 05/11/2021    TRIG 84 05/11/2021          Current Outpatient Medications:     albuterol (PROVENTIL/VENTOLIN HFA) 90 mcg/actuation inhaler, Inhale 1-2 puffs into the lungs every 6 (six) hours as needed for Wheezing. Rescue, Disp: 18 g, Rfl: 0    carvediloL (COREG) 3.125 MG tablet, Take 1 tablet (3.125 mg total) by mouth 2 (two) times daily., Disp: 30 tablet, Rfl: 11    fluticasone furoate-vilanteroL (BREO) 100-25 mcg/dose diskus inhaler, Inhale 1 puff into the lungs once daily. Controller, Disp: 30 each, Rfl: 11    multivitamin-Ca-iron-minerals (MULTIPLE VITAMIN, WOMENS) Tab, Take  by mouth., Disp: , Rfl:     acetaminophen (TYLENOL) 500 MG tablet, Take 2 tablets (1,000 mg total) by mouth every 6 (six) hours as needed for Pain., Disp: 40 tablet, Rfl: 0        Assessment:       1. Strain of thoracic back region           Plan:       Strain of thoracic back region  -     acetaminophen (TYLENOL) 500 MG tablet; Take 2 tablets (1,000 mg total) by mouth every 6 (six) hours as needed for Pain.  Dispense: 40 tablet; Refill: 0  - Ice recommended at this time  - Instructed patient to avoid NSAIDS with CKD 3b    RTC in 6 months

## 2022-07-13 DIAGNOSIS — J44.9 CHRONIC OBSTRUCTIVE PULMONARY DISEASE, UNSPECIFIED COPD TYPE: ICD-10-CM

## 2022-07-13 RX ORDER — FLUTICASONE FUROATE AND VILANTEROL 100; 25 UG/1; UG/1
1 POWDER RESPIRATORY (INHALATION) DAILY
Qty: 30 EACH | Refills: 11 | Status: SHIPPED | OUTPATIENT
Start: 2022-07-13 | End: 2022-09-16 | Stop reason: ALTCHOICE

## 2022-07-13 RX ORDER — AMLODIPINE BESYLATE 5 MG/1
5 TABLET ORAL DAILY
Qty: 30 TABLET | Refills: 3 | Status: SHIPPED | OUTPATIENT
Start: 2022-07-13 | End: 2022-12-14 | Stop reason: SDUPTHER

## 2022-07-13 NOTE — TELEPHONE ENCOUNTER
No new care gaps identified.  [facility] Embedded Care Gaps. Reference number: [id number]. 7/13/2022   2:37:11 PM CDT

## 2022-07-13 NOTE — TELEPHONE ENCOUNTER
----- Message from Charisse Warren sent at 7/13/2022  1:11 PM CDT -----  Contact: self [phone]  Requesting an RX refill or new RX.  Is this a refill or new RX: new  RX name and strength (copy/paste from chart):  amLODIPine (NORVASC) 5 MG tablet   Is this a 30 day or 90 day RX:   Pharmacy name and phone # (copy/paste from chart):    [facility] #[id number] - [place] [address] [facility] [address] [facility] [address]  Phone: [phone] Fax: [phone]      The doctors have asked that we provide their patients with the following 2 reminders -- prescription refills can take up to 72 hours, and a friendly reminder that in the future you can use your [facility] account to request refills: yes    Requesting an RX refill or new RX.  Is this a refill or new RX: refill  RX name and strength (copy/paste from chart):  fluticasone furoate-vilanteroL (BREO) 100-25 mcg/dose diskus inhaler  Is this a 30 day or 90 day RX:   Pharmacy name and phone # (copy/paste from chart):    [facility] #[id number] - [place] [address] [address] [facility] [address]  [address] [facility] [address]  Phone: [phone] Fax: [phone]      The doctors have asked that we provide their patients with the following 2 reminders -- prescription refills can take up to 72 hours, and a friendly reminder that in the future you can use your [facility] account to request refills: yes    Please call and advise

## 2022-07-15 ENCOUNTER — HOSPITAL ENCOUNTER (EMERGENCY)
Facility: OTHER | Age: 82
Discharge: HOME | End: 2022-07-16
Attending: EMERGENCY MEDICINE
Payer: MEDICARE

## 2022-07-15 DIAGNOSIS — N39.0 URINARY TRACT INFECTION WITHOUT HEMATURIA, SITE UNSPECIFIED: ICD-10-CM

## 2022-07-15 DIAGNOSIS — F10.920 ALCOHOLIC INTOXICATION WITHOUT COMPLICATION: Primary | ICD-10-CM

## 2022-07-15 DIAGNOSIS — R41.82 ALTERED MENTAL STATUS: ICD-10-CM

## 2022-07-15 LAB
BASOPHILS # BLD AUTO: 0.01 K/UL (ref 0–0.2)
BASOPHILS NFR BLD: 0.2 % (ref 0–1.9)
DIFFERENTIAL METHOD: ABNORMAL
EOSINOPHIL # BLD AUTO: 0 K/UL (ref 0–0.5)
EOSINOPHIL NFR BLD: 0 % (ref 0–8)
ERYTHROCYTE [DISTWIDTH] IN BLOOD BY AUTOMATED COUNT: 11.4 % (ref 11.5–14.5)
HCT VFR BLD AUTO: 41.7 % (ref 37–48.5)
HGB BLD-MCNC: 14.3 G/DL (ref 12–16)
IMM GRANULOCYTES # BLD AUTO: 0.05 K/UL (ref 0–0.04)
IMM GRANULOCYTES NFR BLD AUTO: 0.9 % (ref 0–0.5)
LYMPHOCYTES # BLD AUTO: 3.3 K/UL (ref 1–4.8)
LYMPHOCYTES NFR BLD: 56.6 % (ref 18–48)
MCH RBC QN AUTO: 34.3 PG (ref 27–31)
MCHC RBC AUTO-ENTMCNC: 34.3 G/DL (ref 32–36)
MCV RBC AUTO: 100 FL (ref 82–98)
MONOCYTES # BLD AUTO: 1 K/UL (ref 0.3–1)
MONOCYTES NFR BLD: 16.4 % (ref 4–15)
NEUTROPHILS # BLD AUTO: 1.5 K/UL (ref 1.8–7.7)
NEUTROPHILS NFR BLD: 25.9 % (ref 38–73)
NRBC BLD-RTO: 0 /100 WBC
PLATELET # BLD AUTO: 115 K/UL (ref 150–450)
PMV BLD AUTO: 11.5 FL (ref 9.2–12.9)
RBC # BLD AUTO: 4.17 M/UL (ref 4–5.4)
WBC # BLD AUTO: 5.85 K/UL (ref 3.9–12.7)

## 2022-07-15 PROCEDURE — 84484 ASSAY OF TROPONIN QUANT: CPT | Performed by: EMERGENCY MEDICINE

## 2022-07-15 PROCEDURE — 93005 ELECTROCARDIOGRAM TRACING: CPT

## 2022-07-15 PROCEDURE — 93010 EKG 12-LEAD: ICD-10-PCS | Mod: ,,, | Performed by: INTERNAL MEDICINE

## 2022-07-15 PROCEDURE — 93010 ELECTROCARDIOGRAM REPORT: CPT | Mod: ,,, | Performed by: INTERNAL MEDICINE

## 2022-07-15 PROCEDURE — 80053 COMPREHEN METABOLIC PANEL: CPT | Performed by: EMERGENCY MEDICINE

## 2022-07-15 PROCEDURE — 85025 COMPLETE CBC W/AUTO DIFF WBC: CPT | Performed by: EMERGENCY MEDICINE

## 2022-07-15 PROCEDURE — 99285 EMERGENCY DEPT VISIT HI MDM: CPT | Mod: 25

## 2022-07-15 PROCEDURE — 82077 ASSAY SPEC XCP UR&BREATH IA: CPT | Performed by: EMERGENCY MEDICINE

## 2022-07-16 VITALS
RESPIRATION RATE: 19 BRPM | SYSTOLIC BLOOD PRESSURE: 128 MMHG | DIASTOLIC BLOOD PRESSURE: 68 MMHG | OXYGEN SATURATION: 95 % | HEART RATE: 86 BPM | TEMPERATURE: 98 F

## 2022-07-16 LAB
ALBUMIN SERPL BCP-MCNC: 3.7 G/DL (ref 3.5–5.2)
ALP SERPL-CCNC: 180 U/L (ref 55–135)
ALT SERPL W/O P-5'-P-CCNC: 19 U/L (ref 10–44)
AMPHET+METHAMPHET UR QL: NEGATIVE
ANION GAP SERPL CALC-SCNC: 13 MMOL/L (ref 8–16)
AST SERPL-CCNC: 30 U/L (ref 10–40)
BACTERIA #/AREA URNS HPF: ABNORMAL /HPF
BARBITURATES UR QL SCN>200 NG/ML: NEGATIVE
BENZODIAZ UR QL SCN>200 NG/ML: NEGATIVE
BILIRUB SERPL-MCNC: 0.6 MG/DL (ref 0.1–1)
BILIRUB UR QL STRIP: NEGATIVE
BUN SERPL-MCNC: 6 MG/DL (ref 8–23)
BZE UR QL SCN: NEGATIVE
CALCIUM SERPL-MCNC: 9.1 MG/DL (ref 8.7–10.5)
CANNABINOIDS UR QL SCN: NEGATIVE
CHLORIDE SERPL-SCNC: 102 MMOL/L (ref 95–110)
CLARITY UR: CLEAR
CO2 SERPL-SCNC: 22 MMOL/L (ref 23–29)
COLOR UR: YELLOW
CREAT SERPL-MCNC: 1.1 MG/DL (ref 0.5–1.4)
CREAT UR-MCNC: 22 MG/DL (ref 15–325)
EST. GFR  (AFRICAN AMERICAN): 54 ML/MIN/1.73 M^2
EST. GFR  (NON AFRICAN AMERICAN): 47 ML/MIN/1.73 M^2
ETHANOL SERPL-MCNC: 331 MG/DL
GLUCOSE SERPL-MCNC: 120 MG/DL (ref 70–110)
GLUCOSE UR QL STRIP: NEGATIVE
HGB UR QL STRIP: NEGATIVE
KETONES UR QL STRIP: NEGATIVE
LEUKOCYTE ESTERASE UR QL STRIP: ABNORMAL
METHADONE UR QL SCN>300 NG/ML: NEGATIVE
MICROSCOPIC COMMENT: ABNORMAL
NITRITE UR QL STRIP: NEGATIVE
OPIATES UR QL SCN: NEGATIVE
PCP UR QL SCN>25 NG/ML: NEGATIVE
PH UR STRIP: 7 [PH] (ref 5–8)
POTASSIUM SERPL-SCNC: 3.9 MMOL/L (ref 3.5–5.1)
PROT SERPL-MCNC: 6.9 G/DL (ref 6–8.4)
PROT UR QL STRIP: NEGATIVE
RBC #/AREA URNS HPF: 5 /HPF (ref 0–4)
SODIUM SERPL-SCNC: 137 MMOL/L (ref 136–145)
SP GR UR STRIP: <=1.005 (ref 1–1.03)
SQUAMOUS #/AREA URNS HPF: 5 /HPF
TOXICOLOGY INFORMATION: NORMAL
TROPONIN I SERPL DL<=0.01 NG/ML-MCNC: <0.006 NG/ML (ref 0–0.03)
URN SPEC COLLECT METH UR: ABNORMAL
UROBILINOGEN UR STRIP-ACNC: NEGATIVE EU/DL
WBC #/AREA URNS HPF: 25 /HPF (ref 0–5)

## 2022-07-16 PROCEDURE — 81000 URINALYSIS NONAUTO W/SCOPE: CPT | Mod: 59 | Performed by: EMERGENCY MEDICINE

## 2022-07-16 PROCEDURE — 87077 CULTURE AEROBIC IDENTIFY: CPT | Performed by: EMERGENCY MEDICINE

## 2022-07-16 PROCEDURE — 87186 SC STD MICRODIL/AGAR DIL: CPT | Performed by: EMERGENCY MEDICINE

## 2022-07-16 PROCEDURE — 87088 URINE BACTERIA CULTURE: CPT | Performed by: EMERGENCY MEDICINE

## 2022-07-16 PROCEDURE — 87086 URINE CULTURE/COLONY COUNT: CPT | Performed by: EMERGENCY MEDICINE

## 2022-07-16 PROCEDURE — 80307 DRUG TEST PRSMV CHEM ANLYZR: CPT | Performed by: EMERGENCY MEDICINE

## 2022-07-16 RX ORDER — CEPHALEXIN 500 MG/1
500 CAPSULE ORAL 4 TIMES DAILY
Qty: 20 CAPSULE | Refills: 0 | Status: SHIPPED | OUTPATIENT
Start: 2022-07-16 | End: 2022-07-21

## 2022-07-16 NOTE — ED TRIAGE NOTES
Pt presents to ED via [facility] with c/o alcohol intoxication. EMS reports they were called by [employer] for patient sleeping on the sidewalk. 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 Pt does not remember what happened after that or how she got to ED. Pt AAO x3.

## 2022-07-16 NOTE — ED PROVIDER NOTES
"  Source of History:  Medical record, patient, EMS    Chief complaint:  Per triage note: "Alcohol Intoxication (Pt found sleeping outside of flower cart on [address] by [employer].  No trauma or injury noted)  "    HPI:    Sangita Logan is a 81 y.o. female, with a PMHx of COPD, HTN, Osteoporosis, who presents to the ED following alcohol intoxication. Patient reports she has been heavily consuming alcohol prior to arrival today. She was found sleeping outside of a flower cart on [facility] by [employer]. EMS notes she has been "in and out of consciousness". Patient currently states "I feel like I am flating away, and I don't know what life is about, I believe Lyle is the true president". No hx of alcohol abuse.  This is the extent of the patient's complaints at this time.     ROS:   As per HPI and below:   Unable to be obtained due to alcohol intoxication/AMS      Review of patient's allergies indicates:   Allergen Reactions    No known drug allergies        PMH:  As per HPI and below:  Past Medical History:   Diagnosis Date    COPD (chronic obstructive pulmonary disease)     HTN (hypertension) 10/21/2021    Osteoporosis     Sinus disease        Past Surgical History:   Procedure Laterality Date    AUGMENTATION OF BREAST Bilateral     received at age 23, removed at age 76    Breast implants removal  2012    SINUS SURGERY      TONSILLECTOMY      Age 1       Social History     Tobacco Use    Smoking status: Current Every Day Smoker     Packs/day: 0.50     Years: 30.00     Pack years: 15.00     Types: Cigarettes    Smokeless tobacco: Never Used    Tobacco comment: 2 packs a week   Substance Use Topics    Alcohol use: Yes     Alcohol/week: 3.0 standard drinks     Types: 3 Glasses of wine per week     Comment: 4 times weekly    Drug use: Yes     Types: Marijuana       Physical Exam:      Nursing note and vitals reviewed.  /80 (BP Location: Left arm, Patient Position: Sitting)   Pulse 80   Temp 97.6 °F " (36.4 °C) (Oral)   Resp 16   SpO2 96%     Physical Exam  Vitals and nursing note reviewed.   Constitutional:       General: She is not in acute distress.     Appearance: She is not ill-appearing.   HENT:      Head: Normocephalic and atraumatic.      Right Ear: External ear normal.      Left Ear: External ear normal.   Eyes:      Extraocular Movements: Extraocular movements intact.   Cardiovascular:      Rate and Rhythm: Normal rate and regular rhythm.      Heart sounds: Normal heart sounds.   Pulmonary:      Effort: Pulmonary effort is normal. No respiratory distress.      Breath sounds: Normal breath sounds. No stridor. No wheezing, rhonchi or rales.   Abdominal:      General: Abdomen is flat. There is no distension.      Palpations: Abdomen is soft.      Tenderness: There is no abdominal tenderness. There is no guarding or rebound.   Musculoskeletal:         General: Normal range of motion.   Skin:     General: Skin is warm.      Findings: No rash.      Comments: Abrasion on the right forearm.    Neurological:      General: No focal deficit present.      Mental Status: She is alert. Mental status is at baseline.   Psychiatric:         Mood and Affect: Mood is anxious.      Comments: Appears confused, anxious.          EKG:    MDM:    I decided to obtain the patient's medical records.       Labs Reviewed    Admission on 07/15/2022   Component Date Value Ref Range Status    WBC 07/15/2022 5.85  3.90 - 12.70 K/uL Final    RBC 07/15/2022 4.17  4.00 - 5.40 M/uL Final    Hemoglobin 07/15/2022 14.3  12.0 - 16.0 g/dL Final    Hematocrit 07/15/2022 41.7  37.0 - 48.5 % Final    MCV 07/15/2022 100 (A) 82 - 98 fL Final    MCH 07/15/2022 34.3 (A) 27.0 - 31.0 pg Final    MCHC 07/15/2022 34.3  32.0 - 36.0 g/dL Final    RDW 07/15/2022 11.4 (A) 11.5 - 14.5 % Final    Platelets 07/15/2022 115 (A) 150 - 450 K/uL Final    MPV 07/15/2022 11.5  9.2 - 12.9 fL Final    Immature Granulocytes 07/15/2022 0.9 (A) 0.0 - 0.5 %  Final    Gran # (ANC) 07/15/2022 1.5 (A) 1.8 - 7.7 K/uL Final    Immature Grans (Abs) 07/15/2022 0.05 (A) 0.00 - 0.04 K/uL Final    Comment: Mild elevation in immature granulocytes is non specific and   can be seen in a variety of conditions including stress response,   acute inflammation, trauma and pregnancy. Correlation with other   laboratory and clinical findings is essential.      Lymph # 07/15/2022 3.3  1.0 - 4.8 K/uL Final    Mono # 07/15/2022 1.0  0.3 - 1.0 K/uL Final    Eos # 07/15/2022 0.0  0.0 - 0.5 K/uL Final    Baso # 07/15/2022 0.01  0.00 - 0.20 K/uL Final    nRBC 07/15/2022 0  0 /100 WBC Final    Gran % 07/15/2022 25.9 (A) 38.0 - 73.0 % Final    Lymph % 07/15/2022 56.6 (A) 18.0 - 48.0 % Final    Mono % 07/15/2022 16.4 (A) 4.0 - 15.0 % Final    Eosinophil % 07/15/2022 0.0  0.0 - 8.0 % Final    Basophil % 07/15/2022 0.2  0.0 - 1.9 % Final    Differential Method 07/15/2022 Automated   Final    Sodium 07/15/2022 137  136 - 145 mmol/L Final    Potassium 07/15/2022 3.9  3.5 - 5.1 mmol/L Final    Chloride 07/15/2022 102  95 - 110 mmol/L Final    CO2 07/15/2022 22 (A) 23 - 29 mmol/L Final    Glucose 07/15/2022 120 (A) 70 - 110 mg/dL Final    BUN 07/15/2022 6 (A) 8 - 23 mg/dL Final    Creatinine 07/15/2022 1.1  0.5 - 1.4 mg/dL Final    Calcium 07/15/2022 9.1  8.7 - 10.5 mg/dL Final    Total Protein 07/15/2022 6.9  6.0 - 8.4 g/dL Final    Albumin 07/15/2022 3.7  3.5 - 5.2 g/dL Final    Total Bilirubin 07/15/2022 0.6  0.1 - 1.0 mg/dL Final    Comment: For infants and newborns, interpretation of results should be based  on gestational age, weight and in agreement with clinical  observations.    Premature Infant recommended reference ranges:  Up to 24 hours.............<8.0 mg/dL  Up to 48 hours............<12.0 mg/dL  3-5 days..................<15.0 mg/dL  6-29 days.................<15.0 mg/dL      Alkaline Phosphatase 07/15/2022 180 (A) 55 - 135 U/L Final    AST 07/15/2022 30  10 - 40  U/L Final    ALT 07/15/2022 19  10 - 44 U/L Final    Anion Gap 07/15/2022 13  8 - 16 mmol/L Final    eGFR if  07/15/2022 54 (A) >60 mL/min/1.73 m^2 Final    eGFR if non  07/15/2022 47 (A) >60 mL/min/1.73 m^2 Final    Comment: Calculation used to obtain the estimated glomerular filtration  rate (eGFR) is the CKD-EPI equation.       Troponin I 07/15/2022 <0.006  0.000 - 0.026 ng/mL Final    Comment: The reference interval for Troponin I represents the 99th percentile   cutoff   for our facility and is consistent with 3rd generation assay   performance.      Alcohol, Serum 07/15/2022 331 (A) <10 mg/dL Final    Comment: [facility]   critical result(s) called and verbal readback obtained from   Annie Somers RN by SXD 07/16/2022 00:06      Specimen UA 07/16/2022 Urine, Clean Catch   Final    Color, UA 07/16/2022 Yellow  Yellow, Straw, Yodit Final    Appearance, UA 07/16/2022 Clear  Clear Final    pH, UA 07/16/2022 7.0  5.0 - 8.0 Final    Specific Gravity, UA 07/16/2022 <=1.005 (A) 1.005 - 1.030 Final    Protein, UA 07/16/2022 Negative  Negative Final    Comment: Recommend a 24 hour urine protein or a urine   protein/creatinine ratio if globulin induced proteinuria is  clinically suspected.      Glucose, UA 07/16/2022 Negative  Negative Final    Ketones, UA 07/16/2022 Negative  Negative Final    Bilirubin (UA) 07/16/2022 Negative  Negative Final    Occult Blood UA 07/16/2022 Negative  Negative Final    Nitrite, UA 07/16/2022 Negative  Negative Final    Urobilinogen, UA 07/16/2022 Negative  <2.0 EU/dL Final    Leukocytes, UA 07/16/2022 3+ (A) Negative Final    Benzodiazepines 07/16/2022 Negative  Negatiive Final    Methadone metabolites 07/16/2022 Negative  Negative Final    Cocaine (Metab.) 07/16/2022 Negative  Negative Final    Opiate Scrn, Ur 07/16/2022 Negative  Negative Final    Barbiturate Screen, Ur 07/16/2022 Negative  Negative Final    Amphetamine Screen, Ur  07/16/2022 Negative  Negative Final    THC 07/16/2022 Negative  Negative Final    Phencyclidine 07/16/2022 Negative  Negative Final    Creatinine, Urine 07/16/2022 22.0  15.0 - 325.0 mg/dL Final    Toxicology Information 07/16/2022 SEE COMMENT   Final    Comment: This screen includes the following classes of drugs at the listed   cut-off:    Benzodiazepines 200 ng/ml  Methadone 300 ng/ml  Cocaine metabolite 300 ng/ml  Opiates 300 ng/ml  Barbiturates 200 ng/ml  Amphetamines 1000 ng/ml  Marijuana metabs (THC) 50 ng/ml  Phencyclidine (PCP) 25 ng/ml    This is a screening test. If results do not correlate with clinical   presentation, then a confirmatory send out test is advised.     This report is intended for use in clinical monitoring and management   of   patients. It is not intended for use in employment related drug   testing.      RBC, UA 07/16/2022 5 (A) 0 - 4 /hpf Final    WBC, UA 07/16/2022 25 (A) 0 - 5 /hpf Final    Bacteria 07/16/2022 Occasional  None-Occ /hpf Final    Squam Epithel, UA 07/16/2022 5  /hpf Final    Microscopic Comment 07/16/2022 SEE COMMENT   Final    Comment: Other formed elements not mentioned in the report are not   present in the microscopic examination.           Imaging Reviewed    Imaging Results          CT Cervical Spine Without Contrast (Final result)  Result time 07/16/22 00:57:25    Final result by Domenica Anderson MD (07/16/22 00:57:25)                 Impression:      No evidence of acute cervical spine fracture or dislocation.    Mild T2 vertebral body superior endplate compression deformity.  No clear acute fracture plane is seen, however this finding is new compared to previous CT from 05/12/2022.      Electronically signed by: Domenica Anderson MD  Date:    07/16/2022  Time:    00:57             Narrative:    EXAMINATION:  CT CERVICAL SPINE WITHOUT CONTRAST    CLINICAL HISTORY:  altered mental status found down;    TECHNIQUE:  Low dose axial images, sagittal and  coronal reformations were performed though the cervical spine.  Contrast was not administered.    COMPARISON:  CT chest from 05/12/2022.    FINDINGS:  No evidence of acute cervical spine fracture or dislocation.  Odontoid process is intact.  Craniocervical junction is unremarkable.  Cervical spine alignment is within normal limits.  Mild degenerative changes with mild intervertebral disc space narrowing and mild facet arthropathy are seen.  Mild superior endplate compression deformity is seen of the T2 vertebral body.    Surrounding soft tissues show no significant abnormalities.  Airway is patent.  Partially visualized lung apices are clear.                               CT Head Without Contrast (Final result)  Result time 07/16/22 00:48:32    Final result by Domenica Anderson MD (07/16/22 00:48:32)                 Impression:      No acute intracranial abnormalities identified.      Electronically signed by: Domenica Anderson MD  Date:    07/16/2022  Time:    00:48             Narrative:    EXAMINATION:  CT HEAD WITHOUT CONTRAST    CLINICAL HISTORY:  Mental status change, unknown cause;    TECHNIQUE:  Low dose axial images were obtained through the head.  Coronal and sagittal reformations were also performed. Contrast was not administered.    COMPARISON:  MRI brain from May 2021.    FINDINGS:  There is generalized cerebral volume loss with mild chronic microvascular ischemic change.  No evidence of acute/recent major vascular distribution cerebral infarction, intraparenchymal hemorrhage, or intra-axial space occupying lesion. The ventricular system is normal in size and configuration with no evidence of hydrocephalus. No effacement of the skull-base cisterns. No abnormal extra-axial fluid collections or blood products.  Postsurgical ethmoidectomy changes and bilateral maxillary antral window formation are seen.  There is left frontal and left maxillary sinus disease.  Remaining visualized paranasal sinuses and  mastoid air cells are essentially clear.  The calvarium shows no significant abnormality.                               X-Ray Chest 1 View (Final result)  Result time 07/16/22 00:13:51   Procedure changed from X-Ray Chest PA And Lateral     Final result by Georges Gaston MD (07/16/22 00:13:51)                 Impression:      Mild atelectatic change, there is no additional radiographic evidence for superimposed acute intrathoracic process.      Electronically signed by: Georges Gaston  Date:    07/16/2022  Time:    00:13             Narrative:    EXAMINATION:  XR CHEST 1 VIEW    CLINICAL HISTORY:  Found down; Altered mental status, unspecified    TECHNIQUE:  Single frontal view of the chest was performed.    COMPARISON:  Chest radiograph July 9, 2022    FINDINGS:  Single chest view is submitted.  The cardiomediastinal silhouette appears stable.  Aortic atherosclerotic change noted.  Mild atelectatic changes are noted.  There is no evidence for superimposed confluent infiltrate or consolidation, significant pleural effusion or pneumothorax.  The visualized osseous structures appear intact.  Chronic changes are noted.  Scoliotic curvature of the spine noted.                                Medications given in ED    Medications - No data to display               IDiane, scribed for, and in the presence of, Dr. Douglas. I performed the scribed service and the documentation accurately describes the services I performed. I attest to the accuracy of the note.     Physician Attestation for Scribe:   I, aidan Douglas MD, reviewed documentation as scribed in my presence, which is both accurate and complete.    Diagnostic Impression:    1. Alcoholic intoxication without complication    2. Altered mental status    3. Urinary tract infection without hematuria, site unspecified                  Aidan Douglas MD  07/16/22 [id number]

## 2022-07-19 LAB — BACTERIA UR CULT: ABNORMAL

## 2022-09-13 ENCOUNTER — HOSPITAL ENCOUNTER (EMERGENCY)
Facility: HOSPITAL | Age: 82
Discharge: HOME | End: 2022-09-13
Attending: EMERGENCY MEDICINE
Payer: MEDICARE

## 2022-09-13 VITALS
BODY MASS INDEX: 18.02 KG/M2 | WEIGHT: 105 LBS | RESPIRATION RATE: 20 BRPM | HEART RATE: 90 BPM | TEMPERATURE: 98 F | SYSTOLIC BLOOD PRESSURE: 154 MMHG | OXYGEN SATURATION: 98 % | DIASTOLIC BLOOD PRESSURE: 78 MMHG

## 2022-09-13 DIAGNOSIS — S22.050A COMPRESSION FRACTURE OF T5 VERTEBRA, INITIAL ENCOUNTER: ICD-10-CM

## 2022-09-13 DIAGNOSIS — S32.049A CLOSED FRACTURE OF FOURTH LUMBAR VERTEBRA, UNSPECIFIED FRACTURE MORPHOLOGY, INITIAL ENCOUNTER: ICD-10-CM

## 2022-09-13 DIAGNOSIS — R53.1 WEAKNESS: Primary | ICD-10-CM

## 2022-09-13 LAB
ALBUMIN SERPL BCP-MCNC: 3.4 G/DL (ref 3.5–5.2)
ALBUMIN SERPL BCP-MCNC: 3.7 G/DL (ref 3.5–5.2)
ALLENS TEST: ABNORMAL
ALP SERPL-CCNC: 216 U/L (ref 55–135)
ALP SERPL-CCNC: 231 U/L (ref 55–135)
ALT SERPL W/O P-5'-P-CCNC: 26 U/L (ref 10–44)
ALT SERPL W/O P-5'-P-CCNC: 31 U/L (ref 10–44)
ANION GAP SERPL CALC-SCNC: 13 MMOL/L (ref 8–16)
ANION GAP SERPL CALC-SCNC: 5 MMOL/L (ref 8–16)
AST SERPL-CCNC: 45 U/L (ref 10–40)
AST SERPL-CCNC: 60 U/L (ref 10–40)
BASOPHILS # BLD AUTO: 0.01 K/UL (ref 0–0.2)
BASOPHILS NFR BLD: 0.2 % (ref 0–1.9)
BILIRUB SERPL-MCNC: 1.1 MG/DL (ref 0.1–1)
BILIRUB SERPL-MCNC: 1.2 MG/DL (ref 0.1–1)
BILIRUB UR QL STRIP: NEGATIVE
BNP SERPL-MCNC: 59 PG/ML (ref 0–99)
BUN SERPL-MCNC: 7 MG/DL (ref 8–23)
BUN SERPL-MCNC: 8 MG/DL (ref 8–23)
CALCIUM SERPL-MCNC: 8.6 MG/DL (ref 8.7–10.5)
CALCIUM SERPL-MCNC: 9 MG/DL (ref 8.7–10.5)
CHLORIDE SERPL-SCNC: 100 MMOL/L (ref 95–110)
CHLORIDE SERPL-SCNC: 101 MMOL/L (ref 95–110)
CLARITY UR REFRACT.AUTO: CLEAR
CO2 SERPL-SCNC: 22 MMOL/L (ref 23–29)
CO2 SERPL-SCNC: 26 MMOL/L (ref 23–29)
COLOR UR AUTO: YELLOW
CREAT SERPL-MCNC: 0.7 MG/DL (ref 0.5–1.4)
CREAT SERPL-MCNC: 1.1 MG/DL (ref 0.5–1.4)
DIFFERENTIAL METHOD: ABNORMAL
EOSINOPHIL # BLD AUTO: 0 K/UL (ref 0–0.5)
EOSINOPHIL NFR BLD: 0 % (ref 0–8)
ERYTHROCYTE [DISTWIDTH] IN BLOOD BY AUTOMATED COUNT: 12.9 % (ref 11.5–14.5)
EST. GFR  (NO RACE VARIABLE): 49.9 ML/MIN/1.73 M^2
EST. GFR  (NO RACE VARIABLE): >60 ML/MIN/1.73 M^2
GLUCOSE SERPL-MCNC: 116 MG/DL (ref 70–110)
GLUCOSE SERPL-MCNC: 120 MG/DL (ref 70–110)
GLUCOSE UR QL STRIP: NEGATIVE
HCO3 UR-SCNC: 26.4 MMOL/L (ref 24–28)
HCT VFR BLD AUTO: 42.7 % (ref 37–48.5)
HGB BLD-MCNC: 15 G/DL (ref 12–16)
HGB UR QL STRIP: NEGATIVE
HIV 1+2 AB+HIV1 P24 AG SERPL QL IA: NORMAL
IMM GRANULOCYTES # BLD AUTO: 0.07 K/UL (ref 0–0.04)
IMM GRANULOCYTES NFR BLD AUTO: 1.2 % (ref 0–0.5)
KETONES UR QL STRIP: NEGATIVE
LEUKOCYTE ESTERASE UR QL STRIP: ABNORMAL
LYMPHOCYTES # BLD AUTO: 1.1 K/UL (ref 1–4.8)
LYMPHOCYTES NFR BLD: 18.6 % (ref 18–48)
MCH RBC QN AUTO: 34.2 PG (ref 27–31)
MCHC RBC AUTO-ENTMCNC: 35.1 G/DL (ref 32–36)
MCV RBC AUTO: 97 FL (ref 82–98)
MICROSCOPIC COMMENT: NORMAL
MONOCYTES # BLD AUTO: 2.2 K/UL (ref 0.3–1)
MONOCYTES NFR BLD: 38.1 % (ref 4–15)
NEUTROPHILS # BLD AUTO: 2.4 K/UL (ref 1.8–7.7)
NEUTROPHILS NFR BLD: 41.9 % (ref 38–73)
NITRITE UR QL STRIP: NEGATIVE
NRBC BLD-RTO: 0 /100 WBC
PCO2 BLDA: 45.9 MMHG (ref 35–45)
PH SMN: 7.37 [PH] (ref 7.35–7.45)
PH UR STRIP: 7 [PH] (ref 5–8)
PLATELET # BLD AUTO: 137 K/UL (ref 150–450)
PLATELET BLD QL SMEAR: ABNORMAL
PMV BLD AUTO: 12.1 FL (ref 9.2–12.9)
PO2 BLDA: 43 MMHG (ref 40–60)
POC BE: 1 MMOL/L
POC SATURATED O2: 77 % (ref 95–100)
POC TCO2: 28 MMOL/L (ref 24–29)
POTASSIUM SERPL-SCNC: 4.3 MMOL/L (ref 3.5–5.1)
POTASSIUM SERPL-SCNC: 4.5 MMOL/L (ref 3.5–5.1)
PROT SERPL-MCNC: 7 G/DL (ref 6–8.4)
PROT SERPL-MCNC: 7.5 G/DL (ref 6–8.4)
PROT UR QL STRIP: NEGATIVE
RBC # BLD AUTO: 4.39 M/UL (ref 4–5.4)
RBC #/AREA URNS AUTO: 1 /HPF (ref 0–4)
SAMPLE: ABNORMAL
SARS-COV-2 RDRP RESP QL NAA+PROBE: NEGATIVE
SITE: ABNORMAL
SODIUM SERPL-SCNC: 132 MMOL/L (ref 136–145)
SODIUM SERPL-SCNC: 135 MMOL/L (ref 136–145)
SP GR UR STRIP: 1.01 (ref 1–1.03)
SQUAMOUS #/AREA URNS AUTO: 0 /HPF
TROPONIN I SERPL DL<=0.01 NG/ML-MCNC: <0.006 NG/ML (ref 0–0.03)
URN SPEC COLLECT METH UR: ABNORMAL
WBC # BLD AUTO: 5.8 K/UL (ref 3.9–12.7)
WBC #/AREA URNS AUTO: 2 /HPF (ref 0–5)

## 2022-09-13 PROCEDURE — 99285 EMERGENCY DEPT VISIT HI MDM: CPT | Mod: 25

## 2022-09-13 PROCEDURE — 84484 ASSAY OF TROPONIN QUANT: CPT | Performed by: EMERGENCY MEDICINE

## 2022-09-13 PROCEDURE — 80053 COMPREHEN METABOLIC PANEL: CPT | Performed by: PHYSICIAN ASSISTANT

## 2022-09-13 PROCEDURE — 81001 URINALYSIS AUTO W/SCOPE: CPT | Performed by: PHYSICIAN ASSISTANT

## 2022-09-13 PROCEDURE — 82803 BLOOD GASES ANY COMBINATION: CPT

## 2022-09-13 PROCEDURE — 99285 PR EMERGENCY DEPT VISIT,LEVEL V: ICD-10-PCS | Mod: CS,,, | Performed by: EMERGENCY MEDICINE

## 2022-09-13 PROCEDURE — 99285 EMERGENCY DEPT VISIT HI MDM: CPT | Mod: CS,,, | Performed by: EMERGENCY MEDICINE

## 2022-09-13 PROCEDURE — 80053 COMPREHEN METABOLIC PANEL: CPT | Mod: 91 | Performed by: PHYSICIAN ASSISTANT

## 2022-09-13 PROCEDURE — 25000003 PHARM REV CODE 250: Performed by: PHYSICIAN ASSISTANT

## 2022-09-13 PROCEDURE — 87389 HIV-1 AG W/HIV-1&-2 AB AG IA: CPT | Performed by: PHYSICIAN ASSISTANT

## 2022-09-13 PROCEDURE — 85025 COMPLETE CBC W/AUTO DIFF WBC: CPT | Performed by: EMERGENCY MEDICINE

## 2022-09-13 PROCEDURE — 96360 HYDRATION IV INFUSION INIT: CPT

## 2022-09-13 PROCEDURE — 83880 ASSAY OF NATRIURETIC PEPTIDE: CPT | Performed by: EMERGENCY MEDICINE

## 2022-09-13 PROCEDURE — 96361 HYDRATE IV INFUSION ADD-ON: CPT

## 2022-09-13 PROCEDURE — U0002 COVID-19 LAB TEST NON-CDC: HCPCS | Performed by: EMERGENCY MEDICINE

## 2022-09-13 RX ORDER — LIDOCAINE 50 MG/G
1 PATCH TOPICAL
Status: DISCONTINUED | OUTPATIENT
Start: 2022-09-13 | End: 2022-09-13 | Stop reason: HOSPADM

## 2022-09-13 RX ORDER — ACETAMINOPHEN 500 MG
1000 TABLET ORAL
Status: DISCONTINUED | OUTPATIENT
Start: 2022-09-13 | End: 2022-09-13 | Stop reason: HOSPADM

## 2022-09-13 RX ADMIN — SODIUM CHLORIDE 1000 ML: 0.9 INJECTION, SOLUTION INTRAVENOUS at 11:09

## 2022-09-13 NOTE — ED NOTES
DME called for brace. No answer. Left a message with pt information and appropriate callback number.

## 2022-09-13 NOTE — ED NOTES
CN made aware that DME not returning phone call from earlier. SANTHOSH Chaidez to call different number. PA made aware.

## 2022-09-13 NOTE — ED NOTES
"Sangita Logan, an 81 y.o. female presents to the ED complaining of fatigue and weakness x 2 days. States that she just "feels very weak". Did not sleep well last night because she was anxious about her Sx. Hx of COPD. Compliant with all home meds. Patient denies HA, CP, fever, chills, abd pain, NVD. States 4 weeks ago she fell backwards out of a stool but denies any other falls since. Pt family member at bedside.       LOC: The patient is awake, alert and aware of environment with an appropriate affect, the patient is oriented x 3 and speaking appropriately.   APPEARANCE: Patient appears comfortable and in no acute distress, patient is clean and well groomed.  SKIN: The skin is warm and dry, color consistent with ethnicity.   MUSCULOSKELETAL: Patient moving all extremities spontaneously, no swelling noted.  RESPIRATORY: Airway is open and patent, respirations are spontaneous, patient has a normal effort and rate, no accessory muscle use noted.  CARDIAC: Patient has a normal rate and regular rhythm, no edema noted, capillary refill < 3 seconds.   GASTRO: Soft and non tender to palpation, no distention noted.   : Pt denies any pain or frequency with urination.  NEURO: Pt opens eyes spontaneously, behavior appropriate to situation, follows commands.    Chief Complaint   Patient presents with    Weakness     Generalized weakness x 2 days. Reports cough. Hx of COPD.     Review of patient's allergies indicates:   Allergen Reactions    No known drug allergies      Past Medical History:   Diagnosis Date    COPD (chronic obstructive pulmonary disease)     HTN (hypertension) 10/21/2021    Osteoporosis     Sinus disease        "

## 2022-09-13 NOTE — ED PROVIDER NOTES
"Encounter Date: 9/13/2022       History     Chief Complaint   Patient presents with    Weakness     Generalized weakness x 2 days. Reports cough. Hx of COPD.     81-year-old female with a history of hypertension, osteoporosis, COPD presents to the ED affect are with chief complaint of weakness.  Patient reports generalized weakness that began yesterday.  she reports with some mild worsening of her shortness of breath.  Denies any significant change for cough. Pt also complaining of pain in the "small" of her back for the past few days. Denies falls, injury. Reports intermittent tingling in her lower legs for the same duration. Denies fever, chills, chest discomfort, abdominal discomfort, nausea, vomiting, diarrhea, fever, chills, changes in urination or other complaints.     Review of patient's allergies indicates:   Allergen Reactions    No known drug allergies      Past Medical History:   Diagnosis Date    COPD (chronic obstructive pulmonary disease)     HTN (hypertension) 10/21/2021    Osteoporosis     Sinus disease      Past Surgical History:   Procedure Laterality Date    AUGMENTATION OF BREAST Bilateral     received at age 23, removed at age 76    Breast implants removal  2012    SINUS SURGERY      TONSILLECTOMY      Age 1     Family History   Problem Relation Age of Onset    No Known Problems Mother     No Known Problems Father     Breast cancer Sister         dx in 30s    Blindness Neg Hx     Glaucoma Neg Hx     Macular degeneration Neg Hx     Retinal detachment Neg Hx      Social History     Tobacco Use    Smoking status: Every Day     Packs/day: 0.50     Years: 30.00     Pack years: 15.00     Types: Cigarettes    Smokeless tobacco: Never    Tobacco comments:     2 packs a week   Substance Use Topics    Alcohol use: Yes     Alcohol/week: 3.0 standard drinks     Types: 3 Glasses of wine per week     Comment: 4 times weekly    Drug use: Yes     Types: Marijuana     Review of Systems   Constitutional:  " Negative for chills and fever.   HENT:  Negative for sore throat.    Respiratory:  Negative for shortness of breath.    Cardiovascular:  Negative for chest pain and leg swelling.   Gastrointestinal:  Negative for abdominal pain, diarrhea, nausea and vomiting.   Genitourinary:  Negative for dysuria.   Musculoskeletal:  Negative for back pain.   Skin:  Negative for rash.   Neurological:  Positive for weakness and numbness (tingling legs).   Hematological:  Does not bruise/bleed easily.     Physical Exam     Initial Vitals   BP Pulse Resp Temp SpO2   09/13/22 0850 09/13/22 0849 09/13/22 0849 09/13/22 0849 09/13/22 0849   138/83 86 20 97.7 °F (36.5 °C) 95 %      MAP       --                Physical Exam    Nursing note and vitals reviewed.  Constitutional: She appears well-developed and well-nourished. She is not diaphoretic.  Non-toxic appearance. She does not appear ill. No distress.   Global weakness   HENT:   Head: Normocephalic and atraumatic.   Neck: Neck supple.   Cardiovascular:  Normal rate and regular rhythm.     Exam reveals no gallop and no friction rub.       No murmur heard.  Pulmonary/Chest: Effort normal. No accessory muscle usage. No tachypnea. No respiratory distress. She has no decreased breath sounds. She has wheezes (scattered expiratory). She has no rhonchi. She has no rales.   Abdominal: Abdomen is soft and flat. She exhibits no distension and no mass. There is no abdominal tenderness. There is no guarding.   Musculoskeletal:      Cervical back: Neck supple.        Back:       Comments: No peripheral edema.  Bilateral lumbosacral tenderness.  No midline C, T, L spine tenderness.     Neurological: She is alert.   4/5 strength to BLE. Normal sensation.    Skin: No rash noted.   Psychiatric: She has a normal mood and affect. Her behavior is normal.       ED Course   Procedures  Labs Reviewed   CBC W/ AUTO DIFFERENTIAL - Abnormal; Notable for the following components:       Result Value    MCH 34.2  (*)     Platelets 137 (*)     Immature Granulocytes 1.2 (*)     Immature Grans (Abs) 0.07 (*)     Mono # 2.2 (*)     Mono % 38.1 (*)     All other components within normal limits   URINALYSIS, REFLEX TO URINE CULTURE - Abnormal; Notable for the following components:    Leukocytes, UA 2+ (*)     All other components within normal limits    Narrative:     Specimen Source->Urine   COMPREHENSIVE METABOLIC PANEL - Abnormal; Notable for the following components:    Sodium 132 (*)     Glucose 116 (*)     BUN 7 (*)     Calcium 8.6 (*)     Albumin 3.4 (*)     Total Bilirubin 1.2 (*)     Alkaline Phosphatase 216 (*)     AST 45 (*)     Anion Gap 5 (*)     All other components within normal limits   COMPREHENSIVE METABOLIC PANEL - Abnormal; Notable for the following components:    Sodium 135 (*)     CO2 22 (*)     Glucose 120 (*)     Total Bilirubin 1.1 (*)     Alkaline Phosphatase 231 (*)     AST 60 (*)     eGFR 49.9 (*)     All other components within normal limits    Narrative:     Release to patient->Immediate    ADD-ON [facility] # [id number] Per. Sandeep Ba MD  09/13/2022  16:06    ISTAT PROCEDURE - Abnormal; Notable for the following components:    POC PCO2 45.9 (*)     POC SATURATED O2 77 (*)     All other components within normal limits   HIV 1 / 2 ANTIBODY    Narrative:     Release to patient->Immediate   B-TYPE NATRIURETIC PEPTIDE   TROPONIN I   SARS-COV-2 RNA AMPLIFICATION, QUAL   COMPREHENSIVE METABOLIC PANEL   URINALYSIS MICROSCOPIC    Narrative:     Specimen Source->Urine          Imaging Results               CT Thoracic Spine Without Contrast (Final result)  Result time 09/13/22 15:27:43      Final result by Bruce Bonilla MD (09/13/22 15:27:43)                   Impression:      This report was flagged in Epic as abnormal.    1. Fracture of T5, new since most recent chest CT 05/12/2022, correlation is advised.  2. There is fracture involving the anterior superior aspect of the L4 vertebral body, the  configuration appears similar to the previous exam however obliquely oriented fracture plane was not evident on that exam.  This may be on the basis of slice selection at that time however correlation is advised.  Please see above for additional findings.  3. Indistinctness about a few diverticula involving the mid to distal descending colon, diverticulitis is of concern, correlation is needed.  4. Please see above for several additional findings.      Electronically signed by: Bruce Bonilla MD  Date:    09/13/2022  Time:    15:27               Narrative:    EXAMINATION:  CT THORACIC SPINE WITHOUT CONTRAST; CT LUMBAR SPINE WITHOUT CONTRAST    CLINICAL HISTORY:  Compression fracture, thoracic;; Compression fracture, lumbar;    TECHNIQUE:  Axial images of the lumbar spine and axial images of the thoracic spine were obtained at 1.25 mm intervals without administration of IV contrast.  Coronal and sagittal reformatted images were reviewed.    COMPARISON:  CT chest 05/12/2022, CT abdomen and pelvis 10/21/2021    FINDINGS:  There is osteopenia.    Sagittal reformatted imaging demonstrates adequate alignment of the thoracolumbar spine.  There is multilevel disc space height loss particularly involving the thoracic spine.  There is compression deformity of the anterior superior endplate of L4.  The degree of which appears similar to examination 10/21/2021 however on current exam, there is a more clearly defined obliquely oriented fracture plane anteriorly.  This may be on the basis of slice selection however correlation with any focal tenderness advised.  There is stable height loss of L3.  There is height loss involving the superior endplate of T12, likely sequela of Schmorl's node, stable.  Since examination 05/12/2022, there has been interval development of compression deformity involving the superior endplate of T5 with less than 50% height loss.  No significant retropulsion of fragments into the canal.  No clear  involvement of the posterior elements.  The facet joints are aligned noting multilevel facet arthropathy primarily involving the lumbar spine.    Motion artifact limits evaluation for spondylitic changes.  Allowing for this, there is multilevel mild spinal canal stenosis and mild neuroforaminal narrowing primarily involving the lumbar spine.  The posterior paraspinous soft tissues are grossly unremarkable.    Evaluation of the airways and pulmonary parenchyma is limited given motion artifact.  There is web like secretion within the distal aspect the trachea, placing patient at risk for aspiration.  There is biapical atelectasis.  There is a pulmonary nodule within the right upper lobe measuring 2-3 mm, stable.  An additional pulmonary nodule is noted along the anterior aspect of the right upper lobe measuring 3 mm, stable.  Additional pulmonary nodule noted within the right upper lobe measures 2-3 mm, stable.  There is bilateral basilar dependent atelectasis.  No large focal consolidation.  No pneumothorax.  There is a partially visualized calcified granuloma along the periphery of the left upper lobe.  There is atherosclerotic calcification of the aorta and its branches.  There may be hepatic steatosis.  No hydronephrosis or nephrolithiasis.  There is colonic diverticulosis.  There is indistinctness about a few diverticula involving the mid to distal descending colon, findings are concerning for diverticulitis however correlation with current symptomatology is needed.                                        CT Lumbar Spine Without Contrast (Final result)  Result time 09/13/22 15:27:43      Final result by Bruce Bonilla MD (09/13/22 15:27:43)                   Impression:      This report was flagged in Epic as abnormal.    1. Fracture of T5, new since most recent chest CT 05/12/2022, correlation is advised.  2. There is fracture involving the anterior superior aspect of the L4 vertebral body, the configuration  appears similar to the previous exam however obliquely oriented fracture plane was not evident on that exam.  This may be on the basis of slice selection at that time however correlation is advised.  Please see above for additional findings.  3. Indistinctness about a few diverticula involving the mid to distal descending colon, diverticulitis is of concern, correlation is needed.  4. Please see above for several additional findings.      Electronically signed by: Bruce Bonilla MD  Date:    09/13/2022  Time:    15:27               Narrative:    EXAMINATION:  CT THORACIC SPINE WITHOUT CONTRAST; CT LUMBAR SPINE WITHOUT CONTRAST    CLINICAL HISTORY:  Compression fracture, thoracic;; Compression fracture, lumbar;    TECHNIQUE:  Axial images of the lumbar spine and axial images of the thoracic spine were obtained at 1.25 mm intervals without administration of IV contrast.  Coronal and sagittal reformatted images were reviewed.    COMPARISON:  CT chest 05/12/2022, CT abdomen and pelvis 10/21/2021    FINDINGS:  There is osteopenia.    Sagittal reformatted imaging demonstrates adequate alignment of the thoracolumbar spine.  There is multilevel disc space height loss particularly involving the thoracic spine.  There is compression deformity of the anterior superior endplate of L4.  The degree of which appears similar to examination 10/21/2021 however on current exam, there is a more clearly defined obliquely oriented fracture plane anteriorly.  This may be on the basis of slice selection however correlation with any focal tenderness advised.  There is stable height loss of L3.  There is height loss involving the superior endplate of T12, likely sequela of Schmorl's node, stable.  Since examination 05/12/2022, there has been interval development of compression deformity involving the superior endplate of T5 with less than 50% height loss.  No significant retropulsion of fragments into the canal.  No clear involvement of the  posterior elements.  The facet joints are aligned noting multilevel facet arthropathy primarily involving the lumbar spine.    Motion artifact limits evaluation for spondylitic changes.  Allowing for this, there is multilevel mild spinal canal stenosis and mild neuroforaminal narrowing primarily involving the lumbar spine.  The posterior paraspinous soft tissues are grossly unremarkable.    Evaluation of the airways and pulmonary parenchyma is limited given motion artifact.  There is web like secretion within the distal aspect the trachea, placing patient at risk for aspiration.  There is biapical atelectasis.  There is a pulmonary nodule within the right upper lobe measuring 2-3 mm, stable.  An additional pulmonary nodule is noted along the anterior aspect of the right upper lobe measuring 3 mm, stable.  Additional pulmonary nodule noted within the right upper lobe measures 2-3 mm, stable.  There is bilateral basilar dependent atelectasis.  No large focal consolidation.  No pneumothorax.  There is a partially visualized calcified granuloma along the periphery of the left upper lobe.  There is atherosclerotic calcification of the aorta and its branches.  There may be hepatic steatosis.  No hydronephrosis or nephrolithiasis.  There is colonic diverticulosis.  There is indistinctness about a few diverticula involving the mid to distal descending colon, findings are concerning for diverticulitis however correlation with current symptomatology is needed.                                       X-Ray Chest PA And Lateral (Final result)  Result time 09/13/22 12:33:03      Final result by David Duckworth MD (09/13/22 12:33:03)                   Impression:      No acute cardiopulmonary disease.  Spinal compression fractures.      Electronically signed by: David Duckworth MD  Date:    09/13/2022  Time:    12:33               Narrative:    EXAMINATION:  XR CHEST PA AND LATERAL    CLINICAL  HISTORY:  Weakness    TECHNIQUE:  PA and lateral views of the chest were performed.    COMPARISON:  07/16/2022    FINDINGS:  Heart size is normal.  Mediastinum shows aortic atherosclerosis.  The lungs are expanded and clear.  No lung consolidation or pleural fluid is detected.  A compression fracture is now seen at about T5 in the spine.  The compression fracture at about T12 is old.                                       Medications   sodium chloride 0.9% bolus 1,000 mL (0 mLs Intravenous Stopped 9/13/22 1309)     Medical Decision Making:   History:   Old Medical Records: I decided to obtain old medical records.  Initial Assessment:   81-year-old female presents to the ED with generalized weakness for the past couple of days.  Hemodynamically stable.  Afebrile.  Appears generally weak, but in no acute distress.  Nontoxic appearing.  Differential Diagnosis:   My differential diagnosis includes but is not limited to:  Electrolyte abnormality, dehydration, KAYLA, UTI, deconditioning, COVID, viral syndrome  Clinical Tests:   Lab Tests: Reviewed  Radiological Study: Reviewed  Medical Tests: Ordered and Reviewed  ED Management:  Labs showed a mild hyponatremia of 132.  Patient received IV fluids in the ED. was also slight elevation in liver enzymes with T bili of 1.2, alk-phos 216 and AST of 45.  Slightly higher than previous values.  UA without infection.  COVID negative.  Chest x-ray showed incidental finding of a T5 compression fracture with indeterminate age.  CT of the T and L-spine was obtained to further assess.  Appears there are new fractures of T5 and L4.  Discussed with neurosurgery.  See separate consult note for details.  Unfortunately, patient had an unusually long ED evaluation and was in the ED for >10hrs.  X-ray was behind on imaging.  Patient was adamant about leaving.  We were unable to obtain the upright x-ray or obtain a TLSO brace in a reasonable amount of time.  Neurosurgery did note that back brace  was not necessary.  I advised patient to obtain the upright x-rays on an outpatient basis prior to her follow-up with Neurosurgery in 6 weeks.  Patient was able to ambulate independently in the ED.  Advised to take Tylenol as needed for pain.  Strict ED return precautions given.  I have reviewed the patient's records and discussed this case with my supervising physician.                             Clinical Impression:   Final diagnoses:  [R53.1] Weakness (Primary)  [S22.050A] Compression fracture of T5 vertebra, initial encounter  [S32.049A] Closed fracture of fourth lumbar vertebra, unspecified fracture morphology, initial encounter        ED Disposition Condition    Discharge Stable          ED Prescriptions    None       Follow-up Information       Follow up With Specialties Details Why Contact Info Additional Information    [facility]-Neurosurg [facility] Neurosurgery Go in 6 weeks For reevaluation [address] [phone] Please park in the [facility] surface lot on the [place] side and check in on the 3rd floor             Karey Castillo PA-C  09/14/22 1025

## 2022-09-14 NOTE — HPI
81 F PMHx osteoporosis consulted to NSGY for L4 endplate fx and T5 compression fracture. Pt presented with 1 day generalized weakness, now resolved, no focal weakness, no trauma, now bowel/bladder issues, resting comfortably at time of interview. She has minor low back pain.

## 2022-09-14 NOTE — CONSULTS
[facility] Emergency Dept  Neurosurgery  Progress Note    Subjective:     History of Present Illness: 81 F PMHx osteoporosis consulted to NSGY for L4 endplate fx and T5 compression fracture. Pt presented with 1 day generalized weakness, now resolved, no focal weakness, no trauma, now bowel/bladder issues, resting comfortably at time of interview. She has minor low back pain.       Post-Op Info:  * No surgery found *         Past Medical History:   Diagnosis Date    COPD (chronic obstructive pulmonary disease)     HTN (hypertension) 10/21/2021    Osteoporosis     Sinus disease        Past Surgical History:   Procedure Laterality Date    AUGMENTATION OF BREAST Bilateral     received at age 23, removed at age 76    Breast implants removal  2012    SINUS SURGERY      TONSILLECTOMY      Age 1       Social History     Socioeconomic History    Marital status:    Tobacco Use    Smoking status: Every Day     Packs/day: 0.50     Years: 30.00     Pack years: 15.00     Types: Cigarettes    Smokeless tobacco: Never    Tobacco comments:     2 packs a week   Substance and Sexual Activity    Alcohol use: Yes     Alcohol/week: 3.0 standard drinks     Types: 3 Glasses of wine per week     Comment: 4 times weekly    Drug use: Yes     Types: Marijuana       Family History   Problem Relation Age of Onset    No Known Problems Mother     No Known Problems Father     Breast cancer Sister         dx in 30s    Blindness Neg Hx     Glaucoma Neg Hx     Macular degeneration Neg Hx     Retinal detachment Neg Hx        Review of patient's allergies indicates:   Allergen Reactions    No known drug allergies          Medications:  Continuous Infusions:  Scheduled Meds:  PRN Meds:     Review of Systems    Review of Systems   Constitutional:  Negative for fatigue and fever.   Respiratory:  Negative for shortness of breath.    Cardiovascular:  Negative for chest pain.   Gastrointestinal:  Negative for nausea and vomiting.    Genitourinary:  Negative for difficulty urinating.   Musculoskeletal: positive back pain  Neurological:  Negative for headaches. Negative for dizziness, seizures, syncope, weakness and numbness.     Objective:     Weight: 47.6 kg (105 lb)  Body mass index is 18.02 kg/m².  Vital Signs (Most Recent):  Temp: 97.8 °F (36.6 °C) (09/13/22 1808)  Pulse: 76 (09/13/22 1808)  Resp: 18 (09/13/22 1808)  BP: (!) 142/83 (09/13/22 1808)  SpO2: 97 % (09/13/22 1808)   Vital Signs (24h Range):  Temp:  [97.7 °F (36.5 °C)-97.8 °F (36.6 °C)] 97.8 °F (36.6 °C)  Pulse:  [76-86] 76  Resp:  [18-20] 18  SpO2:  [95 %-98 %] 97 %  BP: (133-142)/(77-83) 142/83     Date 09/13/22 0700 - 09/14/22 0659   Shift 6619-1263 9234-0841 3044-1641 24 Hour Total   INTAKE   IV Piggyback 999   999   Shift Total(mL/kg) 999(21)   999(21)   OUTPUT   Shift Total(mL/kg)       Weight (kg) 47.6 47.6 47.6 47.6                        Physical Exam    Neurosurgery Physical Exam    Physical Exam:    Constitutional: No distress.     HEENT: atraumatic/normocephalic    Cardiovascular: Regular rhythm.     Pulm: aerating well, saturating well    Abdominal: Soft.     Psych/Behavior: He is alert.     RUE: 5/5 delt, 5/5 bi, 5/5 tri, 5/5 hg, 5/5 io  LUE: 5/5 delt, 5/5 bi, 5/5 tri, 5/5 hg, 5/5 io  RLE: 5/5 hf, 5/5 quad, 5/5 hamstring, 5/5 df, 5/5 pf  LLE: 5/5 hf, 5/5 quad, 5/5 hamstring, 5/5 df, 5/5 pf    SILT    No hoffmans  No clonus  No babinski    Minor L spine TTP       Significant Labs:  Recent Labs   Lab 09/13/22  0851 09/13/22  1554   * 116*   * 132*   K 4.5 4.3    101   CO2 22* 26   BUN 8 7*   CREATININE 1.1 0.7   CALCIUM 9.0 8.6*     Recent Labs   Lab 09/13/22  0941   WBC 5.80   HGB 15.0   HCT 42.7   *     No results for input(s): LABPT, INR, APTT in the last 48 hours.  Microbiology Results (last 7 days)       ** No results found for the last 168 hours. **          All pertinent labs from the last 24 hours have been reviewed.    Significant  Diagnostics:  I have reviewed and interpreted all pertinent imaging results/findings within the past 24 hours.    Assessment/Plan:     L4 vertebral fracture  81 F PMHx osteoporosis consulted to NSGY for L4 endplate fx and T5 compression fracture.    No surgical intervention needed.     Upright xrays    May have brace if needed for comfort.     Will set up follow up in clinic in 6 weeks for rpt xrays    NSGY will sign off at this point.         Rubia Pedraza MD  Neurosurgery  [facility] - Emergency Dept

## 2022-09-14 NOTE — SUBJECTIVE & OBJECTIVE
Past Medical History:   Diagnosis Date    COPD (chronic obstructive pulmonary disease)     HTN (hypertension) 10/21/2021    Osteoporosis     Sinus disease        Past Surgical History:   Procedure Laterality Date    AUGMENTATION OF BREAST Bilateral     received at age 23, removed at age 76    Breast implants removal  2012    SINUS SURGERY      TONSILLECTOMY      Age 1       Social History     Socioeconomic History    Marital status:    Tobacco Use    Smoking status: Every Day     Packs/day: 0.50     Years: 30.00     Pack years: 15.00     Types: Cigarettes    Smokeless tobacco: Never    Tobacco comments:     2 packs a week   Substance and Sexual Activity    Alcohol use: Yes     Alcohol/week: 3.0 standard drinks     Types: 3 Glasses of wine per week     Comment: 4 times weekly    Drug use: Yes     Types: Marijuana       Family History   Problem Relation Age of Onset    No Known Problems Mother     No Known Problems Father     Breast cancer Sister         dx in 30s    Blindness Neg Hx     Glaucoma Neg Hx     Macular degeneration Neg Hx     Retinal detachment Neg Hx        Review of patient's allergies indicates:   Allergen Reactions    No known drug allergies          Medications:  Continuous Infusions:  Scheduled Meds:  PRN Meds:     Review of Systems    Review of Systems   Constitutional:  Negative for fatigue and fever.   Respiratory:  Negative for shortness of breath.    Cardiovascular:  Negative for chest pain.   Gastrointestinal:  Negative for nausea and vomiting.   Genitourinary:  Negative for difficulty urinating.   Musculoskeletal: positive back pain  Neurological:  Negative for headaches. Negative for dizziness, seizures, syncope, weakness and numbness.     Objective:     Weight: 47.6 kg (105 lb)  Body mass index is 18.02 kg/m².  Vital Signs (Most Recent):  Temp: 97.8 °F (36.6 °C) (09/13/22 1808)  Pulse: 76 (09/13/22 1808)  Resp: 18 (09/13/22 1808)  BP: (!) 142/83 (09/13/22 1808)  SpO2: 97 % (09/13/22  1808)   Vital Signs (24h Range):  Temp:  [97.7 °F (36.5 °C)-97.8 °F (36.6 °C)] 97.8 °F (36.6 °C)  Pulse:  [76-86] 76  Resp:  [18-20] 18  SpO2:  [95 %-98 %] 97 %  BP: (133-142)/(77-83) 142/83     Date 09/13/22 0700 - 09/14/22 0659   Shift 1139-0191 5702-1382 7891-6217 24 Hour Total   INTAKE   IV Piggyback 999   999   Shift Total(mL/kg) 999(21)   999(21)   OUTPUT   Shift Total(mL/kg)       Weight (kg) 47.6 47.6 47.6 47.6                        Physical Exam    Neurosurgery Physical Exam    Physical Exam:    Constitutional: No distress.     HEENT: atraumatic/normocephalic    Cardiovascular: Regular rhythm.     Pulm: aerating well, saturating well    Abdominal: Soft.     Psych/Behavior: He is alert.     RUE: 5/5 delt, 5/5 bi, 5/5 tri, 5/5 hg, 5/5 io  LUE: 5/5 delt, 5/5 bi, 5/5 tri, 5/5 hg, 5/5 io  RLE: 5/5 hf, 5/5 quad, 5/5 hamstring, 5/5 df, 5/5 pf  LLE: 5/5 hf, 5/5 quad, 5/5 hamstring, 5/5 df, 5/5 pf    SILT    No hoffmans  No clonus  No babinski    Minor L spine TTP       Significant Labs:  Recent Labs   Lab 09/13/22  0851 09/13/22  1554   * 116*   * 132*   K 4.5 4.3    101   CO2 22* 26   BUN 8 7*   CREATININE 1.1 0.7   CALCIUM 9.0 8.6*     Recent Labs   Lab 09/13/22  0941   WBC 5.80   HGB 15.0   HCT 42.7   *     No results for input(s): LABPT, INR, APTT in the last 48 hours.  Microbiology Results (last 7 days)       ** No results found for the last 168 hours. **          All pertinent labs from the last 24 hours have been reviewed.    Significant Diagnostics:  I have reviewed and interpreted all pertinent imaging results/findings within the past 24 hours.

## 2022-09-14 NOTE — ED NOTES
"Patient states that she does not want to wait anymore in the department for her XR. Spoke with XR who did not have an ETA for the pt scan. PA at bedside speaking to the pt. Pt expresses, "I cannot wait here anymore I just need to get out of here I have been here for 12 hours".   "

## 2022-09-14 NOTE — DISCHARGE INSTRUCTIONS
You still need UPRIGHT xrays of your lumbar spine.  You can follow-up with your primary doctor who can order these.  You should follow-up with Neurosurgery and 6 weeks.  You can take Tylenol as needed for pain.    Return to the ER if he develop any worsening numbness or new weakness in your legs, difficulty using the bathroom, loss of bowel or bladder control or any other worrisome symptoms

## 2022-09-14 NOTE — ASSESSMENT & PLAN NOTE
81 F PMHx osteoporosis consulted to NSGY for L4 endplate fx and T5 compression fracture.    No surgical intervention needed.     Upright xrays    May have brace if needed for comfort.     Will set up follow up in clinic in 6 weeks for rpt xrays    NSGY will sign off at this point.

## 2022-09-15 ENCOUNTER — TELEPHONE (OUTPATIENT)
Dept: NEUROSURGERY | Facility: CLINIC | Age: 82
End: 2022-09-15
Payer: MEDICARE

## 2022-09-16 ENCOUNTER — OFFICE VISIT (OUTPATIENT)
Dept: PULMONOLOGY | Facility: CLINIC | Age: 82
End: 2022-09-16
Payer: MEDICARE

## 2022-09-16 ENCOUNTER — TELEPHONE (OUTPATIENT)
Dept: PRIMARY CARE CLINIC | Facility: CLINIC | Age: 82
End: 2022-09-16
Payer: MEDICARE

## 2022-09-16 ENCOUNTER — TELEPHONE (OUTPATIENT)
Dept: PULMONOLOGY | Facility: CLINIC | Age: 82
End: 2022-09-16
Payer: MEDICARE

## 2022-09-16 VITALS
HEIGHT: 64 IN | DIASTOLIC BLOOD PRESSURE: 76 MMHG | WEIGHT: 107.38 LBS | SYSTOLIC BLOOD PRESSURE: 120 MMHG | OXYGEN SATURATION: 95 % | BODY MASS INDEX: 18.33 KG/M2 | HEART RATE: 104 BPM

## 2022-09-16 DIAGNOSIS — J44.9 COPD, GROUP A, BY GOLD 2017 CLASSIFICATION: ICD-10-CM

## 2022-09-16 DIAGNOSIS — M48.56XS NONTRAUMATIC COMPRESSION FRACTURE OF L4 VERTEBRA, SEQUELA: Primary | ICD-10-CM

## 2022-09-16 DIAGNOSIS — R91.8 MULTIPLE PULMONARY NODULES: ICD-10-CM

## 2022-09-16 DIAGNOSIS — J44.9 CHRONIC OBSTRUCTIVE PULMONARY DISEASE, UNSPECIFIED COPD TYPE: Primary | Chronic | ICD-10-CM

## 2022-09-16 PROCEDURE — 1159F MED LIST DOCD IN RCRD: CPT | Mod: CPTII,GC,S$GLB, | Performed by: INTERNAL MEDICINE

## 2022-09-16 PROCEDURE — 3074F PR MOST RECENT SYSTOLIC BLOOD PRESSURE < 130 MM HG: ICD-10-PCS | Mod: CPTII,GC,S$GLB, | Performed by: INTERNAL MEDICINE

## 2022-09-16 PROCEDURE — 1101F PR PT FALLS ASSESS DOC 0-1 FALLS W/OUT INJ PAST YR: ICD-10-PCS | Mod: CPTII,GC,S$GLB, | Performed by: INTERNAL MEDICINE

## 2022-09-16 PROCEDURE — 3078F PR MOST RECENT DIASTOLIC BLOOD PRESSURE < 80 MM HG: ICD-10-PCS | Mod: CPTII,GC,S$GLB, | Performed by: INTERNAL MEDICINE

## 2022-09-16 PROCEDURE — 1101F PT FALLS ASSESS-DOCD LE1/YR: CPT | Mod: CPTII,GC,S$GLB, | Performed by: INTERNAL MEDICINE

## 2022-09-16 PROCEDURE — 3288F FALL RISK ASSESSMENT DOCD: CPT | Mod: CPTII,GC,S$GLB, | Performed by: INTERNAL MEDICINE

## 2022-09-16 PROCEDURE — 1126F AMNT PAIN NOTED NONE PRSNT: CPT | Mod: CPTII,GC,S$GLB, | Performed by: INTERNAL MEDICINE

## 2022-09-16 PROCEDURE — 99999 PR PBB SHADOW E&M-EST. PATIENT-LVL III: ICD-10-PCS | Mod: PBBFAC,GC,, | Performed by: STUDENT IN AN ORGANIZED HEALTH CARE EDUCATION/TRAINING PROGRAM

## 2022-09-16 PROCEDURE — 99204 OFFICE O/P NEW MOD 45 MIN: CPT | Mod: GC,S$GLB,, | Performed by: INTERNAL MEDICINE

## 2022-09-16 PROCEDURE — 1159F PR MEDICATION LIST DOCUMENTED IN MEDICAL RECORD: ICD-10-PCS | Mod: CPTII,GC,S$GLB, | Performed by: INTERNAL MEDICINE

## 2022-09-16 PROCEDURE — 99999 PR PBB SHADOW E&M-EST. PATIENT-LVL III: CPT | Mod: PBBFAC,GC,, | Performed by: STUDENT IN AN ORGANIZED HEALTH CARE EDUCATION/TRAINING PROGRAM

## 2022-09-16 PROCEDURE — 3078F DIAST BP <80 MM HG: CPT | Mod: CPTII,GC,S$GLB, | Performed by: INTERNAL MEDICINE

## 2022-09-16 PROCEDURE — 99204 PR OFFICE/OUTPT VISIT, NEW, LEVL IV, 45-59 MIN: ICD-10-PCS | Mod: GC,S$GLB,, | Performed by: INTERNAL MEDICINE

## 2022-09-16 PROCEDURE — 3074F SYST BP LT 130 MM HG: CPT | Mod: CPTII,GC,S$GLB, | Performed by: INTERNAL MEDICINE

## 2022-09-16 PROCEDURE — 3288F PR FALLS RISK ASSESSMENT DOCUMENTED: ICD-10-PCS | Mod: CPTII,GC,S$GLB, | Performed by: INTERNAL MEDICINE

## 2022-09-16 PROCEDURE — 1126F PR PAIN SEVERITY QUANTIFIED, NO PAIN PRESENT: ICD-10-PCS | Mod: CPTII,GC,S$GLB, | Performed by: INTERNAL MEDICINE

## 2022-09-16 NOTE — TELEPHONE ENCOUNTER
----- Message from Charisse Warren sent at 9/16/2022  3:20 PM CDT -----  Contact: self [phone]  Pt stated was released from the hospital and need some test order requesting a call to see if pcp can order the test she needs.    Please call and advise

## 2022-09-16 NOTE — PROGRESS NOTES
81 year old woman with PRESTON COPD.    Currently prescribed Breo.  No recent exacerbations.  Current smoker. No interest in quitting.  Presents to follow up an abnormal CT Chest.   Imaging reviewed.  CT Chest from 2022 looks stable to improved when compared to 2021 images.  Recommend continued annual CT imaging for lung cancer screening.   Advise switching from Breo (LABA-ICS) to Anoro (LABA-LAMA) if insurance permits.

## 2022-09-16 NOTE — PROGRESS NOTES
[facility]    SUBJECTIVE:     Chief Complaint: Multiple Pulmonary Nodules, mucus plug    History of Present Illness:  Sangita Logan is a 81 y.o. female with COPD (PFT 2/20219, FEV1 58%), current lifelong smoker, EtOH use disorder, HTN, who presents for pulmonary clinic to evaluate multiple pulmonary nodules. She had a   CT chest in 12/2021 that at the time showed numerous bilateral pulmonary nodules and a few cysts and on follow up, had a CT chest from 5/2022 which appeared stable and w/o progression in size of the nodules. Smokes ~3-4 cigarrettes daily, not on oxygen. She has a good functional quality of life and can walk up a flight of stairs w/o dyspnea. She does not wish to quit. She endorses compliance to her breo and has not been intubated previously and denies regular use of albuterol relief inhaler. Currently, she is w/o dyspnea, does have an occasional, non productive cough, but denies weight loss, fevers, chills, chest pain. I inquired if she had any antecedent URI like symptoms or sick contacts if she could remember in 12/2021 and she denied. Currently she is w/o complaint.      Review of patient's allergies indicates:   Allergen Reactions    No known drug allergies        Past Medical History:   Diagnosis Date    COPD (chronic obstructive pulmonary disease)     HTN (hypertension) 10/21/2021    Osteoporosis     Sinus disease      Past Surgical History:   Procedure Laterality Date    AUGMENTATION OF BREAST Bilateral     received at age 23, removed at age 76    Breast implants removal  2012    SINUS SURGERY      TONSILLECTOMY      Age 1     Family History   Problem Relation Age of Onset    No Known Problems Mother     No Known Problems Father     Breast cancer Sister         dx in 30s    Blindness Neg Hx     Glaucoma Neg Hx     Macular degeneration Neg Hx     Retinal detachment Neg Hx      Social History     Socioeconomic History    Marital status:    Tobacco Use     Smoking status: Every Day     Packs/day: 0.50     Years: 30.00     Pack years: 15.00     Types: Cigarettes    Smokeless tobacco: Never    Tobacco comments:     2 packs a week   Substance and Sexual Activity    Alcohol use: Yes     Alcohol/week: 3.0 standard drinks     Types: 3 Glasses of wine per week     Comment: 4 times weekly    Drug use: Yes     Types: Marijuana       Review of Systems:  ROS  CV: no syncope  ENT: no sore throat  Resp: per hpi  Eyes: no eye pain  Gastrointestinal: no nausea or vomiting  Integument/Breast: no rash  Musculoskeletal: no arthralgias  Neurological: no headaches  Behavioral/Psych: no confusion or depression  Heme: no bleeding      OBJECTIVE:     Vital Signs  There were no vitals filed for this visit.  There is no height or weight on file to calculate BMI.    Physical Exam:  Physical Exam  General: no distress  Eyes:  conjunctivae/corneas clear  Nose: no discharge  Neck: trachea midline with no masses appreciated  Lungs:  normal respiratory effort, no wheezes, no rales  Heart: regular rate and rhythm and no murmur  Abdomen: non-distended  Extremities: no cyanosis, no edema, no clubbing  Skin: No rashes or lesions. good skin turgor  Neurologic: alert, oriented, thought content appropriate    Laboratory:  CBC  Lab Results   Component Value Date    WBC 5.80 09/13/2022    HGB 15.0 09/13/2022    HCT 42.7 09/13/2022     (L) 09/13/2022    MCV 97 09/13/2022    RDW 12.9 09/13/2022     BMP  Lab Results   Component Value Date     (L) 09/13/2022    K 4.3 09/13/2022     09/13/2022    CO2 26 09/13/2022    BUN 7 (L) 09/13/2022    CREATININE 0.7 09/13/2022     (H) 09/13/2022    CALCIUM 8.6 (L) 09/13/2022    MG 2.0 12/10/2021    PHOS 3.5 01/21/2022     LFTs  Lab Results   Component Value Date    PROT 7.0 09/13/2022    ALBUMIN 3.4 (L) 09/13/2022    BILITOT 1.2 (H) 09/13/2022    AST 45 (H) 09/13/2022    ALKPHOS 216 (H) 09/13/2022    ALT 26 09/13/2022       PFT                       2.2019                  FEV1/FVC  58            FEV1          1.12 (58%)           FVC            1.93 (75%)                  DLCO         84%                    My interpretation (most recent): Moderate obstruction. Normal DLCO uncorrected for Hgb.     2.2019 6MWT:  Patient ambulated 396.2 meters  Baseline SpO2 97% desaturated to 98%  SpO2 recovered to 98% with 0LPM nasal cannula  Rony Scale - Legs 0 Breathing 2    Chest Imaging, My Impression:   I have reviewed the CT chest from 12/2021 and 5/2022    Diagnostic Results:  CT: Reviewed    ASSESSMENT/PLAN:     No problems updated.  Problem List Items Addressed This Visit          Pulmonary    COPD, group A, by GOLD 2017 classification - Primary    Overview     PFT 2.2019: FEV1 58% consistent with moderate obstruction, normal DLCO. Still smoking without desire to quit, ~10.4 pack year (0.15 packs x 69 yrs).   - Needs to stop smoking  - Does not need O2 yet  - Needs repeat PFTs, ordered  - Would switch to LABA/LAMA as she has not had any recent exacerbations and doesn't need ICS.   - Stop breo, start anoro.   - Follow up in 1 year          Relevant Medications    umeclidinium-vilanteroL (ANORO ELLIPTA) 62.5-25 mcg/actuation DsDv    Multiple pulmonary nodules    Overview     First noted on CT chest in 12/2021, appears stable in 5/2022, uncertain etiology at present and too small to biopsy. Weight has been stable and nodules have not increased in size. Mostly centrilobular in distribution without many cysts if any, + centrilobular emphysema. Low concern for malignancy, considered smoking related ILD but this is less likely (RBILD male > female), DIP, very very atypical characteristic appearance for PLCH and this is not favored in differential (older, not irregular cysts of varying size). In any case, she adamantly refuses to quit smoking and does not want smoking cessation meds or counseling.  As this has been stable in appearance, recommend continued observation.           Current Assessment & Plan     - CT chest w/o in 1 year.          RTC in 1 year or sooner if needed.    TREVOR Mattson DO  [employer] [facility] Fellow

## 2022-09-16 NOTE — TELEPHONE ENCOUNTER
Spoke to patient and she stated that she went to the ED and she was told to ask her pcp for an upright lumbar spine xray.

## 2022-09-19 ENCOUNTER — HOSPITAL ENCOUNTER (OUTPATIENT)
Dept: PULMONOLOGY | Facility: CLINIC | Age: 82
Discharge: HOME | End: 2022-09-19
Payer: MEDICARE

## 2022-09-19 DIAGNOSIS — J44.9 CHRONIC OBSTRUCTIVE PULMONARY DISEASE, UNSPECIFIED COPD TYPE: Chronic | ICD-10-CM

## 2022-09-19 PROCEDURE — 94729 DIFFUSING CAPACITY: CPT | Mod: S$GLB,,, | Performed by: INTERNAL MEDICINE

## 2022-09-19 PROCEDURE — 94729 PR C02/MEMBANE DIFFUSE CAPACITY: ICD-10-PCS | Mod: S$GLB,,, | Performed by: INTERNAL MEDICINE

## 2022-09-19 PROCEDURE — 94727 PR PULM FUNCTION TEST BY GAS: ICD-10-PCS | Mod: S$GLB,,, | Performed by: INTERNAL MEDICINE

## 2022-09-19 PROCEDURE — 94060 PR EVAL OF BRONCHOSPASM: ICD-10-PCS | Mod: S$GLB,,, | Performed by: INTERNAL MEDICINE

## 2022-09-19 PROCEDURE — 94060 EVALUATION OF WHEEZING: CPT | Mod: S$GLB,,, | Performed by: INTERNAL MEDICINE

## 2022-09-19 PROCEDURE — 94727 GAS DIL/WSHOT DETER LNG VOL: CPT | Mod: S$GLB,,, | Performed by: INTERNAL MEDICINE

## 2022-09-20 ENCOUNTER — TELEPHONE (OUTPATIENT)
Dept: PULMONOLOGY | Facility: CLINIC | Age: 82
End: 2022-09-20
Payer: MEDICARE

## 2022-09-20 NOTE — TELEPHONE ENCOUNTER
I spoke with Magali. Anoro is not covered under patient's plan. Preferred alternative is Bevespi or Sprivia. Please sent alternative to patient's pharmacy patient is out of medication.

## 2022-09-20 NOTE — TELEPHONE ENCOUNTER
----- Message from Rebecca Valenzuela MA sent at 9/20/2022  3:14 PM CDT -----  Regarding: change mediaction  Contact: Sangita Alexander is requesting a call back from staff in regards to a prescription that was supposed to be sent over on yesterday. Pt stated Dr. Mattson was going to change her inhaler. Pt is currently out of medication. Please call pt and advise.        Confirmed Contact below:  Contact Name:Sangita Logan  Phone Number: [phone]

## 2022-09-20 NOTE — TELEPHONE ENCOUNTER
----- Message from Radha Bermudez sent at 9/20/2022  4:20 PM CDT -----  Contact: [facility] [phone]  Pharmacy is calling to clarify an RX.  RX name:  umeclidinium-vilanteroL (ANORO ELLIPTA) 62.5-25 mcg/actuation DsDv  What do they need to clarify:  An Alternative will be needed PA not authorized   Comments:

## 2022-09-20 NOTE — TELEPHONE ENCOUNTER
I spoke with patient and pharmacy. [facility] will fill patient's Anoro and patient will go  before 7pm, today. Patient verbalized understanding.

## 2022-09-21 ENCOUNTER — TELEPHONE (OUTPATIENT)
Dept: NEUROSURGERY | Facility: CLINIC | Age: 82
End: 2022-09-21
Payer: MEDICARE

## 2022-09-21 DIAGNOSIS — S32.049A CLOSED FRACTURE OF FOURTH LUMBAR VERTEBRA, UNSPECIFIED FRACTURE MORPHOLOGY, INITIAL ENCOUNTER: Primary | ICD-10-CM

## 2022-09-21 NOTE — TELEPHONE ENCOUNTER
----- Message from Vanesa Gtz MA sent at 9/14/2022  6:37 PM CDT -----    ----- Message -----  From: Rubia Pedraza MD  Sent: 9/13/2022   7:34 PM CDT  To: [facility] Staff    F/u in 4 weeks with rpt T/L spine xrays in [facility] clinic

## 2022-10-10 ENCOUNTER — TELEPHONE (OUTPATIENT)
Dept: NEUROSURGERY | Facility: CLINIC | Age: 82
End: 2022-10-10
Payer: MEDICARE

## 2022-10-10 NOTE — TELEPHONE ENCOUNTER
Lm reminding pt of appt scheduled tomorrow with Winter Salvador at 10am at Neurosurgery clinic. Informed she has imaging scheduled at the imaging center before the appt at 9:15am and 9:30am. Requested call back to confirm appt date and time.

## 2022-10-28 ENCOUNTER — TELEPHONE (OUTPATIENT)
Dept: ELECTROPHYSIOLOGY | Facility: CLINIC | Age: 82
End: 2022-10-28
Payer: MEDICARE

## 2022-10-28 NOTE — TELEPHONE ENCOUNTER
Called patient to reschedule her appointment with Dr. Garcia from 11/28/22. Patient did not answer. I left a message with my call back number.

## 2022-10-31 ENCOUNTER — TELEPHONE (OUTPATIENT)
Dept: ELECTROPHYSIOLOGY | Facility: CLINIC | Age: 82
End: 2022-10-31
Payer: MEDICARE

## 2022-10-31 NOTE — TELEPHONE ENCOUNTER
Called patient to reschedule appointment with Dr. Garcia from 11/28/22. Patient rescheduled to 11/4/22. Patient agreed to appointment.

## 2022-11-03 ENCOUNTER — TELEPHONE (OUTPATIENT)
Dept: ELECTROPHYSIOLOGY | Facility: CLINIC | Age: 82
End: 2022-11-03
Payer: MEDICARE

## 2022-11-03 NOTE — TELEPHONE ENCOUNTER
Called pt to confirm appointment with Dr. Garcia tomorrow. No answer, left a message with this information and call back #.

## 2022-12-14 ENCOUNTER — OFFICE VISIT (OUTPATIENT)
Dept: PRIMARY CARE CLINIC | Facility: CLINIC | Age: 82
End: 2022-12-14
Payer: MEDICARE

## 2022-12-14 VITALS
HEIGHT: 64 IN | DIASTOLIC BLOOD PRESSURE: 78 MMHG | SYSTOLIC BLOOD PRESSURE: 128 MMHG | OXYGEN SATURATION: 98 % | BODY MASS INDEX: 17.93 KG/M2 | TEMPERATURE: 97 F | WEIGHT: 105.06 LBS | HEART RATE: 99 BPM | RESPIRATION RATE: 18 BRPM

## 2022-12-14 DIAGNOSIS — I10 BENIGN ESSENTIAL HTN: ICD-10-CM

## 2022-12-14 DIAGNOSIS — R06.2 WHEEZING: ICD-10-CM

## 2022-12-14 DIAGNOSIS — R53.1 WEAKNESS: Primary | ICD-10-CM

## 2022-12-14 DIAGNOSIS — R53.83 FATIGUE, UNSPECIFIED TYPE: ICD-10-CM

## 2022-12-14 DIAGNOSIS — E87.1 HYPONATREMIA: ICD-10-CM

## 2022-12-14 DIAGNOSIS — Z23 NEED FOR VACCINATION: ICD-10-CM

## 2022-12-14 DIAGNOSIS — R41.3 OTHER AMNESIA: ICD-10-CM

## 2022-12-14 DIAGNOSIS — J44.9 COPD, GROUP A, BY GOLD 2017 CLASSIFICATION: ICD-10-CM

## 2022-12-14 PROCEDURE — 1101F PR PT FALLS ASSESS DOC 0-1 FALLS W/OUT INJ PAST YR: ICD-10-PCS | Mod: CPTII,S$GLB,, | Performed by: STUDENT IN AN ORGANIZED HEALTH CARE EDUCATION/TRAINING PROGRAM

## 2022-12-14 PROCEDURE — 1101F PT FALLS ASSESS-DOCD LE1/YR: CPT | Mod: CPTII,S$GLB,, | Performed by: STUDENT IN AN ORGANIZED HEALTH CARE EDUCATION/TRAINING PROGRAM

## 2022-12-14 PROCEDURE — 90677 PCV20 VACCINE IM: CPT | Mod: S$GLB,,, | Performed by: STUDENT IN AN ORGANIZED HEALTH CARE EDUCATION/TRAINING PROGRAM

## 2022-12-14 PROCEDURE — 1126F AMNT PAIN NOTED NONE PRSNT: CPT | Mod: CPTII,S$GLB,, | Performed by: STUDENT IN AN ORGANIZED HEALTH CARE EDUCATION/TRAINING PROGRAM

## 2022-12-14 PROCEDURE — 1160F RVW MEDS BY RX/DR IN RCRD: CPT | Mod: CPTII,S$GLB,, | Performed by: STUDENT IN AN ORGANIZED HEALTH CARE EDUCATION/TRAINING PROGRAM

## 2022-12-14 PROCEDURE — 1160F PR REVIEW ALL MEDS BY PRESCRIBER/CLIN PHARMACIST DOCUMENTED: ICD-10-PCS | Mod: CPTII,S$GLB,, | Performed by: STUDENT IN AN ORGANIZED HEALTH CARE EDUCATION/TRAINING PROGRAM

## 2022-12-14 PROCEDURE — 3288F PR FALLS RISK ASSESSMENT DOCUMENTED: ICD-10-PCS | Mod: CPTII,S$GLB,, | Performed by: STUDENT IN AN ORGANIZED HEALTH CARE EDUCATION/TRAINING PROGRAM

## 2022-12-14 PROCEDURE — 99214 OFFICE O/P EST MOD 30 MIN: CPT | Mod: 25,S$GLB,, | Performed by: STUDENT IN AN ORGANIZED HEALTH CARE EDUCATION/TRAINING PROGRAM

## 2022-12-14 PROCEDURE — 99214 PR OFFICE/OUTPT VISIT, EST, LEVL IV, 30-39 MIN: ICD-10-PCS | Mod: 25,S$GLB,, | Performed by: STUDENT IN AN ORGANIZED HEALTH CARE EDUCATION/TRAINING PROGRAM

## 2022-12-14 PROCEDURE — 3288F FALL RISK ASSESSMENT DOCD: CPT | Mod: CPTII,S$GLB,, | Performed by: STUDENT IN AN ORGANIZED HEALTH CARE EDUCATION/TRAINING PROGRAM

## 2022-12-14 PROCEDURE — 1159F MED LIST DOCD IN RCRD: CPT | Mod: CPTII,S$GLB,, | Performed by: STUDENT IN AN ORGANIZED HEALTH CARE EDUCATION/TRAINING PROGRAM

## 2022-12-14 PROCEDURE — 1126F PR PAIN SEVERITY QUANTIFIED, NO PAIN PRESENT: ICD-10-PCS | Mod: CPTII,S$GLB,, | Performed by: STUDENT IN AN ORGANIZED HEALTH CARE EDUCATION/TRAINING PROGRAM

## 2022-12-14 PROCEDURE — 3078F PR MOST RECENT DIASTOLIC BLOOD PRESSURE < 80 MM HG: ICD-10-PCS | Mod: CPTII,S$GLB,, | Performed by: STUDENT IN AN ORGANIZED HEALTH CARE EDUCATION/TRAINING PROGRAM

## 2022-12-14 PROCEDURE — 99999 PR PBB SHADOW E&M-EST. PATIENT-LVL IV: ICD-10-PCS | Mod: PBBFAC,,, | Performed by: STUDENT IN AN ORGANIZED HEALTH CARE EDUCATION/TRAINING PROGRAM

## 2022-12-14 PROCEDURE — 90677 PNEUMOCOCCAL CONJUGATE VACCINE 20-VALENT: ICD-10-PCS | Mod: S$GLB,,, | Performed by: STUDENT IN AN ORGANIZED HEALTH CARE EDUCATION/TRAINING PROGRAM

## 2022-12-14 PROCEDURE — 3074F SYST BP LT 130 MM HG: CPT | Mod: CPTII,S$GLB,, | Performed by: STUDENT IN AN ORGANIZED HEALTH CARE EDUCATION/TRAINING PROGRAM

## 2022-12-14 PROCEDURE — 3078F DIAST BP <80 MM HG: CPT | Mod: CPTII,S$GLB,, | Performed by: STUDENT IN AN ORGANIZED HEALTH CARE EDUCATION/TRAINING PROGRAM

## 2022-12-14 PROCEDURE — 1159F PR MEDICATION LIST DOCUMENTED IN MEDICAL RECORD: ICD-10-PCS | Mod: CPTII,S$GLB,, | Performed by: STUDENT IN AN ORGANIZED HEALTH CARE EDUCATION/TRAINING PROGRAM

## 2022-12-14 PROCEDURE — G0009 ADMIN PNEUMOCOCCAL VACCINE: HCPCS | Mod: S$GLB,,, | Performed by: STUDENT IN AN ORGANIZED HEALTH CARE EDUCATION/TRAINING PROGRAM

## 2022-12-14 PROCEDURE — 3074F PR MOST RECENT SYSTOLIC BLOOD PRESSURE < 130 MM HG: ICD-10-PCS | Mod: CPTII,S$GLB,, | Performed by: STUDENT IN AN ORGANIZED HEALTH CARE EDUCATION/TRAINING PROGRAM

## 2022-12-14 PROCEDURE — G0009 PNEUMOCOCCAL CONJUGATE VACCINE 20-VALENT: ICD-10-PCS | Mod: S$GLB,,, | Performed by: STUDENT IN AN ORGANIZED HEALTH CARE EDUCATION/TRAINING PROGRAM

## 2022-12-14 PROCEDURE — 99999 PR PBB SHADOW E&M-EST. PATIENT-LVL IV: CPT | Mod: PBBFAC,,, | Performed by: STUDENT IN AN ORGANIZED HEALTH CARE EDUCATION/TRAINING PROGRAM

## 2022-12-14 RX ORDER — AMLODIPINE BESYLATE 5 MG/1
5 TABLET ORAL DAILY
Qty: 30 TABLET | Refills: 3 | Status: SHIPPED | OUTPATIENT
Start: 2022-12-14 | End: 2022-12-14

## 2022-12-14 RX ORDER — CARVEDILOL 3.12 MG/1
3.12 TABLET ORAL 2 TIMES DAILY
Qty: 30 TABLET | Refills: 11 | Status: SHIPPED | OUTPATIENT
Start: 2022-12-14 | End: 2023-02-22

## 2022-12-14 RX ORDER — ALBUTEROL SULFATE 0.83 MG/ML
2.5 SOLUTION RESPIRATORY (INHALATION) EVERY 6 HOURS PRN
Qty: 90 ML | Refills: 5 | Status: SHIPPED | OUTPATIENT
Start: 2022-12-14 | End: 2023-12-22

## 2022-12-14 NOTE — PROGRESS NOTES
"Subjective:       Patient ID: Sangita Logan is a 81 y.o. female.    Chief Complaint: Follow-up (6 month f/u)      HPI:  81 y.o. female presents to [facility] for 6 month follow-up visit    Acute concerns?: Patient feels that fatigue has been persistent since we last talked in clinic. 2 1/2 weeks has been avoiding alcohol all together. Mostly feels weak when up and ambulating. Feels fine at rest. Patient has been off of BP medications past few weeks and no improvement with strength level.    Lived independently on her own. Still gets all ADLs accomplised on her own without intervention.    Step-son present reports she has been stable with eating. Has been having a little hard liquor over week.    Some difficulty with quickly remembering names.    Current smoker?: Yes, minimal at this time  Son reports patient has not been drinking alcohol as had in past. Some over weekend.    Last tetanus vaccine?: Amenable  Pneumococcal vaccine?: Amenable      Review of Systems   Constitutional:  Negative for chills, diaphoresis, fatigue and fever.   HENT:  Negative for congestion, sinus pressure, sneezing and sore throat.    Respiratory:  Positive for shortness of breath (With history of COPD. No worsenign recently). Negative for cough.    Cardiovascular:  Negative for chest pain and palpitations.   Gastrointestinal:  Negative for abdominal pain, diarrhea, nausea and vomiting.   Skin:  Negative for rash and wound.   Neurological:  Positive for weakness. Negative for dizziness, light-headedness and headaches.     Objective:      Vitals:    12/14/22 1440   BP: 128/78   BP Location: Left arm   Patient Position: Sitting   BP Method: Medium (Manual)   Pulse: 99   Resp: 18   Temp: 97.2 °F (36.2 °C)   TempSrc: Temporal   SpO2: 98%   Weight: 47.7 kg (105 lb 0.8 oz)   Height: 5' 4" (1.626 m)     Physical Exam  Vitals reviewed.   Constitutional:       General: She is not in acute distress.     Appearance: Normal appearance. She " is not ill-appearing.   HENT:      Head: Normocephalic and atraumatic.      Mouth/Throat:      Mouth: Mucous membranes are moist.      Pharynx: Oropharynx is clear.   Eyes:      General:         Right eye: No discharge.         Left eye: No discharge.   Cardiovascular:      Rate and Rhythm: Normal rate and regular rhythm.      Pulses: Normal pulses.      Heart sounds: No murmur heard.  Pulmonary:      Effort: Pulmonary effort is normal.      Breath sounds: Normal breath sounds.   Abdominal:      Palpations: Abdomen is soft.      Tenderness: There is no abdominal tenderness.   Musculoskeletal:         General: No deformity.   Skin:     General: Skin is warm and dry.      Coloration: Skin is not jaundiced.   Neurological:      General: No focal deficit present.      Mental Status: She is alert and oriented to person, place, and time.   Psychiatric:         Mood and Affect: Mood normal.         Behavior: Behavior normal.           Lab Results   Component Value Date     (L) 09/13/2022    K 4.3 09/13/2022     09/13/2022    CO2 26 09/13/2022    BUN 7 (L) 09/13/2022    CREATININE 0.7 09/13/2022    ANIONGAP 5 (L) 09/13/2022     No results found for: HGBA1C  Lab Results   Component Value Date    BNP 59 09/13/2022       Lab Results   Component Value Date    WBC 5.80 09/13/2022    HGB 15.0 09/13/2022    HCT 42.7 09/13/2022    HCT 30 (L) 11/07/2021     (L) 09/13/2022    GRAN 2.4 09/13/2022    GRAN 41.9 09/13/2022     Lab Results   Component Value Date    CHOL 170 05/11/2021    HDL 55 05/11/2021    LDLCALC 98.2 05/11/2021    TRIG 84 05/11/2021          Current Outpatient Medications:     albuterol (PROVENTIL/VENTOLIN HFA) 90 mcg/actuation inhaler, Inhale 1-2 puffs into the lungs every 6 (six) hours as needed for Wheezing. Rescue, Disp: 18 g, Rfl: 0    multivitamin-Ca-iron-minerals (MULTIPLE VITAMIN, WOMENS) Tab, Take by mouth., Disp: , Rfl:     umeclidinium-vilanteroL (ANORO ELLIPTA) 62.5-25 mcg/actuation  DsDv, Inhale 1 puff into the lungs once daily. Controller, Disp: 60 each, Rfl: 11    acetaminophen (TYLENOL) 500 MG tablet, Take 2 tablets (1,000 mg total) by mouth every 6 (six) hours as needed for Pain. (Patient not taking: Reported on 12/14/2022), Disp: 40 tablet, Rfl: 0    albuterol (PROVENTIL) 2.5 mg /3 mL (0.083 %) nebulizer solution, Take 3 mLs (2.5 mg total) by nebulization every 6 (six) hours as needed for Wheezing. Rescue, Disp: 90 mL, Rfl: 5    carvediloL (COREG) 3.125 MG tablet, Take 1 tablet (3.125 mg total) by mouth 2 (two) times daily., Disp: 30 tablet, Rfl: 11    diphth,pertus,acell,,tetanus (BOOSTRIX) 2.5-8-5 Lf-mcg-Lf/0.5mL Susp, Inject 0.5 mLs into the muscle once. for 1 dose, Disp: 0.5 mL, Rfl: 0        Assessment:       1. Weakness    2. Fatigue, unspecified type    3. Benign essential HTN    4. Hyponatremia    5. Other amnesia    6. Need for vaccination    7. Wheezing    8. COPD, group A, by GOLD 2017 classification           Plan:       Weakness  Fatigue, unspecified type  Hyponatremia  -     Comprehensive Metabolic Panel; Future; Expected date: 12/14/2022  -     carvediloL (COREG) 3.125 MG tablet; Take 1 tablet (3.125 mg total) by mouth 2 (two) times daily.  Dispense: 30 tablet; Refill: 11  -     Comprehensive Metabolic Panel; Future; Expected date: 12/14/2022  -     Ambulatory referral/consult to Physical/Occupational Therapy; Future; Expected date: 12/21/2022  -     TSH; Future; Expected date: 12/14/2022  -     VITAMIN B12; Future; Expected date: 12/14/2022  -     VITAMIN B1; Future; Expected date: 12/14/2022  -     Ambulatory referral/consult to Physical/Occupational Therapy; Future; Expected date: 12/21/2022  - Will attempt PT for strength training  - Discussed benefit of 1 glass of wine daily, but don't recommend more than that until work-up of current weakness addressed with history of alcohol use    Benign essential HTN  -     Discontinue: amLODIPine (NORVASC) 5 MG tablet; Take 1  tablet (5 mg total) by mouth once daily.  Dispense: 30 tablet; Refill: 3  -     carvediloL (COREG) 3.125 MG tablet; Take 1 tablet (3.125 mg total) by mouth 2 (two) times daily.  Dispense: 30 tablet; Refill: 11    Other amnesia  -     VITAMIN B12; Future; Expected date: 12/14/2022    Need for vaccination  -     diphth,pertus,acell,,tetanus (BOOSTRIX) 2.5-8-5 Lf-mcg-Lf/0.5mL Susp; Inject 0.5 mLs into the muscle once. for 1 dose  Dispense: 0.5 mL; Refill: 0  -     (In Office Administered) Pneumococcal Conjugate Vaccine (20 Valent) (IM)    Wheezing  -     NEBULIZER KIT (SUPPLIES) FOR HOME USE  -     albuterol (PROVENTIL) 2.5 mg /3 mL (0.083 %) nebulizer solution; Take 3 mLs (2.5 mg total) by nebulization every 6 (six) hours as needed for Wheezing. Rescue  Dispense: 90 mL; Refill: 5    COPD, group A, by GOLD 2017 classification  -     NEBULIZER KIT (SUPPLIES) FOR HOME USE  -     albuterol (PROVENTIL) 2.5 mg /3 mL (0.083 %) nebulizer solution; Take 3 mLs (2.5 mg total) by nebulization every 6 (six) hours as needed for Wheezing. Rescue  Dispense: 90 mL; Refill: 5    RTC in 6 weeks

## 2023-02-07 DIAGNOSIS — Z00.00 ENCOUNTER FOR MEDICARE ANNUAL WELLNESS EXAM: ICD-10-CM

## 2023-02-09 DIAGNOSIS — Z00.00 ENCOUNTER FOR MEDICARE ANNUAL WELLNESS EXAM: ICD-10-CM

## 2023-02-22 ENCOUNTER — OFFICE VISIT (OUTPATIENT)
Dept: PRIMARY CARE CLINIC | Facility: CLINIC | Age: 83
End: 2023-02-22
Payer: MEDICARE

## 2023-02-22 VITALS
OXYGEN SATURATION: 96 % | WEIGHT: 105.06 LBS | DIASTOLIC BLOOD PRESSURE: 68 MMHG | BODY MASS INDEX: 17.93 KG/M2 | HEART RATE: 100 BPM | HEIGHT: 64 IN | SYSTOLIC BLOOD PRESSURE: 122 MMHG | RESPIRATION RATE: 18 BRPM | TEMPERATURE: 98 F

## 2023-02-22 DIAGNOSIS — D69.6 THROMBOCYTOPENIA, UNSPECIFIED: ICD-10-CM

## 2023-02-22 DIAGNOSIS — J44.9 COPD, GROUP A, BY GOLD 2017 CLASSIFICATION: ICD-10-CM

## 2023-02-22 DIAGNOSIS — I10 BENIGN ESSENTIAL HTN: ICD-10-CM

## 2023-02-22 DIAGNOSIS — R94.4 DECREASED GFR: ICD-10-CM

## 2023-02-22 DIAGNOSIS — I74.10 AORTIC MURAL THROMBUS: ICD-10-CM

## 2023-02-22 DIAGNOSIS — I47.10 SVT (SUPRAVENTRICULAR TACHYCARDIA): Primary | ICD-10-CM

## 2023-02-22 DIAGNOSIS — R53.83 FATIGUE, UNSPECIFIED TYPE: ICD-10-CM

## 2023-02-22 PROCEDURE — 99999 PR PBB SHADOW E&M-EST. PATIENT-LVL V: ICD-10-PCS | Mod: PBBFAC,HCNC,, | Performed by: STUDENT IN AN ORGANIZED HEALTH CARE EDUCATION/TRAINING PROGRAM

## 2023-02-22 PROCEDURE — 1101F PR PT FALLS ASSESS DOC 0-1 FALLS W/OUT INJ PAST YR: ICD-10-PCS | Mod: HCNC,CPTII,S$GLB, | Performed by: STUDENT IN AN ORGANIZED HEALTH CARE EDUCATION/TRAINING PROGRAM

## 2023-02-22 PROCEDURE — 3078F PR MOST RECENT DIASTOLIC BLOOD PRESSURE < 80 MM HG: ICD-10-PCS | Mod: HCNC,CPTII,S$GLB, | Performed by: STUDENT IN AN ORGANIZED HEALTH CARE EDUCATION/TRAINING PROGRAM

## 2023-02-22 PROCEDURE — 3074F SYST BP LT 130 MM HG: CPT | Mod: HCNC,CPTII,S$GLB, | Performed by: STUDENT IN AN ORGANIZED HEALTH CARE EDUCATION/TRAINING PROGRAM

## 2023-02-22 PROCEDURE — 1159F PR MEDICATION LIST DOCUMENTED IN MEDICAL RECORD: ICD-10-PCS | Mod: HCNC,CPTII,S$GLB, | Performed by: STUDENT IN AN ORGANIZED HEALTH CARE EDUCATION/TRAINING PROGRAM

## 2023-02-22 PROCEDURE — 3078F DIAST BP <80 MM HG: CPT | Mod: HCNC,CPTII,S$GLB, | Performed by: STUDENT IN AN ORGANIZED HEALTH CARE EDUCATION/TRAINING PROGRAM

## 2023-02-22 PROCEDURE — 1160F PR REVIEW ALL MEDS BY PRESCRIBER/CLIN PHARMACIST DOCUMENTED: ICD-10-PCS | Mod: HCNC,CPTII,S$GLB, | Performed by: STUDENT IN AN ORGANIZED HEALTH CARE EDUCATION/TRAINING PROGRAM

## 2023-02-22 PROCEDURE — 1126F PR PAIN SEVERITY QUANTIFIED, NO PAIN PRESENT: ICD-10-PCS | Mod: HCNC,CPTII,S$GLB, | Performed by: STUDENT IN AN ORGANIZED HEALTH CARE EDUCATION/TRAINING PROGRAM

## 2023-02-22 PROCEDURE — 99999 PR PBB SHADOW E&M-EST. PATIENT-LVL V: CPT | Mod: PBBFAC,HCNC,, | Performed by: STUDENT IN AN ORGANIZED HEALTH CARE EDUCATION/TRAINING PROGRAM

## 2023-02-22 PROCEDURE — 99214 PR OFFICE/OUTPT VISIT, EST, LEVL IV, 30-39 MIN: ICD-10-PCS | Mod: HCNC,S$GLB,, | Performed by: STUDENT IN AN ORGANIZED HEALTH CARE EDUCATION/TRAINING PROGRAM

## 2023-02-22 PROCEDURE — 3288F PR FALLS RISK ASSESSMENT DOCUMENTED: ICD-10-PCS | Mod: HCNC,CPTII,S$GLB, | Performed by: STUDENT IN AN ORGANIZED HEALTH CARE EDUCATION/TRAINING PROGRAM

## 2023-02-22 PROCEDURE — 1159F MED LIST DOCD IN RCRD: CPT | Mod: HCNC,CPTII,S$GLB, | Performed by: STUDENT IN AN ORGANIZED HEALTH CARE EDUCATION/TRAINING PROGRAM

## 2023-02-22 PROCEDURE — 1160F RVW MEDS BY RX/DR IN RCRD: CPT | Mod: HCNC,CPTII,S$GLB, | Performed by: STUDENT IN AN ORGANIZED HEALTH CARE EDUCATION/TRAINING PROGRAM

## 2023-02-22 PROCEDURE — 99214 OFFICE O/P EST MOD 30 MIN: CPT | Mod: HCNC,S$GLB,, | Performed by: STUDENT IN AN ORGANIZED HEALTH CARE EDUCATION/TRAINING PROGRAM

## 2023-02-22 PROCEDURE — 3074F PR MOST RECENT SYSTOLIC BLOOD PRESSURE < 130 MM HG: ICD-10-PCS | Mod: HCNC,CPTII,S$GLB, | Performed by: STUDENT IN AN ORGANIZED HEALTH CARE EDUCATION/TRAINING PROGRAM

## 2023-02-22 PROCEDURE — 3288F FALL RISK ASSESSMENT DOCD: CPT | Mod: HCNC,CPTII,S$GLB, | Performed by: STUDENT IN AN ORGANIZED HEALTH CARE EDUCATION/TRAINING PROGRAM

## 2023-02-22 PROCEDURE — 1126F AMNT PAIN NOTED NONE PRSNT: CPT | Mod: HCNC,CPTII,S$GLB, | Performed by: STUDENT IN AN ORGANIZED HEALTH CARE EDUCATION/TRAINING PROGRAM

## 2023-02-22 PROCEDURE — 1101F PT FALLS ASSESS-DOCD LE1/YR: CPT | Mod: HCNC,CPTII,S$GLB, | Performed by: STUDENT IN AN ORGANIZED HEALTH CARE EDUCATION/TRAINING PROGRAM

## 2023-02-22 RX ORDER — TETANUS TOXOID, REDUCED DIPHTHERIA TOXOID AND ACELLULAR PERTUSSIS VACCINE, ADSORBED 5; 2.5; 8; 8; 2.5 [IU]/.5ML; [IU]/.5ML; UG/.5ML; UG/.5ML; UG/.5ML
SUSPENSION INTRAMUSCULAR
COMMUNITY
Start: 2022-12-18 | End: 2023-02-22

## 2023-02-22 RX ORDER — CARVEDILOL 6.25 MG/1
6.25 TABLET ORAL 2 TIMES DAILY WITH MEALS
Qty: 180 TABLET | Refills: 3 | Status: SHIPPED | OUTPATIENT
Start: 2023-02-22 | End: 2023-11-24

## 2023-02-22 RX ORDER — INFLUENZA A VIRUS A/VICTORIA/2570/2019 IVR-215 (H1N1) ANTIGEN (FORMALDEHYDE INACTIVATED), INFLUENZA A VIRUS A/DARWIN/6/2021 IVR-227 (H3N2) ANTIGEN (FORMALDEHYDE INACTIVATED), INFLUENZA B VIRUS B/AUSTRIA/1359417/2021 BVR-26 ANTIGEN (FORMALDEHYDE INACTIVATED), INFLUENZA B VIRUS B/PHUKET/3073/2013 BVR-1B ANTIGEN (FORMALDEHYDE INACTIVATED) 15; 15; 15; 15 UG/.5ML; UG/.5ML; UG/.5ML; UG/.5ML
INJECTION, SUSPENSION INTRAMUSCULAR
COMMUNITY
Start: 2022-11-15 | End: 2023-02-22

## 2023-02-22 RX ORDER — BNT162B2 ORIGINAL AND OMICRON BA.4/BA.5 .1125; .1125 MG/2.25ML; MG/2.25ML
INJECTION, SUSPENSION INTRAMUSCULAR
COMMUNITY
Start: 2022-11-15 | End: 2023-02-22

## 2023-02-22 RX ORDER — PNEUMOCOCCAL 20-VALENT CONJUGATE VACCINE 2.2; 2.2; 2.2; 2.2; 2.2; 2.2; 2.2; 2.2; 2.2; 2.2; 2.2; 2.2; 2.2; 2.2; 2.2; 2.2; 4.4; 2.2; 2.2; 2.2 UG/.5ML; UG/.5ML; UG/.5ML; UG/.5ML; UG/.5ML; UG/.5ML; UG/.5ML; UG/.5ML; UG/.5ML; UG/.5ML; UG/.5ML; UG/.5ML; UG/.5ML; UG/.5ML; UG/.5ML; UG/.5ML; UG/.5ML; UG/.5ML; UG/.5ML; UG/.5ML
INJECTION, SUSPENSION INTRAMUSCULAR
COMMUNITY
Start: 2022-12-14 | End: 2023-02-22

## 2023-02-22 NOTE — PROGRESS NOTES
"Subjective:       Patient ID: Sangita Logan is a 82 y.o. female.    Chief Complaint: Follow-up (Pt in for a follow-up)    HPI:  82 y.o. female presents to [facility] for follow-up visit    Patient reports she does still have some weak days. Can not related to anything she is doing in particular. Lasts through day. No changes in sleep when present.    Patient reports she was unaware that appointment with Dr. Garcia was missed for SVT, RBBB and monitoring. Would like rescheduled.    Review of Systems   Constitutional:  Positive for appetite change (Poor, feels that worsened since loss of sense of smell. Made eating work) and fatigue (Random days, can not associate with anything in particular). Negative for chills, diaphoresis, fever and unexpected weight change.   Respiratory:  Positive for shortness of breath (On occasion, not always. No limit on walking.). Negative for chest tightness.    Cardiovascular:  Negative for chest pain and palpitations.   Neurological:  Positive for weakness (Random days, can not associate with anything in particular).     Objective:      Vitals:    02/22/23 1434   BP: 122/68   BP Location: Right arm   Patient Position: Sitting   BP Method: Medium (Manual)   Pulse: (!) 115   Resp: 18   Temp: 97.7 °F (36.5 °C)   TempSrc: Temporal   SpO2: 96%   Weight: 47.7 kg (105 lb 0.8 oz)   Height: 5' 4" (1.626 m)     Physical Exam  Vitals reviewed.   Constitutional:       General: She is not in acute distress.     Appearance: Normal appearance. She is not ill-appearing.   HENT:      Head: Normocephalic and atraumatic.   Eyes:      General:         Right eye: No discharge.         Left eye: No discharge.      Conjunctiva/sclera: Conjunctivae normal.   Cardiovascular:      Rate and Rhythm: Regular rhythm. Tachycardia present.      Pulses: Normal pulses.      Heart sounds: Normal heart sounds. No murmur heard.  Pulmonary:      Effort: Pulmonary effort is normal.      Breath sounds: Normal " breath sounds.   Abdominal:      Palpations: Abdomen is soft.      Tenderness: There is no abdominal tenderness.   Musculoskeletal:         General: No deformity.   Skin:     General: Skin is warm and dry.      Coloration: Skin is not jaundiced.   Neurological:      General: No focal deficit present.      Mental Status: She is alert and oriented to person, place, and time.   Psychiatric:         Mood and Affect: Mood normal.         Behavior: Behavior normal.           Lab Results   Component Value Date     12/14/2022    K 4.8 12/14/2022     12/14/2022    CO2 26 12/14/2022    BUN 10 12/14/2022    CREATININE 1.2 12/14/2022    ANIONGAP 8 12/14/2022     No results found for: HGBA1C  Lab Results   Component Value Date    BNP 59 09/13/2022       Lab Results   Component Value Date    WBC 5.80 09/13/2022    HGB 15.0 09/13/2022    HCT 42.7 09/13/2022    HCT 30 (L) 11/07/2021     (L) 09/13/2022    GRAN 2.4 09/13/2022    GRAN 41.9 09/13/2022     Lab Results   Component Value Date    CHOL 170 05/11/2021    HDL 55 05/11/2021    LDLCALC 98.2 05/11/2021    TRIG 84 05/11/2021          Current Outpatient Medications:     acetaminophen (TYLENOL) 500 MG tablet, Take 2 tablets (1,000 mg total) by mouth every 6 (six) hours as needed for Pain., Disp: 40 tablet, Rfl: 0    albuterol (PROVENTIL) 2.5 mg /3 mL (0.083 %) nebulizer solution, Take 3 mLs (2.5 mg total) by nebulization every 6 (six) hours as needed for Wheezing. Rescue, Disp: 90 mL, Rfl: 5    albuterol (PROVENTIL/VENTOLIN HFA) 90 mcg/actuation inhaler, Inhale 1-2 puffs into the lungs every 6 (six) hours as needed for Wheezing. Rescue, Disp: 18 g, Rfl: 0    multivitamin-Ca-iron-minerals (MULTIPLE VITAMIN, WOMENS) Tab, Take by mouth., Disp: , Rfl:     carvediloL (COREG) 6.25 MG tablet, Take 1 tablet (6.25 mg total) by mouth 2 (two) times daily with meals., Disp: 180 tablet, Rfl: 3    umeclidinium-vilanteroL (ANORO ELLIPTA) 62.5-25 mcg/actuation DsDv, Inhale 1  puff into the lungs once daily. Controller, Disp: 60 each, Rfl: 11        Assessment:       1. SVT (supraventricular tachycardia)    2. Aortic mural thrombus    3. Thrombocytopenia, unspecified    4. COPD, group A, by GOLD 2017 classification    5. Fatigue, unspecified type    6. Benign essential HTN    7. Decreased GFR           Plan:       SVT (supraventricular tachycardia)  Aortic mural thrombus  Thrombocytopenia, unspecified  -     CBC Auto Differential; Future; Expected date: 02/22/2023  -     Cancel: Ambulatory referral/consult to Vascular Surgery; Future; Expected date: 03/01/2023  -     carvediloL (COREG) 6.25 MG tablet; Take 1 tablet (6.25 mg total) by mouth 2 (two) times daily with meals.  Dispense: 180 tablet; Refill: 3  -     Ambulatory referral/consult to Cardiology; Future; Expected date: 03/01/2023  - Unclear cause why Vascular Surgery appointment was missed in past, will attempt referral to Interventional Cardiology locally at this time for Aortic mural thrombus and SVT  - Platelets marginally suppressed and will continue to monitor    COPD, group A, by GOLD 2017 classification  Fatigue, unspecified type  -     Comprehensive Metabolic Panel; Future; Expected date: 02/22/2023  -     CBC Auto Differential; Future; Expected date: 02/22/2023  - Continue current medications    Benign essential HTN  -     carvediloL (COREG) 6.25 MG tablet; Take 1 tablet (6.25 mg total) by mouth 2 (two) times daily with meals.  Dispense: 180 tablet; Refill: 3  - With increase in heart rate in clinic and blood pressure with PT, will increase dosage of carvedilol at this time. 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 Will repeat at this time    RTC in 3 months

## 2023-03-20 ENCOUNTER — OFFICE VISIT (OUTPATIENT)
Dept: CARDIOLOGY | Facility: CLINIC | Age: 83
End: 2023-03-20
Payer: MEDICARE

## 2023-03-20 VITALS
HEART RATE: 78 BPM | HEIGHT: 64 IN | OXYGEN SATURATION: 96 % | DIASTOLIC BLOOD PRESSURE: 74 MMHG | SYSTOLIC BLOOD PRESSURE: 158 MMHG | WEIGHT: 108.25 LBS | BODY MASS INDEX: 18.48 KG/M2

## 2023-03-20 DIAGNOSIS — E78.5 HYPERLIPIDEMIA, UNSPECIFIED HYPERLIPIDEMIA TYPE: ICD-10-CM

## 2023-03-20 DIAGNOSIS — I45.10 RBBB: Primary | ICD-10-CM

## 2023-03-20 DIAGNOSIS — I74.10 AORTIC MURAL THROMBUS: ICD-10-CM

## 2023-03-20 DIAGNOSIS — I10 HYPERTENSION, UNSPECIFIED TYPE: Chronic | ICD-10-CM

## 2023-03-20 DIAGNOSIS — I47.10 SVT (SUPRAVENTRICULAR TACHYCARDIA): ICD-10-CM

## 2023-03-20 PROCEDURE — 1126F PR PAIN SEVERITY QUANTIFIED, NO PAIN PRESENT: ICD-10-PCS | Mod: HCNC,CPTII,S$GLB, | Performed by: INTERNAL MEDICINE

## 2023-03-20 PROCEDURE — 1160F RVW MEDS BY RX/DR IN RCRD: CPT | Mod: HCNC,CPTII,S$GLB, | Performed by: INTERNAL MEDICINE

## 2023-03-20 PROCEDURE — 93000 EKG 12-LEAD: ICD-10-PCS | Mod: HCNC,S$GLB,, | Performed by: INTERNAL MEDICINE

## 2023-03-20 PROCEDURE — 1101F PT FALLS ASSESS-DOCD LE1/YR: CPT | Mod: HCNC,CPTII,S$GLB, | Performed by: INTERNAL MEDICINE

## 2023-03-20 PROCEDURE — 99204 PR OFFICE/OUTPT VISIT, NEW, LEVL IV, 45-59 MIN: ICD-10-PCS | Mod: HCNC,S$GLB,, | Performed by: INTERNAL MEDICINE

## 2023-03-20 PROCEDURE — 99204 OFFICE O/P NEW MOD 45 MIN: CPT | Mod: HCNC,S$GLB,, | Performed by: INTERNAL MEDICINE

## 2023-03-20 PROCEDURE — 99999 PR PBB SHADOW E&M-EST. PATIENT-LVL IV: CPT | Mod: PBBFAC,HCNC,, | Performed by: INTERNAL MEDICINE

## 2023-03-20 PROCEDURE — 99999 PR PBB SHADOW E&M-EST. PATIENT-LVL IV: ICD-10-PCS | Mod: PBBFAC,HCNC,, | Performed by: INTERNAL MEDICINE

## 2023-03-20 PROCEDURE — 1126F AMNT PAIN NOTED NONE PRSNT: CPT | Mod: HCNC,CPTII,S$GLB, | Performed by: INTERNAL MEDICINE

## 2023-03-20 PROCEDURE — 3078F DIAST BP <80 MM HG: CPT | Mod: HCNC,CPTII,S$GLB, | Performed by: INTERNAL MEDICINE

## 2023-03-20 PROCEDURE — 3077F PR MOST RECENT SYSTOLIC BLOOD PRESSURE >= 140 MM HG: ICD-10-PCS | Mod: HCNC,CPTII,S$GLB, | Performed by: INTERNAL MEDICINE

## 2023-03-20 PROCEDURE — 3288F PR FALLS RISK ASSESSMENT DOCUMENTED: ICD-10-PCS | Mod: HCNC,CPTII,S$GLB, | Performed by: INTERNAL MEDICINE

## 2023-03-20 PROCEDURE — 1101F PR PT FALLS ASSESS DOC 0-1 FALLS W/OUT INJ PAST YR: ICD-10-PCS | Mod: HCNC,CPTII,S$GLB, | Performed by: INTERNAL MEDICINE

## 2023-03-20 PROCEDURE — 93000 ELECTROCARDIOGRAM COMPLETE: CPT | Mod: HCNC,S$GLB,, | Performed by: INTERNAL MEDICINE

## 2023-03-20 PROCEDURE — 1159F MED LIST DOCD IN RCRD: CPT | Mod: HCNC,CPTII,S$GLB, | Performed by: INTERNAL MEDICINE

## 2023-03-20 PROCEDURE — 1160F PR REVIEW ALL MEDS BY PRESCRIBER/CLIN PHARMACIST DOCUMENTED: ICD-10-PCS | Mod: HCNC,CPTII,S$GLB, | Performed by: INTERNAL MEDICINE

## 2023-03-20 PROCEDURE — 3288F FALL RISK ASSESSMENT DOCD: CPT | Mod: HCNC,CPTII,S$GLB, | Performed by: INTERNAL MEDICINE

## 2023-03-20 PROCEDURE — 3078F PR MOST RECENT DIASTOLIC BLOOD PRESSURE < 80 MM HG: ICD-10-PCS | Mod: HCNC,CPTII,S$GLB, | Performed by: INTERNAL MEDICINE

## 2023-03-20 PROCEDURE — 1159F PR MEDICATION LIST DOCUMENTED IN MEDICAL RECORD: ICD-10-PCS | Mod: HCNC,CPTII,S$GLB, | Performed by: INTERNAL MEDICINE

## 2023-03-20 PROCEDURE — 3077F SYST BP >= 140 MM HG: CPT | Mod: HCNC,CPTII,S$GLB, | Performed by: INTERNAL MEDICINE

## 2023-03-20 RX ORDER — ASPIRIN 81 MG/1
81 TABLET ORAL DAILY
Qty: 90 TABLET | Refills: 3 | Status: SHIPPED | OUTPATIENT
Start: 2023-03-20 | End: 2024-03-19

## 2023-03-20 RX ORDER — ROSUVASTATIN CALCIUM 20 MG/1
20 TABLET, COATED ORAL DAILY
Qty: 90 TABLET | Refills: 3 | Status: SHIPPED | OUTPATIENT
Start: 2023-03-20 | End: 2024-02-22

## 2023-03-20 NOTE — PROGRESS NOTES
[facility]  Cardiology Clinic Note      Chief Complaint  Chief Complaint   Patient presents with    Follow-up     weakness       HPI:      82-year-old female with past medical history of hiatal hernia, ETOH abuse, thrombocytopenia, tobacco, COPD, pulmonary nodules, CKD?, hypertension, hyperlipidemia, pSVT followed by EP, right bundle-branch block, atherosclerotic changes of the aorta with mural thrombus near the diaphragmatic hiatus with 40-50% narrowing of the lumen CT 2021 referred to vascular surgery though not seen no mention of aneurysm    Blood pressure is usually normal but is elevated today  Asymptomatic    Medications  Current Outpatient Medications   Medication Sig Dispense Refill    acetaminophen (TYLENOL) 500 MG tablet Take 2 tablets (1,000 mg total) by mouth every 6 (six) hours as needed for Pain. 40 tablet 0    albuterol (PROVENTIL) 2.5 mg /3 mL (0.083 %) nebulizer solution Take 3 mLs (2.5 mg total) by nebulization every 6 (six) hours as needed for Wheezing. Rescue 90 mL 5    albuterol (PROVENTIL/VENTOLIN HFA) 90 mcg/actuation inhaler Inhale 1-2 puffs into the lungs every 6 (six) hours as needed for Wheezing. Rescue 18 g 0    carvediloL (COREG) 6.25 MG tablet Take 1 tablet (6.25 mg total) by mouth 2 (two) times daily with meals. 180 tablet 3    multivitamin-Ca-iron-minerals (MULTIPLE VITAMIN, WOMENS) Tab Take by mouth.      umeclidinium-vilanteroL (ANORO ELLIPTA) 62.5-25 mcg/actuation DsDv Inhale 1 puff into the lungs once daily. Controller 60 each 11    aspirin (ECOTRIN) 81 MG EC tablet Take 1 tablet (81 mg total) by mouth once daily. 90 tablet 3    rosuvastatin (CRESTOR) 20 MG tablet Take 1 tablet (20 mg total) by mouth once daily. 90 tablet 3     No current facility-administered medications for this visit.        History  Past Medical History:   Diagnosis Date    COPD (chronic obstructive pulmonary disease)     HTN (hypertension) 10/21/2021    Osteoporosis     Sinus disease       Past Surgical History:   Procedure Laterality Date    AUGMENTATION OF BREAST Bilateral     received at age 23, removed at age 76    Breast implants removal  2012    SINUS SURGERY      TONSILLECTOMY      Age 1     Social History     Socioeconomic History    Marital status:    Tobacco Use    Smoking status: Every Day     Packs/day: 0.50     Years: 30.00     Pack years: 15.00     Types: Cigarettes    Smokeless tobacco: Never    Tobacco comments:     2 packs a week   Substance and Sexual Activity    Alcohol use: Yes     Alcohol/week: 3.0 standard drinks     Types: 3 Glasses of wine per week     Comment: 4 times weekly    Drug use: Not Currently     Types: Marijuana     Family History   Problem Relation Age of Onset    No Known Problems Mother     No Known Problems Father     Breast cancer Sister         dx in 30s    Blindness Neg Hx     Glaucoma Neg Hx     Macular degeneration Neg Hx     Retinal detachment Neg Hx         Allergies  Review of patient's allergies indicates:   Allergen Reactions    No known drug allergies        Review of Systems   Review of Systems   Constitutional: Negative for fever.   HENT:  Negative for nosebleeds.    Eyes:  Negative for visual disturbance.   Cardiovascular:  Negative for chest pain, claudication, dyspnea on exertion, palpitations and syncope.   Respiratory:  Negative for cough, hemoptysis and wheezing.    Endocrine: Negative for cold intolerance, heat intolerance, polyphagia and polyuria.   Hematologic/Lymphatic: Negative for bleeding problem.   Skin:  Negative for rash.   Musculoskeletal:  Negative for myalgias.   Gastrointestinal:  Negative for hematemesis, hematochezia, nausea and vomiting.   Genitourinary:  Negative for dysuria.   Neurological:  Negative for focal weakness and sensory change.   Psychiatric/Behavioral:  Negative for altered mental status.      Physical Exam  Vitals:    03/20/23 1549   BP: (!) 158/74   Pulse: 78     Wt Readings from Last 1 Encounters:    03/20/23 49.1 kg (108 lb 3.9 oz)     Physical Exam  Constitutional:       General: She is not in acute distress.  HENT:      Head: Normocephalic and atraumatic.      Mouth/Throat:      Mouth: Mucous membranes are moist.   Eyes:      Extraocular Movements: Extraocular movements intact.      Pupils: Pupils are equal, round, and reactive to light.   Neck:      Vascular: No carotid bruit or JVD.   Cardiovascular:      Rate and Rhythm: Normal rate and regular rhythm.      Heart sounds: No murmur heard.    No friction rub. No gallop.   Pulmonary:      Effort: Pulmonary effort is normal.      Breath sounds: Normal breath sounds.   Abdominal:      Tenderness: There is no abdominal tenderness. There is no guarding or rebound.   Musculoskeletal:      Right lower leg: No edema.      Left lower leg: No edema.   Skin:     General: Skin is warm and dry.      Capillary Refill: Capillary refill takes less than 2 seconds.   Neurological:      General: No focal deficit present.      Mental Status: She is alert.   Psychiatric:         Mood and Affect: Mood normal.       Labs  Lab Visit on 02/22/2023   Component Date Value Ref Range Status    Sodium 02/22/2023 142  136 - 145 mmol/L Final    Potassium 02/22/2023 4.0  3.5 - 5.1 mmol/L Final    Chloride 02/22/2023 105  95 - 110 mmol/L Final    CO2 02/22/2023 26  23 - 29 mmol/L Final    Glucose 02/22/2023 112 (H)  70 - 110 mg/dL Final    BUN 02/22/2023 11  8 - 23 mg/dL Final    Creatinine 02/22/2023 1.3  0.5 - 1.4 mg/dL Final    Calcium 02/22/2023 9.2  8.7 - 10.5 mg/dL Final    Total Protein 02/22/2023 7.5  6.0 - 8.4 g/dL Final    Albumin 02/22/2023 4.0  3.5 - 5.2 g/dL Final    Total Bilirubin 02/22/2023 0.5  0.1 - 1.0 mg/dL Final    Comment: For infants and newborns, interpretation of results should be based  on gestational age, weight and in agreement with clinical  observations.    Premature Infant recommended reference ranges:  Up to 24 hours.............<8.0 mg/dL  Up to 48  hours............<12.0 mg/dL  3-5 days..................<15.0 mg/dL  6-29 days.................<15.0 mg/dL      Alkaline Phosphatase 02/22/2023 144 (H)  55 - 135 U/L Final    AST 02/22/2023 18  10 - 40 U/L Final    ALT 02/22/2023 10  10 - 44 U/L Final    Anion Gap 02/22/2023 11  8 - 16 mmol/L Final    eGFR 02/22/2023 40.5 (A)  >60 mL/min/1.73 m^2 Final    WBC 02/22/2023 3.87 (L)  3.90 - 12.70 K/uL Final    RBC 02/22/2023 4.31  4.00 - 5.40 M/uL Final    Hemoglobin 02/22/2023 14.0  12.0 - 16.0 g/dL Final    Hematocrit 02/22/2023 42.5  37.0 - 48.5 % Final    MCV 02/22/2023 99 (H)  82 - 98 fL Final    MCH 02/22/2023 32.5 (H)  27.0 - 31.0 pg Final    MCHC 02/22/2023 32.9  32.0 - 36.0 g/dL Final    RDW 02/22/2023 14.0  11.5 - 14.5 % Final    Platelets 02/22/2023 134 (L)  150 - 450 K/uL Final    MPV 02/22/2023 11.9  9.2 - 12.9 fL Final    Immature Granulocytes 02/22/2023 1.6 (H)  0.0 - 0.5 % Final    Gran # (ANC) 02/22/2023 1.5 (L)  1.8 - 7.7 K/uL Final    Immature Grans (Abs) 02/22/2023 0.06 (H)  0.00 - 0.04 K/uL Final    Comment: Mild elevation in immature granulocytes is non specific and   can be seen in a variety of conditions including stress response,   acute inflammation, trauma and pregnancy. Correlation with other   laboratory and clinical findings is essential.      Lymph # 02/22/2023 1.3  1.0 - 4.8 K/uL Final    Mono # 02/22/2023 1.0  0.3 - 1.0 K/uL Final    Eos # 02/22/2023 0.0  0.0 - 0.5 K/uL Final    Baso # 02/22/2023 0.01  0.00 - 0.20 K/uL Final    nRBC 02/22/2023 0  0 /100 WBC Final    Gran % 02/22/2023 37.6 (L)  38.0 - 73.0 % Final    Lymph % 02/22/2023 33.6  18.0 - 48.0 % Final    Mono % 02/22/2023 26.9 (H)  4.0 - 15.0 % Final    Eosinophil % 02/22/2023 0.0  0.0 - 8.0 % Final    Basophil % 02/22/2023 0.3  0.0 - 1.9 % Final    Differential Method 02/22/2023 Automated   Final   Lab Visit on 12/14/2022   Component Date Value Ref Range Status    Sodium 12/14/2022 138  136 - 145 mmol/L Final    Potassium  12/14/2022 4.8  3.5 - 5.1 mmol/L Final    Chloride 12/14/2022 104  95 - 110 mmol/L Final    CO2 12/14/2022 26  23 - 29 mmol/L Final    Glucose 12/14/2022 108  70 - 110 mg/dL Final    BUN 12/14/2022 10  8 - 23 mg/dL Final    Creatinine 12/14/2022 1.2  0.5 - 1.4 mg/dL Final    Calcium 12/14/2022 9.8  8.7 - 10.5 mg/dL Final    Total Protein 12/14/2022 7.6  6.0 - 8.4 g/dL Final    Albumin 12/14/2022 4.0  3.5 - 5.2 g/dL Final    Total Bilirubin 12/14/2022 0.5  0.1 - 1.0 mg/dL Final    Comment: For infants and newborns, interpretation of results should be based  on gestational age, weight and in agreement with clinical  observations.    Premature Infant recommended reference ranges:  Up to 24 hours.............<8.0 mg/dL  Up to 48 hours............<12.0 mg/dL  3-5 days..................<15.0 mg/dL  6-29 days.................<15.0 mg/dL      Alkaline Phosphatase 12/14/2022 126  55 - 135 U/L Final    AST 12/14/2022 22  10 - 40 U/L Final    ALT 12/14/2022 21  10 - 44 U/L Final    Anion Gap 12/14/2022 8  8 - 16 mmol/L Final    eGFR 12/14/2022 44.9 (A)  >60 mL/min/1.73 m^2 Final    TSH 12/14/2022 2.115  0.400 - 4.000 uIU/mL Final    Vitamin B-12 12/14/2022 545  210 - 950 pg/mL Final    Thiamine 12/14/2022 66  38 - 122 ug/L Final    Comment: This test was developed and the performance   characteristics determined by [facility].   It has not been cleared or approved by the FDA.   The laboratory is regulated under CLIA as qualified to   perform high-complexity testing. This test is used for   patient testing purposes. It should not be regarded   as investigational or for research.    Test performed at [facility],  [address] , [place]  [address] [phone]  Nano Rodriguez MD, PhD - Medical Director         EKG  EKG 03/20/2023 normal sinus rhythm normal rate consider right atrial enlargement right bundle-branch block prolonged QTC    Echo   Results for orders placed or performed during  the hospital encounter of 12/10/21   Echo   Result Value Ref Range    TDI SEPTAL 0.05 m/s    LV LATERAL E/E' RATIO 5.30 m/s    LV SEPTAL E/E' RATIO 10.60 m/s    AORTIC VALVE CUSP SEPERATION 1.70 cm    TDI LATERAL 0.10 m/s    PV PEAK VELOCITY 0.93 cm/s    LVIDd 3.74 3.5 - 6.0 cm    IVS 1.30 (A) 0.6 - 1.1 cm    Posterior Wall 1.30 0.6 - 1.1 cm    Ao root annulus 3.40 cm    LVIDs 2.72 2.1 - 4.0 cm    FS 27 28 - 44 %    LV mass 169.10 g    LA size 2.71 cm    Left Ventricle Relative Wall Thickness 0.70 cm    AV Velocity Ratio 0.61     MV valve area p 1/2 method 3.60 cm2    E/A ratio 0.63     Mean e' 0.08 m/s    E wave deceleration time 210.48 msec    LVOT diameter 1.68 cm    LVOT area 2.2 cm2    LVOT peak get 0.89 m/s    LVOT peak VTI 17.31 cm    Ao peak get 1.47 m/s    Mr max get 0.05 m/s    LVOT stroke volume 38.35 cm3    AV peak gradient 9 mmHg    E/E' ratio 7.07 m/s    MV Peak E Get 0.53 m/s    TR Max Get 2.15 m/s    MV stenosis pressure 1/2 time 61.04 ms    MV Peak A Get 0.84 m/s    LV Systolic Volume 27.43 mL    LV Diastolic Volume 59.55 mL    Triscuspid Valve Regurgitation Peak Gradient 18 mmHg    Right Atrial Pressure (from IVC) 3 mmHg    EF 55 %    TV rest pulmonary artery pressure 21 mmHg    Narrative    · The left ventricle is normal in size with mild concentric hypertrophy   and normal systolic function.  · The estimated ejection fraction is 55%.  · Grade I left ventricular diastolic dysfunction.  · Normal right ventricular size with normal right ventricular systolic   function.  · Mild aortic regurgitation.  · Mild mitral regurgitation.  · Mild tricuspid regurgitation.  · Normal central venous pressure (3 mmHg).  · The estimated PA systolic pressure is 21 mmHg.          Imaging  No results found.    Prior coronary angiogram / intervention:  No known    Assessment and Plan  1. SVT (supraventricular tachycardia)  Followed by electrophysiology on beta-blocker    2. Aortic mural thrombus  Add aspirin and  high-dose statin  Abdominal aortic ultrasound  Consider vascular surgery consult  - aspirin (ECOTRIN) 81 MG EC tablet; Take 1 tablet (81 mg total) by mouth once daily.  Dispense: 90 tablet; Refill: 3  - rosuvastatin (CRESTOR) 20 MG tablet; Take 1 tablet (20 mg total) by mouth once daily.  Dispense: 90 tablet; Refill: 3  - US Abdominal Aorta; Future    3. Hyperlipidemia, unspecified hyperlipidemia type  Statin as above    4. RBBB  Stable     5. Hypertension, unspecified type  Continue beta-blocker  Blood pressure log at night call me if systolic greater than 140  - IN OFFICE EKG 12-LEAD (to Muse)        Follow Up  Follow up in about 3 months (around 6/20/2023).      Savage Farias MD, FACC, RPVI  Interventional Cardiology

## 2023-04-20 ENCOUNTER — INITIAL CONSULT (OUTPATIENT)
Dept: VASCULAR SURGERY | Facility: CLINIC | Age: 83
End: 2023-04-20
Attending: SURGERY
Payer: MEDICARE

## 2023-04-20 VITALS
BODY MASS INDEX: 18.67 KG/M2 | DIASTOLIC BLOOD PRESSURE: 67 MMHG | HEIGHT: 62 IN | HEART RATE: 88 BPM | WEIGHT: 101.44 LBS | TEMPERATURE: 98 F | SYSTOLIC BLOOD PRESSURE: 121 MMHG

## 2023-04-20 DIAGNOSIS — I74.10 AORTIC MURAL THROMBUS: ICD-10-CM

## 2023-04-20 PROCEDURE — 99204 OFFICE O/P NEW MOD 45 MIN: CPT | Mod: HCNC,S$GLB,, | Performed by: SURGERY

## 2023-04-20 PROCEDURE — 99999 PR PBB SHADOW E&M-EST. PATIENT-LVL IV: ICD-10-PCS | Mod: PBBFAC,HCNC,, | Performed by: SURGERY

## 2023-04-20 PROCEDURE — 99204 PR OFFICE/OUTPT VISIT, NEW, LEVL IV, 45-59 MIN: ICD-10-PCS | Mod: HCNC,S$GLB,, | Performed by: SURGERY

## 2023-04-20 PROCEDURE — 99999 PR PBB SHADOW E&M-EST. PATIENT-LVL IV: CPT | Mod: PBBFAC,HCNC,, | Performed by: SURGERY

## 2023-04-20 RX ORDER — AMLODIPINE BESYLATE 5 MG/1
TABLET ORAL
COMMUNITY
Start: 2023-03-24 | End: 2023-11-10

## 2023-04-20 RX ORDER — GLYCOPYRROLATE AND FORMOTEROL FUMARATE 9; 4.8 UG/1; UG/1
2 AEROSOL, METERED RESPIRATORY (INHALATION) DAILY
COMMUNITY
Start: 2023-04-04 | End: 2023-08-02 | Stop reason: SDUPTHER

## 2023-04-20 NOTE — PROGRESS NOTES
Sangita Almonte Desmond  04/20/2023    HPI:  Patient is a 82 y.o. female who is here today for evaluation of a recently diagnosed infrarenal abdominal aortic aneurysm.  She is quite an active woman for her age, but she reports significant and increasing fatigue over the past several months.  She will see her primary care physician Dr. Pereira in the upcoming weeks to address this.      Past Medical History:   Diagnosis Date    COPD (chronic obstructive pulmonary disease)     HTN (hypertension) 10/21/2021    Osteoporosis     Sinus disease      Past Surgical History:   Procedure Laterality Date    AUGMENTATION OF BREAST Bilateral     received at age 23, removed at age 76    Breast implants removal  2012    SINUS SURGERY      TONSILLECTOMY      Age 1     Family History   Problem Relation Age of Onset    No Known Problems Mother     No Known Problems Father     Breast cancer Sister         dx in 30s    Blindness Neg Hx     Glaucoma Neg Hx     Macular degeneration Neg Hx     Retinal detachment Neg Hx      Social History     Socioeconomic History    Marital status:    Tobacco Use    Smoking status: Every Day     Packs/day: 0.50     Years: 30.00     Pack years: 15.00     Types: Cigarettes    Smokeless tobacco: Never    Tobacco comments:     2 packs a week   Substance and Sexual Activity    Alcohol use: Yes     Alcohol/week: 3.0 standard drinks     Types: 3 Glasses of wine per week     Comment: 4 times weekly    Drug use: Not Currently     Types: Marijuana     Current Outpatient Medications on File Prior to Visit   Medication Sig    acetaminophen (TYLENOL) 500 MG tablet Take 2 tablets (1,000 mg total) by mouth every 6 (six) hours as needed for Pain.    albuterol (PROVENTIL) 2.5 mg /3 mL (0.083 %) nebulizer solution Take 3 mLs (2.5 mg total) by nebulization every 6 (six) hours as needed for Wheezing. Rescue    albuterol (PROVENTIL/VENTOLIN HFA) 90 mcg/actuation inhaler Inhale 1-2 puffs into the lungs every 6 (six)  hours as needed for Wheezing. Rescue    amLODIPine (NORVASC) 5 MG tablet     aspirin (ECOTRIN) 81 MG EC tablet Take 1 tablet (81 mg total) by mouth once daily.    BEVESPI AEROSPHERE 9-4.8 mcg HFAA Inhale into the lungs.    carvediloL (COREG) 6.25 MG tablet Take 1 tablet (6.25 mg total) by mouth 2 (two) times daily with meals.    multivitamin-Ca-iron-minerals (MULTIPLE VITAMIN, WOMENS) Tab Take by mouth.    rosuvastatin (CRESTOR) 20 MG tablet Take 1 tablet (20 mg total) by mouth once daily.    umeclidinium-vilanteroL (ANORO ELLIPTA) 62.5-25 mcg/actuation DsDv Inhale 1 puff into the lungs once daily. Controller     No current facility-administered medications on file prior to visit.       REVIEW OF SYSTEMS:  General: negative; ENT: negative; Allergy and Immunology: negative; Hematological and Lymphatic: Negative; Endocrine: negative; Respiratory: no cough, shortness of breath, or wheezing; Cardiovascular: no chest pain or dyspnea on exertion; Gastrointestinal: no abdominal pain/back, change in bowel habits, or bloody stools; Genito-Urinary: no dysuria, trouble voiding, or hematuria; Musculoskeletal: negative  Neurological: no TIA or stroke symptoms    PHYSICAL EXAM:   Right Arm BP - Sittin/67 (23 1331)  Left Arm BP - Sittin/66 (23 1331)  Pulse: 88  Temp: 98.1 °F (36.7 °C)      General appearance:  Alert, well-appearing, and in no distress.  Oriented to person, place, and time   Neurological: Normal speech, no focal findings noted; CN II - XII grossly intact           Musculoskeletal: Digits/nail without cyanosis/clubbing.  Normal muscle strength/tone.                 Neck: Supple, no significant adenopathy; thyroid is not enlarged                  No carotid bruit can be auscultated                Chest:  Clear to auscultation, no wheezes, rales or rhonchi, symmetric air entry     No use of accessory muscles             Cardiac: Normal rate and regular rhythm, S1 and S2 normal; PMI  non-displaced          Abdomen: Soft, nontender, nondistended, no masses or organomegaly     No rebound tenderness noted; bowel sounds normal     Pulsatile aortic mass is not palpable.     No groin adenopathy      Extremities:   2+ femoral pulses bilaterally     2+ Popliteal pulses; 2+ pedal pulses palpable.     No pedal edema     No ulcerations    LAB RESULTS:  Lab Results   Component Value Date    K 4.0 02/22/2023    K 4.8 12/14/2022    K 4.3 09/13/2022    CREATININE 1.3 02/22/2023    CREATININE 1.2 12/14/2022    CREATININE 0.7 09/13/2022     Lab Results   Component Value Date    WBC 3.87 (L) 02/22/2023    WBC 5.80 09/13/2022    WBC 5.85 07/15/2022    HCT 42.5 02/22/2023    HCT 42.7 09/13/2022    HCT 41.7 07/15/2022     (L) 02/22/2023     (L) 09/13/2022     (L) 07/15/2022     No results found for: HGBA1C  IMAGING:  The proximal abdominal aorta measures 2.9 x 3.5 cm.     The mid abdominal aorta measures 2.0 x 2.7 cm.     The distal abdominal aorta measures 1.8 x 1.8 cm.     The right iliac artery measures 1.8 x 1.5 cm.     The left iliac artery measures 1.4 x 1.6 cm.     Prominent atherosclerotic plaque.  Abdominal aortic peak systolic velocity measures 60.4 centimeters/seconds.         IMP/PLAN:  82 y.o. female with a history as noted above.  Her infrarenal aorta barely qualifies as an aneurysm, and, at her age of 82, I do not think it needs to be imaged again.  If in 3 years she is still is vibrant a repeat ultrasound of the abdominal aorta would be indicated.      1) follow-up with primary care physician regarding recent fatigue.    Shayan Jones MD  Vascular & Endovascular Surgery

## 2023-05-24 ENCOUNTER — OFFICE VISIT (OUTPATIENT)
Dept: PRIMARY CARE CLINIC | Facility: CLINIC | Age: 83
End: 2023-05-24
Payer: MEDICARE

## 2023-05-24 VITALS
SYSTOLIC BLOOD PRESSURE: 136 MMHG | BODY MASS INDEX: 19.13 KG/M2 | OXYGEN SATURATION: 99 % | HEIGHT: 62 IN | WEIGHT: 103.94 LBS | DIASTOLIC BLOOD PRESSURE: 70 MMHG | TEMPERATURE: 98 F | RESPIRATION RATE: 18 BRPM | HEART RATE: 97 BPM

## 2023-05-24 DIAGNOSIS — R53.82 CHRONIC FATIGUE: ICD-10-CM

## 2023-05-24 PROCEDURE — 99999 PR PBB SHADOW E&M-EST. PATIENT-LVL IV: CPT | Mod: PBBFAC,,, | Performed by: STUDENT IN AN ORGANIZED HEALTH CARE EDUCATION/TRAINING PROGRAM

## 2023-05-24 PROCEDURE — 3075F PR MOST RECENT SYSTOLIC BLOOD PRESS GE 130-139MM HG: ICD-10-PCS | Mod: CPTII,S$GLB,, | Performed by: STUDENT IN AN ORGANIZED HEALTH CARE EDUCATION/TRAINING PROGRAM

## 2023-05-24 PROCEDURE — 1101F PR PT FALLS ASSESS DOC 0-1 FALLS W/OUT INJ PAST YR: ICD-10-PCS | Mod: CPTII,S$GLB,, | Performed by: STUDENT IN AN ORGANIZED HEALTH CARE EDUCATION/TRAINING PROGRAM

## 2023-05-24 PROCEDURE — 99999 PR PBB SHADOW E&M-EST. PATIENT-LVL IV: ICD-10-PCS | Mod: PBBFAC,,, | Performed by: STUDENT IN AN ORGANIZED HEALTH CARE EDUCATION/TRAINING PROGRAM

## 2023-05-24 PROCEDURE — 1160F RVW MEDS BY RX/DR IN RCRD: CPT | Mod: CPTII,S$GLB,, | Performed by: STUDENT IN AN ORGANIZED HEALTH CARE EDUCATION/TRAINING PROGRAM

## 2023-05-24 PROCEDURE — 3078F DIAST BP <80 MM HG: CPT | Mod: CPTII,S$GLB,, | Performed by: STUDENT IN AN ORGANIZED HEALTH CARE EDUCATION/TRAINING PROGRAM

## 2023-05-24 PROCEDURE — 3078F PR MOST RECENT DIASTOLIC BLOOD PRESSURE < 80 MM HG: ICD-10-PCS | Mod: CPTII,S$GLB,, | Performed by: STUDENT IN AN ORGANIZED HEALTH CARE EDUCATION/TRAINING PROGRAM

## 2023-05-24 PROCEDURE — 1126F AMNT PAIN NOTED NONE PRSNT: CPT | Mod: CPTII,S$GLB,, | Performed by: STUDENT IN AN ORGANIZED HEALTH CARE EDUCATION/TRAINING PROGRAM

## 2023-05-24 PROCEDURE — 99214 PR OFFICE/OUTPT VISIT, EST, LEVL IV, 30-39 MIN: ICD-10-PCS | Mod: S$GLB,,, | Performed by: STUDENT IN AN ORGANIZED HEALTH CARE EDUCATION/TRAINING PROGRAM

## 2023-05-24 PROCEDURE — 3075F SYST BP GE 130 - 139MM HG: CPT | Mod: CPTII,S$GLB,, | Performed by: STUDENT IN AN ORGANIZED HEALTH CARE EDUCATION/TRAINING PROGRAM

## 2023-05-24 PROCEDURE — 1159F MED LIST DOCD IN RCRD: CPT | Mod: CPTII,S$GLB,, | Performed by: STUDENT IN AN ORGANIZED HEALTH CARE EDUCATION/TRAINING PROGRAM

## 2023-05-24 PROCEDURE — 1159F PR MEDICATION LIST DOCUMENTED IN MEDICAL RECORD: ICD-10-PCS | Mod: CPTII,S$GLB,, | Performed by: STUDENT IN AN ORGANIZED HEALTH CARE EDUCATION/TRAINING PROGRAM

## 2023-05-24 PROCEDURE — 1126F PR PAIN SEVERITY QUANTIFIED, NO PAIN PRESENT: ICD-10-PCS | Mod: CPTII,S$GLB,, | Performed by: STUDENT IN AN ORGANIZED HEALTH CARE EDUCATION/TRAINING PROGRAM

## 2023-05-24 PROCEDURE — 99214 OFFICE O/P EST MOD 30 MIN: CPT | Mod: S$GLB,,, | Performed by: STUDENT IN AN ORGANIZED HEALTH CARE EDUCATION/TRAINING PROGRAM

## 2023-05-24 PROCEDURE — 1101F PT FALLS ASSESS-DOCD LE1/YR: CPT | Mod: CPTII,S$GLB,, | Performed by: STUDENT IN AN ORGANIZED HEALTH CARE EDUCATION/TRAINING PROGRAM

## 2023-05-24 PROCEDURE — 3288F PR FALLS RISK ASSESSMENT DOCUMENTED: ICD-10-PCS | Mod: CPTII,S$GLB,, | Performed by: STUDENT IN AN ORGANIZED HEALTH CARE EDUCATION/TRAINING PROGRAM

## 2023-05-24 PROCEDURE — 3288F FALL RISK ASSESSMENT DOCD: CPT | Mod: CPTII,S$GLB,, | Performed by: STUDENT IN AN ORGANIZED HEALTH CARE EDUCATION/TRAINING PROGRAM

## 2023-05-24 PROCEDURE — 1160F PR REVIEW ALL MEDS BY PRESCRIBER/CLIN PHARMACIST DOCUMENTED: ICD-10-PCS | Mod: CPTII,S$GLB,, | Performed by: STUDENT IN AN ORGANIZED HEALTH CARE EDUCATION/TRAINING PROGRAM

## 2023-05-24 NOTE — PROGRESS NOTES
"Subjective:       Patient ID: Sangita Logan is a 82 y.o. female.    Chief Complaint: Follow-up (3 month follow up)      HPI:  82 y.o. female presents to [facility] for 3 month follow-up visit    Patient reports continued fatigue is persistent at this time. Normal CBC, TSH in past.    EtOH use?: 4 nights per week 2-3 drinks    Attempted 2 months of no drinking and didn't have any change in her energy level, so resumed.    Patient is currently on beta blocker through electrophysiologic for SVT.    Review of Systems   Constitutional:  Positive for fatigue. Negative for chills, diaphoresis, fever and unexpected weight change.   Respiratory:  Negative for chest tightness and shortness of breath (Very mild if ever).    Cardiovascular:  Negative for chest pain and palpitations.   Gastrointestinal:  Negative for abdominal pain, blood in stool, diarrhea, nausea and vomiting.   Skin:  Negative for rash and wound.   Neurological:  Positive for weakness. Negative for dizziness.   Psychiatric/Behavioral:  Negative for dysphoric mood and sleep disturbance (Sometimes in bed late, but getting sufficient sleep. Wakes once after 5-6 sleep hours then drifts back to sleep). The patient is nervous/anxious (Reports only because so fatigued and weak.).      Objective:      Vitals:    05/24/23 0935   BP: 136/70   BP Location: Right arm   Patient Position: Sitting   BP Method: Small (Manual)   Pulse: 97   Resp: 18   Temp: 97.7 °F (36.5 °C)   TempSrc: Temporal   SpO2: 99%   Weight: 47.1 kg (103 lb 15.2 oz)   Height: 5' 2" (1.575 m)     Physical Exam  Vitals reviewed.   Constitutional:       General: She is not in acute distress.     Appearance: Normal appearance. She is not ill-appearing.   HENT:      Head: Normocephalic and atraumatic.   Eyes:      General:         Right eye: No discharge.         Left eye: No discharge.      Conjunctiva/sclera: Conjunctivae normal.   Cardiovascular:      Rate and Rhythm: Normal rate. "   Pulmonary:      Effort: Pulmonary effort is normal.   Musculoskeletal:         General: No deformity.   Skin:     Coloration: Skin is not jaundiced or pale.   Neurological:      General: No focal deficit present.      Mental Status: She is alert and oriented to person, place, and time.   Psychiatric:         Mood and Affect: Mood normal.         Behavior: Behavior normal.           Lab Results   Component Value Date     02/22/2023    K 4.0 02/22/2023     02/22/2023    CO2 26 02/22/2023    BUN 11 02/22/2023    CREATININE 1.3 02/22/2023    ANIONGAP 11 02/22/2023     No results found for: HGBA1C  Lab Results   Component Value Date    BNP 59 09/13/2022       Lab Results   Component Value Date    WBC 3.87 (L) 02/22/2023    HGB 14.0 02/22/2023    HCT 42.5 02/22/2023    HCT 30 (L) 11/07/2021     (L) 02/22/2023    GRAN 1.5 (L) 02/22/2023    GRAN 37.6 (L) 02/22/2023     Lab Results   Component Value Date    CHOL 170 05/11/2021    HDL 55 05/11/2021    LDLCALC 98.2 05/11/2021    TRIG 84 05/11/2021          Current Outpatient Medications:     acetaminophen (TYLENOL) 500 MG tablet, Take 2 tablets (1,000 mg total) by mouth every 6 (six) hours as needed for Pain., Disp: 40 tablet, Rfl: 0    albuterol (PROVENTIL) 2.5 mg /3 mL (0.083 %) nebulizer solution, Take 3 mLs (2.5 mg total) by nebulization every 6 (six) hours as needed for Wheezing. Rescue, Disp: 90 mL, Rfl: 5    amLODIPine (NORVASC) 5 MG tablet, , Disp: , Rfl:     aspirin (ECOTRIN) 81 MG EC tablet, Take 1 tablet (81 mg total) by mouth once daily., Disp: 90 tablet, Rfl: 3    BEVESPI AEROSPHERE 9-4.8 mcg HFAA, Inhale 2 puffs into the lungs once daily., Disp: , Rfl:     carvediloL (COREG) 6.25 MG tablet, Take 1 tablet (6.25 mg total) by mouth 2 (two) times daily with meals., Disp: 180 tablet, Rfl: 3    rosuvastatin (CRESTOR) 20 MG tablet, Take 1 tablet (20 mg total) by mouth once daily., Disp: 90 tablet, Rfl: 3    multivitamin-Ca-iron-minerals (MULTIPLE  VITAMIN, WOMENS) Tab, Take by mouth., Disp: , Rfl:         Assessment:       1. Chronic fatigue           Plan:       Chronic fatigue  -     Ambulatory referral/consult to Endocrinology; Future; Expected date: 05/31/2023  - Work-up to date unremarkable. Denies sleep or depression concerns. Is still drinking at this time and was started on beta blocker after fatigue began  - Sleep quality questionable with EtOH use, and possible contribution of COPD  - Can re-address anxiety at follow-up    RTC in 6 months

## 2023-06-20 ENCOUNTER — OFFICE VISIT (OUTPATIENT)
Dept: CARDIOLOGY | Facility: CLINIC | Age: 83
End: 2023-06-20
Payer: MEDICARE

## 2023-06-20 VITALS
HEART RATE: 79 BPM | BODY MASS INDEX: 19.31 KG/M2 | HEIGHT: 62 IN | OXYGEN SATURATION: 99 % | DIASTOLIC BLOOD PRESSURE: 80 MMHG | SYSTOLIC BLOOD PRESSURE: 120 MMHG | WEIGHT: 104.94 LBS

## 2023-06-20 DIAGNOSIS — I10 HYPERTENSION, UNSPECIFIED TYPE: Chronic | ICD-10-CM

## 2023-06-20 DIAGNOSIS — E78.5 HYPERLIPIDEMIA, UNSPECIFIED HYPERLIPIDEMIA TYPE: Primary | ICD-10-CM

## 2023-06-20 DIAGNOSIS — I47.10 SVT (SUPRAVENTRICULAR TACHYCARDIA): ICD-10-CM

## 2023-06-20 DIAGNOSIS — I45.10 RBBB: ICD-10-CM

## 2023-06-20 DIAGNOSIS — I74.10 AORTIC MURAL THROMBUS: ICD-10-CM

## 2023-06-20 PROCEDURE — 99213 PR OFFICE/OUTPT VISIT, EST, LEVL III, 20-29 MIN: ICD-10-PCS | Mod: S$GLB,,, | Performed by: INTERNAL MEDICINE

## 2023-06-20 PROCEDURE — 1126F PR PAIN SEVERITY QUANTIFIED, NO PAIN PRESENT: ICD-10-PCS | Mod: CPTII,S$GLB,, | Performed by: INTERNAL MEDICINE

## 2023-06-20 PROCEDURE — 3079F DIAST BP 80-89 MM HG: CPT | Mod: CPTII,S$GLB,, | Performed by: INTERNAL MEDICINE

## 2023-06-20 PROCEDURE — 3079F PR MOST RECENT DIASTOLIC BLOOD PRESSURE 80-89 MM HG: ICD-10-PCS | Mod: CPTII,S$GLB,, | Performed by: INTERNAL MEDICINE

## 2023-06-20 PROCEDURE — 99999 PR PBB SHADOW E&M-EST. PATIENT-LVL III: CPT | Mod: PBBFAC,,, | Performed by: INTERNAL MEDICINE

## 2023-06-20 PROCEDURE — 1159F MED LIST DOCD IN RCRD: CPT | Mod: CPTII,S$GLB,, | Performed by: INTERNAL MEDICINE

## 2023-06-20 PROCEDURE — 1101F PR PT FALLS ASSESS DOC 0-1 FALLS W/OUT INJ PAST YR: ICD-10-PCS | Mod: CPTII,S$GLB,, | Performed by: INTERNAL MEDICINE

## 2023-06-20 PROCEDURE — 1159F PR MEDICATION LIST DOCUMENTED IN MEDICAL RECORD: ICD-10-PCS | Mod: CPTII,S$GLB,, | Performed by: INTERNAL MEDICINE

## 2023-06-20 PROCEDURE — 1126F AMNT PAIN NOTED NONE PRSNT: CPT | Mod: CPTII,S$GLB,, | Performed by: INTERNAL MEDICINE

## 2023-06-20 PROCEDURE — 99213 OFFICE O/P EST LOW 20 MIN: CPT | Mod: S$GLB,,, | Performed by: INTERNAL MEDICINE

## 2023-06-20 PROCEDURE — 3074F SYST BP LT 130 MM HG: CPT | Mod: CPTII,S$GLB,, | Performed by: INTERNAL MEDICINE

## 2023-06-20 PROCEDURE — 1101F PT FALLS ASSESS-DOCD LE1/YR: CPT | Mod: CPTII,S$GLB,, | Performed by: INTERNAL MEDICINE

## 2023-06-20 PROCEDURE — 99999 PR PBB SHADOW E&M-EST. PATIENT-LVL III: ICD-10-PCS | Mod: PBBFAC,,, | Performed by: INTERNAL MEDICINE

## 2023-06-20 PROCEDURE — 1160F PR REVIEW ALL MEDS BY PRESCRIBER/CLIN PHARMACIST DOCUMENTED: ICD-10-PCS | Mod: CPTII,S$GLB,, | Performed by: INTERNAL MEDICINE

## 2023-06-20 PROCEDURE — 1160F RVW MEDS BY RX/DR IN RCRD: CPT | Mod: CPTII,S$GLB,, | Performed by: INTERNAL MEDICINE

## 2023-06-20 PROCEDURE — 3074F PR MOST RECENT SYSTOLIC BLOOD PRESSURE < 130 MM HG: ICD-10-PCS | Mod: CPTII,S$GLB,, | Performed by: INTERNAL MEDICINE

## 2023-06-20 PROCEDURE — 3288F PR FALLS RISK ASSESSMENT DOCUMENTED: ICD-10-PCS | Mod: CPTII,S$GLB,, | Performed by: INTERNAL MEDICINE

## 2023-06-20 PROCEDURE — 3288F FALL RISK ASSESSMENT DOCD: CPT | Mod: CPTII,S$GLB,, | Performed by: INTERNAL MEDICINE

## 2023-06-20 NOTE — PROGRESS NOTES
[facility] - [facility] [address]  Cardiology Clinic Note      Chief Complaint  Chief Complaint   Patient presents with    Follow-up    Fatigue       HPI:      82-year-old female with past medical history of hiatal hernia, ETOH abuse, thrombocytopenia, tobacco, COPD, pulmonary nodules, CKD?, hypertension, hyperlipidemia, pSVT followed by EP, right bundle-branch block, atherosclerotic changes of the aorta with mural thrombus near the diaphragmatic hiatus with 40-50% narrowing of the lumen CT 2021 referred to vascular surgery though not seen no mention of aneurysm    Last visit added aspirin and high-dose statin in addition to ordering abdominal aortic ultrasound  Sent to vascular surgery  BP normalized after sitting for 15 minutes  Asymptomatic    Medications  Current Outpatient Medications   Medication Sig Dispense Refill    albuterol (PROVENTIL) 2.5 mg /3 mL (0.083 %) nebulizer solution Take 3 mLs (2.5 mg total) by nebulization every 6 (six) hours as needed for Wheezing. Rescue 90 mL 5    aspirin (ECOTRIN) 81 MG EC tablet Take 1 tablet (81 mg total) by mouth once daily. 90 tablet 3    BEVESPI AEROSPHERE 9-4.8 mcg HFAA Inhale 2 puffs into the lungs once daily.      carvediloL (COREG) 6.25 MG tablet Take 1 tablet (6.25 mg total) by mouth 2 (two) times daily with meals. 180 tablet 3    rosuvastatin (CRESTOR) 20 MG tablet Take 1 tablet (20 mg total) by mouth once daily. 90 tablet 3    acetaminophen (TYLENOL) 500 MG tablet Take 2 tablets (1,000 mg total) by mouth every 6 (six) hours as needed for Pain. (Patient not taking: Reported on 6/20/2023) 40 tablet 0    amLODIPine (NORVASC) 5 MG tablet       multivitamin-Ca-iron-minerals (MULTIPLE VITAMIN, WOMENS) Tab Take by mouth.       No current facility-administered medications for this visit.        History  Past Medical History:   Diagnosis Date    COPD (chronic obstructive pulmonary disease)     HTN (hypertension) 10/21/2021    Osteoporosis     Sinus disease      Past  Surgical History:   Procedure Laterality Date    AUGMENTATION OF BREAST Bilateral     received at age 23, removed at age 76    Breast implants removal  2012    SINUS SURGERY      TONSILLECTOMY      Age 1     Social History     Socioeconomic History    Marital status:    Tobacco Use    Smoking status: Every Day     Packs/day: 0.50     Years: 30.00     Pack years: 15.00     Types: Cigarettes    Smokeless tobacco: Never    Tobacco comments:     2 packs a week   Substance and Sexual Activity    Alcohol use: Yes     Alcohol/week: 3.0 standard drinks     Types: 3 Glasses of wine per week     Comment: 4 times weekly    Drug use: Not Currently     Types: Marijuana     Family History   Problem Relation Age of Onset    No Known Problems Mother     No Known Problems Father     Breast cancer Sister         dx in 30s    Blindness Neg Hx     Glaucoma Neg Hx     Macular degeneration Neg Hx     Retinal detachment Neg Hx         Allergies  Review of patient's allergies indicates:   Allergen Reactions    No known drug allergies        Review of Systems   Review of Systems   Constitutional: Negative for fever.   HENT:  Negative for nosebleeds.    Eyes:  Negative for visual disturbance.   Cardiovascular:  Negative for chest pain, claudication, dyspnea on exertion, palpitations and syncope.   Respiratory:  Negative for cough, hemoptysis and wheezing.    Endocrine: Negative for cold intolerance, heat intolerance, polyphagia and polyuria.   Hematologic/Lymphatic: Negative for bleeding problem.   Skin:  Negative for rash.   Musculoskeletal:  Negative for myalgias.   Gastrointestinal:  Negative for hematemesis, hematochezia, nausea and vomiting.   Genitourinary:  Negative for dysuria.   Neurological:  Negative for focal weakness and sensory change.   Psychiatric/Behavioral:  Negative for altered mental status.      Physical Exam  Vitals:    06/20/23 1327   BP: (!) 150/78   Pulse: 79     Wt Readings from Last 1 Encounters:   06/20/23  47.6 kg (104 lb 15 oz)     Physical Exam  Constitutional:       General: She is not in acute distress.  HENT:      Head: Normocephalic and atraumatic.      Mouth/Throat:      Mouth: Mucous membranes are moist.   Eyes:      Extraocular Movements: Extraocular movements intact.      Pupils: Pupils are equal, round, and reactive to light.   Neck:      Vascular: No carotid bruit or JVD.   Cardiovascular:      Rate and Rhythm: Normal rate and regular rhythm.      Heart sounds: No murmur heard.    No friction rub. No gallop.   Pulmonary:      Effort: Pulmonary effort is normal.      Breath sounds: Normal breath sounds.   Abdominal:      Tenderness: There is no abdominal tenderness. There is no guarding or rebound.   Musculoskeletal:      Right lower leg: No edema.      Left lower leg: No edema.   Skin:     General: Skin is warm and dry.      Capillary Refill: Capillary refill takes less than 2 seconds.   Neurological:      General: No focal deficit present.      Mental Status: She is alert.   Psychiatric:         Mood and Affect: Mood normal.       Labs  Lab Visit on 02/22/2023   Component Date Value Ref Range Status    Sodium 02/22/2023 142  136 - 145 mmol/L Final    Potassium 02/22/2023 4.0  3.5 - 5.1 mmol/L Final    Chloride 02/22/2023 105  95 - 110 mmol/L Final    CO2 02/22/2023 26  23 - 29 mmol/L Final    Glucose 02/22/2023 112 (H)  70 - 110 mg/dL Final    BUN 02/22/2023 11  8 - 23 mg/dL Final    Creatinine 02/22/2023 1.3  0.5 - 1.4 mg/dL Final    Calcium 02/22/2023 9.2  8.7 - 10.5 mg/dL Final    Total Protein 02/22/2023 7.5  6.0 - 8.4 g/dL Final    Albumin 02/22/2023 4.0  3.5 - 5.2 g/dL Final    Total Bilirubin 02/22/2023 0.5  0.1 - 1.0 mg/dL Final    Comment: For infants and newborns, interpretation of results should be based  on gestational age, weight and in agreement with clinical  observations.    Premature Infant recommended reference ranges:  Up to 24 hours.............<8.0 mg/dL  Up to 48  hours............<12.0 mg/dL  3-5 days..................<15.0 mg/dL  6-29 days.................<15.0 mg/dL      Alkaline Phosphatase 02/22/2023 144 (H)  55 - 135 U/L Final    AST 02/22/2023 18  10 - 40 U/L Final    ALT 02/22/2023 10  10 - 44 U/L Final    Anion Gap 02/22/2023 11  8 - 16 mmol/L Final    eGFR 02/22/2023 40.5 (A)  >60 mL/min/1.73 m^2 Final    WBC 02/22/2023 3.87 (L)  3.90 - 12.70 K/uL Final    RBC 02/22/2023 4.31  4.00 - 5.40 M/uL Final    Hemoglobin 02/22/2023 14.0  12.0 - 16.0 g/dL Final    Hematocrit 02/22/2023 42.5  37.0 - 48.5 % Final    MCV 02/22/2023 99 (H)  82 - 98 fL Final    MCH 02/22/2023 32.5 (H)  27.0 - 31.0 pg Final    MCHC 02/22/2023 32.9  32.0 - 36.0 g/dL Final    RDW 02/22/2023 14.0  11.5 - 14.5 % Final    Platelets 02/22/2023 134 (L)  150 - 450 K/uL Final    MPV 02/22/2023 11.9  9.2 - 12.9 fL Final    Immature Granulocytes 02/22/2023 1.6 (H)  0.0 - 0.5 % Final    Gran # (ANC) 02/22/2023 1.5 (L)  1.8 - 7.7 K/uL Final    Immature Grans (Abs) 02/22/2023 0.06 (H)  0.00 - 0.04 K/uL Final    Comment: Mild elevation in immature granulocytes is non specific and   can be seen in a variety of conditions including stress response,   acute inflammation, trauma and pregnancy. Correlation with other   laboratory and clinical findings is essential.      Lymph # 02/22/2023 1.3  1.0 - 4.8 K/uL Final    Mono # 02/22/2023 1.0  0.3 - 1.0 K/uL Final    Eos # 02/22/2023 0.0  0.0 - 0.5 K/uL Final    Baso # 02/22/2023 0.01  0.00 - 0.20 K/uL Final    nRBC 02/22/2023 0  0 /100 WBC Final    Gran % 02/22/2023 37.6 (L)  38.0 - 73.0 % Final    Lymph % 02/22/2023 33.6  18.0 - 48.0 % Final    Mono % 02/22/2023 26.9 (H)  4.0 - 15.0 % Final    Eosinophil % 02/22/2023 0.0  0.0 - 8.0 % Final    Basophil % 02/22/2023 0.3  0.0 - 1.9 % Final    Differential Method 02/22/2023 Automated   Final       EKG  EKG 03/20/2023 normal sinus rhythm normal rate consider right atrial enlargement right bundle-branch block prolonged  QTC    Echo   Results for orders placed or performed during the hospital encounter of 12/10/21   Echo   Result Value Ref Range    TDI SEPTAL 0.05 m/s    LV LATERAL E/E' RATIO 5.30 m/s    LV SEPTAL E/E' RATIO 10.60 m/s    AORTIC VALVE CUSP SEPERATION 1.70 cm    TDI LATERAL 0.10 m/s    PV PEAK VELOCITY 0.93 cm/s    LVIDd 3.74 3.5 - 6.0 cm    IVS 1.30 (A) 0.6 - 1.1 cm    Posterior Wall 1.30 0.6 - 1.1 cm    Ao root annulus 3.40 cm    LVIDs 2.72 2.1 - 4.0 cm    FS 27 28 - 44 %    LV mass 169.10 g    LA size 2.71 cm    Left Ventricle Relative Wall Thickness 0.70 cm    AV Velocity Ratio 0.61     MV valve area p 1/2 method 3.60 cm2    E/A ratio 0.63     Mean e' 0.08 m/s    E wave deceleration time 210.48 msec    LVOT diameter 1.68 cm    LVOT area 2.2 cm2    LVOT peak get 0.89 m/s    LVOT peak VTI 17.31 cm    Ao peak get 1.47 m/s    Mr max get 0.05 m/s    LVOT stroke volume 38.35 cm3    AV peak gradient 9 mmHg    E/E' ratio 7.07 m/s    MV Peak E Get 0.53 m/s    TR Max Get 2.15 m/s    MV stenosis pressure 1/2 time 61.04 ms    MV Peak A Get 0.84 m/s    LV Systolic Volume 27.43 mL    LV Diastolic Volume 59.55 mL    Triscuspid Valve Regurgitation Peak Gradient 18 mmHg    Right Atrial Pressure (from IVC) 3 mmHg    EF 55 %    TV rest pulmonary artery pressure 21 mmHg    Narrative    · The left ventricle is normal in size with mild concentric hypertrophy   and normal systolic function.  · The estimated ejection fraction is 55%.  · Grade I left ventricular diastolic dysfunction.  · Normal right ventricular size with normal right ventricular systolic   function.  · Mild aortic regurgitation.  · Mild mitral regurgitation.  · Mild tricuspid regurgitation.  · Normal central venous pressure (3 mmHg).  · The estimated PA systolic pressure is 21 mmHg.          Imaging  No results found.    Prior coronary angiogram / intervention:  No known    Assessment and Plan  1. SVT (supraventricular tachycardia)  Followed by electrophysiology on  beta-blocker    2. Aortic mural thrombus  Aspirin and high-dose statin  Seen by vascular surgery  Surveillance    3. Hyperlipidemia, unspecified hyperlipidemia type  Statin as above    4. RBBB  Stable     5. Hypertension, unspecified type  Continue beta-blocker  Blood pressure log at night call me if systolic greater than 140    Follow Up  6 months      Savage Farias MD, FACC, RPVI  Interventional Cardiology

## 2023-07-07 DIAGNOSIS — I47.10 SVT (SUPRAVENTRICULAR TACHYCARDIA): ICD-10-CM

## 2023-07-07 DIAGNOSIS — I10 BENIGN ESSENTIAL HTN: ICD-10-CM

## 2023-07-07 DIAGNOSIS — E78.5 HYPERLIPIDEMIA, UNSPECIFIED HYPERLIPIDEMIA TYPE: ICD-10-CM

## 2023-07-07 RX ORDER — GLYCOPYRROLATE AND FORMOTEROL FUMARATE 9; 4.8 UG/1; UG/1
AEROSOL, METERED RESPIRATORY (INHALATION)
Refills: 0 | OUTPATIENT
Start: 2023-07-07

## 2023-07-07 RX ORDER — NAPROXEN SODIUM 220 MG/1
TABLET, FILM COATED ORAL
Refills: 0 | OUTPATIENT
Start: 2023-07-07

## 2023-07-07 RX ORDER — ROSUVASTATIN CALCIUM 20 MG/1
TABLET, COATED ORAL
Refills: 0 | OUTPATIENT
Start: 2023-07-07

## 2023-07-07 RX ORDER — CARVEDILOL 6.25 MG/1
TABLET ORAL
Refills: 0 | OUTPATIENT
Start: 2023-07-07

## 2023-07-07 NOTE — TELEPHONE ENCOUNTER
Refill Decision Note   Sangita Logan  is requesting a refill authorization.  Brief Assessment and Rationale for Refill:  Quick Discontinue     Medication Therapy Plan:  No Sig Pharmacy is requesting new scripts for the following medications without required information, (sig/ frequency/qty/etc)      Medication Reconciliation Completed: No     Comments: Pharmacies have been requesting medications for patients without required information, (sig, frequency, qty, etc.). In addition, requests are sent for medication(s) pt. are currently not taking, and medications patients have never taken.    We have spoken to the pharmacies about these request types and advised their teams previously that we are unable to assess these New Script requests and require all details for these requests. This is a known issue and has been reported.     Note composed:12:52 PM 07/07/2023

## 2023-07-07 NOTE — TELEPHONE ENCOUNTER
Refill Decision Note   Sangita Logan  is requesting a refill authorization.  Brief Assessment and Rationale for Refill:  Quick Discontinue     Medication Therapy Plan:  No Sig Pharmacy is requesting new scripts for the following medications without required information, (sig/ frequency/qty/etc)      Medication Reconciliation Completed: No     Comments: Pharmacies have been requesting medications for patients without required information, (sig, frequency, qty, etc.). In addition, requests are sent for medication(s) pt. are currently not taking, and medications patients have never taken.    We have spoken to the pharmacies about these request types and advised their teams previously that we are unable to assess these New Script requests and require all details for these requests. This is a known issue and has been reported.     Note composed:12:29 PM 07/07/2023

## 2023-07-07 NOTE — TELEPHONE ENCOUNTER
No care due was identified.  [facility] Embedded Care Due Messages. Reference number: [id number].   7/07/2023 11:33:56 AM CDT

## 2023-08-02 ENCOUNTER — HOSPITAL ENCOUNTER (EMERGENCY)
Facility: HOSPITAL | Age: 83
Discharge: HOME | End: 2023-08-02
Attending: EMERGENCY MEDICINE
Payer: MEDICARE

## 2023-08-02 VITALS
BODY MASS INDEX: 19.32 KG/M2 | RESPIRATION RATE: 20 BRPM | HEART RATE: 80 BPM | DIASTOLIC BLOOD PRESSURE: 89 MMHG | SYSTOLIC BLOOD PRESSURE: 168 MMHG | WEIGHT: 105 LBS | TEMPERATURE: 98 F | HEIGHT: 62 IN | OXYGEN SATURATION: 95 %

## 2023-08-02 DIAGNOSIS — R06.02 SHORTNESS OF BREATH: ICD-10-CM

## 2023-08-02 DIAGNOSIS — J44.1 COPD EXACERBATION: Primary | ICD-10-CM

## 2023-08-02 DIAGNOSIS — N30.00 ACUTE CYSTITIS WITHOUT HEMATURIA: ICD-10-CM

## 2023-08-02 LAB
ALBUMIN SERPL BCP-MCNC: 4.1 G/DL (ref 3.5–5.2)
ALP SERPL-CCNC: 122 U/L (ref 55–135)
ALT SERPL W/O P-5'-P-CCNC: 17 U/L (ref 10–44)
ANION GAP SERPL CALC-SCNC: 13 MMOL/L (ref 8–16)
AST SERPL-CCNC: 30 U/L (ref 10–40)
BACTERIA #/AREA URNS AUTO: ABNORMAL /HPF
BASOPHILS # BLD AUTO: 0.01 K/UL (ref 0–0.2)
BASOPHILS NFR BLD: 0.2 % (ref 0–1.9)
BILIRUB SERPL-MCNC: 0.5 MG/DL (ref 0.1–1)
BILIRUB UR QL STRIP: NEGATIVE
BNP SERPL-MCNC: 54 PG/ML (ref 0–99)
BUN SERPL-MCNC: 24 MG/DL (ref 8–23)
CALCIUM SERPL-MCNC: 9.4 MG/DL (ref 8.7–10.5)
CHLORIDE SERPL-SCNC: 102 MMOL/L (ref 95–110)
CLARITY UR REFRACT.AUTO: CLEAR
CO2 SERPL-SCNC: 25 MMOL/L (ref 23–29)
COLOR UR AUTO: YELLOW
CREAT SERPL-MCNC: 0.9 MG/DL (ref 0.5–1.4)
DIFFERENTIAL METHOD: ABNORMAL
EOSINOPHIL # BLD AUTO: 0 K/UL (ref 0–0.5)
EOSINOPHIL NFR BLD: 0 % (ref 0–8)
ERYTHROCYTE [DISTWIDTH] IN BLOOD BY AUTOMATED COUNT: 12 % (ref 11.5–14.5)
EST. GFR  (NO RACE VARIABLE): >60 ML/MIN/1.73 M^2
GLUCOSE SERPL-MCNC: 136 MG/DL (ref 70–110)
GLUCOSE UR QL STRIP: NEGATIVE
HCT VFR BLD AUTO: 37.4 % (ref 37–48.5)
HCV AB SERPL QL IA: NORMAL
HGB BLD-MCNC: 12.7 G/DL (ref 12–16)
HGB UR QL STRIP: NEGATIVE
HYALINE CASTS UR QL AUTO: 0 /LPF
IMM GRANULOCYTES # BLD AUTO: 0.03 K/UL (ref 0–0.04)
IMM GRANULOCYTES NFR BLD AUTO: 0.6 % (ref 0–0.5)
KETONES UR QL STRIP: ABNORMAL
LEUKOCYTE ESTERASE UR QL STRIP: ABNORMAL
LYMPHOCYTES # BLD AUTO: 1.1 K/UL (ref 1–4.8)
LYMPHOCYTES NFR BLD: 22.1 % (ref 18–48)
MCH RBC QN AUTO: 32.6 PG (ref 27–31)
MCHC RBC AUTO-ENTMCNC: 34 G/DL (ref 32–36)
MCV RBC AUTO: 96 FL (ref 82–98)
MICROSCOPIC COMMENT: ABNORMAL
MONOCYTES # BLD AUTO: 1.1 K/UL (ref 0.3–1)
MONOCYTES NFR BLD: 22.5 % (ref 4–15)
NEUTROPHILS # BLD AUTO: 2.7 K/UL (ref 1.8–7.7)
NEUTROPHILS NFR BLD: 54.6 % (ref 38–73)
NITRITE UR QL STRIP: NEGATIVE
NRBC BLD-RTO: 0 /100 WBC
PH UR STRIP: 5 [PH] (ref 5–8)
PLATELET # BLD AUTO: 125 K/UL (ref 150–450)
PMV BLD AUTO: 12 FL (ref 9.2–12.9)
POTASSIUM SERPL-SCNC: 3.7 MMOL/L (ref 3.5–5.1)
PROT SERPL-MCNC: 7.6 G/DL (ref 6–8.4)
PROT UR QL STRIP: ABNORMAL
RBC # BLD AUTO: 3.9 M/UL (ref 4–5.4)
RBC #/AREA URNS AUTO: 2 /HPF (ref 0–4)
SARS-COV-2 RDRP RESP QL NAA+PROBE: NEGATIVE
SODIUM SERPL-SCNC: 140 MMOL/L (ref 136–145)
SP GR UR STRIP: 1.02 (ref 1–1.03)
SQUAMOUS #/AREA URNS AUTO: 0 /HPF
TROPONIN I SERPL DL<=0.01 NG/ML-MCNC: 0.01 NG/ML (ref 0–0.03)
URN SPEC COLLECT METH UR: ABNORMAL
WBC # BLD AUTO: 4.88 K/UL (ref 3.9–12.7)
WBC #/AREA URNS AUTO: 29 /HPF (ref 0–5)

## 2023-08-02 PROCEDURE — 99285 EMERGENCY DEPT VISIT HI MDM: CPT | Mod: 25,HCNC

## 2023-08-02 PROCEDURE — 86803 HEPATITIS C AB TEST: CPT | Mod: HCNC | Performed by: EMERGENCY MEDICINE

## 2023-08-02 PROCEDURE — 25000003 PHARM REV CODE 250: Mod: HCNC | Performed by: PHYSICIAN ASSISTANT

## 2023-08-02 PROCEDURE — 94761 N-INVAS EAR/PLS OXIMETRY MLT: CPT | Mod: HCNC

## 2023-08-02 PROCEDURE — 93005 ELECTROCARDIOGRAM TRACING: CPT | Mod: HCNC

## 2023-08-02 PROCEDURE — 96361 HYDRATE IV INFUSION ADD-ON: CPT | Mod: HCNC

## 2023-08-02 PROCEDURE — 96365 THER/PROPH/DIAG IV INF INIT: CPT | Mod: HCNC

## 2023-08-02 PROCEDURE — 83880 ASSAY OF NATRIURETIC PEPTIDE: CPT | Mod: HCNC | Performed by: PHYSICIAN ASSISTANT

## 2023-08-02 PROCEDURE — 96375 TX/PRO/DX INJ NEW DRUG ADDON: CPT | Mod: HCNC

## 2023-08-02 PROCEDURE — 94640 AIRWAY INHALATION TREATMENT: CPT | Mod: HCNC

## 2023-08-02 PROCEDURE — 84484 ASSAY OF TROPONIN QUANT: CPT | Mod: HCNC | Performed by: PHYSICIAN ASSISTANT

## 2023-08-02 PROCEDURE — 87086 URINE CULTURE/COLONY COUNT: CPT | Mod: HCNC | Performed by: PHYSICIAN ASSISTANT

## 2023-08-02 PROCEDURE — U0002 COVID-19 LAB TEST NON-CDC: HCPCS | Mod: HCNC | Performed by: PHYSICIAN ASSISTANT

## 2023-08-02 PROCEDURE — 81001 URINALYSIS AUTO W/SCOPE: CPT | Mod: HCNC | Performed by: PHYSICIAN ASSISTANT

## 2023-08-02 PROCEDURE — 85025 COMPLETE CBC W/AUTO DIFF WBC: CPT | Mod: HCNC | Performed by: PHYSICIAN ASSISTANT

## 2023-08-02 PROCEDURE — 93010 ELECTROCARDIOGRAM REPORT: CPT | Mod: HCNC,,, | Performed by: INTERNAL MEDICINE

## 2023-08-02 PROCEDURE — 80053 COMPREHEN METABOLIC PANEL: CPT | Mod: HCNC | Performed by: PHYSICIAN ASSISTANT

## 2023-08-02 PROCEDURE — 93010 EKG 12-LEAD: ICD-10-PCS | Mod: HCNC,,, | Performed by: INTERNAL MEDICINE

## 2023-08-02 PROCEDURE — 63600175 PHARM REV CODE 636 W HCPCS: Mod: HCNC | Performed by: PHYSICIAN ASSISTANT

## 2023-08-02 PROCEDURE — 25000242 PHARM REV CODE 250 ALT 637 W/ HCPCS: Mod: HCNC | Performed by: PHYSICIAN ASSISTANT

## 2023-08-02 RX ORDER — AMOXICILLIN AND CLAVULANATE POTASSIUM 875; 125 MG/1; MG/1
1 TABLET, FILM COATED ORAL 2 TIMES DAILY
Qty: 14 TABLET | Refills: 0 | Status: SHIPPED | OUTPATIENT
Start: 2023-08-02 | End: 2023-08-09

## 2023-08-02 RX ORDER — METHYLPREDNISOLONE SOD SUCC 125 MG
125 VIAL (EA) INJECTION
Status: COMPLETED | OUTPATIENT
Start: 2023-08-02 | End: 2023-08-02

## 2023-08-02 RX ORDER — IPRATROPIUM BROMIDE AND ALBUTEROL SULFATE 2.5; .5 MG/3ML; MG/3ML
3 SOLUTION RESPIRATORY (INHALATION)
Status: COMPLETED | OUTPATIENT
Start: 2023-08-02 | End: 2023-08-02

## 2023-08-02 RX ORDER — GLYCOPYRROLATE AND FORMOTEROL FUMARATE 9; 4.8 UG/1; UG/1
2 AEROSOL, METERED RESPIRATORY (INHALATION) DAILY
Qty: 10.7 G | Status: SHIPPED | OUTPATIENT
Start: 2023-08-02 | End: 2023-11-07 | Stop reason: SDUPTHER

## 2023-08-02 RX ORDER — IPRATROPIUM BROMIDE AND ALBUTEROL SULFATE 2.5; .5 MG/3ML; MG/3ML
3 SOLUTION RESPIRATORY (INHALATION)
Status: DISCONTINUED | OUTPATIENT
Start: 2023-08-02 | End: 2023-08-02

## 2023-08-02 RX ADMIN — SODIUM CHLORIDE, POTASSIUM CHLORIDE, SODIUM LACTATE AND CALCIUM CHLORIDE 1000 ML: 600; 310; 30; 20 INJECTION, SOLUTION INTRAVENOUS at 03:08

## 2023-08-02 RX ADMIN — METHYLPREDNISOLONE SODIUM SUCCINATE 125 MG: 125 INJECTION, POWDER, FOR SOLUTION INTRAMUSCULAR; INTRAVENOUS at 01:08

## 2023-08-02 RX ADMIN — CEFTRIAXONE 1 G: 1 INJECTION, POWDER, FOR SOLUTION INTRAMUSCULAR; INTRAVENOUS at 03:08

## 2023-08-02 RX ADMIN — IPRATROPIUM BROMIDE AND ALBUTEROL SULFATE 3 ML: .5; 3 SOLUTION RESPIRATORY (INHALATION) at 01:08

## 2023-08-02 NOTE — ED NOTES
Pt identifiers Sangita Logan were checked and are correct  LOC: The patient is awake, alert, aware of environment with an appropriate affect. Oriented x4, speaking appropriately  APPEARANCE: Slightly labored breathing noted, in no acute distress, pt is clean and well groomed, clothing properly fastened  SKIN: Skin warm, dry and intact, normal skin turgor, moist mucus membranes  RESPIRATORY: Airway is open and patent, respirations are spontaneous, slightly labored Wheezing auscultated to RLL  CARDIAC: Normal rate and rhythm, no peripheral edema noted, capillary refill < 3 seconds, bilateral radial pulses 2+  ABDOMEN: Soft, nontender, nondistended. Bowel sounds present to all four quad of abd on auscultation  NEUROLOGIC: PERRL, facial expression is symmetrical, patient moving all extremities spontaneously, normal sensation in all extremities when touched with a finger.  Follows all commands appropriately  MUSCULOSKELETAL: No obvious deformities.

## 2023-08-02 NOTE — ED PROVIDER NOTES
Encounter Date: 8/2/2023       History     Chief Complaint   Patient presents with    Generalized Fatigue     Hx COPD having shortness of breath     82-year-old female with past medical history hypertension, COPD presents emergency department for generalized weakness.  Reports she ran out of her Bevespi inhaler 3 days ago.  States she has been feeling generally weak for 3 days which worsened yesterday.  States she has difficulty walking a few blocks.  Has mildly worse shortness of breath.  Pain, abdominal pain, urinary symptoms.    The history is provided by the patient.     Review of patient's allergies indicates:   Allergen Reactions    No known drug allergies      Past Medical History:   Diagnosis Date    COPD (chronic obstructive pulmonary disease)     HTN (hypertension) 10/21/2021    Osteoporosis     Sinus disease      Past Surgical History:   Procedure Laterality Date    AUGMENTATION OF BREAST Bilateral     received at age 23, removed at age 76    Breast implants removal  2012    SINUS SURGERY      TONSILLECTOMY      Age 1     Family History   Problem Relation Age of Onset    No Known Problems Mother     No Known Problems Father     Breast cancer Sister         dx in 30s    Blindness Neg Hx     Glaucoma Neg Hx     Macular degeneration Neg Hx     Retinal detachment Neg Hx      Social History     Tobacco Use    Smoking status: Every Day     Current packs/day: 0.50     Average packs/day: 0.5 packs/day for 30.0 years (15.0 ttl pk-yrs)     Types: Cigarettes    Smokeless tobacco: Never    Tobacco comments:     2 packs a week   Substance Use Topics    Alcohol use: Yes     Alcohol/week: 3.0 standard drinks of alcohol     Types: 3 Glasses of wine per week     Comment: 4 times weekly    Drug use: Not Currently     Types: Marijuana     Review of Systems   Constitutional:  Positive for fatigue. Negative for chills and fever.   HENT:  Negative for sore throat.    Respiratory:  Positive for cough and shortness of breath.     Cardiovascular:  Negative for chest pain.   Gastrointestinal:  Negative for abdominal pain, nausea and vomiting.   Genitourinary:  Negative for difficulty urinating and dysuria.   Musculoskeletal: Negative.    Neurological:  Negative for weakness.   Psychiatric/Behavioral:  Negative for confusion.        Physical Exam     Initial Vitals [08/02/23 1236]   BP Pulse Resp Temp SpO2   (!) 145/98 92 (!) 26 97.9 °F (36.6 °C) (!) 94 %      MAP       --         Physical Exam    Nursing note and vitals reviewed.  Constitutional: She appears well-developed and well-nourished.   HENT:   Head: Normocephalic and atraumatic.   Eyes: Conjunctivae are normal. Pupils are equal, round, and reactive to light.   Neck: Neck supple.   Normal range of motion.  Cardiovascular:  Normal rate, regular rhythm, normal heart sounds and intact distal pulses.           Pulmonary/Chest: She has wheezes (Faint expiratory in the RLL).   Abdominal: Abdomen is soft. Bowel sounds are normal. There is no abdominal tenderness.   Musculoskeletal:         General: Normal range of motion.      Cervical back: Normal range of motion and neck supple.     Neurological: She is alert and oriented to person, place, and time. GCS score is 15. GCS eye subscore is 4. GCS verbal subscore is 5. GCS motor subscore is 6.   Skin: Skin is warm and dry. Capillary refill takes less than 2 seconds.   Psychiatric: She has a normal mood and affect.         ED Course   Procedures  Labs Reviewed   CBC W/ AUTO DIFFERENTIAL - Abnormal; Notable for the following components:       Result Value    RBC 3.90 (*)     MCH 32.6 (*)     Platelets 125 (*)     Immature Granulocytes 0.6 (*)     Mono # 1.1 (*)     Mono % 22.5 (*)     All other components within normal limits   COMPREHENSIVE METABOLIC PANEL - Abnormal; Notable for the following components:    Glucose 136 (*)     BUN 24 (*)     All other components within normal limits   URINALYSIS, REFLEX TO URINE CULTURE - Abnormal; Notable for  the following components:    Protein, UA 1+ (*)     Ketones, UA Trace (*)     Leukocytes, UA 2+ (*)     All other components within normal limits    Narrative:     Specimen Source->Urine   URINALYSIS MICROSCOPIC - Abnormal; Notable for the following components:    WBC, UA 29 (*)     All other components within normal limits    Narrative:     Specimen Source->Urine   CULTURE, URINE   HEPATITIS C ANTIBODY    Narrative:     Release to patient->Immediate   TROPONIN I   SARS-COV-2 RNA AMPLIFICATION, QUAL   B-TYPE NATRIURETIC PEPTIDE        ECG Results              EKG 12-lead (Final result)  Result time 08/02/23 14:07:11      Final result by Interface, Lab In [facility] (08/02/23 14:07:11)                   Narrative:    Test Reason : R06.02,    Vent. Rate : 087 BPM     Atrial Rate : 087 BPM     P-R Int : 150 ms          QRS Dur : 104 ms      QT Int : 400 ms       P-R-T Axes : 102 032 019 degrees     QTc Int : 481 ms    Normal sinus rhythm  Incomplete right bundle branch block  Borderline Abnormal ECG  When compared with ECG of 20-MAR-2023 15:58,  Nonspecific T wave abnormality now evident in Inferior leads  Confirmed by Chris TELLEZ MD ([id number]) on 8/2/2023 2:06:59 PM    Referred By:             Confirmed By:Chris TELLEZ MD                                  Imaging Results              X-Ray Chest 1 View (Final result)  Result time 08/02/23 13:46:23   Procedure changed from X-Ray Chest PA And Lateral     Final result by Bruce Bonilla MD (08/02/23 13:46:23)                   Impression:      1. Chronic appearing interstitial findings, no large focal consolidation.      Electronically signed by: Bruce Bonilla MD  Date:    08/02/2023  Time:    13:46               Narrative:    EXAMINATION:  XR CHEST 1 VIEW    CLINICAL HISTORY:  SOB; Shortness of breath    TECHNIQUE:  Single frontal view of the chest was performed.    COMPARISON:  09/13/2022    FINDINGS:  The cardiomediastinal silhouette is not enlarged noting calcification of  the aorta..  There is no pleural effusion.  The trachea is midline.  The lungs are symmetrically expanded bilaterally with coarse interstitial attenuation bilaterally, mild.  There may be a calcified granuloma projected over the left upper lung zone..  No large focal consolidation seen.  There is no pneumothorax.  The osseous structures are remarkable for degenerative changes.  There is osteopenia..                                       Medications   lactated ringers bolus 1,000 mL (1,000 mLs Intravenous New Bag 8/2/23 1550)   cefTRIAXone (ROCEPHIN) 1 g in dextrose 5 % in water (D5W) 100 mL IVPB (MB+) (1 g Intravenous New Bag 8/2/23 1549)   methylPREDNISolone sodium succinate injection 125 mg (125 mg Intravenous Given 8/2/23 1359)   albuterol-ipratropium 2.5 mg-0.5 mg/3 mL nebulizer solution 3 mL (3 mLs Nebulization Given 8/2/23 1331)     Medical Decision Making:   History:   Old Medical Records: I decided to obtain old medical records.  Initial Assessment:   82-year-old female presents the ED with shortness of breath and generalized weakness.  Differential Diagnosis:   Dehydration, UTI, COPD exacerbation, COVID-19, viral syndrome, electrolyte derangement, pneumonia  Independently Interpreted Test(s):   I have ordered and independently interpreted EKG Reading(s) - see summary below       <> Summary of EKG Reading(s): On my individual interpretation normal sinus rhythm at a rate 87, incomplete right bundle-branch block which is old compared to previous.  No STEMI.  Clinical Tests:   Lab Tests: Ordered and Reviewed  Radiological Study: Ordered and Reviewed  Medical Tests: Ordered and Reviewed  ED Management:  On exam mild wheezing in the right lower lobe.  Given Solu-Medrol and DuoNeb in the ED with resolution of her wheezing.  No focal neurological deficits on exam.  Abdominal exam is benign.  Her UA suspicious for UTI and she was given Rocephin in the ED. BUN creatinine ratio slightly elevated which may suggest mild  dehydration.  She was given IV fluids in the ED. Ambulatory saturation 95%.  She does endorse mild sputum production and given her history of COPD will cover with Augmentin.  Troponin and BNP are normal and doubt ACS or CHF exacerbation.  Discussed follow-up with PCP as needed.  Discussed return ED precautions.             ED Course as of 08/02/23 1608   Wed Aug 02, 2023   1533 SARS-CoV-2 RNA, Amplification, Qual: Negative [HJ]      ED Course User Index  [HJ] Hood Lewis PA-C                 Clinical Impression:   Final diagnoses:  [J44.1] COPD exacerbation (Primary)  [N30.00] Acute cystitis without hematuria        ED Disposition Condition    Discharge Stable          ED Prescriptions       Medication Sig Dispense Start Date End Date Auth. Provider    amoxicillin-clavulanate 875-125mg (AUGMENTIN) 875-125 mg per tablet Take 1 tablet by mouth 2 (two) times daily. for 7 days 14 tablet 8/2/2023 8/9/2023 Hood Lewis PA-C    BEVESPI AEROSPHERE 9-4.8 mcg HFAA Inhale 2 puffs into the lungs once daily. 10.7 g 8/2/2023 -- Hood Lewis PA-C          Follow-up Information       Follow up With Specialties Details Why Contact Info    Danish Pereira MD Family Medicine, Hospitalist   [address] [place] [address] [phone]               Hood Lewis PA-C  08/02/23 1603

## 2023-08-02 NOTE — DISCHARGE INSTRUCTIONS
You were seen in the ED for generalized weakness.  Your urine was concerning for a urinary tract infection.  You may also have a mild COPD exacerbation.  I have given you a refill of your Bevespi as well as a course of antibiotics.  Please take this until finished.  If continued to have symptoms please follow-up with your PCP.  Return to ED sooner for develop any new or worsening symptoms.

## 2023-08-03 LAB
BACTERIA UR CULT: NORMAL
BACTERIA UR CULT: NORMAL

## 2023-09-18 ENCOUNTER — PATIENT MESSAGE (OUTPATIENT)
Dept: PRIMARY CARE CLINIC | Facility: CLINIC | Age: 83
End: 2023-09-18
Payer: MEDICARE

## 2023-10-09 ENCOUNTER — TELEPHONE (OUTPATIENT)
Dept: PULMONOLOGY | Facility: CLINIC | Age: 83
End: 2023-10-09
Payer: MEDICARE

## 2023-10-09 DIAGNOSIS — J44.9 CHRONIC OBSTRUCTIVE PULMONARY DISEASE, UNSPECIFIED COPD TYPE: Primary | ICD-10-CM

## 2023-10-18 ENCOUNTER — PATIENT MESSAGE (OUTPATIENT)
Dept: CARDIOLOGY | Facility: CLINIC | Age: 83
End: 2023-10-18
Payer: MEDICARE

## 2023-11-07 ENCOUNTER — NURSE TRIAGE (OUTPATIENT)
Dept: ADMINISTRATIVE | Facility: CLINIC | Age: 83
End: 2023-11-07
Payer: MEDICARE

## 2023-11-07 ENCOUNTER — HOSPITAL ENCOUNTER (EMERGENCY)
Facility: HOSPITAL | Age: 83
Discharge: HOME | End: 2023-11-07
Attending: EMERGENCY MEDICINE
Payer: MEDICARE

## 2023-11-07 VITALS
WEIGHT: 110 LBS | HEART RATE: 82 BPM | DIASTOLIC BLOOD PRESSURE: 78 MMHG | RESPIRATION RATE: 18 BRPM | OXYGEN SATURATION: 95 % | BODY MASS INDEX: 20.12 KG/M2 | TEMPERATURE: 98 F | SYSTOLIC BLOOD PRESSURE: 138 MMHG

## 2023-11-07 DIAGNOSIS — J44.1 COPD EXACERBATION: Primary | ICD-10-CM

## 2023-11-07 PROCEDURE — 94761 N-INVAS EAR/PLS OXIMETRY MLT: CPT | Mod: HCNC

## 2023-11-07 PROCEDURE — 25000242 PHARM REV CODE 250 ALT 637 W/ HCPCS: Mod: HCNC | Performed by: EMERGENCY MEDICINE

## 2023-11-07 PROCEDURE — 27000221 HC OXYGEN, UP TO 24 HOURS: Mod: HCNC

## 2023-11-07 PROCEDURE — 36000 PLACE NEEDLE IN VEIN: CPT | Mod: HCNC

## 2023-11-07 PROCEDURE — 94640 AIRWAY INHALATION TREATMENT: CPT | Mod: HCNC

## 2023-11-07 PROCEDURE — 99283 EMERGENCY DEPT VISIT LOW MDM: CPT | Mod: 25,HCNC

## 2023-11-07 RX ORDER — ALBUTEROL SULFATE 90 UG/1
2 AEROSOL, METERED RESPIRATORY (INHALATION) EVERY 6 HOURS PRN
Status: DISCONTINUED | OUTPATIENT
Start: 2023-11-07 | End: 2023-11-07 | Stop reason: HOSPADM

## 2023-11-07 RX ORDER — IPRATROPIUM BROMIDE AND ALBUTEROL SULFATE 2.5; .5 MG/3ML; MG/3ML
3 SOLUTION RESPIRATORY (INHALATION)
Status: COMPLETED | OUTPATIENT
Start: 2023-11-07 | End: 2023-11-07

## 2023-11-07 RX ORDER — GLYCOPYRROLATE AND FORMOTEROL FUMARATE 9; 4.8 UG/1; UG/1
2 AEROSOL, METERED RESPIRATORY (INHALATION) DAILY
Qty: 10.7 G | Status: SHIPPED | OUTPATIENT
Start: 2023-11-07 | End: 2023-12-13 | Stop reason: SDUPTHER

## 2023-11-07 RX ADMIN — IPRATROPIUM BROMIDE AND ALBUTEROL SULFATE 3 ML: .5; 3 SOLUTION RESPIRATORY (INHALATION) at 04:11

## 2023-11-07 NOTE — ED TRIAGE NOTES
Patient identifiers for Sangita Logan 82 y.o. female checked and correct.  Chief Complaint   Patient presents with    Shortness of Breath     Out of inhaler      Past Medical History:   Diagnosis Date    COPD (chronic obstructive pulmonary disease)     HTN (hypertension) 10/21/2021    Osteoporosis     Sinus disease      Allergies reported:   Review of patient's allergies indicates:   Allergen Reactions    No known drug allergies          LOC: Patient is awake, alert, and aware of environment with an appropriate affect. Patient is oriented x 4 and speaking appropriately.  APPEARANCE: Patient resting comfortably and in no acute distress. Patient is clean and well groomed, patient's clothing is properly fastened.  SKIN: The skin is warm and dry. Patient has normal skin turgor and moist mucus membranes.   MUSKULOSKELETAL: Patient is moving all extremities well, no obvious deformities noted. Pulses intact.   RESPIRATORY: Airway is open and patent. Respirations are spontaneous and non-labored with normal effort and rate. Report SOB  CARDIAC: Patient has a normal rate and rhythm. Normal sinus on cardiac monitor. No peripheral edema noted.   ABDOMEN: No distention noted. Soft and non-tender upon palpation.  NEUROLOGICAL:  PERRL. Facial expression is symmetrical. Hand grasps are equal bilaterally. Normal sensation in all extremities when touched with finger.

## 2023-11-07 NOTE — ED PROVIDER NOTES
Encounter Date: 11/7/2023       History     Chief Complaint   Patient presents with    Shortness of Breath     Out of inhaler      HPI  82-year-old female with a past medical history of HTN, COPD and osteoporosis presents with shortness of breath.  Patient usually use was Bevespi inhaler but she ran out the day before yesterday.  Has tried to refill her prescription but states that it is not available until this Thursday (which is the day after tomorrow).  States that since yesterday she is had progressively increasing shortness of breath and she is concerned that she was not going to bili make it until Thursday so presents to the emergency department.  Does have a cough that this is a chronic cough no significant change.  No chest pain.  No lower extremity swelling.  No fevers.  Is not on home O2.  Is an active smoker but states she is a light smoker  Bevespi is the only inhaler patient has she has no other rescue inhaler    Review of patient's allergies indicates:   Allergen Reactions    No known drug allergies      Past Medical History:   Diagnosis Date    COPD (chronic obstructive pulmonary disease)     HTN (hypertension) 10/21/2021    Osteoporosis     Sinus disease      Past Surgical History:   Procedure Laterality Date    AUGMENTATION OF BREAST Bilateral     received at age 23, removed at age 76    Breast implants removal  2012    SINUS SURGERY      TONSILLECTOMY      Age 1     Family History   Problem Relation Age of Onset    No Known Problems Mother     No Known Problems Father     Breast cancer Sister         dx in 30s    Blindness Neg Hx     Glaucoma Neg Hx     Macular degeneration Neg Hx     Retinal detachment Neg Hx      Social History     Tobacco Use    Smoking status: Every Day     Current packs/day: 0.50     Average packs/day: 0.5 packs/day for 30.0 years (15.0 ttl pk-yrs)     Types: Cigarettes    Smokeless tobacco: Never    Tobacco comments:     2 packs a week   Substance Use Topics    Alcohol use:  Yes     Alcohol/week: 3.0 standard drinks of alcohol     Types: 3 Glasses of wine per week     Comment: 4 times weekly    Drug use: Not Currently     Types: Marijuana     Review of Systems   Constitutional:  Negative for fatigue and fever.   Respiratory:  Positive for cough, shortness of breath and wheezing.    Cardiovascular:  Negative for chest pain, palpitations and leg swelling.   Gastrointestinal:  Negative for abdominal pain, nausea and vomiting.   Genitourinary:  Negative for dysuria.   Neurological:  Negative for weakness and headaches.       Physical Exam     Initial Vitals   BP Pulse Resp Temp SpO2   11/07/23 1518 11/07/23 1518 11/07/23 1518 11/07/23 1521 11/07/23 1518   (!) 156/73 80 (!) 24 97.9 °F (36.6 °C) (!) 93 %      MAP       --                Physical Exam    Nursing note and vitals reviewed.  Constitutional:   Thin female in no acute distress   HENT:   Head: Normocephalic and atraumatic.   Eyes: Conjunctivae are normal.   Neck: Neck supple. No JVD present.   Cardiovascular:  Normal rate, regular rhythm and intact distal pulses.           Pulmonary/Chest: No respiratory distress. She has wheezes.   No stridor   Abdominal: Abdomen is soft. Bowel sounds are normal. She exhibits no distension. There is no abdominal tenderness.   Musculoskeletal:         General: No tenderness or edema.      Cervical back: Neck supple.     Neurological: She is alert and oriented to person, place, and time. GCS score is 15. GCS eye subscore is 4. GCS verbal subscore is 5. GCS motor subscore is 6.   Skin: Skin is warm and dry.         ED Course   Procedures  Labs Reviewed - No data to display       Imaging Results              X-Ray Chest AP Portable (Final result)  Result time 11/07/23 18:11:29      Final result by Sophy Santos MD (11/07/23 18:11:29)                   Impression:      No acute abnormality or cardiomegaly.      Electronically signed by: Sophy Santos  Date:    11/07/2023  Time:    18:11                Narrative:    EXAMINATION:  XR CHEST AP PORTABLE    CLINICAL HISTORY:  Asthma;    TECHNIQUE:  Single frontal view of the chest was performed.    COMPARISON:  08/02/2023 09/13/2022 chest x-ray    FINDINGS:  The cardiac silhouette is stable and nonenlarged.  The aorta is mildly tortuous.  There are no appreciable acute lung parenchymal or pleural abnormalities.  Patient's chin overlies the apices limiting evaluation.  Lungs otherwise are clear.  Osseous structures appear stable with degenerative changes noted.                                    X-Rays:   Independently Interpreted Readings:   Chest X-Ray: No cardiomegaly, no focal infiltrate, no pleural effusions, flattened diaphragms bilaterally     Medications   albuterol inhaler 2 puff (has no administration in time range)   albuterol-ipratropium 2.5 mg-0.5 mg/3 mL nebulizer solution 3 mL (3 mLs Nebulization Given 11/7/23 1646)     Medical Decision Making  82-year-old female with a past medical history of HTN, COPD and osteoporosis presents with shortness of breath.  Differential diagnoses COPD exacerbation likely related to noncompliance given she is run out of her inhaler, less likely pleural effusion, have considered but do not suspect pneumonia or PE  Patient was placed on oxygen in triage for a O2 sat of 93% which is probably her baseline given her COPD disease.  We will order a chest x-ray and treat with a DuoNeb and reassess anticipate discharge home    6:20 PM  CXR without evidence of pneumonia or pleural effusion. Pt feeling better after duoned. Still has an occasional exp wheeze but improved. 95% on room air. Will discharge home with albuterol rescue inhaler which she can use until her prescription for bevespi is filled day after tomorrow. Routine return precautions. Follow up PCP.    Amount and/or Complexity of Data Reviewed  Radiology: ordered and independent interpretation performed. Decision-making details documented in ED  Course.    Risk  Prescription drug management.                               Clinical Impression:   Final diagnoses:  [J44.1] COPD exacerbation (Primary)        ED Disposition Condition    Discharge Stable          ED Prescriptions    None       Follow-up Information       Follow up With Specialties Details Why Contact Info    Danish Pereira MD Family Medicine, Hospitalist Schedule an appointment as soon as possible for a visit   [address] [place] [address]  [phone]               Marsha Steen MD  11/07/23 5717

## 2023-11-07 NOTE — TELEPHONE ENCOUNTER
----- Message from Yajaira Jacob sent at 11/7/2023 11:49 AM CST -----  Contact: [phone]  Requesting an RX refill or new RX.  Is this a refill or new RX: refill  RX name and strength (copy/paste from chart):  BEVESPI AEROSPHERE 9-4.8 mcg HFAA  Is this a 30 day or 90 day RX: 90 day  Pharmacy name and phone # (copy/paste from chart):    [id number] - [place], [place] - [address] [facility] [address]  [address]  [facility] [address]  Phone: [phone] Fax: [phone]  The doctors have asked that we provide their patients with the following 2 reminders -- prescription refills can take up to 72 hours, and a friendly reminder that in the future you can use your [facility] account to request refills: aware       Pt  currently having issues with SOB and says provider was not the original prescribe of the medication, but needs the medication desperately. Please Advise

## 2023-11-07 NOTE — TELEPHONE ENCOUNTER
BEVESPI AEROSPHERE 9-4.8 mcg HFAA  Was called in to [facility] as written due to prescription written to print. Patient is informed of call in.  Reason for Disposition   Prescription prescribed recently is not at pharmacy and triager has access to patient's EMR and prescription is recorded in the EMR    Protocols used: Medication Question Call-A-[place]

## 2023-11-08 NOTE — DISCHARGE INSTRUCTIONS
Use albuterol inhaler if needed for shortness of breath maximum 1-2  puffs if needed every 4 hours until you have your usual inhaler  Follow up with your doctor  Return to ED for chest pain, fevers, worsening shortness of breath or any other concerns

## 2023-11-10 ENCOUNTER — HOSPITAL ENCOUNTER (EMERGENCY)
Facility: HOSPITAL | Age: 83
Discharge: HOME | End: 2023-11-10
Attending: EMERGENCY MEDICINE
Payer: MEDICARE

## 2023-11-10 VITALS
BODY MASS INDEX: 17.93 KG/M2 | DIASTOLIC BLOOD PRESSURE: 78 MMHG | RESPIRATION RATE: 18 BRPM | WEIGHT: 105 LBS | TEMPERATURE: 98 F | SYSTOLIC BLOOD PRESSURE: 150 MMHG | HEIGHT: 64 IN | HEART RATE: 81 BPM | OXYGEN SATURATION: 96 %

## 2023-11-10 DIAGNOSIS — J44.1 COPD EXACERBATION: ICD-10-CM

## 2023-11-10 DIAGNOSIS — R53.1 WEAKNESS: Primary | ICD-10-CM

## 2023-11-10 DIAGNOSIS — J44.9 CHRONIC OBSTRUCTIVE PULMONARY DISEASE, UNSPECIFIED COPD TYPE: ICD-10-CM

## 2023-11-10 LAB
ALBUMIN SERPL BCP-MCNC: 4 G/DL (ref 3.5–5.2)
ALP SERPL-CCNC: 113 U/L (ref 55–135)
ALT SERPL W/O P-5'-P-CCNC: 10 U/L (ref 10–44)
ANION GAP SERPL CALC-SCNC: 12 MMOL/L (ref 8–16)
AST SERPL-CCNC: 25 U/L (ref 10–40)
BASOPHILS # BLD AUTO: 0.01 K/UL (ref 0–0.2)
BASOPHILS NFR BLD: 0.2 % (ref 0–1.9)
BILIRUB SERPL-MCNC: 0.9 MG/DL (ref 0.1–1)
BUN SERPL-MCNC: 18 MG/DL (ref 8–23)
CALCIUM SERPL-MCNC: 9.4 MG/DL (ref 8.7–10.5)
CHLORIDE SERPL-SCNC: 102 MMOL/L (ref 95–110)
CO2 SERPL-SCNC: 23 MMOL/L (ref 23–29)
CREAT SERPL-MCNC: 1.1 MG/DL (ref 0.5–1.4)
DIFFERENTIAL METHOD: ABNORMAL
EOSINOPHIL # BLD AUTO: 0 K/UL (ref 0–0.5)
EOSINOPHIL NFR BLD: 0 % (ref 0–8)
ERYTHROCYTE [DISTWIDTH] IN BLOOD BY AUTOMATED COUNT: 12.7 % (ref 11.5–14.5)
EST. GFR  (NO RACE VARIABLE): 49.5 ML/MIN/1.73 M^2
GLUCOSE SERPL-MCNC: 106 MG/DL (ref 70–110)
HCT VFR BLD AUTO: 38.7 % (ref 37–48.5)
HGB BLD-MCNC: 12.6 G/DL (ref 12–16)
IMM GRANULOCYTES # BLD AUTO: 0.04 K/UL (ref 0–0.04)
IMM GRANULOCYTES NFR BLD AUTO: 0.7 % (ref 0–0.5)
LYMPHOCYTES # BLD AUTO: 1.7 K/UL (ref 1–4.8)
LYMPHOCYTES NFR BLD: 27.8 % (ref 18–48)
MAGNESIUM SERPL-MCNC: 2.3 MG/DL (ref 1.6–2.6)
MCH RBC QN AUTO: 33.2 PG (ref 27–31)
MCHC RBC AUTO-ENTMCNC: 32.6 G/DL (ref 32–36)
MCV RBC AUTO: 102 FL (ref 82–98)
MONOCYTES # BLD AUTO: 1.4 K/UL (ref 0.3–1)
MONOCYTES NFR BLD: 23.1 % (ref 4–15)
NEUTROPHILS # BLD AUTO: 2.9 K/UL (ref 1.8–7.7)
NEUTROPHILS NFR BLD: 48.2 % (ref 38–73)
NRBC BLD-RTO: 0 /100 WBC
PHOSPHATE SERPL-MCNC: 3.6 MG/DL (ref 2.7–4.5)
PLATELET # BLD AUTO: 124 K/UL (ref 150–450)
PMV BLD AUTO: 12.7 FL (ref 9.2–12.9)
POTASSIUM SERPL-SCNC: 4.9 MMOL/L (ref 3.5–5.1)
PROT SERPL-MCNC: 7.5 G/DL (ref 6–8.4)
RBC # BLD AUTO: 3.79 M/UL (ref 4–5.4)
SODIUM SERPL-SCNC: 137 MMOL/L (ref 136–145)
TROPONIN I SERPL DL<=0.01 NG/ML-MCNC: 0.01 NG/ML (ref 0–0.03)
TSH SERPL DL<=0.005 MIU/L-ACNC: 2.2 UIU/ML (ref 0.4–4)
WBC # BLD AUTO: 5.97 K/UL (ref 3.9–12.7)

## 2023-11-10 PROCEDURE — 99285 EMERGENCY DEPT VISIT HI MDM: CPT | Mod: 25,HCNC

## 2023-11-10 PROCEDURE — 84443 ASSAY THYROID STIM HORMONE: CPT | Mod: HCNC | Performed by: EMERGENCY MEDICINE

## 2023-11-10 PROCEDURE — 94640 AIRWAY INHALATION TREATMENT: CPT | Mod: HCNC

## 2023-11-10 PROCEDURE — 84484 ASSAY OF TROPONIN QUANT: CPT | Mod: HCNC | Performed by: EMERGENCY MEDICINE

## 2023-11-10 PROCEDURE — 25000242 PHARM REV CODE 250 ALT 637 W/ HCPCS: Mod: HCNC | Performed by: EMERGENCY MEDICINE

## 2023-11-10 PROCEDURE — 93010 EKG 12-LEAD: ICD-10-PCS | Mod: HCNC,,, | Performed by: INTERNAL MEDICINE

## 2023-11-10 PROCEDURE — 96360 HYDRATION IV INFUSION INIT: CPT | Mod: HCNC

## 2023-11-10 PROCEDURE — 94761 N-INVAS EAR/PLS OXIMETRY MLT: CPT | Mod: HCNC

## 2023-11-10 PROCEDURE — 93010 ELECTROCARDIOGRAM REPORT: CPT | Mod: HCNC,,, | Performed by: INTERNAL MEDICINE

## 2023-11-10 PROCEDURE — 83735 ASSAY OF MAGNESIUM: CPT | Mod: HCNC | Performed by: EMERGENCY MEDICINE

## 2023-11-10 PROCEDURE — 80053 COMPREHEN METABOLIC PANEL: CPT | Mod: HCNC | Performed by: EMERGENCY MEDICINE

## 2023-11-10 PROCEDURE — 25000003 PHARM REV CODE 250: Mod: HCNC | Performed by: EMERGENCY MEDICINE

## 2023-11-10 PROCEDURE — 84100 ASSAY OF PHOSPHORUS: CPT | Mod: HCNC | Performed by: EMERGENCY MEDICINE

## 2023-11-10 PROCEDURE — 93005 ELECTROCARDIOGRAM TRACING: CPT | Mod: HCNC

## 2023-11-10 PROCEDURE — 63600175 PHARM REV CODE 636 W HCPCS: Mod: HCNC | Performed by: EMERGENCY MEDICINE

## 2023-11-10 PROCEDURE — 85025 COMPLETE CBC W/AUTO DIFF WBC: CPT | Mod: HCNC | Performed by: EMERGENCY MEDICINE

## 2023-11-10 RX ORDER — IPRATROPIUM BROMIDE AND ALBUTEROL SULFATE 2.5; .5 MG/3ML; MG/3ML
3 SOLUTION RESPIRATORY (INHALATION)
Status: COMPLETED | OUTPATIENT
Start: 2023-11-10 | End: 2023-11-10

## 2023-11-10 RX ADMIN — DEXAMETHASONE 10 MG: 4 TABLET ORAL at 06:11

## 2023-11-10 RX ADMIN — SODIUM CHLORIDE 1000 ML: 9 INJECTION, SOLUTION INTRAVENOUS at 03:11

## 2023-11-10 RX ADMIN — IPRATROPIUM BROMIDE AND ALBUTEROL SULFATE 3 ML: .5; 3 SOLUTION RESPIRATORY (INHALATION) at 03:11

## 2023-11-10 NOTE — ED NOTES
"Sangita Logan, a 82 y.o. female presents to the ED w/ complaint of fatigue for the past few months    Triage note:  Chief Complaint   Patient presents with    Fatigue     C/o weakness x months, intensity fluctuates, but today "it's bad."      Review of patient's allergies indicates:   Allergen Reactions    No known drug allergies      Past Medical History:   Diagnosis Date    COPD (chronic obstructive pulmonary disease)     HTN (hypertension) 10/21/2021    Osteoporosis     Sinus disease      LOC: Patient name and date of birth verified. The patient is awake, alert and aware of environment with an appropriate affect, the patient is oriented x 3 and speaking appropriately.   APPEARANCE: Patient resting comfortably, patient is clean and well groomed, patient's clothing is properly fastened.  SKIN: The skin is warm and dry, color consistent with ethnicity, patient has normal skin turgor and moist mucus membranes, skin intact, no breakdown or bruising noted.  MUSCULOSKELETAL: Patient moving all extremities well, no obvious swelling or deformities noted.   RESPIRATORY: Respirations are spontaneous, patient has a normal effort and rate, no accessory muscle use noted.  CARDIAC: Patient has a normal rate and rhythm, no periphreal edema noted, capillary refill < 3 seconds.  ABDOMEN: Soft and non tender to palpation, no distention noted. Bowel sounds present in all four quadrants.  NEUROLOGIC: Eyes open spontaneously, behavior appropriate to situation, follows commands, facial expression symmetrical, bilateral hand grasp equal and even, purposeful motor response noted, normal sensation in all extremities when touched with a finger.    "

## 2023-11-10 NOTE — ED PROVIDER NOTES
"Chief Complaint   Fatigue (C/o weakness x months, intensity fluctuates, but today "it's bad." )      History Of Present Illness   Sangita Logan is a 82 y.o. female presenting with generalized weakness.  The patient states she is been having the weakness in waxing and waning fashion for months, but it is much worse today.  She wonders if she might be dehydrated.  She states that a ways back, she was weak and found to be dehydrated.  She improved with fluids.  She also notes a little bit of shortness of breath.  She was recently in the ER for COPD, and states that she thinks she might need a treatment.  No chest pain or tightness.  No nausea or vomiting.  No focal weakness.  No numbness.      Review of patient's allergies indicates:   Allergen Reactions    No known drug allergies        No current facility-administered medications on file prior to encounter.     Current Outpatient Medications on File Prior to Encounter   Medication Sig Dispense Refill    acetaminophen (TYLENOL) 500 MG tablet Take 2 tablets (1,000 mg total) by mouth every 6 (six) hours as needed for Pain. 40 tablet 0    albuterol (PROVENTIL) 2.5 mg /3 mL (0.083 %) nebulizer solution Take 3 mLs (2.5 mg total) by nebulization every 6 (six) hours as needed for Wheezing. Rescue 90 mL 5    aspirin (ECOTRIN) 81 MG EC tablet Take 1 tablet (81 mg total) by mouth once daily. 90 tablet 3    BEVESPI AEROSPHERE 9-4.8 mcg HFAA Inhale 2 puffs into the lungs once daily. 10.7 g G    carvediloL (COREG) 6.25 MG tablet Take 1 tablet (6.25 mg total) by mouth 2 (two) times daily with meals. 180 tablet 3    multivitamin-Ca-iron-minerals (MULTIPLE VITAMIN, WOMENS) Tab Take 1 tablet by mouth Daily.      rosuvastatin (CRESTOR) 20 MG tablet Take 1 tablet (20 mg total) by mouth once daily. (Patient taking differently: Take 20 mg by mouth every evening.) 90 tablet 3       Past History   As per HPI and below:  Past Medical History:   Diagnosis Date    COPD (chronic " "obstructive pulmonary disease)     HTN (hypertension) 10/21/2021    Osteoporosis     Sinus disease      Past Surgical History:   Procedure Laterality Date    AUGMENTATION OF BREAST Bilateral     received at age 23, removed at age 76    Breast implants removal  2012    SINUS SURGERY      TONSILLECTOMY      Age 1       Social History     Socioeconomic History    Marital status:    Tobacco Use    Smoking status: Every Day     Current packs/day: 0.50     Average packs/day: 0.5 packs/day for 30.0 years (15.0 ttl pk-yrs)     Types: Cigarettes    Smokeless tobacco: Never    Tobacco comments:     2 packs a week   Substance and Sexual Activity    Alcohol use: Yes     Alcohol/week: 3.0 standard drinks of alcohol     Types: 3 Glasses of wine per week     Comment: 4 times weekly    Drug use: Not Currently     Types: Marijuana    Sexual activity: Not Currently       Family History   Problem Relation Age of Onset    No Known Problems Mother     No Known Problems Father     Breast cancer Sister         dx in 30s    Blindness Neg Hx     Glaucoma Neg Hx     Macular degeneration Neg Hx     Retinal detachment Neg Hx        Physical Exam     Vitals:    11/10/23 1425 11/10/23 1554 11/10/23 1623 11/10/23 1828   BP: (!) 163/84  (!) 181/87 (!) 150/78   BP Location:   Right arm Right arm   Patient Position:   Sitting Sitting   Pulse: 89 85 95 81   Resp: 20 18 18 18   Temp: 97.7 °F (36.5 °C)  97.6 °F (36.4 °C) 97.9 °F (36.6 °C)   TempSrc: Oral  Oral Oral   SpO2: (!) 94% 95% 99% 96%   Weight: 47.6 kg (105 lb)      Height: 5' 4" (1.626 m)        Appearance: No acute distress.  Skin: No rashes seen.  Decreased turgor.  No abrasions.  No ecchymoses.  Eyes: No conjunctival injection.  ENT: Oropharynx clear.    Chest: Good air movement.  Diffuse end expiratory wheezes.  No rhonchi.  Cardiovascular: Regular rate and rhythm.  No murmurs. No gallops. No rubs.  Abdomen: Soft.  Not distended.  Nontender.  No guarding.  No " rebound.  Musculoskeletal: Good range of motion all joints.  No deformities.  Neck supple.  No meningismus.  Neurologic: Motor intact.  Sensation intact.  Cerebellar intact.  Cranial nerves intact.  Mental Status:  Alert and oriented x 3.  Appropriate, conversant.      Initial MDM   Generalized weakness, decreased skin turgor, patient notes weakness is similar to previous episode of dehydration.  Weakness has waxed and waned for months.  It is not associated with exertion, but rather standing.  No chest pain.  She has some shortness of breath which she attributes to her COPD, in his wheezing.  I am going to give fluids, 1 breathing treatment, check labs and reassess.  She does not look toxic.  Disposition will be based on lab work and response to treatment.      Medications Given     Medications   sodium chloride 0.9% bolus 1,000 mL 1,000 mL (0 mLs Intravenous Stopped 11/10/23 1712)   albuterol-ipratropium 2.5 mg-0.5 mg/3 mL nebulizer solution 3 mL (3 mLs Nebulization Given 11/10/23 1554)   dexAMETHasone tablet 10 mg (10 mg Oral Given 11/10/23 1853)       Results and Course     Labs Reviewed   CBC W/ AUTO DIFFERENTIAL - Abnormal; Notable for the following components:       Result Value    RBC 3.79 (*)      (*)     MCH 33.2 (*)     Platelets 124 (*)     Immature Granulocytes 0.7 (*)     Mono # 1.4 (*)     Mono % 23.1 (*)     All other components within normal limits   COMPREHENSIVE METABOLIC PANEL - Abnormal; Notable for the following components:    eGFR 49.5 (*)     All other components within normal limits   MAGNESIUM   PHOSPHORUS   TROPONIN I   TSH       Imaging Results              X-Ray Chest AP Portable (Final result)  Result time 11/10/23 16:49:43      Final result by Bruce Bonilla MD (11/10/23 16:49:43)                   Impression:      1. Chronic appearing interstitial findings suggesting COPD/emphysema.  No large focal consolidation.      Electronically signed by: Bruce Bonilla  MD  Date:    11/10/2023  Time:    16:49               Narrative:    EXAMINATION:  XR CHEST AP PORTABLE    CLINICAL HISTORY:  Shortness of breath    TECHNIQUE:  Single frontal view of the chest was performed.    COMPARISON:  11/07/2023    FINDINGS:  The cardiomediastinal silhouette is not enlarged noting calcification of the aorta..  There is no pleural effusion.  The trachea is midline.  The lungs are symmetrically expanded bilaterally with mildly coarse interstitial attenuation.  There may be a calcified granuloma projected over the left upper lung zone..  No large focal consolidation seen.  There is no pneumothorax.  The osseous structures are remarkable for degenerative change..                                      ED Course as of 11/11/23 1206   Fri Nov 10, 2023   1636 WBC: 5.97 [DC]   1636 Hemoglobin: 12.6 [DC]   1636 Platelet Count(!): 124 [DC]   1642 Magnesium : 2.3 [DC]   1642 Phosphorus Level: 3.6 [DC]   1642 Troponin I: 0.014 [DC]   1642 Creatinine: 1.1 [DC]   1642 X-Ray Chest AP Portable  CXR shows no acute disease per my independent interpretation.     [DC]   1642 EKG 12-lead  NSR, incomplete RBBB, no acute ischemia, similar to previous per my independent interpretation.   [DC]   1647 Feels better, still a little weak.  alf through fluids. [DC]      ED Course User Index  [DC] Ion Trejo MD           MDM, Impression and Plan   82 y.o. female with generalized weakness, intermittent for months, worse recently.  Also with some shortness of breath from her COPD.  That was treated with 1 treatment and she feels better.  Weakness is improving with fluids, patient noted similar issues in the past with dehydration.  Labs benign.  Will continue fluids and reassess.  Anticipate discharge.  Turned over to Dr. Gonzalez pending re-evaluation.         Final diagnoses:  [R53.1] Weakness (Primary)  [J44.9] Chronic obstructive pulmonary disease, unspecified COPD type  [J44.1] COPD exacerbation        ED  Disposition Condition    Discharge Stable          ED Prescriptions    None       Follow-up Information       Follow up With Specialties Details Why Contact Info    Danish Pereira MD Family Medicine, Hospitalist In 3 days  [address] [place] [address]  [phone]                 Ion Trejo MD  11/11/23 2656

## 2023-11-11 NOTE — PROVIDER PROGRESS NOTES - EMERGENCY DEPT.
Encounter Date: 11/10/2023    ED Physician Progress Notes          Pt signed out to me by Dr. Trejo.  She is here with generalized weakness on and off for months.  History of COPD.   Received IV fluids and albuterol in ED.  Labs, EKG, CXR without acutely concerning findings.  Pending re-eval after fluids finish.    Update:  On my re-evaluation pt feeling somewhat better.  She is walking normally for me.  Does still have some wheezing on exam but no shortness of breath or increased work of breathing.  She has not been using albuterol, I did recommend she use this as needed, she does have an inhaler at home.  Recommend close follow up with PCP.  Return precautions given for worsening symptoms or other new concerning symptoms. Discharged in stable condition.

## 2023-11-11 NOTE — PHARMACY MED REC
"Admission Medication History     The home medication history was taken by Ava Chappell.    You may go to "Admission" then "Reconcile Home Medications" tabs to review and/or act upon these items.     The home medication list has been updated by the Pharmacy department.   Please read ALL comments highlighted in yellow.   Please address this information as you see fit.    Feel free to contact us if you have any questions or require assistance.      The medications listed below were removed from the home medication list. Please reorder if appropriate:  Patient reports no longer taking the following medication(s):  AMLODIPINE 5 MG TABLET      Medications listed below were obtained from: Patient/family and Analytic software- [employer]  Current Outpatient Medications on File Prior to Encounter   Medication Sig    acetaminophen (TYLENOL) 500 MG tablet   Take 2 tablets (1,000 mg total) by mouth every 6 (six) hours as needed for pain.    albuterol (PROVENTIL) 2.5 mg /3 mL (0.083 %) nebulizer solution   Use 3 mLs (2.5 mg total) by nebulization every 6 (six) hours as needed for wheezing. Rescue    aspirin (ECOTRIN) 81 MG EC tablet   Take 1 tablet (81 mg total) by mouth once daily.    BEVESPI AEROSPHERE 9-4.8 mcg HFAA   Inhale 2 puffs into the lungs once daily.    carvediloL (COREG) 6.25 MG tablet   Take 1 tablet (6.25 mg total) by mouth 2 (two) times daily with meals.    multivitamin-Ca-iron-minerals (MULTIPLE VITAMIN, WOMENS) Tab   Take 1 tablet by mouth daily.    rosuvastatin (CRESTOR) 20 MG tablet   Take 20 mg by mouth every evening.         Potential issues to be addressed PRIOR TO DISCHARGE  Patient requires education regarding drug therapies     Ava Chappell  EXT [id number]                  .          "

## 2023-11-13 ENCOUNTER — TELEPHONE (OUTPATIENT)
Dept: PRIMARY CARE CLINIC | Facility: CLINIC | Age: 83
End: 2023-11-13
Payer: MEDICARE

## 2023-11-13 ENCOUNTER — TELEPHONE (OUTPATIENT)
Dept: PULMONOLOGY | Facility: CLINIC | Age: 83
End: 2023-11-13
Payer: MEDICARE

## 2023-11-13 NOTE — TELEPHONE ENCOUNTER
----- Message from Roselynene Sanchez sent at 11/13/2023 11:21 AM CST -----  Contact: [phone]  Caller is requesting an earlier appointment then we can schedule.  Caller is requesting a message be sent to the provider.  If this is for urgent care symptoms, did you offer other providers at this location, providers at other locations, or [facility]? (yes, no, n/a):  n/a  If this is for the patients physical, did you offer to schedule next available and put on wait list, or to see NP or PA for their physical?  (yes, no, n/a):  n/a  When is the next available appointment with their provider:  11/27/23  Reason for the appointment:  er f/u  Patient preference of timeframe to be scheduled: within 3 days   Would the patient like a call back, or a response through their [facility] portal?:   phone  Comments:

## 2023-11-16 ENCOUNTER — OFFICE VISIT (OUTPATIENT)
Dept: PRIMARY CARE CLINIC | Facility: CLINIC | Age: 83
End: 2023-11-16
Payer: MEDICARE

## 2023-11-16 VITALS
HEIGHT: 64 IN | RESPIRATION RATE: 18 BRPM | SYSTOLIC BLOOD PRESSURE: 110 MMHG | OXYGEN SATURATION: 94 % | HEART RATE: 87 BPM | TEMPERATURE: 97 F | BODY MASS INDEX: 18.16 KG/M2 | WEIGHT: 106.38 LBS | DIASTOLIC BLOOD PRESSURE: 56 MMHG

## 2023-11-16 DIAGNOSIS — F41.9 ANXIETY: ICD-10-CM

## 2023-11-16 DIAGNOSIS — J44.9 CHRONIC OBSTRUCTIVE PULMONARY DISEASE, UNSPECIFIED COPD TYPE: ICD-10-CM

## 2023-11-16 DIAGNOSIS — R53.1 WEAKNESS: Primary | ICD-10-CM

## 2023-11-16 DIAGNOSIS — R53.83 FATIGUE, UNSPECIFIED TYPE: ICD-10-CM

## 2023-11-16 PROCEDURE — 1101F PR PT FALLS ASSESS DOC 0-1 FALLS W/OUT INJ PAST YR: ICD-10-PCS | Mod: HCNC,CPTII,S$GLB, | Performed by: STUDENT IN AN ORGANIZED HEALTH CARE EDUCATION/TRAINING PROGRAM

## 2023-11-16 PROCEDURE — 1126F AMNT PAIN NOTED NONE PRSNT: CPT | Mod: HCNC,CPTII,S$GLB, | Performed by: STUDENT IN AN ORGANIZED HEALTH CARE EDUCATION/TRAINING PROGRAM

## 2023-11-16 PROCEDURE — 99999 PR PBB SHADOW E&M-EST. PATIENT-LVL IV: CPT | Mod: PBBFAC,HCNC,, | Performed by: STUDENT IN AN ORGANIZED HEALTH CARE EDUCATION/TRAINING PROGRAM

## 2023-11-16 PROCEDURE — 1159F PR MEDICATION LIST DOCUMENTED IN MEDICAL RECORD: ICD-10-PCS | Mod: HCNC,CPTII,S$GLB, | Performed by: STUDENT IN AN ORGANIZED HEALTH CARE EDUCATION/TRAINING PROGRAM

## 2023-11-16 PROCEDURE — 1160F PR REVIEW ALL MEDS BY PRESCRIBER/CLIN PHARMACIST DOCUMENTED: ICD-10-PCS | Mod: HCNC,CPTII,S$GLB, | Performed by: STUDENT IN AN ORGANIZED HEALTH CARE EDUCATION/TRAINING PROGRAM

## 2023-11-16 PROCEDURE — 3074F PR MOST RECENT SYSTOLIC BLOOD PRESSURE < 130 MM HG: ICD-10-PCS | Mod: HCNC,CPTII,S$GLB, | Performed by: STUDENT IN AN ORGANIZED HEALTH CARE EDUCATION/TRAINING PROGRAM

## 2023-11-16 PROCEDURE — 1160F RVW MEDS BY RX/DR IN RCRD: CPT | Mod: HCNC,CPTII,S$GLB, | Performed by: STUDENT IN AN ORGANIZED HEALTH CARE EDUCATION/TRAINING PROGRAM

## 2023-11-16 PROCEDURE — 3074F SYST BP LT 130 MM HG: CPT | Mod: HCNC,CPTII,S$GLB, | Performed by: STUDENT IN AN ORGANIZED HEALTH CARE EDUCATION/TRAINING PROGRAM

## 2023-11-16 PROCEDURE — 1101F PT FALLS ASSESS-DOCD LE1/YR: CPT | Mod: HCNC,CPTII,S$GLB, | Performed by: STUDENT IN AN ORGANIZED HEALTH CARE EDUCATION/TRAINING PROGRAM

## 2023-11-16 PROCEDURE — 3078F DIAST BP <80 MM HG: CPT | Mod: HCNC,CPTII,S$GLB, | Performed by: STUDENT IN AN ORGANIZED HEALTH CARE EDUCATION/TRAINING PROGRAM

## 2023-11-16 PROCEDURE — 1159F MED LIST DOCD IN RCRD: CPT | Mod: HCNC,CPTII,S$GLB, | Performed by: STUDENT IN AN ORGANIZED HEALTH CARE EDUCATION/TRAINING PROGRAM

## 2023-11-16 PROCEDURE — 99214 PR OFFICE/OUTPT VISIT, EST, LEVL IV, 30-39 MIN: ICD-10-PCS | Mod: HCNC,S$GLB,, | Performed by: STUDENT IN AN ORGANIZED HEALTH CARE EDUCATION/TRAINING PROGRAM

## 2023-11-16 PROCEDURE — 99214 OFFICE O/P EST MOD 30 MIN: CPT | Mod: HCNC,S$GLB,, | Performed by: STUDENT IN AN ORGANIZED HEALTH CARE EDUCATION/TRAINING PROGRAM

## 2023-11-16 PROCEDURE — 3288F FALL RISK ASSESSMENT DOCD: CPT | Mod: HCNC,CPTII,S$GLB, | Performed by: STUDENT IN AN ORGANIZED HEALTH CARE EDUCATION/TRAINING PROGRAM

## 2023-11-16 PROCEDURE — 1126F PR PAIN SEVERITY QUANTIFIED, NO PAIN PRESENT: ICD-10-PCS | Mod: HCNC,CPTII,S$GLB, | Performed by: STUDENT IN AN ORGANIZED HEALTH CARE EDUCATION/TRAINING PROGRAM

## 2023-11-16 PROCEDURE — 99999 PR PBB SHADOW E&M-EST. PATIENT-LVL IV: ICD-10-PCS | Mod: PBBFAC,HCNC,, | Performed by: STUDENT IN AN ORGANIZED HEALTH CARE EDUCATION/TRAINING PROGRAM

## 2023-11-16 PROCEDURE — 3078F PR MOST RECENT DIASTOLIC BLOOD PRESSURE < 80 MM HG: ICD-10-PCS | Mod: HCNC,CPTII,S$GLB, | Performed by: STUDENT IN AN ORGANIZED HEALTH CARE EDUCATION/TRAINING PROGRAM

## 2023-11-16 PROCEDURE — 3288F PR FALLS RISK ASSESSMENT DOCUMENTED: ICD-10-PCS | Mod: HCNC,CPTII,S$GLB, | Performed by: STUDENT IN AN ORGANIZED HEALTH CARE EDUCATION/TRAINING PROGRAM

## 2023-11-16 RX ORDER — BUSPIRONE HYDROCHLORIDE 10 MG/1
TABLET ORAL
Qty: 60 TABLET | Refills: 2 | Status: SHIPPED | OUTPATIENT
Start: 2023-11-16 | End: 2023-12-22

## 2023-11-16 NOTE — PROGRESS NOTES
"Subjective:       Patient ID: Sangita Logan is a 82 y.o. female.    Chief Complaint: Follow-up (ED follow up for sob and weak)    HPI:  82 y.o. female presents to [facility] for ED follow-up    Patient was seen in ED 11/10/2023 with acute on chronic fatigue and weakness. Patient reports     Patient reports that things have been going pretty good since ED visit. Just feels that onset was acute as she was feeling very well around age 78. Then things declined quickly. Will see Pulmonology 12/11/2023.    Patient is cutting way back on alcohol. Plans to cut to 12 beers per week.    Interested in appropriate calories at this time and used [employer] calculator with ~1350 calories per day recommended Recommend balance from food pyramid.    Patient feels like she is experiencing anxiety, feeling that she can be scared due to her weakness. Feels she used to be an optimist, now feels more scared.    Estimated Creatinine Clearance: 29 mL/min (based on SCr of 1.1 mg/dL).    Review of Systems   Constitutional:  Positive for fatigue. Negative for chills, diaphoresis and fever.   HENT:  Negative for congestion, sinus pressure, sneezing and sore throat.    Respiratory:  Positive for shortness of breath. Negative for cough.    Cardiovascular:  Negative for chest pain and palpitations.   Gastrointestinal:  Negative for abdominal pain, diarrhea, nausea and vomiting.   Musculoskeletal:  Negative for joint swelling and myalgias.   Neurological:  Positive for weakness. Negative for dizziness.       Objective:      Vitals:    11/16/23 1428   BP: (!) 110/56   BP Location: Left arm   Patient Position: Sitting   BP Method: Medium (Manual)   Pulse: 87   Resp: 18   Temp: 97.2 °F (36.2 °C)   TempSrc: Temporal   SpO2: (!) 94%   Weight: 48.2 kg (106 lb 6 oz)   Height: 5' 4" (1.626 m)     Physical Exam  Vitals reviewed.   Constitutional:       General: She is not in acute distress.     Appearance: Normal appearance. She is not " "ill-appearing.   HENT:      Head: Normocephalic and atraumatic.   Eyes:      General:         Right eye: No discharge.         Left eye: No discharge.      Conjunctiva/sclera: Conjunctivae normal.   Cardiovascular:      Rate and Rhythm: Normal rate and regular rhythm.      Heart sounds: Normal heart sounds. No murmur heard.  Pulmonary:      Effort: Pulmonary effort is normal.      Breath sounds: Normal breath sounds.   Musculoskeletal:         General: No deformity.   Skin:     General: Skin is warm and dry.      Coloration: Skin is not jaundiced.   Neurological:      General: No focal deficit present.      Mental Status: She is alert and oriented to person, place, and time.   Psychiatric:         Mood and Affect: Mood normal.         Behavior: Behavior normal.             Lab Results   Component Value Date     11/10/2023    K 4.9 11/10/2023     11/10/2023    CO2 23 11/10/2023    BUN 18 11/10/2023    CREATININE 1.1 11/10/2023    ANIONGAP 12 11/10/2023     No results found for: "HGBA1C"  Lab Results   Component Value Date    BNP 54 08/02/2023    BNP 59 09/13/2022       Lab Results   Component Value Date    WBC 5.97 11/10/2023    HGB 12.6 11/10/2023    HCT 38.7 11/10/2023    HCT 30 (L) 11/07/2021     (L) 11/10/2023    GRAN 2.9 11/10/2023    GRAN 48.2 11/10/2023     Lab Results   Component Value Date    CHOL 170 05/11/2021    HDL 55 05/11/2021    LDLCALC 98.2 05/11/2021    TRIG 84 05/11/2021          Current Outpatient Medications:     albuterol (PROVENTIL) 2.5 mg /3 mL (0.083 %) nebulizer solution, Take 3 mLs (2.5 mg total) by nebulization every 6 (six) hours as needed for Wheezing. Rescue, Disp: 90 mL, Rfl: 5    aspirin (ECOTRIN) 81 MG EC tablet, Take 1 tablet (81 mg total) by mouth once daily., Disp: 90 tablet, Rfl: 3    BEVESPI AEROSPHERE 9-4.8 mcg HFAA, Inhale 2 puffs into the lungs once daily., Disp: 10.7 g, Rfl: G    carvediloL (COREG) 6.25 MG tablet, Take 1 tablet (6.25 mg total) by mouth 2 " (two) times daily with meals., Disp: 180 tablet, Rfl: 3    multivitamin-Ca-iron-minerals (MULTIPLE VITAMIN, WOMENS) Tab, Take 1 tablet by mouth Daily., Disp: , Rfl:     rosuvastatin (CRESTOR) 20 MG tablet, Take 1 tablet (20 mg total) by mouth once daily. (Patient taking differently: Take 20 mg by mouth every evening.), Disp: 90 tablet, Rfl: 3    busPIRone (BUSPAR) 10 MG tablet, Take 1/2 tablet (5 mg) three times daily for first week, then 1 tablet twice daily thereafter., Disp: 60 tablet, Rfl: 2        Assessment:       1. Weakness    2. Fatigue, unspecified type    3. Anxiety           Plan:       Weakness  Fatigue, unspecified type  COPD/Shortness of breath  -     Ambulatory referral/consult to Endocrinology; Future; Expected date: 11/23/2023  - Will attempt to have front schedule patient at this time, referral placed 5/2023  - Keep appointment with Pulmonology 12/11/2023  - Daily caloric recommendations provided today's visit    Anxiety  -     busPIRone (BUSPAR) 10 MG tablet; Take 1/2 tablet (5 mg) three times daily for first week, then 1 tablet twice daily thereafter.  Dispense: 60 tablet; Refill: 2    RTC in 4 weeks on Buspar

## 2023-11-24 DIAGNOSIS — I47.10 SVT (SUPRAVENTRICULAR TACHYCARDIA): ICD-10-CM

## 2023-11-24 DIAGNOSIS — I10 BENIGN ESSENTIAL HTN: ICD-10-CM

## 2023-11-24 RX ORDER — CARVEDILOL 6.25 MG/1
6.25 TABLET ORAL 2 TIMES DAILY
Qty: 180 TABLET | Refills: 0 | Status: SHIPPED | OUTPATIENT
Start: 2023-11-24 | End: 2024-02-22

## 2023-11-24 NOTE — TELEPHONE ENCOUNTER
No care due was identified.  [facility] Embedded Care Due Messages. Reference number: [id number].   11/24/2023 5:52:18 AM CST

## 2023-12-05 ENCOUNTER — IMMUNIZATION (OUTPATIENT)
Dept: INTERNAL MEDICINE | Facility: CLINIC | Age: 83
End: 2023-12-05
Payer: MEDICARE

## 2023-12-05 DIAGNOSIS — Z23 NEED FOR VACCINATION: Primary | ICD-10-CM

## 2023-12-05 PROCEDURE — 90480 COVID-19 VAC, MRNA 2023 (MODERNA)(PF) 50 MCG/0.5 ML IM SUSR (12+): ICD-10-PCS | Mod: HCNC,S$GLB,, | Performed by: INTERNAL MEDICINE

## 2023-12-05 PROCEDURE — 91322 SARSCOV2 VAC 50 MCG/0.5ML IM: CPT | Mod: HCNC,S$GLB,, | Performed by: INTERNAL MEDICINE

## 2023-12-05 PROCEDURE — 90480 ADMN SARSCOV2 VAC 1/ONLY CMP: CPT | Mod: HCNC,S$GLB,, | Performed by: INTERNAL MEDICINE

## 2023-12-05 PROCEDURE — 91322 COVID-19 VAC, MRNA 2023 (MODERNA)(PF) 50 MCG/0.5 ML IM SUSR (12+): ICD-10-PCS | Mod: HCNC,S$GLB,, | Performed by: INTERNAL MEDICINE

## 2023-12-07 ENCOUNTER — IMMUNIZATION (OUTPATIENT)
Dept: INTERNAL MEDICINE | Facility: CLINIC | Age: 83
End: 2023-12-07
Payer: MEDICARE

## 2023-12-07 DIAGNOSIS — Z23 NEED FOR VACCINATION: Primary | ICD-10-CM

## 2023-12-07 PROCEDURE — G0008 FLU VACCINE - QUADRIVALENT - ADJUVANTED: ICD-10-PCS | Mod: HCNC,S$GLB,, | Performed by: INTERNAL MEDICINE

## 2023-12-07 PROCEDURE — 90694 VACC AIIV4 NO PRSRV 0.5ML IM: CPT | Mod: HCNC,S$GLB,, | Performed by: INTERNAL MEDICINE

## 2023-12-07 PROCEDURE — G0008 ADMIN INFLUENZA VIRUS VAC: HCPCS | Mod: HCNC,S$GLB,, | Performed by: INTERNAL MEDICINE

## 2023-12-07 PROCEDURE — 90694 FLU VACCINE - QUADRIVALENT - ADJUVANTED: ICD-10-PCS | Mod: HCNC,S$GLB,, | Performed by: INTERNAL MEDICINE

## 2023-12-13 ENCOUNTER — HOSPITAL ENCOUNTER (OUTPATIENT)
Dept: PULMONOLOGY | Facility: CLINIC | Age: 83
Discharge: HOME | End: 2023-12-13
Payer: MEDICARE

## 2023-12-13 ENCOUNTER — OFFICE VISIT (OUTPATIENT)
Dept: PULMONOLOGY | Facility: CLINIC | Age: 83
End: 2023-12-13
Payer: MEDICARE

## 2023-12-13 VITALS
DIASTOLIC BLOOD PRESSURE: 60 MMHG | SYSTOLIC BLOOD PRESSURE: 110 MMHG | HEIGHT: 64 IN | WEIGHT: 109.81 LBS | BODY MASS INDEX: 18.75 KG/M2 | HEART RATE: 86 BPM | OXYGEN SATURATION: 97 %

## 2023-12-13 DIAGNOSIS — J44.9 CHRONIC OBSTRUCTIVE PULMONARY DISEASE, UNSPECIFIED COPD TYPE: ICD-10-CM

## 2023-12-13 DIAGNOSIS — J44.9 COPD, GROUP A, BY GOLD 2017 CLASSIFICATION: Primary | ICD-10-CM

## 2023-12-13 DIAGNOSIS — R91.8 MULTIPLE PULMONARY NODULES: ICD-10-CM

## 2023-12-13 DIAGNOSIS — F17.210 CIGARETTE NICOTINE DEPENDENCE WITHOUT COMPLICATION: ICD-10-CM

## 2023-12-13 DIAGNOSIS — R91.1 PULMONARY NODULE: ICD-10-CM

## 2023-12-13 PROCEDURE — 99999 PR PBB SHADOW E&M-EST. PATIENT-LVL III: ICD-10-PCS | Mod: PBBFAC,HCNC,, | Performed by: INTERNAL MEDICINE

## 2023-12-13 PROCEDURE — 3078F PR MOST RECENT DIASTOLIC BLOOD PRESSURE < 80 MM HG: ICD-10-PCS | Mod: HCNC,CPTII,S$GLB, | Performed by: INTERNAL MEDICINE

## 2023-12-13 PROCEDURE — 99999 PR PBB SHADOW E&M-EST. PATIENT-LVL III: CPT | Mod: PBBFAC,HCNC,, | Performed by: INTERNAL MEDICINE

## 2023-12-13 PROCEDURE — 94010 BREATHING CAPACITY TEST: ICD-10-PCS | Mod: HCNC,S$GLB,, | Performed by: INTERNAL MEDICINE

## 2023-12-13 PROCEDURE — 94729 PR C02/MEMBANE DIFFUSE CAPACITY: ICD-10-PCS | Mod: HCNC,S$GLB,, | Performed by: INTERNAL MEDICINE

## 2023-12-13 PROCEDURE — 3078F DIAST BP <80 MM HG: CPT | Mod: HCNC,CPTII,S$GLB, | Performed by: INTERNAL MEDICINE

## 2023-12-13 PROCEDURE — 94010 BREATHING CAPACITY TEST: CPT | Mod: HCNC,S$GLB,, | Performed by: INTERNAL MEDICINE

## 2023-12-13 PROCEDURE — 94727 GAS DIL/WSHOT DETER LNG VOL: CPT | Mod: HCNC,S$GLB,, | Performed by: INTERNAL MEDICINE

## 2023-12-13 PROCEDURE — 3074F PR MOST RECENT SYSTOLIC BLOOD PRESSURE < 130 MM HG: ICD-10-PCS | Mod: HCNC,CPTII,S$GLB, | Performed by: INTERNAL MEDICINE

## 2023-12-13 PROCEDURE — 1101F PT FALLS ASSESS-DOCD LE1/YR: CPT | Mod: HCNC,CPTII,S$GLB, | Performed by: INTERNAL MEDICINE

## 2023-12-13 PROCEDURE — 3288F PR FALLS RISK ASSESSMENT DOCUMENTED: ICD-10-PCS | Mod: HCNC,CPTII,S$GLB, | Performed by: INTERNAL MEDICINE

## 2023-12-13 PROCEDURE — 99214 PR OFFICE/OUTPT VISIT, EST, LEVL IV, 30-39 MIN: ICD-10-PCS | Mod: HCNC,25,S$GLB, | Performed by: INTERNAL MEDICINE

## 2023-12-13 PROCEDURE — 1159F MED LIST DOCD IN RCRD: CPT | Mod: HCNC,CPTII,S$GLB, | Performed by: INTERNAL MEDICINE

## 2023-12-13 PROCEDURE — 1101F PR PT FALLS ASSESS DOC 0-1 FALLS W/OUT INJ PAST YR: ICD-10-PCS | Mod: HCNC,CPTII,S$GLB, | Performed by: INTERNAL MEDICINE

## 2023-12-13 PROCEDURE — 1159F PR MEDICATION LIST DOCUMENTED IN MEDICAL RECORD: ICD-10-PCS | Mod: HCNC,CPTII,S$GLB, | Performed by: INTERNAL MEDICINE

## 2023-12-13 PROCEDURE — 94727 PR PULM FUNCTION TEST BY GAS: ICD-10-PCS | Mod: HCNC,S$GLB,, | Performed by: INTERNAL MEDICINE

## 2023-12-13 PROCEDURE — 99214 OFFICE O/P EST MOD 30 MIN: CPT | Mod: HCNC,25,S$GLB, | Performed by: INTERNAL MEDICINE

## 2023-12-13 PROCEDURE — 3288F FALL RISK ASSESSMENT DOCD: CPT | Mod: HCNC,CPTII,S$GLB, | Performed by: INTERNAL MEDICINE

## 2023-12-13 PROCEDURE — 94729 DIFFUSING CAPACITY: CPT | Mod: HCNC,S$GLB,, | Performed by: INTERNAL MEDICINE

## 2023-12-13 PROCEDURE — 3074F SYST BP LT 130 MM HG: CPT | Mod: HCNC,CPTII,S$GLB, | Performed by: INTERNAL MEDICINE

## 2023-12-13 RX ORDER — GLYCOPYRROLATE AND FORMOTEROL FUMARATE 9; 4.8 UG/1; UG/1
2 AEROSOL, METERED RESPIRATORY (INHALATION) DAILY
Qty: 10.7 G | Refills: 11 | Status: SHIPPED | OUTPATIENT
Start: 2023-12-13 | End: 2023-12-22 | Stop reason: SDUPTHER

## 2023-12-14 PROBLEM — F17.210 CIGARETTE NICOTINE DEPENDENCE WITHOUT COMPLICATION: Status: ACTIVE | Noted: 2023-12-14

## 2023-12-14 NOTE — ASSESSMENT & PLAN NOTE
On bevespi and prn IVAN. 2 exacerbations this year, but one was when she ran out of her inhaler. Respiratory symptoms stable. Cont to smoke 1/3ppd.     - Strongly encouraged smoking cessation  - Cont bevespi and prn albuterol  - UTD on vaccines  - Encouraged regular, progressive exercise  - CT Chest ordered to f/u likely mucoid impaction to ensure this isn't an endobronchial mass

## 2023-12-22 ENCOUNTER — TELEPHONE (OUTPATIENT)
Dept: ADMINISTRATIVE | Facility: OTHER | Age: 83
End: 2023-12-22
Payer: MEDICARE

## 2023-12-22 ENCOUNTER — OFFICE VISIT (OUTPATIENT)
Dept: PRIMARY CARE CLINIC | Facility: CLINIC | Age: 83
End: 2023-12-22
Payer: MEDICARE

## 2023-12-22 VITALS
RESPIRATION RATE: 16 BRPM | DIASTOLIC BLOOD PRESSURE: 64 MMHG | OXYGEN SATURATION: 95 % | WEIGHT: 109.44 LBS | HEART RATE: 71 BPM | BODY MASS INDEX: 18.68 KG/M2 | TEMPERATURE: 98 F | SYSTOLIC BLOOD PRESSURE: 110 MMHG | HEIGHT: 64 IN

## 2023-12-22 DIAGNOSIS — M79.641 PAIN IN BOTH HANDS: ICD-10-CM

## 2023-12-22 DIAGNOSIS — M79.642 PAIN IN BOTH HANDS: ICD-10-CM

## 2023-12-22 DIAGNOSIS — J44.9 COPD, GROUP A, BY GOLD 2017 CLASSIFICATION: ICD-10-CM

## 2023-12-22 DIAGNOSIS — R53.1 GENERAL WEAKNESS: Primary | ICD-10-CM

## 2023-12-22 PROCEDURE — 1101F PR PT FALLS ASSESS DOC 0-1 FALLS W/OUT INJ PAST YR: ICD-10-PCS | Mod: HCNC,CPTII,S$GLB, | Performed by: STUDENT IN AN ORGANIZED HEALTH CARE EDUCATION/TRAINING PROGRAM

## 2023-12-22 PROCEDURE — 99214 OFFICE O/P EST MOD 30 MIN: CPT | Mod: HCNC,S$GLB,, | Performed by: STUDENT IN AN ORGANIZED HEALTH CARE EDUCATION/TRAINING PROGRAM

## 2023-12-22 PROCEDURE — 99214 PR OFFICE/OUTPT VISIT, EST, LEVL IV, 30-39 MIN: ICD-10-PCS | Mod: HCNC,S$GLB,, | Performed by: STUDENT IN AN ORGANIZED HEALTH CARE EDUCATION/TRAINING PROGRAM

## 2023-12-22 PROCEDURE — 3288F PR FALLS RISK ASSESSMENT DOCUMENTED: ICD-10-PCS | Mod: HCNC,CPTII,S$GLB, | Performed by: STUDENT IN AN ORGANIZED HEALTH CARE EDUCATION/TRAINING PROGRAM

## 2023-12-22 PROCEDURE — 99999 PR PBB SHADOW E&M-EST. PATIENT-LVL IV: CPT | Mod: PBBFAC,HCNC,, | Performed by: STUDENT IN AN ORGANIZED HEALTH CARE EDUCATION/TRAINING PROGRAM

## 2023-12-22 PROCEDURE — 3074F SYST BP LT 130 MM HG: CPT | Mod: HCNC,CPTII,S$GLB, | Performed by: STUDENT IN AN ORGANIZED HEALTH CARE EDUCATION/TRAINING PROGRAM

## 2023-12-22 PROCEDURE — 1160F RVW MEDS BY RX/DR IN RCRD: CPT | Mod: HCNC,CPTII,S$GLB, | Performed by: STUDENT IN AN ORGANIZED HEALTH CARE EDUCATION/TRAINING PROGRAM

## 2023-12-22 PROCEDURE — 1101F PT FALLS ASSESS-DOCD LE1/YR: CPT | Mod: HCNC,CPTII,S$GLB, | Performed by: STUDENT IN AN ORGANIZED HEALTH CARE EDUCATION/TRAINING PROGRAM

## 2023-12-22 PROCEDURE — 1159F MED LIST DOCD IN RCRD: CPT | Mod: HCNC,CPTII,S$GLB, | Performed by: STUDENT IN AN ORGANIZED HEALTH CARE EDUCATION/TRAINING PROGRAM

## 2023-12-22 PROCEDURE — 1126F AMNT PAIN NOTED NONE PRSNT: CPT | Mod: HCNC,CPTII,S$GLB, | Performed by: STUDENT IN AN ORGANIZED HEALTH CARE EDUCATION/TRAINING PROGRAM

## 2023-12-22 PROCEDURE — 1159F PR MEDICATION LIST DOCUMENTED IN MEDICAL RECORD: ICD-10-PCS | Mod: HCNC,CPTII,S$GLB, | Performed by: STUDENT IN AN ORGANIZED HEALTH CARE EDUCATION/TRAINING PROGRAM

## 2023-12-22 PROCEDURE — 99999 PR PBB SHADOW E&M-EST. PATIENT-LVL IV: ICD-10-PCS | Mod: PBBFAC,HCNC,, | Performed by: STUDENT IN AN ORGANIZED HEALTH CARE EDUCATION/TRAINING PROGRAM

## 2023-12-22 PROCEDURE — 3074F PR MOST RECENT SYSTOLIC BLOOD PRESSURE < 130 MM HG: ICD-10-PCS | Mod: HCNC,CPTII,S$GLB, | Performed by: STUDENT IN AN ORGANIZED HEALTH CARE EDUCATION/TRAINING PROGRAM

## 2023-12-22 PROCEDURE — 1126F PR PAIN SEVERITY QUANTIFIED, NO PAIN PRESENT: ICD-10-PCS | Mod: HCNC,CPTII,S$GLB, | Performed by: STUDENT IN AN ORGANIZED HEALTH CARE EDUCATION/TRAINING PROGRAM

## 2023-12-22 PROCEDURE — 3078F PR MOST RECENT DIASTOLIC BLOOD PRESSURE < 80 MM HG: ICD-10-PCS | Mod: HCNC,CPTII,S$GLB, | Performed by: STUDENT IN AN ORGANIZED HEALTH CARE EDUCATION/TRAINING PROGRAM

## 2023-12-22 PROCEDURE — 3078F DIAST BP <80 MM HG: CPT | Mod: HCNC,CPTII,S$GLB, | Performed by: STUDENT IN AN ORGANIZED HEALTH CARE EDUCATION/TRAINING PROGRAM

## 2023-12-22 PROCEDURE — 3288F FALL RISK ASSESSMENT DOCD: CPT | Mod: HCNC,CPTII,S$GLB, | Performed by: STUDENT IN AN ORGANIZED HEALTH CARE EDUCATION/TRAINING PROGRAM

## 2023-12-22 PROCEDURE — 1160F PR REVIEW ALL MEDS BY PRESCRIBER/CLIN PHARMACIST DOCUMENTED: ICD-10-PCS | Mod: HCNC,CPTII,S$GLB, | Performed by: STUDENT IN AN ORGANIZED HEALTH CARE EDUCATION/TRAINING PROGRAM

## 2023-12-22 RX ORDER — GLYCOPYRROLATE AND FORMOTEROL FUMARATE 9; 4.8 UG/1; UG/1
2 AEROSOL, METERED RESPIRATORY (INHALATION) DAILY
Qty: 10.7 G | Refills: 11 | Status: SHIPPED | OUTPATIENT
Start: 2023-12-22 | End: 2023-12-22

## 2023-12-22 RX ORDER — ALBUTEROL SULFATE 90 UG/1
2 AEROSOL, METERED RESPIRATORY (INHALATION) EVERY 4 HOURS PRN
COMMUNITY
End: 2023-12-22 | Stop reason: SDUPTHER

## 2023-12-22 RX ORDER — GLYCOPYRROLATE AND FORMOTEROL FUMARATE 9; 4.8 UG/1; UG/1
2 AEROSOL, METERED RESPIRATORY (INHALATION) DAILY
Qty: 10.7 G | Refills: 11 | Status: SHIPPED | OUTPATIENT
Start: 2023-12-22

## 2023-12-22 RX ORDER — DICLOFENAC SODIUM 10 MG/G
2 GEL TOPICAL 2 TIMES DAILY
Qty: 150 G | Refills: 3 | Status: SHIPPED | OUTPATIENT
Start: 2023-12-22

## 2023-12-22 RX ORDER — ALBUTEROL SULFATE 90 UG/1
2 AEROSOL, METERED RESPIRATORY (INHALATION) EVERY 4 HOURS PRN
Qty: 18 G | Refills: 6 | Status: SHIPPED | OUTPATIENT
Start: 2023-12-22

## 2023-12-22 NOTE — TELEPHONE ENCOUNTER
Left voice message for patient to return call to schedule appointment from referral to [facility] department. Contact number provided, and My Chart message to be sent..  Alana SUAREZ [phone]

## 2023-12-22 NOTE — PROGRESS NOTES
"Subjective:       Patient ID: Sangita Logan is a 83 y.o. female.    Chief Complaint: Follow-up    HPI:  83 y.o. female presents to [facility] for follow-up visit    Acute concerns?: Requesting medication refills at this time    Patient continues to feel some generalized over all weakness. Hasn't been feeling as bad over this past week. Went through PT in the past which in past did help.    Doesn't feel feel tired, just weak.    Review of Systems   Constitutional:  Negative for chills, diaphoresis, fatigue and fever.   HENT:  Negative for congestion, sinus pressure, sneezing and sore throat.    Respiratory:  Negative for cough and shortness of breath.    Cardiovascular:  Negative for chest pain and palpitations.   Gastrointestinal:  Negative for abdominal pain, diarrhea, nausea and vomiting.   Musculoskeletal:  Positive for arthralgias (Ocasional intermittent to left base of thumb and right index finger). Negative for joint swelling and myalgias.   Skin:  Negative for rash and wound.   Neurological:  Positive for weakness. Negative for dizziness, numbness and headaches.       Objective:      Vitals:    12/22/23 1438   BP: 110/64   BP Location: Left arm   Patient Position: Sitting   BP Method: Medium (Manual)   Pulse: 71   Resp: 16   Temp: 97.8 °F (36.6 °C)   TempSrc: Oral   SpO2: 95%   Weight: 49.6 kg (109 lb 7.3 oz)   Height: 5' 4" (1.626 m)     Physical Exam  Vitals reviewed.   Constitutional:       General: She is not in acute distress.     Appearance: Normal appearance. She is not ill-appearing.   HENT:      Head: Normocephalic and atraumatic.   Eyes:      General:         Right eye: No discharge.         Left eye: No discharge.      Conjunctiva/sclera: Conjunctivae normal.   Cardiovascular:      Rate and Rhythm: Normal rate and regular rhythm.      Heart sounds: Normal heart sounds. No murmur heard.  Pulmonary:      Effort: Pulmonary effort is normal.   Musculoskeletal:         General: No deformity. " "     Cervical back: Neck supple. No rigidity.   Lymphadenopathy:      Cervical: No cervical adenopathy.   Skin:     General: Skin is warm and dry.      Coloration: Skin is not jaundiced.   Neurological:      General: No focal deficit present.      Mental Status: She is alert and oriented to person, place, and time.   Psychiatric:         Mood and Affect: Mood normal.         Behavior: Behavior normal.             Lab Results   Component Value Date     11/10/2023    K 4.9 11/10/2023     11/10/2023    CO2 23 11/10/2023    BUN 18 11/10/2023    CREATININE 1.1 11/10/2023    ANIONGAP 12 11/10/2023     No results found for: "HGBA1C"  Lab Results   Component Value Date    BNP 54 08/02/2023    BNP 59 09/13/2022       Lab Results   Component Value Date    WBC 5.97 11/10/2023    HGB 12.6 11/10/2023    HCT 38.7 11/10/2023    HCT 30 (L) 11/07/2021     (L) 11/10/2023    GRAN 2.9 11/10/2023    GRAN 48.2 11/10/2023     Lab Results   Component Value Date    CHOL 170 05/11/2021    HDL 55 05/11/2021    LDLCALC 98.2 05/11/2021    TRIG 84 05/11/2021          Current Outpatient Medications:     albuterol (PROVENTIL) 2.5 mg /3 mL (0.083 %) nebulizer solution, Take 3 mLs (2.5 mg total) by nebulization every 6 (six) hours as needed for Wheezing. Rescue, Disp: 90 mL, Rfl: 5    aspirin (ECOTRIN) 81 MG EC tablet, Take 1 tablet (81 mg total) by mouth once daily., Disp: 90 tablet, Rfl: 3    carvediloL (COREG) 6.25 MG tablet, TAKE 1 TABLET(6.25 MG) BY MOUTH TWICE DAILY WITH MEALS, Disp: 180 tablet, Rfl: 0    rosuvastatin (CRESTOR) 20 MG tablet, Take 1 tablet (20 mg total) by mouth once daily. (Patient taking differently: Take 20 mg by mouth every evening.), Disp: 90 tablet, Rfl: 3    albuterol (PROVENTIL/VENTOLIN HFA) 90 mcg/actuation inhaler, Inhale 2 puffs into the lungs every 4 (four) hours as needed for Wheezing. Rescue, Disp: 18 g, Rfl: 6    BEVESPI AEROSPHERE 9-4.8 mcg HFAA, Inhale 2 puffs into the lungs once daily., " Disp: 10.7 g, Rfl: 11    diclofenac sodium (VOLTAREN) 1 % Gel, Apply 2 g topically 2 (two) times daily., Disp: 150 g, Rfl: 3        Assessment:       1. General weakness    2. COPD, group A, by GOLD 2017 classification    3. Pain in both hands           Plan:       General weakness  -     Ambulatory referral/consult to [facility]; Future; Expected date: 12/29/2023    COPD, group A, by GOLD 2017 classification  -     albuterol (PROVENTIL/VENTOLIN HFA) 90 mcg/actuation inhaler; Inhale 2 puffs into the lungs every 4 (four) hours as needed for Wheezing. Rescue  Dispense: 18 g; Refill: 6  -     Discontinue: BEVESPI AEROSPHERE 9-4.8 mcg HFAA; Inhale 2 puffs into the lungs once daily.  Dispense: 10.7 g; Refill: 11  -     BEVESPI AEROSPHERE 9-4.8 mcg HFAA; Inhale 2 puffs into the lungs once daily.  Dispense: 10.7 g; Refill: 11    Pain in both hands  -     diclofenac sodium (VOLTAREN) 1 % Gel; Apply 2 g topically 2 (two) times daily.  Dispense: 150 g; Refill: 3    RTC in 4 months

## 2024-01-12 ENCOUNTER — OFFICE VISIT (OUTPATIENT)
Dept: HOME HEALTH SERVICES | Facility: CLINIC | Age: 84
End: 2024-01-12
Payer: MEDICARE

## 2024-01-12 VITALS
RESPIRATION RATE: 16 BRPM | DIASTOLIC BLOOD PRESSURE: 60 MMHG | HEART RATE: 100 BPM | BODY MASS INDEX: 18.67 KG/M2 | OXYGEN SATURATION: 99 % | SYSTOLIC BLOOD PRESSURE: 100 MMHG | WEIGHT: 109.38 LBS | TEMPERATURE: 97 F | HEIGHT: 64 IN

## 2024-01-12 DIAGNOSIS — I10 HYPERTENSION, UNSPECIFIED TYPE: Chronic | ICD-10-CM

## 2024-01-12 DIAGNOSIS — I74.10 AORTIC MURAL THROMBUS: ICD-10-CM

## 2024-01-12 DIAGNOSIS — Z00.00 ENCOUNTER FOR PREVENTIVE HEALTH EXAMINATION: Primary | ICD-10-CM

## 2024-01-12 DIAGNOSIS — F17.210 CIGARETTE NICOTINE DEPENDENCE WITHOUT COMPLICATION: ICD-10-CM

## 2024-01-12 DIAGNOSIS — J44.9 COPD, GROUP A, BY GOLD 2017 CLASSIFICATION: ICD-10-CM

## 2024-01-12 DIAGNOSIS — I47.10 SVT (SUPRAVENTRICULAR TACHYCARDIA): ICD-10-CM

## 2024-01-12 DIAGNOSIS — E78.5 HYPERLIPIDEMIA, UNSPECIFIED HYPERLIPIDEMIA TYPE: ICD-10-CM

## 2024-01-12 DIAGNOSIS — D69.6 THROMBOCYTOPENIA, UNSPECIFIED: ICD-10-CM

## 2024-01-12 DIAGNOSIS — Z78.9 ALCOHOL USE: Chronic | ICD-10-CM

## 2024-01-12 DIAGNOSIS — I70.0 AORTIC ATHEROSCLEROSIS: ICD-10-CM

## 2024-01-12 DIAGNOSIS — Z00.00 ENCOUNTER FOR MEDICARE ANNUAL WELLNESS EXAM: ICD-10-CM

## 2024-01-12 PROCEDURE — G0439 PPPS, SUBSEQ VISIT: HCPCS | Mod: S$GLB,,,

## 2024-01-12 PROCEDURE — 3074F SYST BP LT 130 MM HG: CPT | Mod: CPTII,S$GLB,,

## 2024-01-12 PROCEDURE — 1170F FXNL STATUS ASSESSED: CPT | Mod: CPTII,S$GLB,,

## 2024-01-12 PROCEDURE — 1101F PT FALLS ASSESS-DOCD LE1/YR: CPT | Mod: CPTII,S$GLB,,

## 2024-01-12 PROCEDURE — 1126F AMNT PAIN NOTED NONE PRSNT: CPT | Mod: CPTII,S$GLB,,

## 2024-01-12 PROCEDURE — 1160F RVW MEDS BY RX/DR IN RCRD: CPT | Mod: CPTII,S$GLB,,

## 2024-01-12 PROCEDURE — G9919 SCRN ND POS ND PROV OF REC: HCPCS | Mod: CPTII,S$GLB,,

## 2024-01-12 PROCEDURE — 1159F MED LIST DOCD IN RCRD: CPT | Mod: CPTII,S$GLB,,

## 2024-01-12 PROCEDURE — 3078F DIAST BP <80 MM HG: CPT | Mod: CPTII,S$GLB,,

## 2024-01-12 PROCEDURE — 3288F FALL RISK ASSESSMENT DOCD: CPT | Mod: CPTII,S$GLB,,

## 2024-01-12 NOTE — PROGRESS NOTES
"Sangita Logan presented for an initial Medicare AWV today. The following components were reviewed and updated:    Medical history  Family History  Social history  Allergies and Current Medications  Health Risk Assessment  Health Maintenance  Care Team    **See Completed Assessments for Annual Wellness visit with in the encounter summary    The following assessments were completed:  Depression Screening  Cognitive function Screening    Timed Get Up Test  Whisper Test      Opioid documentation:      Patient does not have a current opioid prescription.          Vitals:    01/12/24 1058   BP: 100/60   BP Location: Left arm   Patient Position: Sitting   Pulse: 100   Resp: 16   Temp: 97 °F (36.1 °C)   SpO2: 99%   Weight: 49.6 kg (109 lb 5.6 oz)   Height: 5' 4" (1.626 m)     Body mass index is 18.77 kg/m².       Physical Exam  Vitals reviewed.   Constitutional:       General: She is not in acute distress.     Appearance: Normal appearance.   HENT:      Head: Normocephalic.   Cardiovascular:      Rate and Rhythm: Normal rate and regular rhythm.      Pulses: Normal pulses.      Heart sounds: Normal heart sounds.   Pulmonary:      Effort: Pulmonary effort is normal. No respiratory distress.      Breath sounds: Normal breath sounds. No wheezing.   Abdominal:      General: Bowel sounds are normal.      Tenderness: There is no abdominal tenderness.   Musculoskeletal:         General: Normal range of motion.      Cervical back: Normal range of motion.      Right lower leg: No edema.      Left lower leg: No edema.   Skin:     General: Skin is warm and dry.      Capillary Refill: Capillary refill takes less than 2 seconds.   Neurological:      General: No focal deficit present.      Mental Status: She is alert and oriented to person, place, and time.   Psychiatric:         Mood and Affect: Mood normal.         Behavior: Behavior normal.           Diagnoses and health risks identified today and associated " recommendations/orders:    1. Encounter for preventive health examination  Assessments completed.  HM recommendations reviewed.  F/u with PCP as instructed.      2. COPD, group A, by GOLD 2017 classification  Chronic, stable on current albuterol and bevespi regimen. Followed by PCP.     3. Aortic atherosclerosis -- u/s 2023  Chronic, stable on current statin regimen. Followed by PCP.     4. Aortic mural thrombus  Chronic, stable on current regimen. Followed by PCP.    5. SVT (supraventricular tachycardia)  Chronic, stable on current Coreg regimen. Followed by PCP.     6. Thrombocytopenia, unspecified  Chronic, stable on current regimen. Followed by PCP.     7. Hypertension, unspecified type  Chronic, stable on current Coreg regimen. Followed by PCP.     8. Hyperlipidemia, unspecified hyperlipidemia type  Chronic, stable on current statin regimen. Followed by PCP.     9. Cigarette nicotine dependence without complication  Smokes 0.3 PPD. Not ready to quit. Counseling provided.     10. Alcohol use  Drinks alcohol 2-3x per week.     11. Encounter for Medicare annual wellness exam  Referred by PCP.   - Ambulatory Referral/Consult to Enhanced Annual Wellness Visit (eAWV)      Provided [place] with a 5-10 year written screening schedule and personal prevention plan. Recommendations were developed using the USPSTF age appropriate recommendations. Education, counseling, and referrals were provided as needed.  After Visit Summary printed and given to patient which includes a list of additional screenings\tests needed.    Follow up in about 1 year (around 1/12/2025) for Medicare AWV.      Latoya Burris NP    I offered to discuss advanced care planning, including how to pick a person who would make decisions for you if you were unable to make them for yourself, called a health care power of , and what kind of decisions you might make such as use of life sustaining treatments such as ventilators and tube feeding  when faced with a life limiting illness recorded on a living will that they will need to know. (How you want to be cared for as you near the end of your natural life)     X Patient is interested in learning more about how to make advanced directives.  I provided them paperwork and offered to discuss this with them.

## 2024-01-12 NOTE — PATIENT INSTRUCTIONS
Counseling and Referral of Other Preventative  (Italic type indicates deductible and co-insurance are waived)    Patient Name: Sangita Logan  Today's Date: 1/12/2024    Health Maintenance       Date Due Completion Date    Shingles Vaccine (1 of 2) Never done ---    RSV Vaccine (Age 60+ and Pregnant patients) (1 - 1-dose 60+ series) Never done ---    DEXA Scan 12/30/2023 12/30/2020    Lipid Panel 05/11/2026 5/11/2021    TETANUS VACCINE 12/18/2032 12/18/2022        No orders of the defined types were placed in this encounter.    The following information is provided to all patients.  This information is to help you find resources for any of the problems found today that may be affecting your health:                  Living healthy guide: www.[facility].[place].gov      Understanding Diabetes: www.diabetes.org      Eating healthy: www.cdc.gov/healthyweight      [place] home safety checklist: www.cdc.gov/steadi/patient.html      Agency on Aging: www.goea.[place].[facility]      Alcoholics anonymous (AA): www.aa.org      Physical Activity: www.[place].nih.gov/ev9tfhy      Tobacco use: www.quitwithusla.org

## 2024-01-18 ENCOUNTER — OFFICE VISIT (OUTPATIENT)
Dept: PAIN MEDICINE | Facility: OTHER | Age: 84
End: 2024-01-18
Payer: MEDICARE

## 2024-01-18 DIAGNOSIS — R53.1 GENERAL WEAKNESS: ICD-10-CM

## 2024-01-18 PROCEDURE — 99499 UNLISTED E&M SERVICE: CPT | Mod: HCNC,,, | Performed by: NURSE PRACTITIONER

## 2024-01-18 NOTE — PROGRESS NOTES
Patient denies having any pain.     Discussed that [facility] is program that treats chronic pain. She declined the program.

## 2024-01-18 NOTE — PROGRESS NOTES
[facility]    Initial Medical Screening Visit Note    Subjective:       Chief Complaint Requiring Rehabilitation: Chronic Pain    Consulted by: Danish Pereira MD      HPI:     Sangita Logan is a 83 y.o. female who presents today for the [facility] Medical Screening Visit. Sangita Logan was referred by Danish Pereira MD with associated diagnosis of       . Sangita Logan also has a PMH of Past Medical History:  No date: COPD (chronic obstructive pulmonary disease)  10/21/2021: HTN (hypertension)  No date: Osteoporosis  No date: Sinus disease.    Chronic Pain History:      Ambulatory status:        Balance problems?        Fainting/Syncope/POTS?        Physical Therapy?        Exercise Habits?        Alternative/Complementary Therapies (massage, yoga, harshad chi, acupuncture, guided imagery, chiropractic care, hypnosis, biofeedback, herbs, supplements, dietary approaches)?        Current pain medications:        Pain management injections:        Relevant surgeries:       Any upcoming surgeries or procedures?      Working/Employed?        Sleep:     FLOR?     Sleep Aids?      Mental Health Hx/Tx      Stress/Stress Mgmt comments:    Inpatient Psychiatric Tx?     SI?       Social Hx/Connections:         Health Habits:      Smoking Status:       Alcohol use:     Illegal/illicit drug use:     Substance abuse hx?:       15. Aside from what has been discussed, do you have any other medical issues that you or your doctors would consider unstable at this time?           Past Medical History:   Diagnosis Date    COPD (chronic obstructive pulmonary disease)     HTN (hypertension) 10/21/2021    Osteoporosis     Sinus disease        Past Surgical History:   Procedure Laterality Date    AUGMENTATION OF BREAST Bilateral     received at age 23, removed at age 76    Breast implants removal  2012    SINUS SURGERY      TONSILLECTOMY      Age 1       Review of  patient's allergies indicates:   Allergen Reactions    No known drug allergies        Current Outpatient Medications   Medication Sig Dispense Refill    albuterol (PROVENTIL/VENTOLIN HFA) 90 mcg/actuation inhaler Inhale 2 puffs into the lungs every 4 (four) hours as needed for Wheezing. Rescue 18 g 6    aspirin (ECOTRIN) 81 MG EC tablet Take 1 tablet (81 mg total) by mouth once daily. 90 tablet 3    BEVESPI AEROSPHERE 9-4.8 mcg HFAA Inhale 2 puffs into the lungs once daily. 10.7 g 11    carvediloL (COREG) 6.25 MG tablet TAKE 1 TABLET(6.25 MG) BY MOUTH TWICE DAILY WITH MEALS 180 tablet 0    diclofenac sodium (VOLTAREN) 1 % Gel Apply 2 g topically 2 (two) times daily. 150 g 3    rosuvastatin (CRESTOR) 20 MG tablet Take 1 tablet (20 mg total) by mouth once daily. (Patient taking differently: Take 20 mg by mouth every evening.) 90 tablet 3     No current facility-administered medications for this visit.       Family History   Problem Relation Age of Onset    No Known Problems Mother     No Known Problems Father     Breast cancer Sister         dx in 30s    Blindness Neg Hx     Glaucoma Neg Hx     Macular degeneration Neg Hx     Retinal detachment Neg Hx        Social History     Socioeconomic History    Marital status:    Tobacco Use    Smoking status: Every Day     Current packs/day: 0.50     Average packs/day: 0.5 packs/day for 30.0 years (15.0 ttl pk-yrs)     Types: Cigarettes     Passive exposure: Current    Smokeless tobacco: Never    Tobacco comments:     2 packs a week   Substance and Sexual Activity    Alcohol use: Yes     Alcohol/week: 3.0 standard drinks of alcohol     Types: 3 Glasses of wine per week     Comment: 4 times weekly    Drug use: Not Currently     Types: Marijuana    Sexual activity: Not Currently     Social Determinants of Health     Financial Resource Strain: Low Risk  (1/12/2024)    Overall Financial Resource Strain (CARDIA)     Difficulty of Paying Living Expenses: Not hard at all    Food Insecurity: No Food Insecurity (1/12/2024)    Hunger Vital Sign     Worried About Running Out of Food in the Last Year: Never true     Ran Out of Food in the Last Year: Never true   Transportation Needs: No Transportation Needs (1/12/2024)    PRAPARE - Transportation     Lack of Transportation (Medical): No     Lack of Transportation (Non-Medical): No   Physical Activity: Insufficiently Active (1/12/2024)    Exercise Vital Sign     Days of Exercise per Week: 7 days     Minutes of Exercise per Session: 20 min   Stress: No Stress Concern Present (1/12/2024)    [place] of Occupational Health - Occupational Stress Questionnaire     Feeling of Stress : Not at all   Social Connections: Socially Isolated (1/12/2024)    Social Connection and Isolation Panel [NHANES]     Frequency of Communication with Friends and Family: More than three times a week     Frequency of Social Gatherings with Friends and Family: Three times a week     Attends [employer] Services: Never     Active Member of Clubs or Organizations: No     Attends Club or Organization Meetings: Never     Marital Status:    Housing Stability: Low Risk  (1/12/2024)    Housing Stability Vital Sign     Unable to Pay for Housing in the Last Year: No     Number of Places Lived in the Last Year: 1     Unstable Housing in the Last Year: No              Objective:        GEN: Well developed, well nourished. No acute distress. Fully alert, oriented, and appropriate. Speech normal edson.   PSYCH: Normal affect. Thought content appropriate.  CHEST: Breathing symmetric. No audible wheezing.  SKIN: Warm, dry. No rash or discoloration on exposed areas.   NEURO/MUSCULOSKELETAL:  Cervical: ROM full and painless; TTP neck and shoulder musculature ; 5/5 UE strength; gross sensation and reflexes intact bilaterally; Negative Hogue's bilaterally.  Lumbar: ROM limited and painful. TTP lumbar musculature; 5/5 LE strength bilaterally; gross sensation and  reflexes intact bilaterally; negative clonus bilaterally  SLR negative bilaterally (sitting)  OSMANY negative bilaterally (sitting)           Imaging:        Assessment:     Encounter Diagnosis   Name Primary?    General weakness        Plan:     Diagnoses and all orders for this visit:    General weakness  -     Ambulatory referral[facility]consult to [facility]         Sangita Logan is a 83 y.o. female with      Education about pain and the chronic pain cycle was provided today. Discussed the importance of multimodal and multidisciplinary management of chronic pain with a focus on both pain relief and function. Discussed how our team provides education and training aimed at improving physical function, emotional health, stress and pain coping skills.Treatment is designed to build confidence in physical activity and ADLs and in your ability to control and manage your pain.     Recommend proceeding with PT/OT screening visit for further evaluation of personal goals, functional status and limitations prior to enrollment in the program.     I spent a total of 60 minutes with the patient, and greater than 50% of the time was spent in counseling and education.     The above plan and management options were discussed at length with patient. Patient is in agreement with the above and verbalized understanding. It will be communicated with the referring physician via electronic record, fax, or mail.

## 2024-02-14 DIAGNOSIS — Z78.0 MENOPAUSE: ICD-10-CM

## 2024-02-20 DIAGNOSIS — F41.9 ANXIETY: ICD-10-CM

## 2024-02-20 NOTE — TELEPHONE ENCOUNTER
No care due was identified.  [facility] Embedded Care Due Messages. Reference number: [id number].   2/20/2024 5:16:31 PM CST

## 2024-02-21 RX ORDER — BUSPIRONE HYDROCHLORIDE 10 MG/1
TABLET ORAL
Qty: 60 TABLET | Refills: 2 | Status: SHIPPED | OUTPATIENT
Start: 2024-02-21

## 2024-02-22 DIAGNOSIS — E78.5 HYPERLIPIDEMIA, UNSPECIFIED HYPERLIPIDEMIA TYPE: ICD-10-CM

## 2024-02-22 DIAGNOSIS — I47.10 SVT (SUPRAVENTRICULAR TACHYCARDIA): ICD-10-CM

## 2024-02-22 DIAGNOSIS — I10 BENIGN ESSENTIAL HTN: ICD-10-CM

## 2024-02-22 RX ORDER — ROSUVASTATIN CALCIUM 20 MG/1
20 TABLET, COATED ORAL NIGHTLY
Qty: 90 TABLET | Refills: 1 | Status: SHIPPED | OUTPATIENT
Start: 2024-02-22

## 2024-02-22 RX ORDER — CARVEDILOL 6.25 MG/1
6.25 TABLET ORAL 2 TIMES DAILY
Qty: 180 TABLET | Refills: 3 | Status: SHIPPED | OUTPATIENT
Start: 2024-02-22

## 2024-02-22 NOTE — TELEPHONE ENCOUNTER
Refill Routing Note   Medication(s) are not appropriate for processing by [facility] for the following reason(s):        Drug-disease interaction    [facility] action(s):  Defer        Medication Therapy Plan: carvediloL and COPD, group A, by GOLD 2017 classification    Pharmacist review requested: Yes     Appointments  past 12m or future 3m with PCP    Date Provider   Last Visit   12/22/2023 Danish Pereira MD   Next Visit   4/22/2024 Danish Pereira MD   ED visits in past 90 days: 0        Note composed:7:15 AM 02/22/2024

## 2024-02-22 NOTE — TELEPHONE ENCOUNTER
Refill Decision Note   Sangita Logan  is requesting a refill authorization.  Brief Assessment and Rationale for Refill:  Approve     Medication Therapy Plan:  carvediloL and COPD, group A, by GOLD 2017 classification    Medication Reconciliation Completed: No   Comments:     No Care Gaps recommended.     Note composed:8:40 AM 02/22/2024

## 2024-02-22 NOTE — TELEPHONE ENCOUNTER
No care due was identified.  [facility] Embedded Care Due Messages. Reference number: [id number].   2/22/2024 5:52:36 AM CST

## 2024-05-30 DIAGNOSIS — J44.9 COPD, GROUP A, BY GOLD 2017 CLASSIFICATION: ICD-10-CM

## 2024-05-30 DIAGNOSIS — R06.2 WHEEZING: ICD-10-CM

## 2024-05-30 RX ORDER — ALBUTEROL SULFATE 0.83 MG/ML
SOLUTION RESPIRATORY (INHALATION)
Qty: 90 ML | Refills: 1 | Status: SHIPPED | OUTPATIENT
Start: 2024-05-30

## 2024-05-30 NOTE — TELEPHONE ENCOUNTER
Refill Decision Note   Sangita Logan  is requesting a refill authorization.  Brief Assessment and Rationale for Refill:  Approve     Medication Therapy Plan:         Comments:     Note composed:9:53 AM 05/30/2024

## 2024-05-30 NOTE — TELEPHONE ENCOUNTER
No care due was identified.  [facility] Embedded Care Due Messages. Reference number: [id number].   5/30/2024 9:52:01 AM CDT

## 2024-08-20 DIAGNOSIS — E78.5 HYPERLIPIDEMIA, UNSPECIFIED HYPERLIPIDEMIA TYPE: ICD-10-CM

## 2024-08-20 RX ORDER — ROSUVASTATIN CALCIUM 20 MG/1
20 TABLET, COATED ORAL NIGHTLY
Qty: 90 TABLET | Refills: 1 | Status: SHIPPED | OUTPATIENT
Start: 2024-08-20

## 2024-09-19 ENCOUNTER — PATIENT MESSAGE (OUTPATIENT)
Dept: PRIMARY CARE CLINIC | Facility: CLINIC | Age: 84
End: 2024-09-19
Payer: MEDICARE

## 2024-11-08 ENCOUNTER — HOSPITAL ENCOUNTER (EMERGENCY)
Facility: HOSPITAL | Age: 84
Discharge: HOME | End: 2024-11-08
Attending: EMERGENCY MEDICINE
Payer: MEDICARE

## 2024-11-08 VITALS
WEIGHT: 109 LBS | SYSTOLIC BLOOD PRESSURE: 170 MMHG | TEMPERATURE: 99 F | DIASTOLIC BLOOD PRESSURE: 93 MMHG | OXYGEN SATURATION: 98 % | HEART RATE: 97 BPM | BODY MASS INDEX: 18.61 KG/M2 | HEIGHT: 64 IN | RESPIRATION RATE: 18 BRPM

## 2024-11-08 DIAGNOSIS — R00.0 TACHYCARDIA: ICD-10-CM

## 2024-11-08 DIAGNOSIS — R53.83 FATIGUE, UNSPECIFIED TYPE: Primary | ICD-10-CM

## 2024-11-08 DIAGNOSIS — R03.0 ELEVATED BLOOD PRESSURE READING: ICD-10-CM

## 2024-11-08 LAB
ALBUMIN SERPL BCP-MCNC: 4.1 G/DL (ref 3.5–5.2)
ALP SERPL-CCNC: 167 U/L (ref 40–150)
ALT SERPL W/O P-5'-P-CCNC: 11 U/L (ref 10–44)
ANION GAP SERPL CALC-SCNC: 11 MMOL/L (ref 8–16)
AST SERPL-CCNC: 20 U/L (ref 10–40)
BACTERIA #/AREA URNS AUTO: NORMAL /HPF
BASOPHILS # BLD AUTO: 0.01 K/UL (ref 0–0.2)
BASOPHILS NFR BLD: 0.1 % (ref 0–1.9)
BILIRUB SERPL-MCNC: 0.8 MG/DL (ref 0.1–1)
BILIRUB UR QL STRIP: NEGATIVE
BNP SERPL-MCNC: 37 PG/ML (ref 0–99)
BUN SERPL-MCNC: 18 MG/DL (ref 8–23)
CALCIUM SERPL-MCNC: 8.9 MG/DL (ref 8.7–10.5)
CHLORIDE SERPL-SCNC: 101 MMOL/L (ref 95–110)
CLARITY UR REFRACT.AUTO: CLEAR
CO2 SERPL-SCNC: 22 MMOL/L (ref 23–29)
COLOR UR AUTO: YELLOW
CREAT SERPL-MCNC: 0.9 MG/DL (ref 0.5–1.4)
DIFFERENTIAL METHOD BLD: ABNORMAL
EOSINOPHIL # BLD AUTO: 0 K/UL (ref 0–0.5)
EOSINOPHIL NFR BLD: 0 % (ref 0–8)
ERYTHROCYTE [DISTWIDTH] IN BLOOD BY AUTOMATED COUNT: 12.7 % (ref 11.5–14.5)
EST. GFR  (NO RACE VARIABLE): >60 ML/MIN/1.73 M^2
GLUCOSE SERPL-MCNC: 106 MG/DL (ref 70–110)
GLUCOSE UR QL STRIP: NEGATIVE
HCT VFR BLD AUTO: 41.7 % (ref 37–48.5)
HGB BLD-MCNC: 14.5 G/DL (ref 12–16)
HGB UR QL STRIP: NEGATIVE
IMM GRANULOCYTES # BLD AUTO: 0.05 K/UL (ref 0–0.04)
IMM GRANULOCYTES NFR BLD AUTO: 0.7 % (ref 0–0.5)
KETONES UR QL STRIP: ABNORMAL
LEUKOCYTE ESTERASE UR QL STRIP: ABNORMAL
LYMPHOCYTES # BLD AUTO: 1.5 K/UL (ref 1–4.8)
LYMPHOCYTES NFR BLD: 20.1 % (ref 18–48)
MAGNESIUM SERPL-MCNC: 1.7 MG/DL (ref 1.6–2.6)
MCH RBC QN AUTO: 32.2 PG (ref 27–31)
MCHC RBC AUTO-ENTMCNC: 34.8 G/DL (ref 32–36)
MCV RBC AUTO: 93 FL (ref 82–98)
MICROSCOPIC COMMENT: NORMAL
MONOCYTES # BLD AUTO: 1.7 K/UL (ref 0.3–1)
MONOCYTES NFR BLD: 22.6 % (ref 4–15)
NEUTROPHILS # BLD AUTO: 4.3 K/UL (ref 1.8–7.7)
NEUTROPHILS NFR BLD: 56.5 % (ref 38–73)
NITRITE UR QL STRIP: NEGATIVE
NRBC BLD-RTO: 0 /100 WBC
PH UR STRIP: 6 [PH] (ref 5–8)
PLATELET # BLD AUTO: 107 K/UL (ref 150–450)
PMV BLD AUTO: 12.8 FL (ref 9.2–12.9)
POTASSIUM SERPL-SCNC: 3.6 MMOL/L (ref 3.5–5.1)
PROT SERPL-MCNC: 7.3 G/DL (ref 6–8.4)
PROT UR QL STRIP: NEGATIVE
RBC # BLD AUTO: 4.51 M/UL (ref 4–5.4)
RBC #/AREA URNS AUTO: 1 /HPF (ref 0–4)
SODIUM SERPL-SCNC: 134 MMOL/L (ref 136–145)
SP GR UR STRIP: 1.01 (ref 1–1.03)
SQUAMOUS #/AREA URNS AUTO: 1 /HPF
TROPONIN I SERPL DL<=0.01 NG/ML-MCNC: <0.006 NG/ML (ref 0–0.03)
TSH SERPL DL<=0.005 MIU/L-ACNC: 3.11 UIU/ML (ref 0.4–4)
URN SPEC COLLECT METH UR: ABNORMAL
WBC # BLD AUTO: 7.56 K/UL (ref 3.9–12.7)
WBC #/AREA URNS AUTO: 2 /HPF (ref 0–5)

## 2024-11-08 PROCEDURE — 84484 ASSAY OF TROPONIN QUANT: CPT | Mod: HCNC | Performed by: NURSE PRACTITIONER

## 2024-11-08 PROCEDURE — 83735 ASSAY OF MAGNESIUM: CPT | Mod: HCNC | Performed by: NURSE PRACTITIONER

## 2024-11-08 PROCEDURE — 84443 ASSAY THYROID STIM HORMONE: CPT | Mod: HCNC | Performed by: NURSE PRACTITIONER

## 2024-11-08 PROCEDURE — 93010 ELECTROCARDIOGRAM REPORT: CPT | Mod: HCNC,,, | Performed by: INTERNAL MEDICINE

## 2024-11-08 PROCEDURE — 80053 COMPREHEN METABOLIC PANEL: CPT | Mod: HCNC | Performed by: NURSE PRACTITIONER

## 2024-11-08 PROCEDURE — 85025 COMPLETE CBC W/AUTO DIFF WBC: CPT | Mod: HCNC | Performed by: NURSE PRACTITIONER

## 2024-11-08 PROCEDURE — 93005 ELECTROCARDIOGRAM TRACING: CPT | Mod: HCNC

## 2024-11-08 PROCEDURE — 96360 HYDRATION IV INFUSION INIT: CPT | Mod: HCNC

## 2024-11-08 PROCEDURE — 25000003 PHARM REV CODE 250: Mod: HCNC | Performed by: EMERGENCY MEDICINE

## 2024-11-08 PROCEDURE — 83880 ASSAY OF NATRIURETIC PEPTIDE: CPT | Mod: HCNC | Performed by: NURSE PRACTITIONER

## 2024-11-08 PROCEDURE — 81001 URINALYSIS AUTO W/SCOPE: CPT | Mod: HCNC | Performed by: NURSE PRACTITIONER

## 2024-11-08 PROCEDURE — 99284 EMERGENCY DEPT VISIT MOD MDM: CPT | Mod: 25,HCNC

## 2024-11-08 RX ADMIN — SODIUM CHLORIDE 500 ML: 9 INJECTION, SOLUTION INTRAVENOUS at 08:11

## 2024-11-09 LAB
OHS QRS DURATION: 108 MS
OHS QTC CALCULATION: 477 MS

## 2024-11-09 NOTE — ED PROVIDER NOTES
Encounter Date: 11/8/2024       History     Chief Complaint   Patient presents with    Fatigue     83-year-old female presents with several months of worsening fatigue.  Now her fatigue is constant.  She has a history of COPD but says this is different.  She is an everyday smoker.  Also with a history of hypertension.  Patient denies nausea, vomiting, diarrhea, fever, cough, shortness of breath, chest pain, abdominal pain, or dysuria.  She denies polyuria, polydipsia, cold intolerance, or hair loss.  The patients available PMH, PSH, Social History, medications, allergies, and triage vital signs were reviewed just prior to their medical evaluation.         Review of patient's allergies indicates:   Allergen Reactions    No known drug allergies      Past Medical History:   Diagnosis Date    COPD (chronic obstructive pulmonary disease)     HTN (hypertension) 10/21/2021    Osteoporosis     Sinus disease      Past Surgical History:   Procedure Laterality Date    AUGMENTATION OF BREAST Bilateral     received at age 23, removed at age 76    Breast implants removal  2012    SINUS SURGERY      TONSILLECTOMY      Age 1     Family History   Problem Relation Name Age of Onset    No Known Problems Mother      No Known Problems Father      Breast cancer Sister          dx in 30s    Blindness Neg Hx      Glaucoma Neg Hx      Macular degeneration Neg Hx      Retinal detachment Neg Hx       Social History     Tobacco Use    Smoking status: Every Day     Current packs/day: 0.50     Average packs/day: 0.5 packs/day for 30.0 years (15.0 ttl pk-yrs)     Types: Cigarettes     Passive exposure: Current    Smokeless tobacco: Never    Tobacco comments:     2 packs a week   Substance Use Topics    Alcohol use: Yes     Alcohol/week: 3.0 standard drinks of alcohol     Types: 3 Glasses of wine per week     Comment: 4 times weekly    Drug use: Not Currently     Types: Marijuana     Review of Systems   Constitutional:  Positive for appetite  change and fatigue. Negative for fever and unexpected weight change.   Respiratory:  Negative for cough and shortness of breath.    Cardiovascular:  Negative for chest pain.   Gastrointestinal:  Negative for abdominal pain, diarrhea, nausea and vomiting.   Endocrine: Negative for cold intolerance, heat intolerance, polydipsia and polyuria.   Genitourinary:  Negative for dysuria.       Physical Exam     Initial Vitals [11/08/24 1700]   BP Pulse Resp Temp SpO2   (!) 155/87 (!) 111 20 98.4 °F (36.9 °C) 97 %      MAP       --         Physical Exam    Nursing note and vitals reviewed.  Constitutional: She appears well-developed and well-nourished. She is not diaphoretic. No distress.   HENT:   Head: Normocephalic and atraumatic.   Nose: Nose normal.   Eyes: Conjunctivae are normal. Right eye exhibits no discharge. Left eye exhibits no discharge.   Neck: Neck supple.   Normal range of motion.  Cardiovascular:  Regular rhythm and normal heart sounds.     Exam reveals no gallop and no friction rub.       No murmur heard.  tachycardia   Pulmonary/Chest: No respiratory distress. She has wheezes. She has no rhonchi. She has no rales.   Mild end expiratory wheezing   Abdominal: Abdomen is soft. She exhibits no distension. There is no abdominal tenderness. There is no rebound and no guarding.   Musculoskeletal:         General: No tenderness or edema. Normal range of motion.      Cervical back: Normal range of motion and neck supple.     Neurological: She is alert and oriented to person, place, and time. She has normal strength. GCS score is 15. GCS eye subscore is 4. GCS verbal subscore is 5. GCS motor subscore is 6.   Skin: Skin is warm and dry. No rash noted. No erythema.   Psychiatric: She has a normal mood and affect. Her behavior is normal. Judgment and thought content normal.         ED Course   Procedures  Labs Reviewed   CBC W/ AUTO DIFFERENTIAL - Abnormal       Result Value    WBC 7.56      RBC 4.51      Hemoglobin  14.5      Hematocrit 41.7      MCV 93      MCH 32.2 (*)     MCHC 34.8      RDW 12.7      Platelets 107 (*)     MPV 12.8      Immature Granulocytes 0.7 (*)     Gran # (ANC) 4.3      Immature Grans (Abs) 0.05 (*)     Lymph # 1.5      Mono # 1.7 (*)     Eos # 0.0      Baso # 0.01      nRBC 0      Gran % 56.5      Lymph % 20.1      Mono % 22.6 (*)     Eosinophil % 0.0      Basophil % 0.1      Differential Method Automated     COMPREHENSIVE METABOLIC PANEL - Abnormal    Sodium 134 (*)     Potassium 3.6      Chloride 101      CO2 22 (*)     Glucose 106      BUN 18      Creatinine 0.9      Calcium 8.9      Total Protein 7.3      Albumin 4.1      Total Bilirubin 0.8      Alkaline Phosphatase 167 (*)     AST 20      ALT 11      eGFR >60.0      Anion Gap 11     TROPONIN I    Troponin I <0.006      Narrative:     ADD ON TROPONIN, TSH AND MAGNESIUM PER DR LISSY MCINTOSH/ORDER#   [id number] AND [id number] @ 20:03   MAGNESIUM    Magnesium 1.7      Narrative:     ADD ON TROPONIN, TSH AND MAGNESIUM PER DR LISSY MCINTOSH/ORDER#   [id number] AND [id number] @ 20:03   TSH    TSH 3.107      Narrative:     ADD ON TROPONIN, TSH AND MAGNESIUM PER DR LISSY MCINTOSH/ORDER#   [id number] AND [id number] @ 20:03   B-TYPE NATRIURETIC PEPTIDE    BNP 37      Narrative:     ADD ON TROPONIN, TSH AND MAGNESIUM PER DR LISSY MCINTOSH/ORDER#   [id number] AND [id number] @ 20:03    PER ARNALDO PADILLA MD REQUEST ADD ON BNP (ORDER [id number])    11/08/2024  20:52    URINALYSIS, REFLEX TO URINE CULTURE   URINALYSIS MICROSCOPIC     EKG Readings: (Independently Interpreted)   Initial Reading: No STEMI. Rhythm: Sinus Tachycardia. Heart Rate: 101. Ectopy: No Ectopy. Conduction: RBBB. ST Segments: Normal ST Segments. T Waves: Normal.       Imaging Results    None          Medications   sodium chloride 0.9% bolus 500 mL 500 mL (0 mLs Intravenous Stopped 11/8/24 2103)     Medical Decision Making  83-year-old female  presents with fatigue.  Vitals with hypertension and slight tachycardia.  Physical exam as above.  Blood work unremarkable.  No anemia, hyperglycemia, or thyroid dysfunction.  UA pending at turnover.  Discussed with Dr. Smith who will f/up and likely discharge.  Did bedside teaching.  All questions answered.  Patient acknowledges understanding.     Amount and/or Complexity of Data Reviewed  Independent Historian: spouse  Labs: ordered. Decision-making details documented in ED Course.  ECG/medicine tests: ordered and independent interpretation performed. Decision-making details documented in ED Course.                                      Clinical Impression:  Final diagnoses:  [R00.0] Tachycardia  [R53.83] Fatigue, unspecified type (Primary)          ED Disposition Condition    Discharge Stable          ED Prescriptions    None       Follow-up Information       Follow up With Specialties Details Why Contact Info    Danish Pereira MD Family Medicine, Hospitalist Schedule an appointment as soon as possible for a visit   [address] [place] [address]  [phone]               Bjorn Ruiz MD  11/08/24 0721

## 2024-11-09 NOTE — ED TRIAGE NOTES
Sangita Logan, a 83 y.o. female presents to the ED w/ complaint of general weakness. Patient endorses generalized weakness, and mild episodes of shortness of breath. Patient denies C/P, and N/V/D.    Triage note:  Chief Complaint   Patient presents with    Fatigue     Review of patient's allergies indicates:   Allergen Reactions    No known drug allergies      Past Medical History:   Diagnosis Date    COPD (chronic obstructive pulmonary disease)     HTN (hypertension) 10/21/2021    Osteoporosis     Sinus disease

## 2024-11-09 NOTE — PROVIDER PROGRESS NOTES - EMERGENCY DEPT.
Signout Note    I received signout from the previous provider.     Chief complaint:  Fatigue      Pertinent history &exam:  Sangita Logan is a 83 y.o. female who presented to emergency department with complaint of intermittent fatigue times months.  Afebrile and hemodynamically stable.  EKG and labs including CBC CMP Mag TSH and troponin are unremarkable.  Signed out to me pending urinalysis for final disposition.    Vitals:    11/08/24 2202   BP: (!) 170/93   Pulse: 97   Resp: 18   Temp: 98.9 °F (37.2 °C)       Imaging Studies:    No orders to display       Medications Given:  Medications   sodium chloride 0.9% bolus 500 mL 500 mL (0 mLs Intravenous Stopped 11/8/24 2103)       Pending Items/ MDM:  83 y.o. female with several months of fatigue.  Her UA has 1+ ketones, no evidence of urinary tract infection.  She did receive fluids here.  She was updated regarding all findings.  Plan discharge home to follow up with her primary care doctor.    Diagnostic Impression:    1. Fatigue, unspecified type    2. Tachycardia    3. Elevated blood pressure reading         ED Disposition Condition    Discharge Stable          ED Prescriptions    None       Follow-up Information       Follow up With Specialties Details Why Contact Info    Danish Pereira MD Family Medicine, Hospitalist Schedule an appointment as soon as possible for a visit   [address] [place] [address] [phone]              Patient understands the plan and is in agreement, verbalized understanding, questions answered    Maria Esther Smith MD  Emergency Medicine

## 2024-11-09 NOTE — FIRST PROVIDER EVALUATION
" Emergency Department TeleTriage Encounter Note      CHIEF COMPLAINT    Chief Complaint   Patient presents with    Fatigue       VITAL SIGNS   Initial Vitals [11/08/24 1700]   BP Pulse Resp Temp SpO2   (!) 155/87 (!) 111 20 98.4 °F (36.9 °C) 97 %      MAP       --            ALLERGIES    Review of patient's allergies indicates:   Allergen Reactions    No known drug allergies        PROVIDER TRIAGE NOTE  This is a teletriage evaluation of a 83 y.o. female presenting to the ED with c/o increased fatigue.  Pt states she feels weak and "bad."  Pt states she has been having mild weakness for the past few months but worse today.  Pt states she hasn't eaten today due to weakness. .     PE: tachycardic on initial exam.  All other VSS.      Plan: labs, EKG    Limited physical exam via telehealth: The patient is awake, alert, answering questions appropriately and is not in respiratory distress. All ED beds are full at present; patient notified of this status.  Patient seen and medically screened by Nurse Practitioner via teletriage.      Initial orders will be placed and care will be transferred to an alternate provider when patient is roomed for a full evaluation. Any additional orders and the final disposition will be determined by that provider.         ORDERS  Labs Reviewed   CBC W/ AUTO DIFFERENTIAL   COMPREHENSIVE METABOLIC PANEL   URINALYSIS, REFLEX TO URINE CULTURE       ED Orders (720h ago, onward)      Start Ordered     Status Ordering Provider    11/08/24 1907 11/08/24 1906  CBC auto differential  STAT         Ordered NAFISA DUONG    11/08/24 1907 11/08/24 1906  Comprehensive metabolic panel  STAT         Ordered NAFISA DUONG    11/08/24 1907 11/08/24 1906  Urinalysis, Reflex to Urine Culture Urine, Clean Catch  STAT         Ordered NAFISA UDONG    11/08/24 1907 11/08/24 1907  EKG 12-lead  Once         Ordered NAFISA DUONG              Virtual Visit Note: The provider triage portion of this " emergency department evaluation and documentation was performed via [employer], a HIPAA-compliant telemedicine application, in concert with a tele-presenter in the room. A face to face patient evaluation with one of my colleagues will occur once the patient is placed in an emergency department room.      DISCLAIMER: This note was prepared with [employer] voice recognition transcription software. Garbled syntax, mangled pronouns, and other bizarre constructions may be attributed to that software system.

## 2024-11-09 NOTE — DISCHARGE INSTRUCTIONS
Your blood pressure was elevated in the emergency department.  You must follow-up with your primary care doctor for adjustment of your medicine.    Tests today showed:   Labs Reviewed   CBC W/ AUTO DIFFERENTIAL - Abnormal       Result Value    WBC 7.56      RBC 4.51      Hemoglobin 14.5      Hematocrit 41.7      MCV 93      MCH 32.2 (*)     MCHC 34.8      RDW 12.7      Platelets 107 (*)     MPV 12.8      Immature Granulocytes 0.7 (*)     Gran # (ANC) 4.3      Immature Grans (Abs) 0.05 (*)     Lymph # 1.5      Mono # 1.7 (*)     Eos # 0.0      Baso # 0.01      nRBC 0      Gran % 56.5      Lymph % 20.1      Mono % 22.6 (*)     Eosinophil % 0.0      Basophil % 0.1      Differential Method Automated     COMPREHENSIVE METABOLIC PANEL - Abnormal    Sodium 134 (*)     Potassium 3.6      Chloride 101      CO2 22 (*)     Glucose 106      BUN 18      Creatinine 0.9      Calcium 8.9      Total Protein 7.3      Albumin 4.1      Total Bilirubin 0.8      Alkaline Phosphatase 167 (*)     AST 20      ALT 11      eGFR >60.0      Anion Gap 11     TROPONIN I    Troponin I <0.006      Narrative:     ADD ON TROPONIN, TSH AND MAGNESIUM PER DR LISSY MCINTOSH/ORDER#   [id number] AND [id number] @ 20:03   MAGNESIUM    Magnesium 1.7      Narrative:     ADD ON TROPONIN, TSH AND MAGNESIUM PER DR LISSY MCINTOSH/ORDER#   [id number] AND [id number] @ 20:03   TSH    TSH 3.107      Narrative:     ADD ON TROPONIN, TSH AND MAGNESIUM PER DR LISSY MCINTOSH/ORDER#   [id number] AND [id number] @ 20:03   B-TYPE NATRIURETIC PEPTIDE    BNP 37      Narrative:     ADD ON TROPONIN, TSH AND MAGNESIUM PER DR LISSY MCINTOSH/ORDER#   [id number] AND [id number] @ 20:03    PER ARNALDO PADILLA MD REQUEST ADD ON BNP (ORDER [id number])    11/08/2024  20:52    URINALYSIS, REFLEX TO URINE CULTURE   URINALYSIS MICROSCOPIC     No orders to display       Treatments you had today:   Medications   sodium chloride 0.9% bolus 500  mL 500 mL (0 mLs Intravenous Stopped 11/8/24 2103)       Follow-Up Plan:  - Follow-up with primary care doctor within 3 - 5 days  - Additional testing and/or evaluation as directed by your primary doctor    Return to the Emergency Department for symptoms including but not limited to: worsening symptoms, shortness of breath or chest pain, vomiting with inability to hold down fluids, fevers greater than 100.4°F, passing out/fainting/unconsciousness, or other concerning symptoms.

## 2024-11-11 ENCOUNTER — TELEPHONE (OUTPATIENT)
Dept: PRIMARY CARE CLINIC | Facility: CLINIC | Age: 84
End: 2024-11-11
Payer: MEDICARE

## 2024-11-11 ENCOUNTER — PATIENT OUTREACH (OUTPATIENT)
Dept: EMERGENCY MEDICINE | Facility: HOSPITAL | Age: 84
End: 2024-11-11
Payer: MEDICARE

## 2024-11-11 NOTE — TELEPHONE ENCOUNTER
----- Message from Danish Pereira MD sent at 11/11/2024 10:23 AM CST -----  Patient needs an annual appointment and ED f/u    Danish Pereira MD

## 2024-11-11 NOTE — PROGRESS NOTES
ED Navigator f/u from recent ED visit for fatigue. Pt denies wanting to schedule any appt's at this time. But did want to speak with her PCP regarding her recent visit. Message sent to staff to assist.  ED Navigator will continue to follow and assist as needed. Pt encouraged to reach out with any concerns at they arise.

## 2024-11-12 ENCOUNTER — OFFICE VISIT (OUTPATIENT)
Dept: PRIMARY CARE CLINIC | Facility: CLINIC | Age: 84
End: 2024-11-12
Payer: MEDICARE

## 2024-11-12 VITALS
WEIGHT: 114.19 LBS | RESPIRATION RATE: 18 BRPM | BODY MASS INDEX: 19.5 KG/M2 | SYSTOLIC BLOOD PRESSURE: 130 MMHG | OXYGEN SATURATION: 97 % | HEIGHT: 64 IN | HEART RATE: 103 BPM | DIASTOLIC BLOOD PRESSURE: 78 MMHG

## 2024-11-12 DIAGNOSIS — M79.641 PAIN IN BOTH HANDS: ICD-10-CM

## 2024-11-12 DIAGNOSIS — Z23 NEED FOR VACCINATION: ICD-10-CM

## 2024-11-12 DIAGNOSIS — M79.642 PAIN IN BOTH HANDS: ICD-10-CM

## 2024-11-12 DIAGNOSIS — I27.20 PULMONARY HYPERTENSION, UNSPECIFIED: ICD-10-CM

## 2024-11-12 DIAGNOSIS — R53.1 WEAKNESS: Primary | ICD-10-CM

## 2024-11-12 DIAGNOSIS — M85.80 OSTEOPENIA, UNSPECIFIED LOCATION: ICD-10-CM

## 2024-11-12 PROCEDURE — 3075F SYST BP GE 130 - 139MM HG: CPT | Mod: HCNC,CPTII,S$GLB, | Performed by: STUDENT IN AN ORGANIZED HEALTH CARE EDUCATION/TRAINING PROGRAM

## 2024-11-12 PROCEDURE — 1159F MED LIST DOCD IN RCRD: CPT | Mod: HCNC,CPTII,S$GLB, | Performed by: STUDENT IN AN ORGANIZED HEALTH CARE EDUCATION/TRAINING PROGRAM

## 2024-11-12 PROCEDURE — 99999 PR PBB SHADOW E&M-EST. PATIENT-LVL V: CPT | Mod: PBBFAC,HCNC,, | Performed by: STUDENT IN AN ORGANIZED HEALTH CARE EDUCATION/TRAINING PROGRAM

## 2024-11-12 PROCEDURE — 1160F RVW MEDS BY RX/DR IN RCRD: CPT | Mod: HCNC,CPTII,S$GLB, | Performed by: STUDENT IN AN ORGANIZED HEALTH CARE EDUCATION/TRAINING PROGRAM

## 2024-11-12 PROCEDURE — 1126F AMNT PAIN NOTED NONE PRSNT: CPT | Mod: HCNC,CPTII,S$GLB, | Performed by: STUDENT IN AN ORGANIZED HEALTH CARE EDUCATION/TRAINING PROGRAM

## 2024-11-12 PROCEDURE — 90653 IIV ADJUVANT VACCINE IM: CPT | Mod: HCNC,S$GLB,, | Performed by: STUDENT IN AN ORGANIZED HEALTH CARE EDUCATION/TRAINING PROGRAM

## 2024-11-12 PROCEDURE — 99214 OFFICE O/P EST MOD 30 MIN: CPT | Mod: HCNC,25,S$GLB, | Performed by: STUDENT IN AN ORGANIZED HEALTH CARE EDUCATION/TRAINING PROGRAM

## 2024-11-12 PROCEDURE — G0008 ADMIN INFLUENZA VIRUS VAC: HCPCS | Mod: HCNC,S$GLB,, | Performed by: STUDENT IN AN ORGANIZED HEALTH CARE EDUCATION/TRAINING PROGRAM

## 2024-11-12 PROCEDURE — 3078F DIAST BP <80 MM HG: CPT | Mod: HCNC,CPTII,S$GLB, | Performed by: STUDENT IN AN ORGANIZED HEALTH CARE EDUCATION/TRAINING PROGRAM

## 2024-11-12 RX ORDER — SPIRONOLACTONE 25 MG
1 TABLET ORAL 2 TIMES DAILY
Qty: 60 TABLET | Refills: 11 | Status: SHIPPED | OUTPATIENT
Start: 2024-11-12

## 2024-11-12 RX ORDER — DICLOFENAC SODIUM 10 MG/G
2 GEL TOPICAL 2 TIMES DAILY
Qty: 150 G | Refills: 3 | Status: SHIPPED | OUTPATIENT
Start: 2024-11-12

## 2024-11-12 NOTE — PROGRESS NOTES
"Subjective:       Patient ID: Sangita Logan is a 83 y.o. female.    Chief Complaint: Follow-up (Ed follow up)    HPI:  83 y.o. female presents to [facility] for ED follow-up visit    Patient seen in ED 11/8/2024 for acute on chronic weakness. Feels that symptoms aren't fatigue, just weakness. Felt really weak 11/8/2024.    Patient's son thinks recent Trump election and stress associated did drink some and felt could be related. Had drank about 1/2 pint when drinking.      Today does still feel somewhat weak. Can feel shaky when walking. No falls.    Bilateral MCP joint pain that began ~3 days ago. Not always, mostly with movement.    Onset?: 11/8/2024 acute on chronic  Progression?: Has improved some  Good hydration?: Yes  Sleep?: Sleep is OK. About 5-6 hours  Appetite?: Lost sense of smell and appetite is decreased  Weight loss?: No  New Medications?: None  History of weakness?: Yes    Needing nebulizer 4-5 days per week with COPD.      Review of Systems   Constitutional:  Negative for chills, diaphoresis, fatigue and fever.   HENT:  Negative for congestion, sinus pressure, sneezing and sore throat.    Respiratory:  Negative for cough and shortness of breath.    Cardiovascular:  Negative for chest pain and palpitations.   Gastrointestinal:  Negative for abdominal pain, diarrhea, nausea and vomiting.   Skin:  Negative for rash and wound.   Neurological:  Positive for weakness. Negative for dizziness and light-headedness.       Objective:      Vitals:    11/12/24 1326   BP: (!) 146/80   BP Location: Right arm   Patient Position: Sitting   Pulse: 103   Resp: 18   SpO2: 97%   Weight: 51.8 kg (114 lb 3.2 oz)   Height: 5' 4" (1.626 m)     Physical Exam  Vitals reviewed.   Constitutional:       General: She is not in acute distress.     Appearance: Normal appearance. She is not ill-appearing.   HENT:      Head: Normocephalic and atraumatic.   Eyes:      General:         Right eye: No discharge.         Left " "eye: No discharge.      Conjunctiva/sclera: Conjunctivae normal.   Cardiovascular:      Rate and Rhythm: Normal rate and regular rhythm.      Pulses: Normal pulses.      Heart sounds: No murmur heard.  Pulmonary:      Effort: Pulmonary effort is normal.      Breath sounds: Normal breath sounds.   Musculoskeletal:         General: No deformity.      Right hand: Tenderness (Over 3rd digit MCP joint) present. No swelling, deformity or lacerations. Normal range of motion. Normal strength. Normal pulse.      Left hand: Tenderness (Over 3rd digit MCP joint) present. No swelling, deformity or lacerations. Normal range of motion. Normal strength. Normal pulse.      Cervical back: Neck supple. No rigidity.   Lymphadenopathy:      Cervical: No cervical adenopathy.   Skin:     General: Skin is warm and dry.      Coloration: Skin is not jaundiced.   Neurological:      General: No focal deficit present.      Mental Status: She is alert and oriented to person, place, and time.   Psychiatric:         Mood and Affect: Mood normal.         Behavior: Behavior normal.             Lab Results   Component Value Date     (L) 11/08/2024    K 3.6 11/08/2024     11/08/2024    CO2 22 (L) 11/08/2024    BUN 18 11/08/2024    CREATININE 0.9 11/08/2024    ANIONGAP 11 11/08/2024     No results found for: "HGBA1C"  Lab Results   Component Value Date    BNP 37 11/08/2024    BNP 54 08/02/2023    BNP 59 09/13/2022       Lab Results   Component Value Date    WBC 7.56 11/08/2024    HGB 14.5 11/08/2024    HCT 41.7 11/08/2024    HCT 30 (L) 11/07/2021     (L) 11/08/2024    GRAN 4.3 11/08/2024    GRAN 56.5 11/08/2024     Lab Results   Component Value Date    CHOL 170 05/11/2021    HDL 55 05/11/2021    LDLCALC 98.2 05/11/2021    TRIG 84 05/11/2021          Current Outpatient Medications:     albuterol (PROVENTIL) 2.5 mg /3 mL (0.083 %) nebulizer solution, USE 1 VIAL VIA NEBULIZER EVERY 6 HOURS AS NEEDED FOR WHEEZING, RESCUE, Disp: 90 mL, " Rfl: 1    albuterol (PROVENTIL/VENTOLIN HFA) 90 mcg/actuation inhaler, Inhale 2 puffs into the lungs every 4 (four) hours as needed for Wheezing. Rescue, Disp: 18 g, Rfl: 6    BEVESPI AEROSPHERE 9-4.8 mcg HFAA, Inhale 2 puffs into the lungs once daily., Disp: 10.7 g, Rfl: 11    carvediloL (COREG) 6.25 MG tablet, TAKE 1 TABLET(6.25 MG) BY MOUTH TWICE DAILY WITH MEALS, Disp: 180 tablet, Rfl: 3    rosuvastatin (CRESTOR) 20 MG tablet, TAKE 1 TABLET(20 MG) BY MOUTH EVERY EVENING, Disp: 90 tablet, Rfl: 1    aspirin (ECOTRIN) 81 MG EC tablet, Take 1 tablet (81 mg total) by mouth once daily., Disp: 90 tablet, Rfl: 3    busPIRone (BUSPAR) 10 MG tablet, TAKE ONE-HALF TABLET BY MOUTH THREE TIMES DAILY FOR 1 WEEK, THEN TAKE 1 TABLET BY MOUTH TWICE DAILY THEREAFTER (Patient not taking: Reported on 11/12/2024), Disp: 60 tablet, Rfl: 2    calcium carbonate-vitamin D3 (CALCIUM 600 WITH VITAMIN D3) 600 mg-10 mcg (400 unit) Chew, Take 1 tablet by mouth 2 (two) times a day., Disp: 60 tablet, Rfl: 11    diclofenac sodium (VOLTAREN) 1 % Gel, Apply 2 g topically 2 (two) times daily. (Patient not taking: Reported on 11/12/2024), Disp: 150 g, Rfl: 3    Current Facility-Administered Medications:     influenza (adjuvanted) (Fluad) 45 mcg/0.5 mL IM vaccine (> or = 66 yo) 0.5 mL, 0.5 mL, Intramuscular, 1 time in Clinic/HOD,         Assessment:       1. Weakness    2. Need for vaccination    3. Osteopenia, unspecified location    4. Pain in both hands    5. Pulmonary hypertension, unspecified           Plan:       Weakness  -     Vitamin D; Future; Expected date: 11/12/2024  -     calcium carbonate-vitamin D3 (CALCIUM 600 WITH VITAMIN D3) 600 mg-10 mcg (400 unit) Chew; Take 1 tablet by mouth 2 (two) times a day.  Dispense: 60 tablet; Refill: 11  - Likely multifactorial. Likely chronic disease contribution with recent in crease in stress and alcohol consumption  - With Osteopenia, will evaluate vitamin D level    Need for vaccination  -      influenza (adjuvanted) (Fluad) 45 mcg/0.5 mL IM vaccine (> or = 64 yo) 0.5 mL    Osteopenia, unspecified location  -     Vitamin D; Future; Expected date: 11/12/2024  -     calcium carbonate-vitamin D3 (CALCIUM 600 WITH VITAMIN D3) 600 mg-10 mcg (400 unit) Chew; Take 1 tablet by mouth 2 (two) times a day.  Dispense: 60 tablet; Refill: 11    Pain in both hands  -     X-Ray Hand 3 View Bilateral; Future; Expected date: 11/12/2024  - Hand exercise print out provided today's visit  - Recommend RICE therapy    Pulmonary hypertension, unspecified  - Likely secondary to COPD. Smoking cessation advised     RTC in 6 months

## 2024-11-15 ENCOUNTER — IMMUNIZATION (OUTPATIENT)
Dept: INTERNAL MEDICINE | Facility: CLINIC | Age: 84
End: 2024-11-15
Payer: MEDICARE

## 2024-11-15 DIAGNOSIS — Z23 NEED FOR VACCINATION: Primary | ICD-10-CM

## 2024-12-31 DIAGNOSIS — J44.9 COPD, GROUP A, BY GOLD 2017 CLASSIFICATION: ICD-10-CM

## 2025-01-02 ENCOUNTER — HOSPITAL ENCOUNTER (INPATIENT)
Facility: HOSPITAL | Age: 85
LOS: 1 days | Discharge: HOME HEALTH | DRG: 544 | End: 2025-01-05
Attending: EMERGENCY MEDICINE | Admitting: STUDENT IN AN ORGANIZED HEALTH CARE EDUCATION/TRAINING PROGRAM
Payer: MEDICARE

## 2025-01-02 DIAGNOSIS — R29.898 WEAKNESS OF BOTH LOWER EXTREMITIES: Primary | ICD-10-CM

## 2025-01-02 DIAGNOSIS — R07.9 CHEST PAIN: ICD-10-CM

## 2025-01-02 DIAGNOSIS — R53.1 WEAKNESS: ICD-10-CM

## 2025-01-02 LAB
ALBUMIN SERPL BCP-MCNC: 4 G/DL (ref 3.5–5.2)
ALP SERPL-CCNC: 125 U/L (ref 40–150)
ALT SERPL W/O P-5'-P-CCNC: 18 U/L (ref 10–44)
ANION GAP SERPL CALC-SCNC: 12 MMOL/L (ref 8–16)
AST SERPL-CCNC: 36 U/L (ref 10–40)
BASOPHILS # BLD AUTO: 0.01 K/UL (ref 0–0.2)
BASOPHILS NFR BLD: 0.2 % (ref 0–1.9)
BILIRUB SERPL-MCNC: 1.3 MG/DL (ref 0.1–1)
BUN SERPL-MCNC: 18 MG/DL (ref 8–23)
CALCIUM SERPL-MCNC: 9.1 MG/DL (ref 8.7–10.5)
CHLORIDE SERPL-SCNC: 101 MMOL/L (ref 95–110)
CO2 SERPL-SCNC: 25 MMOL/L (ref 23–29)
CREAT SERPL-MCNC: 1.1 MG/DL (ref 0.5–1.4)
DIFFERENTIAL METHOD BLD: ABNORMAL
EOSINOPHIL # BLD AUTO: 0 K/UL (ref 0–0.5)
EOSINOPHIL NFR BLD: 0 % (ref 0–8)
ERYTHROCYTE [DISTWIDTH] IN BLOOD BY AUTOMATED COUNT: 12.5 % (ref 11.5–14.5)
EST. GFR  (NO RACE VARIABLE): 48.9 ML/MIN/1.73 M^2
GLUCOSE SERPL-MCNC: 91 MG/DL (ref 70–110)
HCT VFR BLD AUTO: 40.2 % (ref 37–48.5)
HGB BLD-MCNC: 13.2 G/DL (ref 12–16)
IMM GRANULOCYTES # BLD AUTO: 0.09 K/UL (ref 0–0.04)
IMM GRANULOCYTES NFR BLD AUTO: 1.4 % (ref 0–0.5)
LYMPHOCYTES # BLD AUTO: 1.8 K/UL (ref 1–4.8)
LYMPHOCYTES NFR BLD: 28.6 % (ref 18–48)
MAGNESIUM SERPL-MCNC: 1.6 MG/DL (ref 1.6–2.6)
MCH RBC QN AUTO: 31.1 PG (ref 27–31)
MCHC RBC AUTO-ENTMCNC: 32.8 G/DL (ref 32–36)
MCV RBC AUTO: 95 FL (ref 82–98)
MONOCYTES # BLD AUTO: 1.8 K/UL (ref 0.3–1)
MONOCYTES NFR BLD: 28.2 % (ref 4–15)
NEUTROPHILS # BLD AUTO: 2.6 K/UL (ref 1.8–7.7)
NEUTROPHILS NFR BLD: 41.6 % (ref 38–73)
NRBC BLD-RTO: 0 /100 WBC
PHOSPHATE SERPL-MCNC: 3 MG/DL (ref 2.7–4.5)
PLATELET # BLD AUTO: 90 K/UL (ref 150–450)
PMV BLD AUTO: 13 FL (ref 9.2–12.9)
POTASSIUM SERPL-SCNC: 3.5 MMOL/L (ref 3.5–5.1)
PROT SERPL-MCNC: 7.3 G/DL (ref 6–8.4)
RBC # BLD AUTO: 4.25 M/UL (ref 4–5.4)
SODIUM SERPL-SCNC: 138 MMOL/L (ref 136–145)
TSH SERPL DL<=0.005 MIU/L-ACNC: 3.46 UIU/ML (ref 0.4–4)
WBC # BLD AUTO: 6.25 K/UL (ref 3.9–12.7)

## 2025-01-02 PROCEDURE — 84443 ASSAY THYROID STIM HORMONE: CPT | Mod: HCNC

## 2025-01-02 PROCEDURE — 25000003 PHARM REV CODE 250: Mod: HCNC | Performed by: NURSE PRACTITIONER

## 2025-01-02 PROCEDURE — G0378 HOSPITAL OBSERVATION PER HR: HCPCS | Mod: HCNC

## 2025-01-02 PROCEDURE — 80053 COMPREHEN METABOLIC PANEL: CPT | Mod: HCNC

## 2025-01-02 PROCEDURE — 36415 COLL VENOUS BLD VENIPUNCTURE: CPT | Mod: HCNC | Performed by: NURSE PRACTITIONER

## 2025-01-02 PROCEDURE — S4991 NICOTINE PATCH NONLEGEND: HCPCS | Mod: HCNC | Performed by: NURSE PRACTITIONER

## 2025-01-02 PROCEDURE — 63600175 PHARM REV CODE 636 W HCPCS: Mod: HCNC

## 2025-01-02 PROCEDURE — 83735 ASSAY OF MAGNESIUM: CPT | Mod: HCNC

## 2025-01-02 PROCEDURE — 85025 COMPLETE CBC W/AUTO DIFF WBC: CPT | Mod: HCNC

## 2025-01-02 PROCEDURE — 63600175 PHARM REV CODE 636 W HCPCS: Mod: HCNC | Performed by: NURSE PRACTITIONER

## 2025-01-02 PROCEDURE — 96361 HYDRATE IV INFUSION ADD-ON: CPT | Mod: HCNC

## 2025-01-02 PROCEDURE — 84100 ASSAY OF PHOSPHORUS: CPT | Mod: HCNC

## 2025-01-02 PROCEDURE — 93010 ELECTROCARDIOGRAM REPORT: CPT | Mod: HCNC,,, | Performed by: INTERNAL MEDICINE

## 2025-01-02 PROCEDURE — 96365 THER/PROPH/DIAG IV INF INIT: CPT

## 2025-01-02 PROCEDURE — 93005 ELECTROCARDIOGRAM TRACING: CPT | Mod: HCNC

## 2025-01-02 PROCEDURE — 99285 EMERGENCY DEPT VISIT HI MDM: CPT | Mod: 25,HCNC

## 2025-01-02 PROCEDURE — 80321 ALCOHOLS BIOMARKERS 1OR 2: CPT | Mod: HCNC | Performed by: NURSE PRACTITIONER

## 2025-01-02 PROCEDURE — 94761 N-INVAS EAR/PLS OXIMETRY MLT: CPT | Mod: HCNC

## 2025-01-02 RX ORDER — GLUCAGON 1 MG
1 KIT INJECTION
Status: DISCONTINUED | OUTPATIENT
Start: 2025-01-02 | End: 2025-01-05 | Stop reason: HOSPADM

## 2025-01-02 RX ORDER — PROCHLORPERAZINE EDISYLATE 5 MG/ML
5 INJECTION INTRAMUSCULAR; INTRAVENOUS EVERY 6 HOURS PRN
Status: DISCONTINUED | OUTPATIENT
Start: 2025-01-02 | End: 2025-01-05 | Stop reason: HOSPADM

## 2025-01-02 RX ORDER — ACETAMINOPHEN 325 MG/1
650 TABLET ORAL EVERY 6 HOURS PRN
Status: DISCONTINUED | OUTPATIENT
Start: 2025-01-02 | End: 2025-01-05 | Stop reason: HOSPADM

## 2025-01-02 RX ORDER — CARVEDILOL 6.25 MG/1
6.25 TABLET ORAL 2 TIMES DAILY
Status: DISCONTINUED | OUTPATIENT
Start: 2025-01-02 | End: 2025-01-05 | Stop reason: HOSPADM

## 2025-01-02 RX ORDER — HYDROXYZINE HYDROCHLORIDE 25 MG/1
25 TABLET, FILM COATED ORAL 3 TIMES DAILY PRN
Status: DISCONTINUED | OUTPATIENT
Start: 2025-01-03 | End: 2025-01-05 | Stop reason: HOSPADM

## 2025-01-02 RX ORDER — IBUPROFEN 200 MG
1 TABLET ORAL DAILY
Status: DISCONTINUED | OUTPATIENT
Start: 2025-01-02 | End: 2025-01-05 | Stop reason: HOSPADM

## 2025-01-02 RX ORDER — THIAMINE HCL 100 MG
100 TABLET ORAL DAILY
Status: DISCONTINUED | OUTPATIENT
Start: 2025-01-03 | End: 2025-01-05 | Stop reason: HOSPADM

## 2025-01-02 RX ORDER — MELATONIN 10 MG
1 TABLET, SUBLINGUAL SUBLINGUAL 2 TIMES DAILY
Status: DISCONTINUED | OUTPATIENT
Start: 2025-01-02 | End: 2025-01-05 | Stop reason: HOSPADM

## 2025-01-02 RX ORDER — ASPIRIN 81 MG/1
81 TABLET ORAL DAILY
Status: DISCONTINUED | OUTPATIENT
Start: 2025-01-03 | End: 2025-01-05 | Stop reason: HOSPADM

## 2025-01-02 RX ORDER — FOLIC ACID 1 MG/1
1 TABLET ORAL DAILY
Status: DISCONTINUED | OUTPATIENT
Start: 2025-01-03 | End: 2025-01-05 | Stop reason: HOSPADM

## 2025-01-02 RX ORDER — MAGNESIUM SULFATE HEPTAHYDRATE 40 MG/ML
2 INJECTION, SOLUTION INTRAVENOUS ONCE
Status: COMPLETED | OUTPATIENT
Start: 2025-01-02 | End: 2025-01-03

## 2025-01-02 RX ORDER — TALC
6 POWDER (GRAM) TOPICAL NIGHTLY PRN
Status: DISCONTINUED | OUTPATIENT
Start: 2025-01-02 | End: 2025-01-05 | Stop reason: HOSPADM

## 2025-01-02 RX ORDER — GLYCOPYRROLATE AND FORMOTEROL FUMARATE 9; 4.8 UG/1; UG/1
AEROSOL, METERED RESPIRATORY (INHALATION)
Qty: 10.7 G | Refills: 11 | Status: ON HOLD | OUTPATIENT
Start: 2025-01-02

## 2025-01-02 RX ORDER — IPRATROPIUM BROMIDE AND ALBUTEROL SULFATE 2.5; .5 MG/3ML; MG/3ML
3 SOLUTION RESPIRATORY (INHALATION) EVERY 4 HOURS PRN
Status: DISCONTINUED | OUTPATIENT
Start: 2025-01-02 | End: 2025-01-05 | Stop reason: HOSPADM

## 2025-01-02 RX ORDER — IBUPROFEN 200 MG
16 TABLET ORAL
Status: DISCONTINUED | OUTPATIENT
Start: 2025-01-02 | End: 2025-01-05 | Stop reason: HOSPADM

## 2025-01-02 RX ORDER — SODIUM CHLORIDE 0.9 % (FLUSH) 0.9 %
10 SYRINGE (ML) INJECTION EVERY 12 HOURS PRN
Status: DISCONTINUED | OUTPATIENT
Start: 2025-01-02 | End: 2025-01-05 | Stop reason: HOSPADM

## 2025-01-02 RX ORDER — ATORVASTATIN CALCIUM 40 MG/1
80 TABLET, FILM COATED ORAL NIGHTLY
Status: DISCONTINUED | OUTPATIENT
Start: 2025-01-02 | End: 2025-01-05 | Stop reason: HOSPADM

## 2025-01-02 RX ORDER — IBUPROFEN 200 MG
24 TABLET ORAL
Status: DISCONTINUED | OUTPATIENT
Start: 2025-01-02 | End: 2025-01-05 | Stop reason: HOSPADM

## 2025-01-02 RX ORDER — NALOXONE HCL 0.4 MG/ML
0.02 VIAL (ML) INJECTION
Status: DISCONTINUED | OUTPATIENT
Start: 2025-01-02 | End: 2025-01-05 | Stop reason: HOSPADM

## 2025-01-02 RX ADMIN — Medication 1 PATCH: at 11:01

## 2025-01-02 RX ADMIN — CARVEDILOL 6.25 MG: 6.25 TABLET, FILM COATED ORAL at 11:01

## 2025-01-02 RX ADMIN — ATORVASTATIN CALCIUM 80 MG: 40 TABLET, FILM COATED ORAL at 11:01

## 2025-01-02 RX ADMIN — POTASSIUM BICARBONATE 25 MEQ: 978 TABLET, EFFERVESCENT ORAL at 11:01

## 2025-01-02 RX ADMIN — Medication 1 TABLET: at 11:01

## 2025-01-02 RX ADMIN — MAGNESIUM SULFATE HEPTAHYDRATE 2 G: 40 INJECTION, SOLUTION INTRAVENOUS at 11:01

## 2025-01-02 RX ADMIN — SODIUM CHLORIDE, POTASSIUM CHLORIDE, SODIUM LACTATE AND CALCIUM CHLORIDE 500 ML: 600; 310; 30; 20 INJECTION, SOLUTION INTRAVENOUS at 08:01

## 2025-01-03 PROBLEM — N18.30 STAGE 3 CHRONIC KIDNEY DISEASE: Status: ACTIVE | Noted: 2025-01-03

## 2025-01-03 PROBLEM — N18.30 STAGE 3 CHRONIC KIDNEY DISEASE: Status: RESOLVED | Noted: 2025-01-03 | Resolved: 2025-01-03

## 2025-01-03 LAB
ALBUMIN SERPL BCP-MCNC: 3.4 G/DL (ref 3.5–5.2)
ALP SERPL-CCNC: 106 U/L (ref 40–150)
ALT SERPL W/O P-5'-P-CCNC: 13 U/L (ref 10–44)
AMMONIA PLAS-SCNC: 26 UMOL/L (ref 10–50)
AMPHET+METHAMPHET UR QL: NEGATIVE
ANION GAP SERPL CALC-SCNC: 8 MMOL/L (ref 8–16)
AST SERPL-CCNC: 30 U/L (ref 10–40)
BARBITURATES UR QL SCN>200 NG/ML: NEGATIVE
BASOPHILS # BLD AUTO: 0.01 K/UL (ref 0–0.2)
BASOPHILS NFR BLD: 0.2 % (ref 0–1.9)
BENZODIAZ UR QL SCN>200 NG/ML: NEGATIVE
BILIRUB SERPL-MCNC: 1.4 MG/DL (ref 0.1–1)
BILIRUB UR QL STRIP: NEGATIVE
BUN SERPL-MCNC: 16 MG/DL (ref 8–23)
BZE UR QL SCN: NEGATIVE
CALCIUM SERPL-MCNC: 9.1 MG/DL (ref 8.7–10.5)
CANNABINOIDS UR QL SCN: NEGATIVE
CHLORIDE SERPL-SCNC: 98 MMOL/L (ref 95–110)
CK SERPL-CCNC: 250 U/L (ref 20–180)
CLARITY UR REFRACT.AUTO: CLEAR
CO2 SERPL-SCNC: 31 MMOL/L (ref 23–29)
COLOR UR AUTO: YELLOW
CREAT SERPL-MCNC: 0.9 MG/DL (ref 0.5–1.4)
CREAT UR-MCNC: 130 MG/DL (ref 15–325)
DIFFERENTIAL METHOD BLD: ABNORMAL
EOSINOPHIL # BLD AUTO: 0 K/UL (ref 0–0.5)
EOSINOPHIL NFR BLD: 0 % (ref 0–8)
ERYTHROCYTE [DISTWIDTH] IN BLOOD BY AUTOMATED COUNT: 12.7 % (ref 11.5–14.5)
EST. GFR  (NO RACE VARIABLE): >60 ML/MIN/1.73 M^2
ETHANOL UR-MCNC: <10 MG/DL
FOLATE SERPL-MCNC: 16.7 NG/ML (ref 4–24)
GLUCOSE SERPL-MCNC: 95 MG/DL (ref 70–110)
GLUCOSE UR QL STRIP: NEGATIVE
HCT VFR BLD AUTO: 36.9 % (ref 37–48.5)
HGB BLD-MCNC: 12.3 G/DL (ref 12–16)
HGB UR QL STRIP: NEGATIVE
IMM GRANULOCYTES # BLD AUTO: 0.06 K/UL (ref 0–0.04)
IMM GRANULOCYTES NFR BLD AUTO: 1.1 % (ref 0–0.5)
KETONES UR QL STRIP: ABNORMAL
LEUKOCYTE ESTERASE UR QL STRIP: ABNORMAL
LYMPHOCYTES # BLD AUTO: 1.8 K/UL (ref 1–4.8)
LYMPHOCYTES NFR BLD: 33.5 % (ref 18–48)
MAGNESIUM SERPL-MCNC: 2.2 MG/DL (ref 1.6–2.6)
MCH RBC QN AUTO: 31.8 PG (ref 27–31)
MCHC RBC AUTO-ENTMCNC: 33.3 G/DL (ref 32–36)
MCV RBC AUTO: 95 FL (ref 82–98)
METHADONE UR QL SCN>300 NG/ML: NEGATIVE
MICROSCOPIC COMMENT: NORMAL
MONOCYTES # BLD AUTO: 1.8 K/UL (ref 0.3–1)
MONOCYTES NFR BLD: 33.7 % (ref 4–15)
NEUTROPHILS # BLD AUTO: 1.7 K/UL (ref 1.8–7.7)
NEUTROPHILS NFR BLD: 31.5 % (ref 38–73)
NITRITE UR QL STRIP: NEGATIVE
NRBC BLD-RTO: 0 /100 WBC
OHS QRS DURATION: 100 MS
OHS QTC CALCULATION: 503 MS
OPIATES UR QL SCN: NEGATIVE
PCP UR QL SCN>25 NG/ML: NEGATIVE
PH UR STRIP: 7 [PH] (ref 5–8)
PLATELET # BLD AUTO: 80 K/UL (ref 150–450)
PMV BLD AUTO: 13.4 FL (ref 9.2–12.9)
POTASSIUM SERPL-SCNC: 3.8 MMOL/L (ref 3.5–5.1)
PROT SERPL-MCNC: 6 G/DL (ref 6–8.4)
PROT UR QL STRIP: NEGATIVE
RBC # BLD AUTO: 3.87 M/UL (ref 4–5.4)
RBC #/AREA URNS AUTO: 1 /HPF (ref 0–4)
SODIUM SERPL-SCNC: 137 MMOL/L (ref 136–145)
SP GR UR STRIP: 1.02 (ref 1–1.03)
TOXICOLOGY INFORMATION: NORMAL
URN SPEC COLLECT METH UR: ABNORMAL
VIT B12 SERPL-MCNC: 443 PG/ML (ref 210–950)
WBC # BLD AUTO: 5.25 K/UL (ref 3.9–12.7)
WBC #/AREA URNS AUTO: 0 /HPF (ref 0–5)

## 2025-01-03 PROCEDURE — 87086 URINE CULTURE/COLONY COUNT: CPT | Mod: HCNC | Performed by: NURSE PRACTITIONER

## 2025-01-03 PROCEDURE — 82607 VITAMIN B-12: CPT | Mod: HCNC | Performed by: NURSE PRACTITIONER

## 2025-01-03 PROCEDURE — 84425 ASSAY OF VITAMIN B-1: CPT | Mod: HCNC | Performed by: NURSE PRACTITIONER

## 2025-01-03 PROCEDURE — 83735 ASSAY OF MAGNESIUM: CPT | Mod: HCNC | Performed by: NURSE PRACTITIONER

## 2025-01-03 PROCEDURE — 80053 COMPREHEN METABOLIC PANEL: CPT | Mod: HCNC | Performed by: NURSE PRACTITIONER

## 2025-01-03 PROCEDURE — 25000003 PHARM REV CODE 250: Mod: HCNC | Performed by: NURSE PRACTITIONER

## 2025-01-03 PROCEDURE — 96375 TX/PRO/DX INJ NEW DRUG ADDON: CPT

## 2025-01-03 PROCEDURE — 82746 ASSAY OF FOLIC ACID SERUM: CPT | Mod: HCNC | Performed by: NURSE PRACTITIONER

## 2025-01-03 PROCEDURE — 82550 ASSAY OF CK (CPK): CPT | Mod: HCNC | Performed by: NURSE PRACTITIONER

## 2025-01-03 PROCEDURE — 63600175 PHARM REV CODE 636 W HCPCS: Mod: HCNC | Performed by: NURSE PRACTITIONER

## 2025-01-03 PROCEDURE — 80307 DRUG TEST PRSMV CHEM ANLYZR: CPT | Mod: HCNC | Performed by: NURSE PRACTITIONER

## 2025-01-03 PROCEDURE — 96372 THER/PROPH/DIAG INJ SC/IM: CPT | Performed by: NURSE PRACTITIONER

## 2025-01-03 PROCEDURE — 36415 COLL VENOUS BLD VENIPUNCTURE: CPT | Mod: HCNC | Performed by: NURSE PRACTITIONER

## 2025-01-03 PROCEDURE — 82140 ASSAY OF AMMONIA: CPT | Mod: HCNC | Performed by: NURSE PRACTITIONER

## 2025-01-03 PROCEDURE — 85025 COMPLETE CBC W/AUTO DIFF WBC: CPT | Mod: HCNC | Performed by: NURSE PRACTITIONER

## 2025-01-03 PROCEDURE — 84207 ASSAY OF VITAMIN B-6: CPT | Mod: HCNC | Performed by: NURSE PRACTITIONER

## 2025-01-03 PROCEDURE — G0378 HOSPITAL OBSERVATION PER HR: HCPCS | Mod: HCNC

## 2025-01-03 PROCEDURE — 81001 URINALYSIS AUTO W/SCOPE: CPT | Mod: HCNC,XB | Performed by: NURSE PRACTITIONER

## 2025-01-03 PROCEDURE — 63600175 PHARM REV CODE 636 W HCPCS: Mod: HCNC

## 2025-01-03 PROCEDURE — S4991 NICOTINE PATCH NONLEGEND: HCPCS | Mod: HCNC | Performed by: NURSE PRACTITIONER

## 2025-01-03 PROCEDURE — 96366 THER/PROPH/DIAG IV INF ADDON: CPT

## 2025-01-03 RX ORDER — HEPARIN SODIUM 5000 [USP'U]/ML
5000 INJECTION, SOLUTION INTRAVENOUS; SUBCUTANEOUS EVERY 8 HOURS
Status: DISCONTINUED | OUTPATIENT
Start: 2025-01-03 | End: 2025-01-05 | Stop reason: HOSPADM

## 2025-01-03 RX ORDER — CEFTRIAXONE 1 G/1
1 INJECTION, POWDER, FOR SOLUTION INTRAMUSCULAR; INTRAVENOUS
Status: DISCONTINUED | OUTPATIENT
Start: 2025-01-03 | End: 2025-01-05 | Stop reason: HOSPADM

## 2025-01-03 RX ORDER — CYANOCOBALAMIN (VITAMIN B-12) 250 MCG
250 TABLET ORAL DAILY
Status: DISCONTINUED | OUTPATIENT
Start: 2025-01-03 | End: 2025-01-05 | Stop reason: HOSPADM

## 2025-01-03 RX ADMIN — HEPARIN SODIUM 5000 UNITS: 5000 INJECTION INTRAVENOUS; SUBCUTANEOUS at 03:01

## 2025-01-03 RX ADMIN — CEFTRIAXONE 1 G: 1 INJECTION, POWDER, FOR SOLUTION INTRAMUSCULAR; INTRAVENOUS at 09:01

## 2025-01-03 RX ADMIN — Medication 1 TABLET: at 09:01

## 2025-01-03 RX ADMIN — CARVEDILOL 6.25 MG: 6.25 TABLET, FILM COATED ORAL at 09:01

## 2025-01-03 RX ADMIN — Medication 1 PATCH: at 09:01

## 2025-01-03 RX ADMIN — Medication 100 MG: at 09:01

## 2025-01-03 RX ADMIN — Medication 6 MG: at 09:01

## 2025-01-03 RX ADMIN — HEPARIN SODIUM 5000 UNITS: 5000 INJECTION INTRAVENOUS; SUBCUTANEOUS at 06:01

## 2025-01-03 RX ADMIN — HEPARIN SODIUM 5000 UNITS: 5000 INJECTION INTRAVENOUS; SUBCUTANEOUS at 09:01

## 2025-01-03 RX ADMIN — Medication 10 ML: at 09:01

## 2025-01-03 RX ADMIN — FOLIC ACID 1 MG: 1 TABLET ORAL at 09:01

## 2025-01-03 RX ADMIN — ASPIRIN 81 MG: 81 TABLET, COATED ORAL at 09:01

## 2025-01-03 RX ADMIN — ATORVASTATIN CALCIUM 80 MG: 40 TABLET, FILM COATED ORAL at 09:01

## 2025-01-03 RX ADMIN — CYANOCOBALAMIN TAB 250 MCG 250 MCG: 250 TAB at 09:01

## 2025-01-03 NOTE — ASSESSMENT & PLAN NOTE
Creatine stable for now. BMP reviewed- noted Estimated Creatinine Clearance: 29.5 mL/min (based on SCr of 1.1 mg/dL). according to latest data. Based on current GFR, CKD stage is stage 3 - GFR 30-59.  Monitor UOP and serial BMP and adjust therapy as needed. Renally dose meds. Avoid nephrotoxic medications and procedures.

## 2025-01-03 NOTE — ASSESSMENT & PLAN NOTE
-Patient reports drinking 2-3 beers 3-4 days per week noting she will drink a miniature bottle of [place] about 2 days per week instead of beer, last drink about 2 days ago. Denies any history of alcohol withdrawal.  -PETH pending.  -Nursing to perform CIWA assessment, low suspicion for withdrawal.  -Can initiate valium taper if CIWA elevated.  -Initiate folic acid, thiamine, and MVI.    -Discussed the dangers of continued ETOH use with Pt and apparent systemic effects of her abuse.

## 2025-01-03 NOTE — ASSESSMENT & PLAN NOTE
Patient's blood pressure range in the last 24 hours was: BP  Min: 130/60  Max: 165/83.The patient's inpatient anti-hypertensive regimen is listed below:  Current Antihypertensives  carvediloL tablet 6.25 mg, 2 times daily, Oral    Plan  - BP is controlled, no changes needed to their regimen

## 2025-01-03 NOTE — H&P
"  [facility] - Emergency Dept  [facility] Medicine  History & Physical    Patient Name: Sangita Logan  MRN: [id number]  Patient Class: OP- Observation  Admission Date: 1/2/2025  Attending Physician: Christen Adams MD   Primary Care Provider: Danish Pereira MD         Patient information was obtained from patient, past medical records, and ER records.     Subjective:     Principal Problem:Weakness of both lower extremities    Chief Complaint:   Chief Complaint   Patient presents with    Weakness     Has been feeling weak for couple of days but now "I feel like I'm going to collapse." Denies dizziness and lightheadedness, no numbness or tingling denies ha        HPI: Sangita Logan is a 84 y.o. female with a PMHx of COPD, nicotine dependence, osteoporosis, HTN, SVT, and alcohol use who presents to the ED for lower extremity weakness. The patient reports noting tremors in her hands a few days ago which made it difficult to bring a cup to her mouth. She reports the next day the tremors improved, but she noticed weakness to her BLE. Reports worsened over the past 2 days noting today she was unable to stand due to feeling like her legs would give out. She reports a feeling of heaviness in her legs but denies numbness/tingling, headache, lightheadedness/dizziness, or pain to her legs. Notes that sitting down she feels well and has no symptoms and is able to move her legs normally. Denies any recent falls. She reports at baseline she ambulates without assistance and can walk a few blocks. She denies CP, cough, SOB, fever/chills, abdominal pain, or dysuria.    In the ED, patient tachycardic and mildly hypertensive otherwise vitals stable, afebrile. CBC with stable thrombocytopenia. K+3.5. Cr 1.1 (bl 0.9-1.0). Mag 1.6. Tbili 1.3. TSH WNL. EKG shows ST w/ PACs and incomplete R BBB, 108 bpm, non specific ST and T wave abnormalities. The patient received 500cc LR bolus.    Past Medical History:   Diagnosis Date "    COPD (chronic obstructive pulmonary disease)     HTN (hypertension) 10/21/2021    Osteoporosis     Sinus disease        Past Surgical History:   Procedure Laterality Date    AUGMENTATION OF BREAST Bilateral     received at age 23, removed at age 76    Breast implants removal  2012    SINUS SURGERY      TONSILLECTOMY      Age 1       Review of patient's allergies indicates:   Allergen Reactions    No known drug allergies        No current facility-administered medications on file prior to encounter.     Current Outpatient Medications on File Prior to Encounter   Medication Sig    albuterol (PROVENTIL) 2.5 mg /3 mL (0.083 %) nebulizer solution USE 1 VIAL VIA NEBULIZER EVERY 6 HOURS AS NEEDED FOR WHEEZING, RESCUE    albuterol (PROVENTIL/VENTOLIN HFA) 90 mcg/actuation inhaler Inhale 2 puffs into the lungs every 4 (four) hours as needed for Wheezing. Rescue    aspirin (ECOTRIN) 81 MG EC tablet Take 1 tablet (81 mg total) by mouth once daily.    BEVESPI AEROSPHERE 9-4.8 mcg HFAA INHALE 2 PUFFS INTO THE LUNGS EVERY DAY    busPIRone (BUSPAR) 10 MG tablet TAKE ONE-HALF TABLET BY MOUTH THREE TIMES DAILY FOR 1 WEEK, THEN TAKE 1 TABLET BY MOUTH TWICE DAILY THEREAFTER (Patient not taking: Reported on 11/12/2024)    calcium carbonate-vitamin D3 (CALCIUM 600 WITH VITAMIN D3) 600 mg-10 mcg (400 unit) Chew Take 1 tablet by mouth 2 (two) times a day.    carvediloL (COREG) 6.25 MG tablet TAKE 1 TABLET(6.25 MG) BY MOUTH TWICE DAILY WITH MEALS    diclofenac sodium (VOLTAREN) 1 % Gel Apply 2 g topically 2 (two) times daily.    ergocalciferol (ERGOCALCIFEROL) 50,000 unit Cap Take 1 capsule (50,000 Units total) by mouth every 7 days.    rosuvastatin (CRESTOR) 20 MG tablet TAKE 1 TABLET(20 MG) BY MOUTH EVERY EVENING    [DISCONTINUED] BEVESPI AEROSPHERE 9-4.8 mcg HFAA Inhale 2 puffs into the lungs once daily.     Family History       Problem Relation (Age of Onset)    Breast cancer Sister    No Known Problems Mother, Father           Tobacco Use    Smoking status: Every Day     Current packs/day: 0.50     Average packs/day: 0.5 packs/day for 30.0 years (15.0 ttl pk-yrs)     Types: Cigarettes     Passive exposure: Current    Smokeless tobacco: Never    Tobacco comments:     2 packs a week   Substance and Sexual Activity    Alcohol use: Yes     Alcohol/week: 3.0 standard drinks of alcohol     Types: 3 Glasses of wine per week     Comment: 4 times weekly    Drug use: Not Currently     Types: Marijuana    Sexual activity: Not Currently     Review of Systems   Constitutional:  Negative for appetite change, chills, diaphoresis, fatigue and fever.   HENT:  Negative for congestion, rhinorrhea and sore throat.    Eyes:  Negative for photophobia and visual disturbance.   Respiratory:  Negative for cough, shortness of breath and wheezing.    Cardiovascular:  Negative for chest pain, palpitations and leg swelling.   Gastrointestinal:  Negative for abdominal distention, abdominal pain, diarrhea, nausea and vomiting.   Genitourinary:  Negative for dysuria, frequency and hematuria.   Musculoskeletal:  Positive for gait problem. Negative for back pain, myalgias and neck pain.   Skin:  Negative for color change, pallor, rash and wound.   Neurological:  Positive for tremors and weakness (BLE). Negative for dizziness, syncope, light-headedness, numbness and headaches.   Psychiatric/Behavioral:  Negative for confusion and hallucinations. The patient is not nervous/anxious.      Objective:     Vital Signs (Most Recent):  Temp: 98.2 °F (36.8 °C) (01/02/25 2108)  Pulse: 94 (01/02/25 2108)  Resp: 18 (01/02/25 2108)  BP: (!) 145/80 (01/02/25 2108)  SpO2: 97 % (01/02/25 2108) Vital Signs (24h Range):  Temp:  [97.8 °F (36.6 °C)-98.2 °F (36.8 °C)] 98.2 °F (36.8 °C)  Pulse:  [] 94  Resp:  [18] 18  SpO2:  [95 %-97 %] 97 %  BP: (145-150)/(76-80) 145/80     Weight: 49 kg (108 lb)  Body mass index is 19.13 kg/m².     Physical Exam  Vitals and nursing note reviewed.    Constitutional:       General: She is not in acute distress.     Appearance: She is not toxic-appearing or diaphoretic.      Comments: Chronically ill appearing   HENT:      Head: Normocephalic and atraumatic.      Nose: Nose normal.      Mouth/Throat:      Mouth: Mucous membranes are dry.   Eyes:      Pupils: Pupils are equal, round, and reactive to light.   Cardiovascular:      Rate and Rhythm: Regular rhythm. Tachycardia present.      Pulses: Normal pulses.   Pulmonary:      Effort: Pulmonary effort is normal. No respiratory distress.      Breath sounds: No wheezing, rhonchi or rales.   Abdominal:      General: Bowel sounds are normal. There is no distension.      Palpations: Abdomen is soft.      Tenderness: There is no abdominal tenderness. There is no guarding.   Musculoskeletal:         General: Normal range of motion.      Cervical back: Normal range of motion.      Right lower leg: No edema.      Left lower leg: No edema.   Skin:     General: Skin is warm and dry.      Capillary Refill: Capillary refill takes less than 2 seconds.   Neurological:      General: No focal deficit present.      Mental Status: She is alert and oriented to person, place, and time.      Cranial Nerves: No dysarthria or facial asymmetry.      Sensory: No sensory deficit.      Motor: Tremor (bilateral hands) present. No weakness.   Psychiatric:         Mood and Affect: Mood normal.         Behavior: Behavior normal.              CRANIAL NERVES     CN III, IV, VI   Pupils are equal, round, and reactive to light.       Significant Labs: All pertinent labs within the past 24 hours have been reviewed.  CBC:   Recent Labs   Lab 01/02/25 1955   WBC 6.25   HGB 13.2   HCT 40.2   PLT 90*     CMP:   Recent Labs   Lab 01/02/25 1955      K 3.5      CO2 25   GLU 91   BUN 18   CREATININE 1.1   CALCIUM 9.1   PROT 7.3   ALBUMIN 4.0   BILITOT 1.3*   ALKPHOS 125   AST 36   ALT 18   ANIONGAP 12     Magnesium:   Recent Labs   Lab  01/02/25 1955   MG 1.6     TSH:   Recent Labs   Lab 01/02/25 1955   TSH 3.455       Significant Imaging: I have reviewed all pertinent imaging results/findings within the past 24 hours.  Imaging Results    None         Assessment/Plan:     * Weakness of both lower extremities  -CBC unremarkable. TSH WNL  -K 3.5, Mag 1.6- will replete.  -Vitamin B12, vitamin B1, vitamin B5, folate, and CPK pending.  -PETH pending.  -UA and UDS pending.  -Xray thoracic and lumbar spine pending.  -Will start MVI, thiamine, and folic acid given alcohol use.  -Fall precautions.  -PT/OT consulted.    Aortic atherosclerosis -- u/s 2023  -History noted.  -Continue ASA and statin.    Cigarette nicotine dependence without complication  Dangers of cigarette smoking were reviewed with patient in detail. Patient was Counseled for 3-10 minutes. Nicotine replacement options were discussed. Nicotine replacement was discussed- prescribed    HLD (hyperlipidemia)   Patient is chronically on statin.will continue for now. Monitor clinically. Last LDL was   Lab Results   Component Value Date    LDLCALC 98.2 05/11/2021        Thrombocytopenia, unspecified  The likely etiology of thrombocytopenia is  alcohol use? . The patients 3 most recent labs are listed below.  Recent Labs     01/02/25 1955   PLT 90*     Plan  - Will transfuse if platelet count is <50k (if undergoing surgical procedure or have active bleeding).    SVT (supraventricular tachycardia)  -History noted.  -Continue coreg.  -Cardiac monitoring.    Alcohol use  -Patient reports drinking 2-3 beers 3-4 days per week noting she will drink a miniature bottle of Pa Beam about 2 days per week instead of beer, last drink about 2 days ago. Denies any history of alcohol withdrawal.  -PETH pending.  -Nursing to perform CIWA assessment, low suspicion for withdrawal.  -Can initiate valium taper if CIWA elevated.  -Initiate folic acid, thiamine, and MVI.    -Discussed the dangers of continued ETOH use  with Pt and apparent systemic effects of her abuse.      HTN (hypertension)  Patient's blood pressure range in the last 24 hours was: BP  Min: 145/80  Max: 165/83.The patient's inpatient anti-hypertensive regimen is listed below:  Current Antihypertensives  carvediloL tablet 6.25 mg, 2 times daily, Oral    Plan  - BP is controlled, no changes needed to their regimen    COPD, group A, by GOLD 2017 classification  Patient's COPD is controlled currently.  Patient is currently off COPD Pathway. Continue scheduled inhalers Supplemental oxygen PRN and duo nebs PRN and monitor respiratory status closely.       VTE Risk Mitigation (From admission, onward)           Ordered     heparin (porcine) injection 5,000 Units  Every 8 hours         01/03/25 0406     Place sequential compression device  Until discontinued         01/02/25 2203     IP VTE HIGH RISK PATIENT  Once         01/02/25 2203                         On 01/02/2025, patient should be placed in hospital observation services under my care in collaboration with Lalit Mackey MD.           Nya Hammond NP  Department of Hospital Medicine  [facility] - Emergency Dept

## 2025-01-03 NOTE — HOSPITAL COURSE
Ms. Logan was admitted to hospital medicine for further management of her weakness. CBC with chronic thrombocytopenia. Potassium was 3.5, replaced. Repeat K pending. CPK slightly elevated 250. B12, folate, ammonia, TSH WNL. UA showed 3+ leukocytes, on ceftriaxone. Drug tox unremarkable. Chest X ray with no acute process. Abdominal US showed no abnormal findings. Thoracic and lumbar xray showed osteopenia and compression of L3 and likley L4/5. CT L-spine with chronic compression fractures, no evidence of acute compression fracture. Ortho spine consulted, MRI brain and MRI C-T-L spine reviewed. Ortho recommendations reviewed. Start calcium. Replaced lytes. Therapy consulted, low intensity recommendations. Patient symptomatically improved.     At discharge she will continue oral calcium supplementation. Fragility clinic to set up vitamin D injections. Referral placed to PCP. Ortho Spine clinic and Fragility Clinic to call patient with appointment details. Medications sent to bedside. Home Health ordered. Return precautions provided. Patient medically ready for discharge. Plan of care discussed with patient, patient agreeable to plan, and all questions answered.      Physical Exam    General: NAD.  Neuro: Alert and oriented. No focal deficits.  HEENT: EOMI. No JVD appreciated.  CVS: RRR.  Respiratory: Normal respiratory effort.    Abdominal: NTND, soft to palpation.    Extremities: Pulses full, equal, and regular over all 4 extremities.

## 2025-01-03 NOTE — ASSESSMENT & PLAN NOTE
-Patient reports drinking 2-3 beers 3-4 days per week noting she will drink a miniature bottle of Pa Beam about 2 days per week instead of beer, last drink about 2 days ago. Denies any history of alcohol withdrawal.  -PETH pending.  -Nursing to perform CIWA assessment, low suspicion for withdrawal.  -Can initiate valium taper if CIWA elevated.  -Given folic acid, thiamine, and MVI.    -Discussed the dangers of continued ETOH use with Pt and apparent systemic effects of her abuse.    - Abdominal US negative for any acute findings

## 2025-01-03 NOTE — ASSESSMENT & PLAN NOTE
The likely etiology of thrombocytopenia is  alcohol use? . The patients 3 most recent labs are listed below.  Recent Labs     01/02/25 1955 01/03/25  0333   PLT 90* 80*       Plan  - Will transfuse if platelet count is <50k (if undergoing surgical procedure or have active bleeding).

## 2025-01-03 NOTE — PT/OT/SLP PROGRESS
Occupational Therapy      Patient Name:  Sangita Logan   MRN:  [id number]    Patient not seen today secondary to  (Pt OSVALDO on first attempt. On second check imaging now showing compression fractures. OT held evaluation 2/2 awaiting response from MD regarding need for bracing or restrictions/precautions. At 1054, PA still waiting to hear from other team regarding spine.  Still no update in PM and 1530.). Will follow-up tomorrow as appropriate.    1/3/2025

## 2025-01-03 NOTE — SUBJECTIVE & OBJECTIVE
Past Medical History:   Diagnosis Date    COPD (chronic obstructive pulmonary disease)     HTN (hypertension) 10/21/2021    Osteoporosis     Sinus disease        Past Surgical History:   Procedure Laterality Date    AUGMENTATION OF BREAST Bilateral     received at age 23, removed at age 76    Breast implants removal  2012    SINUS SURGERY      TONSILLECTOMY      Age 1       Review of patient's allergies indicates:   Allergen Reactions    No known drug allergies        No current facility-administered medications on file prior to encounter.     Current Outpatient Medications on File Prior to Encounter   Medication Sig    albuterol (PROVENTIL) 2.5 mg /3 mL (0.083 %) nebulizer solution USE 1 VIAL VIA NEBULIZER EVERY 6 HOURS AS NEEDED FOR WHEEZING, RESCUE    albuterol (PROVENTIL/VENTOLIN HFA) 90 mcg/actuation inhaler Inhale 2 puffs into the lungs every 4 (four) hours as needed for Wheezing. Rescue    aspirin (ECOTRIN) 81 MG EC tablet Take 1 tablet (81 mg total) by mouth once daily.    BEVESPI AEROSPHERE 9-4.8 mcg HFAA INHALE 2 PUFFS INTO THE LUNGS EVERY DAY    busPIRone (BUSPAR) 10 MG tablet TAKE ONE-HALF TABLET BY MOUTH THREE TIMES DAILY FOR 1 WEEK, THEN TAKE 1 TABLET BY MOUTH TWICE DAILY THEREAFTER (Patient not taking: Reported on 11/12/2024)    calcium carbonate-vitamin D3 (CALCIUM 600 WITH VITAMIN D3) 600 mg-10 mcg (400 unit) Chew Take 1 tablet by mouth 2 (two) times a day.    carvediloL (COREG) 6.25 MG tablet TAKE 1 TABLET(6.25 MG) BY MOUTH TWICE DAILY WITH MEALS    diclofenac sodium (VOLTAREN) 1 % Gel Apply 2 g topically 2 (two) times daily.    ergocalciferol (ERGOCALCIFEROL) 50,000 unit Cap Take 1 capsule (50,000 Units total) by mouth every 7 days.    rosuvastatin (CRESTOR) 20 MG tablet TAKE 1 TABLET(20 MG) BY MOUTH EVERY EVENING    [DISCONTINUED] BEVESPI AEROSPHERE 9-4.8 mcg HFAA Inhale 2 puffs into the lungs once daily.     Family History       Problem Relation (Age of Onset)    Breast cancer Sister    No Known  Problems Mother, Father          Tobacco Use    Smoking status: Every Day     Current packs/day: 0.50     Average packs/day: 0.5 packs/day for 30.0 years (15.0 ttl pk-yrs)     Types: Cigarettes     Passive exposure: Current    Smokeless tobacco: Never    Tobacco comments:     2 packs a week   Substance and Sexual Activity    Alcohol use: Yes     Alcohol/week: 3.0 standard drinks of alcohol     Types: 3 Glasses of wine per week     Comment: 4 times weekly    Drug use: Not Currently     Types: Marijuana    Sexual activity: Not Currently     Review of Systems   Constitutional:  Negative for appetite change, chills, diaphoresis, fatigue and fever.   HENT:  Negative for congestion, rhinorrhea and sore throat.    Eyes:  Negative for photophobia and visual disturbance.   Respiratory:  Negative for cough, shortness of breath and wheezing.    Cardiovascular:  Negative for chest pain, palpitations and leg swelling.   Gastrointestinal:  Negative for abdominal distention, abdominal pain, diarrhea, nausea and vomiting.   Genitourinary:  Negative for dysuria, frequency and hematuria.   Musculoskeletal:  Positive for gait problem. Negative for back pain, myalgias and neck pain.   Skin:  Negative for color change, pallor, rash and wound.   Neurological:  Positive for tremors and weakness (BLE). Negative for dizziness, syncope, light-headedness, numbness and headaches.   Psychiatric/Behavioral:  Negative for confusion and hallucinations. The patient is not nervous/anxious.      Objective:     Vital Signs (Most Recent):  Temp: 98.2 °F (36.8 °C) (01/02/25 2108)  Pulse: 94 (01/02/25 2108)  Resp: 18 (01/02/25 2108)  BP: (!) 145/80 (01/02/25 2108)  SpO2: 97 % (01/02/25 2108) Vital Signs (24h Range):  Temp:  [97.8 °F (36.6 °C)-98.2 °F (36.8 °C)] 98.2 °F (36.8 °C)  Pulse:  [] 94  Resp:  [18] 18  SpO2:  [95 %-97 %] 97 %  BP: (145-150)/(76-80) 145/80     Weight: 49 kg (108 lb)  Body mass index is 19.13 kg/m².     Physical Exam  Vitals  and nursing note reviewed.   Constitutional:       General: She is not in acute distress.     Appearance: She is not toxic-appearing or diaphoretic.      Comments: Chronically ill appearing   HENT:      Head: Normocephalic and atraumatic.      Nose: Nose normal.      Mouth/Throat:      Mouth: Mucous membranes are dry.   Eyes:      Pupils: Pupils are equal, round, and reactive to light.   Cardiovascular:      Rate and Rhythm: Regular rhythm. Tachycardia present.      Pulses: Normal pulses.   Pulmonary:      Effort: Pulmonary effort is normal. No respiratory distress.      Breath sounds: No wheezing, rhonchi or rales.   Abdominal:      General: Bowel sounds are normal. There is no distension.      Palpations: Abdomen is soft.      Tenderness: There is no abdominal tenderness. There is no guarding.   Musculoskeletal:         General: Normal range of motion.      Cervical back: Normal range of motion.      Right lower leg: No edema.      Left lower leg: No edema.   Skin:     General: Skin is warm and dry.      Capillary Refill: Capillary refill takes less than 2 seconds.   Neurological:      General: No focal deficit present.      Mental Status: She is alert and oriented to person, place, and time.      Cranial Nerves: No dysarthria or facial asymmetry.      Sensory: No sensory deficit.      Motor: Tremor (bilateral hands) present. No weakness.   Psychiatric:         Mood and Affect: Mood normal.         Behavior: Behavior normal.              CRANIAL NERVES     CN III, IV, VI   Pupils are equal, round, and reactive to light.       Significant Labs: All pertinent labs within the past 24 hours have been reviewed.  CBC:   Recent Labs   Lab 01/02/25 1955   WBC 6.25   HGB 13.2   HCT 40.2   PLT 90*     CMP:   Recent Labs   Lab 01/02/25 1955      K 3.5      CO2 25   GLU 91   BUN 18   CREATININE 1.1   CALCIUM 9.1   PROT 7.3   ALBUMIN 4.0   BILITOT 1.3*   ALKPHOS 125   AST 36   ALT 18   ANIONGAP 12     Magnesium:    Recent Labs   Lab 01/02/25 1955   MG 1.6     TSH:   Recent Labs   Lab 01/02/25 1955   TSH 3.455       Significant Imaging: I have reviewed all pertinent imaging results/findings within the past 24 hours.  Imaging Results    None

## 2025-01-03 NOTE — ED PROVIDER NOTES
"Encounter Date: 1/2/2025       History     Chief Complaint   Patient presents with    Weakness     Has been feeling weak for couple of days but now "I feel like I'm going to collapse." Denies dizziness and lightheadedness, no numbness or tingling denies ha     The history is provided by the patient and a relative.     Patient is an 84-year-old female with a past medical history of HTN, COPD, osteoporosis who presents due to worsening leg weakness when standing up.  She notes that for the past few days, whenever she tries to stand up she feels that she is going to become imbalanced and is concerned that she will then fall to the ground.  She notes that during this she does not start to have a headache or feel like she is going to faint, but rather just feels that her legs are just both week.  Today specifically, she notes that she has been unable to stand at all.  Patient's son notes that a week ago she was able to walk around the block multiple times so this is an acute change for her.  Notes that sitting down she feels well and has no symptoms.  Denies any numbness or tingling in any extremity.  Denies any pain in her legs.    Review of patient's allergies indicates:   Allergen Reactions    No known drug allergies      Past Medical History:   Diagnosis Date    COPD (chronic obstructive pulmonary disease)     HTN (hypertension) 10/21/2021    Osteoporosis     Sinus disease      Past Surgical History:   Procedure Laterality Date    AUGMENTATION OF BREAST Bilateral     received at age 23, removed at age 76    Breast implants removal  2012    SINUS SURGERY      TONSILLECTOMY      Age 1     Family History   Problem Relation Name Age of Onset    No Known Problems Mother      No Known Problems Father      Breast cancer Sister          dx in 30s    Blindness Neg Hx      Glaucoma Neg Hx      Macular degeneration Neg Hx      Retinal detachment Neg Hx       Social History     Tobacco Use    Smoking status: Every Day     " Current packs/day: 0.50     Average packs/day: 0.5 packs/day for 30.0 years (15.0 ttl pk-yrs)     Types: Cigarettes     Passive exposure: Current    Smokeless tobacco: Never    Tobacco comments:     2 packs a week   Substance Use Topics    Alcohol use: Yes     Alcohol/week: 3.0 standard drinks of alcohol     Types: 3 Glasses of wine per week     Comment: 4 times weekly    Drug use: Not Currently     Types: Marijuana     Physical Exam     Initial Vitals [01/02/25 1723]   BP Pulse Resp Temp SpO2   (!) 150/76 108 18 97.8 °F (36.6 °C) 95 %      MAP       --         Physical Exam    Nursing note and vitals reviewed.  Constitutional: She appears well-developed. No distress.   She is a comfortable appearing while sitting on her wheelchair.  However, she appears unstable and starts to have her leg shake whenever she attempts to stand up and immediately has to sit back down   HENT:   Head: Normocephalic and atraumatic. Mouth/Throat: Oropharynx is clear and moist and mucous membranes are normal.   Eyes: EOM are normal. Pupils are equal, round, and reactive to light.   Neck:   Normal range of motion.  Cardiovascular:  Normal rate and regular rhythm.           Pulmonary/Chest: No respiratory distress.   Abdominal: She exhibits no distension.   Musculoskeletal:      Cervical back: Normal range of motion.      Comments: Muscle strength is maintained in the upper extremities.  While sitting, she is able to fully range and move her lower extremities without difficulty or pain.  However, her legs began to shake/she has weakness when attempting to stand up     Neurological: She is alert and oriented to person, place, and time.   Cranial nerves are intact.  Degree of dysmetria noted on finger-to-nose test.  Essential tremor noted   Skin: Skin is warm.   Psychiatric: She has a normal mood and affect. Her behavior is normal. Thought content normal.         ED Course   Procedures  Labs Reviewed   CBC W/ AUTO DIFFERENTIAL - Abnormal        Result Value    WBC 6.25      RBC 4.25      Hemoglobin 13.2      Hematocrit 40.2      MCV 95      MCH 31.1 (*)     MCHC 32.8      RDW 12.5      Platelets 90 (*)     MPV 13.0 (*)     Immature Granulocytes 1.4 (*)     Gran # (ANC) 2.6      Immature Grans (Abs) 0.09 (*)     Lymph # 1.8      Mono # 1.8 (*)     Eos # 0.0      Baso # 0.01      nRBC 0      Gran % 41.6      Lymph % 28.6      Mono % 28.2 (*)     Eosinophil % 0.0      Basophil % 0.2      Differential Method Automated     COMPREHENSIVE METABOLIC PANEL - Abnormal    Sodium 138      Potassium 3.5      Chloride 101      CO2 25      Glucose 91      BUN 18      Creatinine 1.1      Calcium 9.1      Total Protein 7.3      Albumin 4.0      Total Bilirubin 1.3 (*)     Alkaline Phosphatase 125      AST 36      ALT 18      eGFR 48.9 (*)     Anion Gap 12     MAGNESIUM    Magnesium 1.6     PHOSPHORUS    Phosphorus 3.0     TSH    TSH 3.455       EKG Readings: (Independently Interpreted)   Independently interpreted by MD:  Rate 108, NSR, no stemi, no ectopy, no hypertrophy.  Right bundle branch block present, however consistent with her prior EKG 2 months ago           Imaging Results    None          Medications   lactated ringers bolus 500 mL (0 mLs Intravenous Stopped 1/2/25 2119)     Medical Decision Making  Patient is a an 84-year-old female who presents due to weakness in her lower extremities, specifically when attempting to stand up.  Differential diagnosis includes but is not limited to orthostatic hypotension, PID, Parkinson's, dehydration, hypothyroidism, dysrhythmia, electrolyte abnormality, anemia. Overall hemodynamically stable and appears well, however noticeable difficulties with standing.  Unclear if this is secondary to an acute process vs the development of a more chronic illness such as Parkinson's.  We will obtain basic labs, TSH, EKG as well as give her a fluid bolus and then reassess.    Platelets noted to be mildly decreased to 90 with this trending  down but she does not have any signs of bleeding right now.  Bilirubin also noted to be mildly elevated to 1.3, but I do not believe that this is connected with her symptoms.  After receiving the fluids, multiple ambulatory trials were performed and she was unable to stand up or walk without instability.  Patient's did not feel comfortable going home, so will admit to Hospital Medicine for observation/further management.    Discussion had with the patient with regards to possible need for placement in a nursing home should she continued to have difficulty with ambulating.    Amount and/or Complexity of Data Reviewed  External Data Reviewed: notes.  Labs: ordered.    Risk  Decision regarding hospitalization.                                Clinical Impression:  Final diagnoses:  [R53.1] Weakness          ED Disposition Condition    Observation                 Shahram Becerra MD  Resident  01/02/25 1961

## 2025-01-03 NOTE — SUBJECTIVE & OBJECTIVE
Interval History: VSSAF. Patient was resting well this morning. She reports no improvement on her LE weakness. Notes that she cannot stand or walk due to it. Prior to yesterday evening, she was in her usual state of health. She was able to do all activities of daily living and walk several blocks without difficulty. Patient denies dizziness, lightheadedness, headache, CP, SOB, NVD. She denies any urinary symptoms. She voices no other complains at this time.       Review of Systems   Constitutional:  Positive for activity change. Negative for chills and fever.   Respiratory:  Negative for chest tightness and shortness of breath.    Cardiovascular:  Negative for chest pain and leg swelling.   Gastrointestinal:  Negative for abdominal distention, abdominal pain and nausea.   Genitourinary:  Negative for difficulty urinating and dysuria.   Neurological:  Positive for weakness. Negative for dizziness, light-headedness and headaches.     Objective:     Vital Signs (Most Recent):  Temp: 97.7 °F (36.5 °C) (01/03/25 0708)  Pulse: 71 (01/03/25 0708)  Resp: 18 (01/03/25 0708)  BP: 136/69 (01/03/25 0708)  SpO2: 95 % (01/03/25 0708) Vital Signs (24h Range):  Temp:  [97.4 °F (36.3 °C)-98.2 °F (36.8 °C)] 97.7 °F (36.5 °C)  Pulse:  [] 71  Resp:  [18-20] 18  SpO2:  [95 %-98 %] 95 %  BP: (136-165)/(68-83) 136/69     Weight: 50.9 kg (112 lb 3.4 oz)  Body mass index is 19.88 kg/m².  No intake or output data in the 24 hours ending 01/03/25 1058      Physical Exam  Vitals and nursing note reviewed.   Constitutional:       Appearance: She is well-developed.   Eyes:      Pupils: Pupils are equal, round, and reactive to light.   Cardiovascular:      Rate and Rhythm: Normal rate and regular rhythm.   Pulmonary:      Effort: Pulmonary effort is normal.      Breath sounds: Normal breath sounds.   Abdominal:      Palpations: Abdomen is soft.      Tenderness: There is no abdominal tenderness.   Musculoskeletal:         General: No swelling  or tenderness.      Comments: 4/5 strength of all extremities. No signs of trauma or injury. Full range of motion.    Skin:     General: Skin is warm and dry.   Neurological:      Mental Status: She is alert and oriented to person, place, and time.   Psychiatric:         Behavior: Behavior normal.             Significant Labs: All pertinent labs within the past 24 hours have been reviewed.    Significant Imaging: I have reviewed all pertinent imaging results/findings within the past 24 hours.

## 2025-01-03 NOTE — ASSESSMENT & PLAN NOTE
-CBC unremarkable. TSH WNL  -K 3.5, Mag 1.6- will replete.  -Vitamin B12, vitamin B1, vitamin B5, folate, and CPK pending.  -PETH pending.  -UA and UDS pending.  -Xray thoracic and lumbar spine pending.  -Will start MVI, thiamine, and folic acid given alcohol use.  -Fall precautions.  -PT/OT consulted.

## 2025-01-03 NOTE — PLAN OF CARE
Problem: Adult Inpatient Plan of Care  Goal: Plan of Care Review  Outcome: Progressing  Goal: Patient-Specific Goal (Individualized)  Outcome: Progressing  Goal: Absence of Hospital-Acquired Illness or Injury  Outcome: Progressing  Goal: Optimal Comfort and Wellbeing  Outcome: Progressing  Goal: Readiness for Transition of Care  Outcome: Progressing     Problem: Fall Injury Risk  Goal: Absence of Fall and Fall-Related Injury  Outcome: Progressing     Problem: Comorbidity Management  Goal: Maintenance of COPD Symptom Control  Outcome: Progressing  Goal: Blood Pressure in Desired Range  Outcome: Progressing     Problem: Fatigue  Goal: Improved Activity Tolerance  Outcome: Progressing

## 2025-01-03 NOTE — ASSESSMENT & PLAN NOTE
Patient's blood pressure range in the last 24 hours was: BP  Min: 145/80  Max: 165/83.The patient's inpatient anti-hypertensive regimen is listed below:  Current Antihypertensives  carvediloL tablet 6.25 mg, 2 times daily, Oral    Plan  - BP is controlled, no changes needed to their regimen

## 2025-01-03 NOTE — ED TRIAGE NOTES
"Sangita Logan, a 84 y.o. female presents to the ED w/ complaint of generalized weakness/fatigue. States that she has gotten these episodes before but for the past two days the weakness has worsened to the point to where she feels like she is going to collapse. Denies weakness to one extremity, dizziness, lightheadedness, tingling.     Triage note:  Chief Complaint   Patient presents with    Weakness     Has been feeling weak for couple of days but now "I feel like I'm going to collapse." Denies dizziness and lightheadedness, no numbness or tingling denies ha     Review of patient's allergies indicates:   Allergen Reactions    No known drug allergies      Past Medical History:   Diagnosis Date    COPD (chronic obstructive pulmonary disease)     HTN (hypertension) 10/21/2021    Osteoporosis     Sinus disease      "

## 2025-01-03 NOTE — PROGRESS NOTES
[facility] [address] [place] [address] Medicine  Progress Note    Patient Name: Sangita Logan  MRN: [id number]  Patient Class: OP- Observation   Admission Date: 1/2/2025  Length of Stay: 0 days  Attending Physician: Tran Ayala MD  Primary Care Provider: Danish Pereira MD        Subjective     Principal Problem:Weakness of both lower extremities        HPI:  Sangita Logan is a 84 y.o. female with a PMHx of COPD, nicotine dependence, osteoporosis, HTN, SVT, and alcohol use who presents to the ED for lower extremity weakness. The patient reports noting tremors in her hands a few days ago which made it difficult to bring a cup to her mouth. She reports the next day the tremors improved, but she noticed weakness to her BLE. Reports worsened over the past 2 days noting today she was unable to stand due to feeling like her legs would give out. She reports a feeling of heaviness in her legs but denies numbness/tingling, headache, lightheadedness/dizziness, or pain to her legs. Notes that sitting down she feels well and has no symptoms and is able to move her legs normally. Denies any recent falls. She reports at baseline she ambulates without assistance and can walk a few blocks. She denies CP, cough, SOB, fever/chills, abdominal pain, or dysuria.    In the ED, patient tachycardic and mildly hypertensive otherwise vitals stable, afebrile. CBC with stable thrombocytopenia. K+3.5. Cr 1.1 (bl 0.9-1.0). Mag 1.6. Tbili 1.3. TSH WNL. EKG shows ST w/ PACs and incomplete R BBB, 108 bpm, non specific ST and T wave abnormalities. The patient received 500cc LR bolus.    Overview/Hospital Course:  Ms. Logan was admitted to hospital medicine for further management of her weakness. CBC with chronic thrombocytopenia. Potassium was 3.5, replaced. Repeat K pending. CPK slightly elevated 250. B12, folate, ammonia, TSH WNL. UA showed 3+ leukocytes, on ceftriaxone. Drug tox unremarkable. Chest X ray with no acute  process. Abdominal US showed no abnormal findings. Thoracic and lumbar xray showed osteopenia and compression of L3 and likley L4/5. Will discuss findings with Ortho Spine. Follow up CT L-spine. PT/OT consulted.     Interval History: VSSAF. Patient was resting well this morning. She reports no improvement on her LE weakness. Notes that she cannot stand or walk due to it. Prior to yesterday evening, she was in her usual state of health. She was able to do all activities of daily living and walk several blocks without difficulty. Patient denies dizziness, lightheadedness, headache, CP, SOB, NVD. She denies any urinary symptoms. She voices no other complains at this time.       Review of Systems   Constitutional:  Positive for activity change. Negative for chills and fever.   Respiratory:  Negative for chest tightness and shortness of breath.    Cardiovascular:  Negative for chest pain and leg swelling.   Gastrointestinal:  Negative for abdominal distention, abdominal pain and nausea.   Genitourinary:  Negative for difficulty urinating and dysuria.   Neurological:  Positive for weakness. Negative for dizziness, light-headedness and headaches.     Objective:     Vital Signs (Most Recent):  Temp: 97.7 °F (36.5 °C) (01/03/25 0708)  Pulse: 71 (01/03/25 0708)  Resp: 18 (01/03/25 0708)  BP: 136/69 (01/03/25 0708)  SpO2: 95 % (01/03/25 0708) Vital Signs (24h Range):  Temp:  [97.4 °F (36.3 °C)-98.2 °F (36.8 °C)] 97.7 °F (36.5 °C)  Pulse:  [] 71  Resp:  [18-20] 18  SpO2:  [95 %-98 %] 95 %  BP: (136-165)/(68-83) 136/69     Weight: 50.9 kg (112 lb 3.4 oz)  Body mass index is 19.88 kg/m².  No intake or output data in the 24 hours ending 01/03/25 1058      Physical Exam  Vitals and nursing note reviewed.   Constitutional:       Appearance: She is well-developed.   Eyes:      Pupils: Pupils are equal, round, and reactive to light.   Cardiovascular:      Rate and Rhythm: Normal rate and regular rhythm.   Pulmonary:      Effort:  Pulmonary effort is normal.      Breath sounds: Normal breath sounds.   Abdominal:      Palpations: Abdomen is soft.      Tenderness: There is no abdominal tenderness.   Musculoskeletal:         General: No swelling or tenderness.      Comments: 4/5 strength of all extremities. No signs of trauma or injury. Full range of motion.    Skin:     General: Skin is warm and dry.   Neurological:      Mental Status: She is alert and oriented to person, place, and time.   Psychiatric:         Behavior: Behavior normal.             Significant Labs: All pertinent labs within the past 24 hours have been reviewed.    Significant Imaging: I have reviewed all pertinent imaging results/findings within the past 24 hours.    Assessment and Plan     * Weakness of both lower extremities  -CBC unremarkable. TSH WNL  -K & Mag replaced on admit  -Vitamin B12, vitamin B1, vitamin B5, folate WNL.    -   - UA shows 3+ leukocytes, on ceftriaxone  - UDS negative  - Thoracic and lumbar spine xrays shows osteopenia and compression of L3 and possible L4/5   -- f/u CT L-spine  -PETH pending.  -Fall precautions.  -PT/OT consulted.    Aortic atherosclerosis -- u/s 2023  -History noted.  -Continue ASA and statin.    Cigarette nicotine dependence without complication  Dangers of cigarette smoking were reviewed with patient in detail. Patient was Counseled for 3-10 minutes. Nicotine replacement options were discussed. Nicotine replacement was discussed- prescribed    HLD (hyperlipidemia)   Patient is chronically on statin.will continue for now. Monitor clinically. Last LDL was   Lab Results   Component Value Date    LDLCALC 98.2 05/11/2021        Thrombocytopenia, unspecified  The likely etiology of thrombocytopenia is  alcohol use? . The patients 3 most recent labs are listed below.  Recent Labs     01/02/25 1955 01/03/25  0333   PLT 90* 80*       Plan  - Will transfuse if platelet count is <50k (if undergoing surgical procedure or have active  bleeding).    SVT (supraventricular tachycardia)  -History noted.  -Continue coreg.  -Cardiac monitoring.    Alcohol use  -Patient reports drinking 2-3 beers 3-4 days per week noting she will drink a miniature bottle of [place] about 2 days per week instead of beer, last drink about 2 days ago. Denies any history of alcohol withdrawal.  -PETH pending.  -Nursing to perform CIWA assessment, low suspicion for withdrawal.  -Can initiate valium taper if CIWA elevated.  -Given folic acid, thiamine, and MVI.    -Discussed the dangers of continued ETOH use with Pt and apparent systemic effects of her abuse.    - Abdominal US negative for any acute findings    HTN (hypertension)  Patient's blood pressure range in the last 24 hours was: BP  Min: 130/60  Max: 165/83.The patient's inpatient anti-hypertensive regimen is listed below:  Current Antihypertensives  carvediloL tablet 6.25 mg, 2 times daily, Oral    Plan  - BP is controlled, no changes needed to their regimen    COPD, group A, by GOLD 2017 classification  Patient's COPD is controlled currently.  Patient is currently off COPD Pathway. Continue scheduled inhalers Supplemental oxygen PRN and duo nebs PRN and monitor respiratory status closely.       VTE Risk Mitigation (From admission, onward)           Ordered     heparin (porcine) injection 5,000 Units  Every 8 hours         01/03/25 0406     Place sequential compression device  Until discontinued         01/02/25 2203     IP VTE HIGH RISK PATIENT  Once         01/02/25 2203                    Discharge Planning   CAMELIA: 1/4/2025     Code Status: Full Code   Medical Readiness for Discharge Date:                    Please place Justification for DME        Carlos Bergeron PA-C  Department of Hospital Medicine   [facility]

## 2025-01-03 NOTE — ASSESSMENT & PLAN NOTE
The likely etiology of thrombocytopenia is  alcohol use? . The patients 3 most recent labs are listed below.  Recent Labs     01/02/25 1955   PLT 90*     Plan  - Will transfuse if platelet count is <50k (if undergoing surgical procedure or have active bleeding).

## 2025-01-03 NOTE — ASSESSMENT & PLAN NOTE
Patient's COPD is controlled currently.  Patient is currently off COPD Pathway. Continue scheduled inhalers Supplemental oxygen PRN and duo nebs PRN and monitor respiratory status closely.

## 2025-01-03 NOTE — ASSESSMENT & PLAN NOTE
-CBC unremarkable. TSH WNL  -K & Mag replaced on admit  -Vitamin B12, vitamin B1, vitamin B5, folate WNL.    -   - UA shows 3+ leukocytes, on ceftriaxone  - UDS negative  - Thoracic and lumbar spine xrays shows osteopenia and compression of L3 and possible L4/5   -- f/u CT L-spine  -PETH pending.  -Fall precautions.  -PT/OT consulted.

## 2025-01-03 NOTE — PLAN OF CARE
[facility]  Discharge Assessment    Primary Care Provider: Danish Pereira MD     Discharge Assessment (most recent)       BRIEF DISCHARGE ASSESSMENT - 01/03/25 1247          Discharge Planning    Assessment Type Discharge Planning Brief Assessment     Resource/Environmental Concerns none     Support Systems Children     Equipment Currently Used at Home none     Current Living Arrangements apartment     Patient/Family Anticipates Transition to home;inpatient rehabilitation facility;home with help/services     Patient/Family Anticipated Services at Transition none;rehabilitation services     DME Needed Upon Discharge  other (see comments)   TBD    Discharge Plan A Home Health     Discharge Plan B Skilled Nursing Facility                   Pt is a 84 y.o. female admitted with weakness BLE and has a PMH of COPD, HTN and SVT. She has not required DME in the past and is independent with his ADLs and iADls. She will have transportation home. [facility] Discharge Packet given to patient and/or family with understanding verbalized.   name and number and estimated discharge date written on white board in patient's room with request to call for any questions or concerns.  Will continue to follow for needs.  Discharge Plan A and Plan B have been determined by review of patient's clinical status, future medical and therapeutic needs, and coverage/benefits for post-acute care in coordination with multidisciplinary team members.  Jeb Pereira, RN,BSN

## 2025-01-03 NOTE — ASSESSMENT & PLAN NOTE
Patient is chronically on statin.will continue for now. Monitor clinically. Last LDL was   Lab Results   Component Value Date    LDLCALC 98.2 05/11/2021

## 2025-01-03 NOTE — PT/OT/SLP PROGRESS
Physical Therapy      Patient Name:  Sangita Logan   MRN:  [id number]    Patient not seen today secondary to  (Pt OSVALDO on first attempt. On second check imaging now showing compression fractures. PT reached out to MD regarding need for bracing or restrictions/precautions. At 1054, PA still waiting to hear from other team regarding spine.  Still no update in PM and 1530.). Will follow-up tomorrow as appropriate.

## 2025-01-04 PROBLEM — S32.000A LUMBAR COMPRESSION FRACTURE: Status: ACTIVE | Noted: 2025-01-04

## 2025-01-04 PROBLEM — E87.6 HYPOKALEMIA: Status: ACTIVE | Noted: 2025-01-04

## 2025-01-04 LAB
ALBUMIN SERPL BCP-MCNC: 3.2 G/DL (ref 3.5–5.2)
ALP SERPL-CCNC: 98 U/L (ref 40–150)
ALT SERPL W/O P-5'-P-CCNC: 10 U/L (ref 10–44)
ANION GAP SERPL CALC-SCNC: 9 MMOL/L (ref 8–16)
AST SERPL-CCNC: 22 U/L (ref 10–40)
BACTERIA UR CULT: NORMAL
BACTERIA UR CULT: NORMAL
BASOPHILS # BLD AUTO: 0.01 K/UL (ref 0–0.2)
BASOPHILS NFR BLD: 0.2 % (ref 0–1.9)
BILIRUB SERPL-MCNC: 0.7 MG/DL (ref 0.1–1)
BUN SERPL-MCNC: 15 MG/DL (ref 8–23)
CALCIUM SERPL-MCNC: 8.7 MG/DL (ref 8.7–10.5)
CHLORIDE SERPL-SCNC: 99 MMOL/L (ref 95–110)
CO2 SERPL-SCNC: 26 MMOL/L (ref 23–29)
CREAT SERPL-MCNC: 0.7 MG/DL (ref 0.5–1.4)
DIFFERENTIAL METHOD BLD: ABNORMAL
EOSINOPHIL # BLD AUTO: 0 K/UL (ref 0–0.5)
EOSINOPHIL NFR BLD: 0.2 % (ref 0–8)
ERYTHROCYTE [DISTWIDTH] IN BLOOD BY AUTOMATED COUNT: 12.7 % (ref 11.5–14.5)
EST. GFR  (NO RACE VARIABLE): >60 ML/MIN/1.73 M^2
GLUCOSE SERPL-MCNC: 105 MG/DL (ref 70–110)
HCT VFR BLD AUTO: 36.9 % (ref 37–48.5)
HGB BLD-MCNC: 12 G/DL (ref 12–16)
IMM GRANULOCYTES # BLD AUTO: 0.15 K/UL (ref 0–0.04)
IMM GRANULOCYTES NFR BLD AUTO: 3 % (ref 0–0.5)
LYMPHOCYTES # BLD AUTO: 1.3 K/UL (ref 1–4.8)
LYMPHOCYTES NFR BLD: 26.2 % (ref 18–48)
MAGNESIUM SERPL-MCNC: 1.9 MG/DL (ref 1.6–2.6)
MCH RBC QN AUTO: 31.5 PG (ref 27–31)
MCHC RBC AUTO-ENTMCNC: 32.5 G/DL (ref 32–36)
MCV RBC AUTO: 97 FL (ref 82–98)
MONOCYTES # BLD AUTO: 1.5 K/UL (ref 0.3–1)
MONOCYTES NFR BLD: 29.4 % (ref 4–15)
NEUTROPHILS # BLD AUTO: 2 K/UL (ref 1.8–7.7)
NEUTROPHILS NFR BLD: 41 % (ref 38–73)
NRBC BLD-RTO: 0 /100 WBC
PLATELET # BLD AUTO: 70 K/UL (ref 150–450)
PMV BLD AUTO: 13 FL (ref 9.2–12.9)
POTASSIUM SERPL-SCNC: 3.3 MMOL/L (ref 3.5–5.1)
PROT SERPL-MCNC: 5.8 G/DL (ref 6–8.4)
RBC # BLD AUTO: 3.81 M/UL (ref 4–5.4)
SODIUM SERPL-SCNC: 134 MMOL/L (ref 136–145)
WBC # BLD AUTO: 4.96 K/UL (ref 3.9–12.7)

## 2025-01-04 PROCEDURE — 99223 1ST HOSP IP/OBS HIGH 75: CPT | Mod: HCNC,,, | Performed by: ORTHOPAEDIC SURGERY

## 2025-01-04 PROCEDURE — 63600175 PHARM REV CODE 636 W HCPCS: Mod: HCNC

## 2025-01-04 PROCEDURE — 97161 PT EVAL LOW COMPLEX 20 MIN: CPT | Mod: HCNC

## 2025-01-04 PROCEDURE — 97116 GAIT TRAINING THERAPY: CPT | Mod: HCNC

## 2025-01-04 PROCEDURE — 80053 COMPREHEN METABOLIC PANEL: CPT | Mod: HCNC | Performed by: NURSE PRACTITIONER

## 2025-01-04 PROCEDURE — 36415 COLL VENOUS BLD VENIPUNCTURE: CPT | Mod: HCNC | Performed by: NURSE PRACTITIONER

## 2025-01-04 PROCEDURE — 25000003 PHARM REV CODE 250: Mod: HCNC

## 2025-01-04 PROCEDURE — 63600175 PHARM REV CODE 636 W HCPCS: Mod: HCNC | Performed by: NURSE PRACTITIONER

## 2025-01-04 PROCEDURE — 96372 THER/PROPH/DIAG INJ SC/IM: CPT | Performed by: NURSE PRACTITIONER

## 2025-01-04 PROCEDURE — 25000003 PHARM REV CODE 250: Mod: HCNC | Performed by: NURSE PRACTITIONER

## 2025-01-04 PROCEDURE — 96365 THER/PROPH/DIAG IV INF INIT: CPT | Mod: 59

## 2025-01-04 PROCEDURE — 83735 ASSAY OF MAGNESIUM: CPT | Mod: HCNC | Performed by: NURSE PRACTITIONER

## 2025-01-04 PROCEDURE — S4991 NICOTINE PATCH NONLEGEND: HCPCS | Mod: HCNC | Performed by: NURSE PRACTITIONER

## 2025-01-04 PROCEDURE — 96375 TX/PRO/DX INJ NEW DRUG ADDON: CPT

## 2025-01-04 PROCEDURE — 97530 THERAPEUTIC ACTIVITIES: CPT | Mod: HCNC

## 2025-01-04 PROCEDURE — 97165 OT EVAL LOW COMPLEX 30 MIN: CPT | Mod: HCNC

## 2025-01-04 PROCEDURE — 21400001 HC TELEMETRY ROOM: Mod: HCNC

## 2025-01-04 PROCEDURE — 85025 COMPLETE CBC W/AUTO DIFF WBC: CPT | Mod: HCNC | Performed by: NURSE PRACTITIONER

## 2025-01-04 RX ORDER — POTASSIUM CHLORIDE 750 MG/1
30 CAPSULE, EXTENDED RELEASE ORAL ONCE
Status: DISCONTINUED | OUTPATIENT
Start: 2025-01-04 | End: 2025-01-04

## 2025-01-04 RX ORDER — MAGNESIUM SULFATE 1 G/100ML
1 INJECTION INTRAVENOUS ONCE
Status: COMPLETED | OUTPATIENT
Start: 2025-01-04 | End: 2025-01-04

## 2025-01-04 RX ADMIN — Medication 1 TABLET: at 09:01

## 2025-01-04 RX ADMIN — FOLIC ACID 1 MG: 1 TABLET ORAL at 09:01

## 2025-01-04 RX ADMIN — POTASSIUM BICARBONATE 25 MEQ: 978 TABLET, EFFERVESCENT ORAL at 09:01

## 2025-01-04 RX ADMIN — HEPARIN SODIUM 5000 UNITS: 5000 INJECTION INTRAVENOUS; SUBCUTANEOUS at 09:01

## 2025-01-04 RX ADMIN — Medication 1 PATCH: at 09:01

## 2025-01-04 RX ADMIN — HEPARIN SODIUM 5000 UNITS: 5000 INJECTION INTRAVENOUS; SUBCUTANEOUS at 06:01

## 2025-01-04 RX ADMIN — CARVEDILOL 6.25 MG: 6.25 TABLET, FILM COATED ORAL at 09:01

## 2025-01-04 RX ADMIN — HEPARIN SODIUM 5000 UNITS: 5000 INJECTION INTRAVENOUS; SUBCUTANEOUS at 02:01

## 2025-01-04 RX ADMIN — Medication 10 ML: at 09:01

## 2025-01-04 RX ADMIN — ASPIRIN 81 MG: 81 TABLET, COATED ORAL at 09:01

## 2025-01-04 RX ADMIN — ATORVASTATIN CALCIUM 80 MG: 40 TABLET, FILM COATED ORAL at 09:01

## 2025-01-04 RX ADMIN — CYANOCOBALAMIN TAB 250 MCG 250 MCG: 250 TAB at 09:01

## 2025-01-04 RX ADMIN — CEFTRIAXONE 1 G: 1 INJECTION, POWDER, FOR SOLUTION INTRAMUSCULAR; INTRAVENOUS at 09:01

## 2025-01-04 RX ADMIN — MAGNESIUM SULFATE HEPTAHYDRATE 1 G: 500 INJECTION, SOLUTION INTRAMUSCULAR; INTRAVENOUS at 09:01

## 2025-01-04 RX ADMIN — Medication 100 MG: at 09:01

## 2025-01-04 NOTE — HPI
Sangita Logan is a 84 y.o. female with PMH significant for COPD, nicotine dependence, osteoporosis, HTN, SVT, and alcohol use who is admitted for lower extremity weakness. Orthopedic Surgery was consulted for worsening compression fx of L3 vertebrae in setting of worsening BLE weakness and inability to ambulate.    Pt reports acute episode of back pain 6 months ago that completely resolcved without any intervention. She does not endorse recent trauma, falls, but reports weakness worsened over the past 2 days noting inabilty to stand 2/2 weakness. Notes that sitting down she feels well and has no symptoms and is able to move her legs normally. Denies any recent falls. She reports at baseline she ambulates without assistance and can walk a few blocks. Patient denies any head trauma or LOC. The patient denies prior hx of falls. Patient denies numbness and tingling. Denies any other musculoskeletal pain or injuries. No known history of prior spine injury or surgery.  Patient denies bowel or bladder incontinence, saddle anesthesia, or muscle weakness. Patient also denies difficulty with keys, buttons, hand writing, or small items.  Doesn't take any anticoagulation at baseline.     They deny IV drug use.  They endorse moderate tobacco use (15 pack years).   They endorse alcohol use (15 drinks per week).   They deny immunosuppressant medications.  They deny chemotherapy.  They deny radiation therapy.

## 2025-01-04 NOTE — ASSESSMENT & PLAN NOTE
See principal problem. As per imaging.  - start calcium  - outpatient vit D injections  - f/u MRIs

## 2025-01-04 NOTE — ASSESSMENT & PLAN NOTE
Sangita Logan is a 84 y.o. female with PMH of COPD, nicotine dependence, osteoporosis, HTN, SVT, and alcohol use who is admitted for lower extremity weakness. Orthopedic Surgery was consulted for worsening compression fx of L3 vertebrae in setting of worsening BLE weakness and inability to ambulate. She is neurovascularly intact in all extremities, does not have motor deficits, and does not endorse any sensation changes in bilateral upper and lower extremities. She is outside of treatment window for intranasal calcitonin. Patient is not a candidate for any acute orthopedic intervention as her L4 compression fracture is chronic without evidence of significant spinal canal or foraminal stenosis. She would benefit from the following recommendations.       - 50,000 IU Ergocalciferol injections once weekly x 8 weeks   - 1,000 mg Calcium daily   - WBAT  - Abx per primary   - Pain control per primary   - DVTppx per primary   - Follow up in Orthopedic spine clinic and Fragility clinic (patient will be contacted with appointment details)

## 2025-01-04 NOTE — ASSESSMENT & PLAN NOTE
Etiology likely 2/2 alcohol use.  - stable, trend daily  - transfuse if platelet count < 50k with active bleed or undergoing surgery

## 2025-01-04 NOTE — PLAN OF CARE
Problem: Adult Inpatient Plan of Care  Goal: Plan of Care Review  Outcome: Progressing     Problem: Fall Injury Risk  Goal: Absence of Fall and Fall-Related Injury  Outcome: Progressing     Problem: Comorbidity Management  Goal: Maintenance of COPD Symptom Control  Outcome: Progressing     Problem: Fatigue  Goal: Improved Activity Tolerance  Outcome: Progressing

## 2025-01-04 NOTE — PLAN OF CARE
Problem: Occupational Therapy  Goal: Occupational Therapy Goal  Description: Goals to be met by: 1/18/2025     Patient will increase functional independence with ADLs by performing:    UE Dressing with Set-up Assistance.  LE Dressing with Set-up Assistance.  Grooming while standing at sink with Supervision.  Toileting from toilet with Supervision for hygiene and clothing management.   Supine to sit with Modified [place].  Step transfer with Supervision with RW.  Toilet transfer to toilet with Supervision with RW.    Outcome: Progressing     Pt evaluated and OT goals established.

## 2025-01-04 NOTE — SUBJECTIVE & OBJECTIVE
Past Medical History:   Diagnosis Date    COPD (chronic obstructive pulmonary disease)     HTN (hypertension) 10/21/2021    Osteoporosis     Sinus disease        Past Surgical History:   Procedure Laterality Date    AUGMENTATION OF BREAST Bilateral     received at age 23, removed at age 76    Breast implants removal  2012    SINUS SURGERY      TONSILLECTOMY      Age 1       Review of patient's allergies indicates:   Allergen Reactions    No known drug allergies        Current Facility-Administered Medications   Medication    acetaminophen tablet 650 mg    albuterol-ipratropium 2.5 mg-0.5 mg/3 mL nebulizer solution 3 mL    aspirin EC tablet 81 mg    atorvastatin tablet 80 mg    calcium-vitamin D3 500 mg-5 mcg (200 unit) per tablet 1 tablet    carvediloL tablet 6.25 mg    cefTRIAXone injection 1 g    cyanocobalamin tablet 250 mcg    dextrose 50% injection 12.5 g    dextrose 50% injection 25 g    folic acid tablet 1 mg    glucagon (human recombinant) injection 1 mg    glucose chewable tablet 16 g    glucose chewable tablet 24 g    heparin (porcine) injection 5,000 Units    hydrOXYzine HCL tablet 25 mg    melatonin tablet 6 mg    multivitamin liquid no.118 per dose 10 mL    naloxone 0.4 mg/mL injection 0.02 mg    nicotine 21 mg/24 hr 1 patch    prochlorperazine injection Soln 5 mg    sodium chloride 0.9% flush 10 mL    thiamine tablet 100 mg     Family History       Problem Relation (Age of Onset)    Breast cancer Sister    No Known Problems Mother, Father          Tobacco Use    Smoking status: Every Day     Current packs/day: 0.50     Average packs/day: 0.5 packs/day for 30.0 years (15.0 ttl pk-yrs)     Types: Cigarettes     Passive exposure: Current    Smokeless tobacco: Never    Tobacco comments:     2 packs a week   Substance and Sexual Activity    Alcohol use: Yes     Alcohol/week: 3.0 standard drinks of alcohol     Types: 3 Glasses of wine per week     Comment: 4 times weekly    Drug use: Not Currently     Types:  "Marijuana    Sexual activity: Not Currently     Constitutional: negative for fevers  Eyes: negative visual changes  ENT: negative for hearing loss  Respiratory: negative for dyspnea  Cardiovascular: negative for chest pain  Gastrointestinal: negative for abdominal pain  Genitourinary: negative for dysuria  Neurological: negative for headaches  Behavioral/Psych: negative for hallucinations  Endocrine: negative for temperature intolerance     Objective:     Vital Signs (Most Recent):  Temp: 97.1 °F (36.2 °C) (01/03/25 2338)  Pulse: 67 (01/03/25 2338)  Resp: 18 (01/03/25 2338)  BP: 110/60 (01/03/25 2338)  SpO2: (!) 92 % (01/03/25 2338) Vital Signs (24h Range):  Temp:  [97.1 °F (36.2 °C)-98 °F (36.7 °C)] 97.1 °F (36.2 °C)  Pulse:  [62-80] 67  Resp:  [18-20] 18  SpO2:  [92 %-96 %] 92 %  BP: (110-142)/(60-70) 110/60     Weight: 50.9 kg (112 lb 3.4 oz)  Height: 5' 3" (160 cm)  Body mass index is 19.88 kg/m².    No intake or output data in the 24 hours ending 01/04/25 0151     General:  no acute distress, appears stated age   Neuro: alert and oriented x3  Psych: normal mood  Head: normocephalic, atraumatic.  Eyes: no scleral icterus  Mouth: moist mucous membranes  Cardiovascular: extremities warm and well perfused  Lungs: breathing comfortably, equal chest rise bilat  Skin: clean, dry, intact (any exceptions noted in below musculoskeletal exam)    MSK:  RUE:  - Skin intact throughout, no open wounds  - No swelling  - No ecchymosis, erythema, or signs of cellulitis  - NonTTP throughout  - AROM and PROM of the shoulder, elbow, wrist, and hand intact without pain  - Axillary/AIN/PIN/Radial/Median/Ulnar Nerves assessed in isolation without deficit  - SILT throughout  - Compartments soft  - Radial artery palpated   - Capillary Refill <3s    LUE:  - Skin intact throughout, no open wounds  - No swelling  - No ecchymosis, erythema, or signs of cellulitis  - NonTTP throughout  - AROM and PROM of the shoulder, elbow, wrist, and hand " intact without pain  - Axillary/AIN/PIN/Radial/Median/Ulnar Nerves assessed in isolation without deficit  - SILT throughout  - Compartments soft  - Radial artery palpated   - Capillary Refill <3s    RLE:  - Skin intact throughout, no open wounds  - No swelling  - No ecchymosis, erythema, or signs of cellulitis  - NonTTP throughout  - AROM and PROM of the hip, knee, ankle, and foot intact without pain  - TA/EHL/Gastroc/FHL assessed in isolation without deficit  - SILT throughout  - Compartments soft  - DP and PT palpated  - Capillary Refill <3s  - Negative Log roll  - Negative [facility]    LLE:  - Skin intact throughout, no open wounds  - No swelling  - No ecchymosis, erythema, or signs of cellulitis  - NonTTP throughout  - AROM and PROM of the hip, knee, ankle, and foot intact without pain  - TA/EHL/Gastroc/FHL assessed in isolation without deficit  - SILT throughout  - Compartments soft  - DP and PT palpated  - Capillary Refill <3s  - Negative [facility]  - Negative [facility]    Spine/pelvis/axial body:  No tenderness to palpation of cervical, thoracic, or lumbar spine  No pain with compression of pelvis  No chest wall or abdominal tenderness  No decubitus ulcers      Significant Labs: CBC:   Recent Labs   Lab 01/02/25 1955 01/03/25  0333   WBC 6.25 5.25   HGB 13.2 12.3   HCT 40.2 36.9*   PLT 90* 80*     CMP:   Recent Labs   Lab 01/02/25 1955 01/03/25  0333    137   K 3.5 3.8    98   CO2 25 31*   GLU 91 95   BUN 18 16   CREATININE 1.1 0.9   CALCIUM 9.1 9.1   PROT 7.3 6.0   ALBUMIN 4.0 3.4*   BILITOT 1.3* 1.4*   ALKPHOS 125 106   AST 36 30   ALT 18 13   ANIONGAP 12 8     All pertinent labs within the past 24 hours have been reviewed.    Significant Imaging: I have reviewed all pertinent imaging results/findings.

## 2025-01-04 NOTE — PT/OT/SLP EVAL
"Occupational Therapy   Evaluation and Treatment    Co-evaluation with PT to have 2 skilled therapists present to safely assess pt's functional mobility.     Name: Sangita Logan  MRN: [id number]  Admitting Diagnosis: Weakness of both lower extremities  Recent Surgery: * No surgery found *      Recommendations:     Discharge Recommendations: Low Intensity Therapy  Discharge Equipment Recommendations:  walker, rolling  Barriers to discharge:  None    Assessment:     Sangita Logan is a 84 y.o. female with a medical diagnosis of Weakness of both lower extremities.  Pt tolerated session well and without incident, but she requires (A) with ADLs and mobility and is performing below her functional baseline.  Due to her current level of function compared to her PLOF and her home environment, low intensity therapy recommended at d/c to ensure her safety.  She presents with the following.  Performance deficits affecting function: weakness, impaired endurance, impaired self care skills, impaired functional mobility, gait instability, impaired balance.      Rehab Prognosis: Good; patient would benefit from acute skilled OT services to address these deficits and reach maximum level of function.       Plan:     Patient to be seen 4 x/week to address the above listed problems via self-care/home management, therapeutic exercises, therapeutic activities  Plan of Care Expires: 02/02/25  Plan of Care Reviewed with: patient    Subjective     Chief Complaint: "I can't walk."  Patient/Family Comments/goals: to return to her PLOF     Occupational Profile:  Living Environment: Pt lives alone in a 1st floor apartment in the [facility] with 2 small ANH.  She has a tub/shower combo.    Previous level of function: Independent with ADLs and mobility.  Pt said she has experienced weakness the past several months.  She said 3 days ago her hands became shaky, and she felt "more wobbly" yesterday.  Pt has not driven in years. "   Roles and Routines: mother; pt likes to play solitaire   Equipment Used at Home: none  Assistance upon Discharge: Pt has family (6 kids) who can (A) and 1 son who could live with her if needed.      Pain/Comfort:  Pain Rating 1: 0/10  Pain Rating Post-Intervention 1: 0/10    Patients cultural, spiritual, [employer] conflicts given the current situation: no    Objective:     Communicated with: nurse and PT prior to session.  Patient found  long-sitting in bed  with telemetry upon OT entry to room.    General Precautions: Standard, fall  Orthopedic Precautions: N/A  Braces: N/A  Respiratory Status: Room air    Occupational Performance:    Bed Mobility:    Patient completed Supine to Sit to the R with supervision    Functional Mobility/Transfers:  Patient completed Sit <> Stand Transfer from EOB x 1 trial and from bedside chair x 5 consecutive trials with supervision with rolling walker   Patient completed Bed <> Chair Transfer using Step Transfer technique with stand by assistance - supervision with rolling walker  Functional Mobility: Pt ambulated ~120 ft with SBA-Supervision with RW.  She had no LOB.      Activities of Daily Living:  Grooming: Pt manipulated toothpaste tube without difficulty while seated in bedside chair  Upper Body Dressing: minimum assistance to don back gown as a robe while seated EOB   Lower Body Dressing: modified independence to doff large non-skid socks/don smaller ones via figure-4 technique while seated in chair     Cognitive/Visual Perceptual:  Cognitive/Psychosocial Skills:     -       Oriented to: Person, Place, Time, and Situation   -       Follows Commands/attention:Follows multistep  commands  -       Communication: clear/fluent    Physical Exam:  Upper Extremity Range of Motion:     -       Right Upper Extremity: WFL  -       Left Upper Extremity: WFL  Upper Extremity Strength:    -       Right Upper Extremity: WFL  -       Left Upper Extremity: WFL   Strength:    -        Right Upper Extremity: WFL  -       Left Upper Extremity: WFL  Fine Motor Coordination:    -       Intact    AMPAC 6 Click ADL:  AMPAC Total Score: 19    Treatment & Education:  Pt edu on role of OT, POC, safety when performing self care tasks, benefit of performing OOB activity, and safety when performing functional transfers and mobility.    - Self care tasks completed-- as noted above      Patient left up in chair with all lines intact and call button in reach    GOALS:   Multidisciplinary Problems       Occupational Therapy Goals          Problem: Occupational Therapy    Goal Priority Disciplines Outcome Interventions   Occupational Therapy Goal     OT, PT/OT Progressing    Description: Goals to be met by: 1/18/2025     Patient will increase functional independence with ADLs by performing:    UE Dressing with Set-up Assistance.  LE Dressing with Set-up Assistance.  Grooming while standing at sink with Supervision.  Toileting from toilet with Supervision for hygiene and clothing management.   Supine to sit with Modified [place].  Step transfer with Supervision with RW.  Toilet transfer to toilet with Supervision with RW.                         History:     Past Medical History:   Diagnosis Date    COPD (chronic obstructive pulmonary disease)     HTN (hypertension) 10/21/2021    Osteoporosis     Sinus disease          Past Surgical History:   Procedure Laterality Date    AUGMENTATION OF BREAST Bilateral     received at age 23, removed at age 76    Breast implants removal  2012    SINUS SURGERY      TONSILLECTOMY      Age 1       Time Tracking:     OT Date of Treatment: 01/04/25  OT Start Time: 0821  OT Stop Time: 0840  OT Total Time (min): 19 min    Billable Minutes:Evaluation 10 min  Therapeutic Activity 9 min    1/4/2025

## 2025-01-04 NOTE — SUBJECTIVE & OBJECTIVE
Interval History: NAEON. VSSAF. Reports bilateral LE weakness. Denies back pain. Replace lytes. Follow up MRI.    Review of Systems   Constitutional:  Negative for chills and fever.   Respiratory:  Negative for chest tightness and shortness of breath.    Cardiovascular:  Negative for chest pain and leg swelling.   Gastrointestinal:  Negative for abdominal pain and nausea.   Neurological:  Positive for weakness. Negative for dizziness.     Objective:     Vital Signs (Most Recent):  Temp: 97.6 °F (36.4 °C) (01/04/25 0803)  Pulse: 72 (01/04/25 0803)  Resp: 18 (01/04/25 0803)  BP: 134/70 (01/04/25 0803)  SpO2: 95 % (01/04/25 0803) Vital Signs (24h Range):  Temp:  [97.1 °F (36.2 °C)-98 °F (36.7 °C)] 97.6 °F (36.4 °C)  Pulse:  [62-80] 72  Resp:  [18-20] 18  SpO2:  [92 %-96 %] 95 %  BP: (110-134)/(60-70) 134/70     Weight: 50.9 kg (112 lb 3.4 oz)  Body mass index is 19.88 kg/m².  No intake or output data in the 24 hours ending 01/04/25 1102      Physical Exam  Vitals and nursing note reviewed.   Constitutional:       Appearance: She is well-developed.   Eyes:      Pupils: Pupils are equal, round, and reactive to light.   Cardiovascular:      Rate and Rhythm: Normal rate and regular rhythm.   Pulmonary:      Effort: Pulmonary effort is normal.      Breath sounds: Normal breath sounds.   Abdominal:      Palpations: Abdomen is soft.      Tenderness: There is no abdominal tenderness.   Musculoskeletal:         General: No tenderness.      Right lower leg: No edema.      Left lower leg: No edema.      Comments: Full active and passive ROM bilateral LE.   Skin:     General: Skin is warm and dry.   Neurological:      General: No focal deficit present.      Mental Status: She is alert and oriented to person, place, and time.   Psychiatric:         Behavior: Behavior normal.             Significant Labs: All pertinent labs within the past 24 hours have been reviewed.    Significant Imaging: I have reviewed all pertinent imaging  results/findings within the past 24 hours.

## 2025-01-04 NOTE — ASSESSMENT & PLAN NOTE
Patient reports worsening bilateral lower extremity weakness and strength x 1 day prior to admit. Imaging as above.     - AF, HDS, no leukocytosis  - TSH, B12, B1, B5, folate wnl  - replace lytes prn  - UDS negative    - UA w/ 3+ leuks, empiric CTX  - f/u PETH  - f/u MRI  - therapy consulted

## 2025-01-04 NOTE — PROGRESS NOTES
[facility] [address] [place] [address] Medicine  Progress Note    Patient Name: Sangita Logan  MRN: [id number]  Patient Class: OP- Observation   Admission Date: 1/2/2025  Length of Stay: 0 days  Attending Physician: Tran Ayala MD  Primary Care Provider: Danish Pereira MD        Subjective     Principal Problem:Weakness of both lower extremities        HPI:  Sangita Logan is a 84 y.o. female with a PMHx of COPD, nicotine dependence, osteoporosis, HTN, SVT, and alcohol use who presents to the ED for lower extremity weakness. The patient reports noting tremors in her hands a few days ago which made it difficult to bring a cup to her mouth. She reports the next day the tremors improved, but she noticed weakness to her BLE. Reports worsened over the past 2 days noting today she was unable to stand due to feeling like her legs would give out. She reports a feeling of heaviness in her legs but denies numbness/tingling, headache, lightheadedness/dizziness, or pain to her legs. Notes that sitting down she feels well and has no symptoms and is able to move her legs normally. Denies any recent falls. She reports at baseline she ambulates without assistance and can walk a few blocks. She denies CP, cough, SOB, fever/chills, abdominal pain, or dysuria.    In the ED, patient tachycardic and mildly hypertensive otherwise vitals stable, afebrile. CBC with stable thrombocytopenia. K+3.5. Cr 1.1 (bl 0.9-1.0). Mag 1.6. Tbili 1.3. TSH WNL. EKG shows ST w/ PACs and incomplete R BBB, 108 bpm, non specific ST and T wave abnormalities. The patient received 500cc LR bolus.    Overview/Hospital Course:  Ms. Logan was admitted to hospital medicine for further management of her weakness. CBC with chronic thrombocytopenia. Potassium was 3.5, replaced. Repeat K pending. CPK slightly elevated 250. B12, folate, ammonia, TSH WNL. UA showed 3+ leukocytes, on ceftriaxone. Drug tox unremarkable. Chest X ray with no acute  process. Abdominal US showed no abnormal findings. Thoracic and lumbar xray showed osteopenia and compression of L3 and likley L4/5. CT L-spine with chronic compression fractures, no evidence of acute compression fracture. Ortho spine consulted, follow up MRI brain and MRI C-T-L spine. Start calcium. Replaced lytes.    Interval History: NAEON. VSSAF. Reports bilateral LE weakness. Denies back pain. Replace lytes. Follow up MRI.    Review of Systems   Constitutional:  Negative for chills and fever.   Respiratory:  Negative for chest tightness and shortness of breath.    Cardiovascular:  Negative for chest pain and leg swelling.   Gastrointestinal:  Negative for abdominal pain and nausea.   Neurological:  Positive for weakness. Negative for dizziness.     Objective:     Vital Signs (Most Recent):  Temp: 97.6 °F (36.4 °C) (01/04/25 0803)  Pulse: 72 (01/04/25 0803)  Resp: 18 (01/04/25 0803)  BP: 134/70 (01/04/25 0803)  SpO2: 95 % (01/04/25 0803) Vital Signs (24h Range):  Temp:  [97.1 °F (36.2 °C)-98 °F (36.7 °C)] 97.6 °F (36.4 °C)  Pulse:  [62-80] 72  Resp:  [18-20] 18  SpO2:  [92 %-96 %] 95 %  BP: (110-134)/(60-70) 134/70     Weight: 50.9 kg (112 lb 3.4 oz)  Body mass index is 19.88 kg/m².  No intake or output data in the 24 hours ending 01/04/25 1102      Physical Exam  Vitals and nursing note reviewed.   Constitutional:       Appearance: She is well-developed.   Eyes:      Pupils: Pupils are equal, round, and reactive to light.   Cardiovascular:      Rate and Rhythm: Normal rate and regular rhythm.   Pulmonary:      Effort: Pulmonary effort is normal.      Breath sounds: Normal breath sounds.   Abdominal:      Palpations: Abdomen is soft.      Tenderness: There is no abdominal tenderness.   Musculoskeletal:         General: No tenderness.      Right lower leg: No edema.      Left lower leg: No edema.      Comments: Full active and passive ROM bilateral LE.   Skin:     General: Skin is warm and dry.   Neurological:       General: No focal deficit present.      Mental Status: She is alert and oriented to person, place, and time.   Psychiatric:         Behavior: Behavior normal.             Significant Labs: All pertinent labs within the past 24 hours have been reviewed.    Significant Imaging: I have reviewed all pertinent imaging results/findings within the past 24 hours.    Assessment and Plan     * Weakness of both lower extremities  Patient reports worsening bilateral lower extremity weakness and strength x 1 day prior to admit. Imaging as above.     - AF, HDS, no leukocytosis  - TSH, B12, B1, B5, folate wnl  - replace lytes prn  - UDS negative    - UA w/ 3+ leuks, empiric CTX  - f/u PETH  - f/u MRI  - therapy consulted    Lumbar compression fracture  See principal problem. As per imaging.  - start calcium  - outpatient vit D injections  - f/u MRIs    Hypokalemia  - K 3.3, replaced  - add on mag jump  - trend daily    Aortic atherosclerosis -- u/s 2023  - cont asa/statin    Cigarette nicotine dependence without complication  Dangers of cigarette smoking were reviewed with patient in detail. Patient was Counseled for 3-10 minutes. Nicotine replacement options were discussed. Nicotine replacement was discussed- prescribed.    HLD (hyperlipidemia)  - cont statin    Thrombocytopenia, unspecified  Etiology likely 2/2 alcohol use.  - stable, trend daily  - transfuse if platelet count < 50k with active bleed or undergoing surgery    SVT (supraventricular tachycardia)  - cont coreg, tele    Alcohol use  Reported 2-3 beers approx 3-4x per week. Last drink 2 day prior to admission. No signs of acute withdrawal. US Abd w/out acute findings.  - CIWA, prn valium taper  - cont folate, MVI, thiamine    HTN (hypertension)  - coreg bid    COPD, group A, by GOLD 2017 classification  Controlled, no acute issues.      VTE Risk Mitigation (From admission, onward)           Ordered     heparin (porcine) injection 5,000 Units  Every 8 hours          01/03/25 0406     Place sequential compression device  Until discontinued         01/02/25 2203     IP VTE HIGH RISK PATIENT  Once         01/02/25 2203                    Discharge Planning   CAMELIA: 1/5/2025     Code Status: Full Code   Medical Readiness for Discharge Date:   Discharge Plan A: Home Health                Please place Justification for DME        Carlos Bergeron PA-C  Department of Hospital Medicine   [facility] - Observation 11H

## 2025-01-04 NOTE — PLAN OF CARE
Problem: Adult Inpatient Plan of Care  Goal: Plan of Care Review  Outcome: Progressing  Goal: Patient-Specific Goal (Individualized)  Outcome: Progressing  Goal: Absence of Hospital-Acquired Illness or Injury  Outcome: Progressing  Goal: Optimal Comfort and Wellbeing  Outcome: Progressing     Problem: Fall Injury Risk  Goal: Absence of Fall and Fall-Related Injury  Outcome: Progressing     Problem: Fatigue  Goal: Improved Activity Tolerance  Outcome: Progressing     Problem: Skin Injury Risk Increased  Goal: Skin Health and Integrity  Outcome: Progressing

## 2025-01-04 NOTE — PT/OT/SLP EVAL
"Physical Therapy Co-Evaluation and Treatment     Patient Name:  Sangita Logan   MRN:  [id number]    *co-treatment with OT  2/2 pt with potential impaired ability to tolerate 2 evaluations 2/2 medical status   Recommendations:     Discharge Recommendations: Low Intensity Therapy   Discharge Equipment Recommendations: walker, rolling   Barriers to discharge: None    Assessment:     Sangita Logan is a 84 y.o. female admitted with a medical diagnosis of Weakness of both lower extremities.  She presents with the following impairments/functional limitations: weakness, impaired endurance, impaired self care skills, impaired functional mobility, gait instability, impaired balance. Pt denies pain throughout session, frequently reports feeling as though her LE are going to "collapse". She presents with 5/5 strength in B LE which is inconsistent with her subjective reports of instability. She was able to ambulate with stand by assistance progressing to supervision with use of a rolling walker. Patient currently demonstrates a need for low intensity therapy on a scheduled basis secondary to a decline in functional status due to illness . Pt would continue to benefit from acute skilled therapy intervention to address deficits and progress toward prior level of function.       Rehab Prognosis: Good; patient would benefit from acute skilled PT services to address these deficits and reach maximum level of function.    Recent Surgery: * No surgery found *      Plan:     During this hospitalization, patient to be seen 4 x/week to address the identified rehab impairments via gait training, therapeutic activities, therapeutic exercises, neuromuscular re-education and progress toward the following goals:    Plan of Care Expires:  02/04/25    Subjective     Chief Complaint: feeling as though her legs are going to "collapse" when she stands   Patient/Family Comments/goals: to get better   Pain/Comfort:  Pain Rating 1: " 0/10  Pain Rating Post-Intervention 1: 0/10    Patients cultural, spiritual, Nondenominational conflicts given the current situation: no    Living Environment:  Pt lives alone in a H with 2 ANH with no HR.   Prior to admission, patients level of function was independent with mobility and ADLs. Pt was active, would walk several blocks ([place] each day.  Equipment used at home: none.  DME owned (not currently used): none.  Upon discharge, patient will have assistance from her family-very supportive.    Objective:     Communicated with RN prior to session.  Patient found  sitting in tailor sitting in bed  with telemetry  upon PT entry to room.    General Precautions: Standard, fall  Orthopedic Precautions:N/A   Braces: N/A  Respiratory Status: Room air    Exams:  Cognitive Exam:  Patient is AAOx4, followed all commands, communicates clearly and fluently  Sensation: in tact to B LE, pt denied any history of numbness or tingling   Gross Motor Coordination:  WFL  RLE ROM: WFL  RLE Strength: WFL  LLE ROM: WFL  LLE Strength: WFL    Functional Mobility:  Bed Mobility:     Supine to Sit: supervision  Transfers:     Sit to Stand:  supervision with rolling walker  Gait: Pt ambulated 120 ft with stand by assistance progressing to supervision with use of a RW. Pt demo'd small step size, decreased foot clearance, narrow CIRILO, slow gait speed. Pt with no LOB, on SOB, no dizziness, reported increased fatigue upon return to room.       AM-PAC 6 CLICK MOBILITY  Total Score:22       Treatment & Education:  Pt performed 5x sit <>  chair in 29.33 seconds.   Pt educated on role of PT/POC. Pt verbalized understanding.   Pt encouraged to only perform OOB mobility with assistance from nursing/therapy. Pt agreeable.   Pt encouraged to ambulate daily with assistance/supervision from nursing/therapy. Pt agreeable.  RW provided to room for use with staff assistance.     Patient left up in chair with all lines  intact, call button in reach, and RN notified.    GOALS:   Multidisciplinary Problems       Physical Therapy Goals          Problem: Physical Therapy    Goal Priority Disciplines Outcome Interventions   Physical Therapy Goal     PT, PT/OT Progressing    Description: Goals to be met by: 2025     Patient will increase functional independence with mobility by performin. Supine to sit with Modified [place]  2. Sit to stand transfer with Modified [place]  3. Gait  x 200 feet with Modified [place] using Rolling Walker.   4. Ascend/descend 2 stair with no Handrails Stand-by Assistance using LRAD                         History:     Past Medical History:   Diagnosis Date    COPD (chronic obstructive pulmonary disease)     HTN (hypertension) 10/21/2021    Osteoporosis     Sinus disease        Past Surgical History:   Procedure Laterality Date    AUGMENTATION OF BREAST Bilateral     received at age 23, removed at age 76    Breast implants removal  2012    SINUS SURGERY      TONSILLECTOMY      Age 1       Time Tracking:     PT Received On: 25  PT Start Time: 0820     PT Stop Time: 0840  PT Total Time (min): 20 min     Billable Minutes: Evaluation 8 mins and Gait Training 12 mins       2025

## 2025-01-04 NOTE — ASSESSMENT & PLAN NOTE
Reported 2-3 beers approx 3-4x per week. Last drink 2 day prior to admission. No signs of acute withdrawal. US Abd w/out acute findings.  - CIWA, prn valium taper  - cont folate, MVI, thiamine

## 2025-01-04 NOTE — CONSULTS
"[facility]  Orthopedics  Consult Note    Patient Name: Sangita Logan  MRN: [id number]  Admission Date: 1/2/2025  Hospital Length of Stay: 0 days  Attending Provider: Tran Ayala MD  Primary Care Provider: Danish Pereira MD    Patient information was obtained from patient, past medical records, ER records, and primary team.     Inpatient consult to Orthopedics  Consult performed by: Deshawn Dickinson MD  Consult ordered by: Carlos Bergeron PA-C        Subjective:     Principal Problem:Weakness of both lower extremities    Chief Complaint:   Chief Complaint   Patient presents with    Weakness     Has been feeling weak for couple of days but now "I feel like I'm going to collapse." Denies dizziness and lightheadedness, no numbness or tingling denies ha        HPI: Sangita Logan is a 84 y.o. female with PMH significant for COPD, nicotine dependence, osteoporosis, HTN, SVT, and alcohol use who is admitted for lower extremity weakness. Orthopedic Surgery was consulted for worsening compression fx of L3 vertebrae in setting of worsening BLE weakness and inability to ambulate.    Pt reports acute episode of back pain 6 months ago that completely resolcved without any intervention. She does not endorse recent trauma, falls, but reports weakness worsened over the past 2 days noting inabilty to stand 2/2 weakness. Notes that sitting down she feels well and has no symptoms and is able to move her legs normally. Denies any recent falls. She reports at baseline she ambulates without assistance and can walk a few blocks. Patient denies any head trauma or LOC. The patient denies prior hx of falls. Patient denies numbness and tingling. Denies any other musculoskeletal pain or injuries. No known history of prior spine injury or surgery.  Patient denies bowel or bladder incontinence, saddle anesthesia, or muscle weakness. Patient also denies difficulty with keys, buttons, hand writing, " or small items.  Doesn't take any anticoagulation at baseline.     They deny IV drug use.  They endorse moderate tobacco use (15 pack years).   They endorse alcohol use (15 drinks per week).   They deny immunosuppressant medications.  They deny chemotherapy.  They deny radiation therapy.      Past Medical History:   Diagnosis Date    COPD (chronic obstructive pulmonary disease)     HTN (hypertension) 10/21/2021    Osteoporosis     Sinus disease        Past Surgical History:   Procedure Laterality Date    AUGMENTATION OF BREAST Bilateral     received at age 23, removed at age 76    Breast implants removal  2012    SINUS SURGERY      TONSILLECTOMY      Age 1       Review of patient's allergies indicates:   Allergen Reactions    No known drug allergies        Current Facility-Administered Medications   Medication    acetaminophen tablet 650 mg    albuterol-ipratropium 2.5 mg-0.5 mg/3 mL nebulizer solution 3 mL    aspirin EC tablet 81 mg    atorvastatin tablet 80 mg    calcium-vitamin D3 500 mg-5 mcg (200 unit) per tablet 1 tablet    carvediloL tablet 6.25 mg    cefTRIAXone injection 1 g    cyanocobalamin tablet 250 mcg    dextrose 50% injection 12.5 g    dextrose 50% injection 25 g    folic acid tablet 1 mg    glucagon (human recombinant) injection 1 mg    glucose chewable tablet 16 g    glucose chewable tablet 24 g    heparin (porcine) injection 5,000 Units    hydrOXYzine HCL tablet 25 mg    melatonin tablet 6 mg    multivitamin liquid no.118 per dose 10 mL    naloxone 0.4 mg/mL injection 0.02 mg    nicotine 21 mg/24 hr 1 patch    prochlorperazine injection Soln 5 mg    sodium chloride 0.9% flush 10 mL    thiamine tablet 100 mg     Family History       Problem Relation (Age of Onset)    Breast cancer Sister    No Known Problems Mother, Father          Tobacco Use    Smoking status: Every Day     Current packs/day: 0.50     Average packs/day: 0.5 packs/day for 30.0 years (15.0 ttl pk-yrs)     Types: Cigarettes      "Passive exposure: Current    Smokeless tobacco: Never    Tobacco comments:     2 packs a week   Substance and Sexual Activity    Alcohol use: Yes     Alcohol/week: 3.0 standard drinks of alcohol     Types: 3 Glasses of wine per week     Comment: 4 times weekly    Drug use: Not Currently     Types: Marijuana    Sexual activity: Not Currently     Constitutional: negative for fevers  Eyes: negative visual changes  ENT: negative for hearing loss  Respiratory: negative for dyspnea  Cardiovascular: negative for chest pain  Gastrointestinal: negative for abdominal pain  Genitourinary: negative for dysuria  Neurological: negative for headaches  Behavioral/Psych: negative for hallucinations  Endocrine: negative for temperature intolerance     Objective:     Vital Signs (Most Recent):  Temp: 97.1 °F (36.2 °C) (01/03/25 2338)  Pulse: 67 (01/03/25 2338)  Resp: 18 (01/03/25 2338)  BP: 110/60 (01/03/25 2338)  SpO2: (!) 92 % (01/03/25 2338) Vital Signs (24h Range):  Temp:  [97.1 °F (36.2 °C)-98 °F (36.7 °C)] 97.1 °F (36.2 °C)  Pulse:  [62-80] 67  Resp:  [18-20] 18  SpO2:  [92 %-96 %] 92 %  BP: (110-142)/(60-70) 110/60     Weight: 50.9 kg (112 lb 3.4 oz)  Height: 5' 3" (160 cm)  Body mass index is 19.88 kg/m².    No intake or output data in the 24 hours ending 01/04/25 0151     Gen:  No acute distress  CV:  Peripherally well-perfused.    Lungs:  Normal respiratory effort.  Head/Neck:  Normocephalic.  Atraumatic.     Spine/pelvis/axial body:  No tenderness to palpation of cervical, thoracic, or lumbar spine  No pain with compression of pelvis  No chest wall or abdominal tenderness  No decubitus ulcers    Motor                                                                                                    RIGHT             LEFT  Deltoid(C5)                                             5/5                  5/5  Biceps(C5)                                             5/5                  5/5  Extensor Carpi Radialis Longus(C6)     5/5 "                  5/5       Triceps(C7)                                            5/5                  5/5                               Flexor Carpi Radialis(C7)                      5/5                  5/5  Flexor Digitorum Superficialis(C8)         5/5                  5/5  Interossei(T1)                                         5/5                  5/5          Sensation (a=absent, i=impaired, n=normal)                                                              RIGHT             LEFT  C5 dermatome                                    n                         n  C6 dermatome                                    n                         n  C7 dermatome                                    n                         n  C8 dermatome                                    n                         n  T1 dermatome                                    n                         n     Reflexes                                                              RIGHT             LEFT  Triceps                                                2+                       2+  Biceps                                                 2+                       2+  Brachioradialis                                    2+                       2+  Hoffmans's                                          neg                    neg        Motor                                                                                                                RIGHT             LEFT  Iliopsoas (L2,3)                                      5/5                  5/5  Quadriceps (L3,4)                                  5/5                  5/5   Tibialis Anterior (L4.5)                            5/5                  5/5       Extensor Halucis Longus (L5)               5/5                  5/5                               Gastrocnemius/Soleus (S1)                   5/5                  5/5  Flexor Hallucis Longus(S2)                   5/5                  5/5     Sensation  (a=absent, i=impaired, n=normal)                                                              RIGHT             LEFT  L2 dermatome                                         n                     n  L3 dermatome                                         n                     n  L4 dermatome                                         n                     n  L5 dermatome                                         n                     n  S1 dermatome                                        n                     n  S2 dermatome                                        n                     n     Reflexes                                                              RIGHT             LEFT  Patellar                                                   2+                    2+  Achilles                                                   2+                    2+  Babinski                                                neg                  neg  Clonus                                                   neg                  neg       Significant Labs: CBC:   Recent Labs   Lab 01/02/25 1955 01/03/25  0333   WBC 6.25 5.25   HGB 13.2 12.3   HCT 40.2 36.9*   PLT 90* 80*     CMP:   Recent Labs   Lab 01/02/25 1955 01/03/25  0333    137   K 3.5 3.8    98   CO2 25 31*   GLU 91 95   BUN 18 16   CREATININE 1.1 0.9   CALCIUM 9.1 9.1   PROT 7.3 6.0   ALBUMIN 4.0 3.4*   BILITOT 1.3* 1.4*   ALKPHOS 125 106   AST 36 30   ALT 18 13   ANIONGAP 12 8     All pertinent labs within the past 24 hours have been reviewed.    Significant Imaging: I have reviewed all pertinent imaging results/findings.  Assessment/Plan:     * Weakness of both lower extremities  Sangita Logan is a 84 y.o. female with PMH of COPD, nicotine dependence, osteoporosis, HTN, SVT, and alcohol use who is admitted for lower extremity weakness. Orthopedic Surgery was consulted for worsening compression fx of L3 vertebrae in setting of worsening BLE weakness and inability to  ambulate. She is neurovascularly intact in all extremities, does not have motor deficits, and does not endorse any sensation changes in bilateral upper and lower extremities. She is outside of treatment window for intranasal calcitonin. Patient is not a candidate for any acute orthopedic intervention as her L4 compression fracture is chronic without evidence of significant spinal canal or foraminal stenosis. Out of an abundance of caution, we will obtain full spine and brain MRI for gait changes as this could be a sign of myelopathy. She would benefit from the following recommendations.       - 50,000 IU Ergocalciferol injections once weekly x 8 weeks   - 1,000 mg Calcium daily   - WBAT  - Abx per primary   - Pain control per primary   - DVTppx per primary   - will f/u MRI  - Follow up in Orthopedic spine clinic and Fragility clinic (patient will be contacted with appointment details)           MAYRA Dickinson MD  Orthopaedic Surgery   Resident Physician, PGY-1  01/04/2025

## 2025-01-04 NOTE — ASSESSMENT & PLAN NOTE
Dangers of cigarette smoking were reviewed with patient in detail. Patient was Counseled for 3-10 minutes. Nicotine replacement options were discussed. Nicotine replacement was discussed- prescribed.

## 2025-01-04 NOTE — PLAN OF CARE
Problem: Physical Therapy  Goal: Physical Therapy Goal  Description: Goals to be met by: 2025     Patient will increase functional independence with mobility by performin. Supine to sit with Modified [place]  2. Sit to stand transfer with Modified [place]  3. Gait  x 200 feet with Modified [place] using Rolling Walker.   4. Ascend/descend 2 stair with no Handrails Stand-by Assistance using LRAD    Outcome: Progressing     Pt evaluated and appropriate goals established.

## 2025-01-04 NOTE — PLAN OF CARE
Problem: Adult Inpatient Plan of Care  Goal: Plan of Care Review  Outcome: Progressing  Goal: Patient-Specific Goal (Individualized)  Outcome: Progressing  Goal: Absence of Hospital-Acquired Illness or Injury  Outcome: Progressing  Goal: Optimal Comfort and Wellbeing  Outcome: Progressing  Goal: Readiness for Transition of Care  Outcome: Progressing     Problem: Fall Injury Risk  Goal: Absence of Fall and Fall-Related Injury  Outcome: Progressing     Problem: Comorbidity Management  Goal: Maintenance of COPD Symptom Control  Outcome: Progressing  Goal: Blood Pressure in Desired Range  Outcome: Progressing     Problem: Fatigue  Goal: Improved Activity Tolerance  Outcome: Progressing     Problem: Skin Injury Risk Increased  Goal: Skin Health and Integrity  Outcome: Progressing

## 2025-01-05 VITALS
TEMPERATURE: 98 F | BODY MASS INDEX: 19.88 KG/M2 | WEIGHT: 112.19 LBS | HEIGHT: 63 IN | HEART RATE: 82 BPM | DIASTOLIC BLOOD PRESSURE: 70 MMHG | SYSTOLIC BLOOD PRESSURE: 134 MMHG | OXYGEN SATURATION: 93 % | RESPIRATION RATE: 18 BRPM

## 2025-01-05 LAB
ALBUMIN SERPL BCP-MCNC: 3.3 G/DL (ref 3.5–5.2)
ALP SERPL-CCNC: 103 U/L (ref 40–150)
ALT SERPL W/O P-5'-P-CCNC: 11 U/L (ref 10–44)
ANION GAP SERPL CALC-SCNC: 12 MMOL/L (ref 8–16)
AST SERPL-CCNC: 18 U/L (ref 10–40)
BASOPHILS # BLD AUTO: 0.02 K/UL (ref 0–0.2)
BASOPHILS NFR BLD: 0.4 % (ref 0–1.9)
BILIRUB SERPL-MCNC: 0.5 MG/DL (ref 0.1–1)
BUN SERPL-MCNC: 8 MG/DL (ref 8–23)
CALCIUM SERPL-MCNC: 8.9 MG/DL (ref 8.7–10.5)
CHLORIDE SERPL-SCNC: 100 MMOL/L (ref 95–110)
CO2 SERPL-SCNC: 21 MMOL/L (ref 23–29)
CREAT SERPL-MCNC: 0.8 MG/DL (ref 0.5–1.4)
DIFFERENTIAL METHOD BLD: ABNORMAL
EOSINOPHIL # BLD AUTO: 0 K/UL (ref 0–0.5)
EOSINOPHIL NFR BLD: 0.2 % (ref 0–8)
ERYTHROCYTE [DISTWIDTH] IN BLOOD BY AUTOMATED COUNT: 12.8 % (ref 11.5–14.5)
EST. GFR  (NO RACE VARIABLE): >60 ML/MIN/1.73 M^2
GLUCOSE SERPL-MCNC: 101 MG/DL (ref 70–110)
HCT VFR BLD AUTO: 39.9 % (ref 37–48.5)
HGB BLD-MCNC: 12.4 G/DL (ref 12–16)
IMM GRANULOCYTES # BLD AUTO: 0.13 K/UL (ref 0–0.04)
IMM GRANULOCYTES NFR BLD AUTO: 2.3 % (ref 0–0.5)
LYMPHOCYTES # BLD AUTO: 1.6 K/UL (ref 1–4.8)
LYMPHOCYTES NFR BLD: 28.8 % (ref 18–48)
MAGNESIUM SERPL-MCNC: 1.8 MG/DL (ref 1.6–2.6)
MCH RBC QN AUTO: 31.4 PG (ref 27–31)
MCHC RBC AUTO-ENTMCNC: 31.1 G/DL (ref 32–36)
MCV RBC AUTO: 101 FL (ref 82–98)
MONOCYTES # BLD AUTO: 1.8 K/UL (ref 0.3–1)
MONOCYTES NFR BLD: 30.7 % (ref 4–15)
NEUTROPHILS # BLD AUTO: 2.2 K/UL (ref 1.8–7.7)
NEUTROPHILS NFR BLD: 37.6 % (ref 38–73)
NRBC BLD-RTO: 0 /100 WBC
PLATELET # BLD AUTO: 82 K/UL (ref 150–450)
PMV BLD AUTO: 13.6 FL (ref 9.2–12.9)
POTASSIUM SERPL-SCNC: 3.7 MMOL/L (ref 3.5–5.1)
PROT SERPL-MCNC: 6.3 G/DL (ref 6–8.4)
RBC # BLD AUTO: 3.95 M/UL (ref 4–5.4)
SODIUM SERPL-SCNC: 133 MMOL/L (ref 136–145)
WBC # BLD AUTO: 5.7 K/UL (ref 3.9–12.7)

## 2025-01-05 PROCEDURE — 63600175 PHARM REV CODE 636 W HCPCS: Mod: HCNC | Performed by: NURSE PRACTITIONER

## 2025-01-05 PROCEDURE — 63600175 PHARM REV CODE 636 W HCPCS: Mod: HCNC

## 2025-01-05 PROCEDURE — 36415 COLL VENOUS BLD VENIPUNCTURE: CPT | Mod: HCNC | Performed by: NURSE PRACTITIONER

## 2025-01-05 PROCEDURE — 85025 COMPLETE CBC W/AUTO DIFF WBC: CPT | Mod: HCNC | Performed by: NURSE PRACTITIONER

## 2025-01-05 PROCEDURE — 25000003 PHARM REV CODE 250: Mod: HCNC | Performed by: NURSE PRACTITIONER

## 2025-01-05 PROCEDURE — 80053 COMPREHEN METABOLIC PANEL: CPT | Mod: HCNC | Performed by: NURSE PRACTITIONER

## 2025-01-05 PROCEDURE — 83735 ASSAY OF MAGNESIUM: CPT | Mod: HCNC | Performed by: NURSE PRACTITIONER

## 2025-01-05 PROCEDURE — S4991 NICOTINE PATCH NONLEGEND: HCPCS | Mod: HCNC | Performed by: NURSE PRACTITIONER

## 2025-01-05 RX ORDER — MELATONIN 10 MG
1 TABLET, SUBLINGUAL SUBLINGUAL 2 TIMES DAILY
Qty: 60 TABLET | Refills: 11 | Status: SHIPPED | OUTPATIENT
Start: 2025-01-05 | End: 2026-01-05

## 2025-01-05 RX ADMIN — CARVEDILOL 6.25 MG: 6.25 TABLET, FILM COATED ORAL at 08:01

## 2025-01-05 RX ADMIN — HEPARIN SODIUM 5000 UNITS: 5000 INJECTION INTRAVENOUS; SUBCUTANEOUS at 06:01

## 2025-01-05 RX ADMIN — ASPIRIN 81 MG: 81 TABLET, COATED ORAL at 08:01

## 2025-01-05 RX ADMIN — Medication 1 TABLET: at 08:01

## 2025-01-05 RX ADMIN — Medication 100 MG: at 08:01

## 2025-01-05 RX ADMIN — FOLIC ACID 1 MG: 1 TABLET ORAL at 08:01

## 2025-01-05 RX ADMIN — Medication 1 PATCH: at 09:01

## 2025-01-05 RX ADMIN — Medication 10 ML: at 08:01

## 2025-01-05 RX ADMIN — CYANOCOBALAMIN TAB 250 MCG 250 MCG: 250 TAB at 08:01

## 2025-01-05 RX ADMIN — HYPROMELLOSE 2910 1 DROP: 5 SOLUTION/ DROPS OPHTHALMIC at 12:01

## 2025-01-05 RX ADMIN — CEFTRIAXONE 1 G: 1 INJECTION, POWDER, FOR SOLUTION INTRAMUSCULAR; INTRAVENOUS at 09:01

## 2025-01-05 NOTE — PLAN OF CARE
"Update - 3:45 PM  [facility] and [facility] declined patient, "unable to initiate timely start of care".    CM generated list of in-network HH agencies within 15 miles of patient's zip code. [facility] referral and hard fax sent to :[facility], [facility], [facility], [facility], [facility], [facility], and [facility]. Awaiting responses. CM will follow up.      Radha Menendez RN  Weekend  - [facility]  [id number]  "

## 2025-01-05 NOTE — PLAN OF CARE
Problem: Adult Inpatient Plan of Care  Goal: Plan of Care Review  Outcome: Met  Goal: Patient-Specific Goal (Individualized)  Outcome: Met  Goal: Absence of Hospital-Acquired Illness or Injury  Outcome: Met  Goal: Optimal Comfort and Wellbeing  Outcome: Met  Goal: Readiness for Transition of Care  Outcome: Met     Problem: Fall Injury Risk  Goal: Absence of Fall and Fall-Related Injury  Outcome: Met     Problem: Comorbidity Management  Goal: Maintenance of COPD Symptom Control  Outcome: Met  Goal: Blood Pressure in Desired Range  Outcome: Met     Problem: Fatigue  Goal: Improved Activity Tolerance  Outcome: Met     Problem: Skin Injury Risk Increased  Goal: Skin Health and Integrity  Outcome: Met

## 2025-01-05 NOTE — PLAN OF CARE
Future Appointments   Date Time Provider Department Center   1/14/2025  9:20 AM Danish Pereira MD [facility]   5/12/2025  1:00 PM Danish Pereira MD [facility]         HOSP f/u scheduled.          Marie Carvajal, ANGELAW, RSW, BSW  Case Management  [id number]

## 2025-01-05 NOTE — PLAN OF CARE
[facility]      HOME HEALTH ORDERS  FACE TO FACE ENCOUNTER    Patient Name: Sangita Logan  YOB: 1940    PCP: Danish Pereira MD   PCP Address: [address] / [place] [address]  PCP Phone Number: [phone]  PCP Fax: [phone]    Encounter Date: 1/2/25    Admit to Home Health    Diagnoses:  Active Hospital Problems    Diagnosis  POA    *Weakness of both lower extremities [R29.898]  Yes    Hypokalemia [E87.6]  No    Lumbar compression fracture [S32.000A]  Yes    Aortic atherosclerosis -- u/s 2023 [I70.0]  Yes    Cigarette nicotine dependence without complication [F17.210]  Yes    HLD (hyperlipidemia) [E78.5]  Yes    Thrombocytopenia, unspecified [D69.6]  Yes    SVT (supraventricular tachycardia) [I47.10]  Yes    HTN (hypertension) [I10]  Yes     Chronic     Unclear what medications patient is taking  Call to [facility] suggestive of her having filled amlodipine recently  Coreg not at [employer] [facility].  Patient reports being out of her meds but she is unsure which one.  Request that she bring her meds to her PCP appt to assist us in ordering the meds she needs.  May need to restart Coreg but at a lower dose.       Alcohol use [Z78.9]  Yes     Chronic    COPD, group A, by GOLD 2017 classification [J44.9]  Yes      Resolved Hospital Problems    Diagnosis Date Resolved POA    Stage 3 chronic kidney disease [N18.30] 01/03/2025 Yes       Follow Up Appointments:  Future Appointments   Date Time Provider Department Center   5/12/2025  1:00 PM Danish Pereira MD [facility]       Allergies:  Review of patient's allergies indicates:   Allergen Reactions    No known drug allergies        Medications: Review discharge medications with patient and family and provide education.    Current Facility-Administered Medications   Medication Dose Route Frequency Provider Last Rate Last Admin    acetaminophen tablet 650 mg  650 mg Oral Q6H PRN Nya Hammond, NP         albuterol-ipratropium 2.5 mg-0.5 mg/3 mL nebulizer solution 3 mL  3 mL Nebulization Q4H PRN Nya Hammond, NP        artificial tears 0.5 % ophthalmic solution 1 drop  1 drop Both Eyes PRN Nya Hammond, NP   1 drop at 01/05/25 0057    aspirin EC tablet 81 mg  81 mg Oral Daily Nya Hammond, NP   81 mg at 01/05/25 0854    atorvastatin tablet 80 mg  80 mg Oral QHS Nya Hammond, NP   80 mg at 01/04/25 2132    calcium-vitamin D3 500 mg-5 mcg (200 unit) per tablet 1 tablet  1 tablet Oral BID Nya Hammond, NP   1 tablet at 01/05/25 0854    carvediloL tablet 6.25 mg  6.25 mg Oral BID Nya Hammond, NP   6.25 mg at 01/05/25 0854    cefTRIAXone injection 1 g  1 g Intravenous Q24H Carlos Bergeron PA-C   1 g at 01/05/25 0901    cyanocobalamin tablet 250 mcg  250 mcg Oral Daily Nya Hammond NP   250 mcg at 01/05/25 0854    dextrose 50% injection 12.5 g  12.5 g Intravenous PRN Nya Hammond, NP        dextrose 50% injection 25 g  25 g Intravenous PRN Nya Hammond, NP        folic acid tablet 1 mg  1 mg Oral Daily Nya Hammond, NP   1 mg at 01/05/25 0854    glucagon (human recombinant) injection 1 mg  1 mg Intramuscular PRN Nya Hammond, NP        glucose chewable tablet 16 g  16 g Oral PRN Nya Hammond, NP        glucose chewable tablet 24 g  24 g Oral PRN Nya Hammond, NP        heparin (porcine) injection 5,000 Units  5,000 Units Subcutaneous Q8H Nya Hammond, NP   5,000 Units at 01/05/25 0609    hydrOXYzine HCL tablet 25 mg  25 mg Oral TID PRN Nya Hammond NP        melatonin tablet 6 mg  6 mg Oral Nightly PRN Nya Hammond, NP   6 mg at 01/03/25 2108    multivitamin liquid no.118 per dose 10 mL  10 mL Oral Daily Nya Hammond, NP   10 mL at 01/05/25 0853    naloxone 0.4 mg/mL injection 0.02 mg  0.02 mg Intravenous PRN Nya Hammond, NP        nicotine 21 mg/24 hr 1 patch  1 patch Transdermal Daily Nya Hammond, NP    1 patch at 01/05/25 0901    prochlorperazine injection Soln 5 mg  5 mg Intravenous Q6H PRN Nya Hammond, NP        sodium chloride 0.9% flush 10 mL  10 mL Intravenous Q12H PRN Nya Hammond NP        thiamine tablet 100 mg  100 mg Oral Daily Nya Hammond NP   100 mg at 01/05/25 0854        Medication List        START taking these medications      calcium-vitamin D3 500 mg-5 mcg (200 unit) per tablet  Commonly known as: OS-KAIA 500 + D3  Take 1 tablet by mouth 2 (two) times daily.  Replaces: CALCIUM 600 WITH VITAMIN D3 600 mg-10 mcg (400 unit) per chew tablet            CONTINUE taking these medications      * albuterol 90 mcg/actuation inhaler  Commonly known as: PROVENTIL/VENTOLIN HFA  Inhale 2 puffs into the lungs every 4 (four) hours as needed for Wheezing. Rescue     * albuterol 2.5 mg /3 mL (0.083 %) nebulizer solution  Commonly known as: PROVENTIL  USE 1 VIAL VIA NEBULIZER EVERY 6 HOURS AS NEEDED FOR WHEEZING, RESCUE     aspirin 81 MG EC tablet  Commonly known as: ECOTRIN  Take 1 tablet (81 mg total) by mouth once daily.     BEVESPI AEROSPHERE 9-4.8 mcg Hfaa  Generic drug: glycopyrrolate-formoteroL  INHALE 2 PUFFS INTO THE LUNGS EVERY DAY     carvediloL 6.25 MG tablet  Commonly known as: COREG  TAKE 1 TABLET(6.25 MG) BY MOUTH TWICE DAILY WITH MEALS     diclofenac sodium 1 % Gel  Commonly known as: VOLTAREN  Apply 2 g topically 2 (two) times daily.     ergocalciferol 50,000 unit Cap  Commonly known as: ERGOCALCIFEROL  Take 1 capsule (50,000 Units total) by mouth every 7 days.     rosuvastatin 20 MG tablet  Commonly known as: CRESTOR  TAKE 1 TABLET(20 MG) BY MOUTH EVERY EVENING           * This list has 2 medication(s) that are the same as other medications prescribed for you. Read the directions carefully, and ask your doctor or other care provider to review them with you.                STOP taking these medications      CALCIUM 600 WITH VITAMIN D3 600 mg-10 mcg (400 unit) per chew  tablet  Generic drug: calcium carbonate-vitamin D3  Replaced by: calcium-vitamin D3 500 mg-5 mcg (200 unit) per tablet            ASK your doctor about these medications      busPIRone 10 MG tablet  Commonly known as: BUSPAR  TAKE ONE-HALF TABLET BY MOUTH THREE TIMES DAILY FOR 1 WEEK, THEN TAKE 1 TABLET BY MOUTH TWICE DAILY THEREAFTER                I have seen and examined this patient within the last 30 days. My clinical findings that support the need for the home health skilled services and home bound status are the following:no   Weakness/numbness causing balance and gait disturbance due to Weakness/Debility making it taxing to leave home.     Diet:   cardiac diet    Labs:  routine    Referrals/ Consults  Physical Therapy to evaluate and treat. Evaluate for home safety and equipment needs; Establish/upgrade home exercise program. Perform / instruct on therapeutic exercises, gait training, transfer training, and Range of Motion.  Occupational Therapy to evaluate and treat. Evaluate home environment for safety and equipment needs. Perform/Instruct on transfers, ADL training, ROM, and therapeutic exercises.    Activities:   activity as tolerated    Nursing:   Agency to admit patient within 24 hours of hospital discharge unless specified on physician order or at patient request    SN to complete comprehensive assessment including routine vital signs. Instruct on disease process and s/s of complications to report to MD. Review/verify medication list sent home with the patient at time of discharge  and instruct patient/caregiver as needed. Frequency may be adjusted depending on start of care date.     Skilled nurse to perform up to 3 visits PRN for symptoms related to diagnosis    Notify MD if SBP > 160 or < 90; DBP > 90 or < 50; HR > 120 or < 50; Temp > 101; O2 < 88%.    Ok to schedule additional visits based on staff availability and patient request on consecutive days within the home health episode.    When  multiple disciplines ordered:    Start of Care occurs on Sunday - Wednesday schedule remaining discipline evaluations as ordered on separate consecutive days following the start of care.    Thursday SOC -schedule subsequent evaluations Friday and Monday the following week.     Friday - Saturday SOC - schedule subsequent discipline evaluations on consecutive days starting Monday of the following week.    For all post-discharge communication and subsequent orders please contact patient's primary care physician. If unable to reach primary care physician or do not receive response within 30 minutes, please contact [phone] for clinical staff order clarification    Home Health Aide:  Physical Therapy Three times weekly and Occupational Therapy Three times weekly    Wound Care Orders  no    I certify that this patient is confined to her home and needs physical therapy and occupational therapy.

## 2025-01-05 NOTE — PLAN OF CARE
[facility]  Discharge Final Note     Primary Care Provider: Danish Pereira MD     Expected Discharge Date: 01/05/25    Patient cleared for discharge from case management standpoint.     01/05/25 1551   Final Note   Assessment Type Final Discharge Note   Anticipated Discharge Disposition Home-Health   What phone number can be called within the next 1-3 days to see how you are doing after discharge? [phone]   Hospital Resources/Appts/Education Provided Provided patient/caregiver with written discharge plan information;Appointments scheduled and added to AVS   Post-Acute Status   Post-Acute Authorization Home Health   Home Health Status Referrals Sent   Discharge Delays None known at this time       Future Appointments   Date Time Provider Department Center   1/14/2025  9:20 AM aDnish Pereira MD [facility]   5/12/2025  1:00 PM Danish Pereira MD [facility]     At the time of discharge, no accepting home health agency.  staff will follow-up on acceptance.      Radha Menendez RN  Weekend  - [facility]  [phone]

## 2025-01-05 NOTE — DISCHARGE SUMMARY
[facility] - Observation [address] [place] [address] Medicine  Discharge Summary      Patient Name: Sangita Logan  MRN: [id number]  [facility]: [id number]  Patient Class: IP- Inpatient  Admission Date: 1/2/2025  Hospital Length of Stay: 1 days  Discharge Date and Time:  01/05/2025 12:58 PM  Attending Physician: Tran Ayala MD   Discharging Provider: Carlos Bergeron PA-C  Primary Care Provider: Danish Pereira MD  [facility] Medicine Team: [facility] MED E Carlos Bergeron PA-C  Primary Care Team: [facility] MED E    HPI:   Sangita Logan is a 84 y.o. female with a PMHx of COPD, nicotine dependence, osteoporosis, HTN, SVT, and alcohol use who presents to the ED for lower extremity weakness. The patient reports noting tremors in her hands a few days ago which made it difficult to bring a cup to her mouth. She reports the next day the tremors improved, but she noticed weakness to her BLE. Reports worsened over the past 2 days noting today she was unable to stand due to feeling like her legs would give out. She reports a feeling of heaviness in her legs but denies numbness/tingling, headache, lightheadedness/dizziness, or pain to her legs. Notes that sitting down she feels well and has no symptoms and is able to move her legs normally. Denies any recent falls. She reports at baseline she ambulates without assistance and can walk a few blocks. She denies CP, cough, SOB, fever/chills, abdominal pain, or dysuria.    In the ED, patient tachycardic and mildly hypertensive otherwise vitals stable, afebrile. CBC with stable thrombocytopenia. K+3.5. Cr 1.1 (bl 0.9-1.0). Mag 1.6. Tbili 1.3. TSH WNL. EKG shows ST w/ PACs and incomplete R BBB, 108 bpm, non specific ST and T wave abnormalities. The patient received 500cc LR bolus.    * No surgery found *      Hospital Course:   Ms. Logan was admitted to hospital medicine for further management of her weakness. CBC with chronic thrombocytopenia. Potassium was 3.5, replaced. Repeat K  pending. CPK slightly elevated 250. B12, folate, ammonia, TSH WNL. UA showed 3+ leukocytes, on ceftriaxone. Drug tox unremarkable. Chest X ray with no acute process. Abdominal US showed no abnormal findings. Thoracic and lumbar xray showed osteopenia and compression of L3 and likley L4/5. CT L-spine with chronic compression fractures, no evidence of acute compression fracture. Ortho spine consulted, MRI brain and MRI C-T-L spine reviewed. Ortho recommendations reviewed. Start calcium. Replaced lytes. Therapy consulted, low intensity recommendations. Patient symptomatically improved.     At discharge she will continue oral calcium supplementation. Fragility clinic to set up vitamin D injections. Referral placed to PCP. Ortho Spine clinic and Fragility Clinic to call patient with appointment details. Medications sent to bedside. Home Health ordered. Return precautions provided. Patient medically ready for discharge. Plan of care discussed with patient, patient agreeable to plan, and all questions answered.      Physical Exam    General: NAD.  Neuro: Alert and oriented. No focal deficits.  HEENT: EOMI. No JVD appreciated.  CVS: RRR.  Respiratory: Normal respiratory effort.    Abdominal: NTND, soft to palpation.    Extremities: Pulses full, equal, and regular over all 4 extremities.      Goals of Care Treatment Preferences:  Code Status: Full Code      SDOH Screening:  The patient declined to be screened for utility difficulties, food insecurity, transport difficulties, housing insecurity, and interpersonal safety, so no concerns could be identified this admission.     Consults:   Consults (From admission, onward)          Status Ordering Provider     Inpatient consult to Orthopedics  Once        Provider:  (Not yet assigned)    Completed KRISTINE DUMONT              Final Active Diagnoses:    Diagnosis Date Noted POA    PRINCIPAL PROBLEM:  Weakness of both lower extremities [R29.898] 01/02/2025 Yes    Hypokalemia  [E87.6] 01/04/2025 No    Lumbar compression fracture [S32.000A] 01/04/2025 Yes    Aortic atherosclerosis -- u/s 2023 [I70.0] 01/12/2024 Yes    Cigarette nicotine dependence without complication [F17.210] 12/14/2023 Yes    HLD (hyperlipidemia) [E78.5] 03/20/2023 Yes    Thrombocytopenia, unspecified [D69.6] 02/22/2023 Yes    SVT (supraventricular tachycardia) [I47.10] 05/30/2022 Yes    HTN (hypertension) [I10] 10/21/2021 Yes     Chronic    Alcohol use [Z78.9] 10/21/2021 Yes     Chronic    COPD, group A, by GOLD 2017 classification [J44.9] 02/27/2019 Yes      Problems Resolved During this Admission:    Diagnosis Date Noted Date Resolved POA    Stage 3 chronic kidney disease [N18.30] 01/03/2025 01/03/2025 Yes       Discharged Condition: stable    Disposition: Home-Health Care c    Follow Up:   Follow-up Information       Danish Pereira MD Follow up in 1 week(s).    Specialties: Family Medicine, Hospitalist  Contact information:  [address] [address] [phone]                           Patient Instructions:      Diet Cardiac     Notify your health care provider if you experience any of the following:  temperature >100.4     Notify your health care provider if you experience any of the following:  severe uncontrolled pain     Notify your health care provider if you experience any of the following:  difficulty breathing or increased cough     Notify your health care provider if you experience any of the following:  increased confusion or weakness     Notify your health care provider if you experience any of the following:  persistent dizziness, light-headedness, or visual disturbances     Reason for not Ordering Smoking Cessation Referral     Order Specific Question Answer Comments   Reason for not ordering: Patient refused      Reason for not Prescribing Nicotine Replacement     Order Specific Question Answer Comments   Reason for not Prescribing: Patient refused      Activity as tolerated        Significant Diagnostic Studies: N/A    Pending Diagnostic Studies:       Procedure Component Value Units Date/Time    Phosphatidylethanol (PETH) [[id number]] Collected: 01/02/25 2304    Order Status: Sent Lab Status: In process Updated: 01/02/25 2320    Specimen: Blood     Vitamin B1 [[id number]] Collected: 01/03/25 0332    Order Status: Sent Lab Status: In process Updated: 01/03/25 0405    Specimen: Blood     Vitamin B6 [[id number]] Collected: 01/03/25 0332    Order Status: Sent Lab Status: In process Updated: 01/03/25 0405    Specimen: Blood            Medications:  Reconciled Home Medications:      Medication List        START taking these medications      calcium-vitamin D3 500 mg-5 mcg (200 unit) per tablet  Commonly known as: OS-KAIA 500 + D3  Take 1 tablet by mouth 2 (two) times daily.  Replaces: CALCIUM 600 WITH VITAMIN D3 600 mg-10 mcg (400 unit) per chew tablet            CONTINUE taking these medications      * albuterol 90 mcg/actuation inhaler  Commonly known as: PROVENTIL/VENTOLIN HFA  Inhale 2 puffs into the lungs every 4 (four) hours as needed for Wheezing. Rescue     * albuterol 2.5 mg /3 mL (0.083 %) nebulizer solution  Commonly known as: PROVENTIL  USE 1 VIAL VIA NEBULIZER EVERY 6 HOURS AS NEEDED FOR WHEEZING, RESCUE     aspirin 81 MG EC tablet  Commonly known as: ECOTRIN  Take 1 tablet (81 mg total) by mouth once daily.     BEVESPI AEROSPHERE 9-4.8 mcg Hfaa  Generic drug: glycopyrrolate-formoteroL  INHALE 2 PUFFS INTO THE LUNGS EVERY DAY     carvediloL 6.25 MG tablet  Commonly known as: COREG  TAKE 1 TABLET(6.25 MG) BY MOUTH TWICE DAILY WITH MEALS     diclofenac sodium 1 % Gel  Commonly known as: VOLTAREN  Apply 2 g topically 2 (two) times daily.     ergocalciferol 50,000 unit Cap  Commonly known as: ERGOCALCIFEROL  Take 1 capsule (50,000 Units total) by mouth every 7 days.     rosuvastatin 20 MG tablet  Commonly known as: CRESTOR  TAKE 1 TABLET(20 MG) BY MOUTH EVERY EVENING           * This  list has 2 medication(s) that are the same as other medications prescribed for you. Read the directions carefully, and ask your doctor or other care provider to review them with you.                STOP taking these medications      CALCIUM 600 WITH VITAMIN D3 600 mg-10 mcg (400 unit) per chew tablet  Generic drug: calcium carbonate-vitamin D3  Replaced by: calcium-vitamin D3 500 mg-5 mcg (200 unit) per tablet            ASK your doctor about these medications      busPIRone 10 MG tablet  Commonly known as: BUSPAR  TAKE ONE-HALF TABLET BY MOUTH THREE TIMES DAILY FOR 1 WEEK, THEN TAKE 1 TABLET BY MOUTH TWICE DAILY THEREAFTER              Indwelling Lines/Drains at time of discharge:   Lines/Drains/Airways       None                   Time spent on the discharge of patient: 47 minutes         Carlos Bergeron PA-C  Department of Hospital Medicine  [facility] - Observ[facility]

## 2025-01-05 NOTE — NURSING
Pt AVS given and discharge education completed. Pt IV and tele monitor removed. Pt medications and follow up appointments went over. All questions answered. Pt refused wheelchair and ambulated with son to  medications.

## 2025-01-07 LAB
CLINICAL BIOCHEMIST REVIEW: NORMAL
PLPETH BLD-MCNC: 346 NG/ML
POPETH BLD-MCNC: 982 NG/ML

## 2025-01-08 ENCOUNTER — PATIENT OUTREACH (OUTPATIENT)
Dept: ADMINISTRATIVE | Facility: CLINIC | Age: 85
End: 2025-01-08
Payer: MEDICARE

## 2025-01-08 LAB — VIT B1 BLD-MCNC: 65 UG/L (ref 38–122)

## 2025-01-08 NOTE — PROGRESS NOTES
C3 nurse spoke with Sangita Logan for a TCC post hospital discharge follow up call. The patient has a scheduled HOSFU appointment with Danish Pereira MD on 01/14/25 @ 8044.

## 2025-01-09 LAB — PYRIDOXAL SERPL-MCNC: 3 UG/L (ref 5–50)

## 2025-01-14 ENCOUNTER — OFFICE VISIT (OUTPATIENT)
Dept: PRIMARY CARE CLINIC | Facility: CLINIC | Age: 85
End: 2025-01-14
Payer: MEDICARE

## 2025-01-14 VITALS
RESPIRATION RATE: 18 BRPM | BODY MASS INDEX: 19.71 KG/M2 | WEIGHT: 111.25 LBS | HEIGHT: 63 IN | HEART RATE: 100 BPM | SYSTOLIC BLOOD PRESSURE: 120 MMHG | OXYGEN SATURATION: 96 % | DIASTOLIC BLOOD PRESSURE: 70 MMHG

## 2025-01-14 DIAGNOSIS — S32.000D COMPRESSION FRACTURE OF LUMBAR VERTEBRA WITH ROUTINE HEALING, UNSPECIFIED LUMBAR VERTEBRAL LEVEL, SUBSEQUENT ENCOUNTER: ICD-10-CM

## 2025-01-14 DIAGNOSIS — J44.9 COPD, GROUP A, BY GOLD 2017 CLASSIFICATION: ICD-10-CM

## 2025-01-14 DIAGNOSIS — Z09 HOSPITAL DISCHARGE FOLLOW-UP: Primary | ICD-10-CM

## 2025-01-14 DIAGNOSIS — Z00.00 ENCOUNTER FOR MEDICARE ANNUAL WELLNESS EXAM: ICD-10-CM

## 2025-01-14 DIAGNOSIS — R06.2 WHEEZING: ICD-10-CM

## 2025-01-14 DIAGNOSIS — I27.20 PULMONARY HYPERTENSION, UNSPECIFIED: ICD-10-CM

## 2025-01-14 PROCEDURE — 1126F AMNT PAIN NOTED NONE PRSNT: CPT | Mod: HCNC,CPTII,S$GLB, | Performed by: STUDENT IN AN ORGANIZED HEALTH CARE EDUCATION/TRAINING PROGRAM

## 2025-01-14 PROCEDURE — 1160F RVW MEDS BY RX/DR IN RCRD: CPT | Mod: HCNC,CPTII,S$GLB, | Performed by: STUDENT IN AN ORGANIZED HEALTH CARE EDUCATION/TRAINING PROGRAM

## 2025-01-14 PROCEDURE — 99214 OFFICE O/P EST MOD 30 MIN: CPT | Mod: HCNC,S$GLB,, | Performed by: STUDENT IN AN ORGANIZED HEALTH CARE EDUCATION/TRAINING PROGRAM

## 2025-01-14 PROCEDURE — 1159F MED LIST DOCD IN RCRD: CPT | Mod: HCNC,CPTII,S$GLB, | Performed by: STUDENT IN AN ORGANIZED HEALTH CARE EDUCATION/TRAINING PROGRAM

## 2025-01-14 PROCEDURE — 1111F DSCHRG MED/CURRENT MED MERGE: CPT | Mod: HCNC,CPTII,S$GLB, | Performed by: STUDENT IN AN ORGANIZED HEALTH CARE EDUCATION/TRAINING PROGRAM

## 2025-01-14 PROCEDURE — 99999 PR PBB SHADOW E&M-EST. PATIENT-LVL III: CPT | Mod: PBBFAC,HCNC,, | Performed by: STUDENT IN AN ORGANIZED HEALTH CARE EDUCATION/TRAINING PROGRAM

## 2025-01-14 RX ORDER — ALBUTEROL SULFATE 0.83 MG/ML
2.5 SOLUTION RESPIRATORY (INHALATION)
Qty: 90 ML | Refills: 1 | Status: SHIPPED | OUTPATIENT
Start: 2025-01-14

## 2025-01-14 RX ORDER — GLYCOPYRROLATE AND FORMOTEROL FUMARATE 9; 4.8 UG/1; UG/1
2 AEROSOL, METERED RESPIRATORY (INHALATION) DAILY
Qty: 10.7 G | Refills: 11 | Status: SHIPPED | OUTPATIENT
Start: 2025-01-14

## 2025-01-14 NOTE — PROGRESS NOTES
"Subjective:       Patient ID: Sangita Logan is a 84 y.o. female.    Chief Complaint: Follow-up (Hospital follow up)    HPI:  84 y.o. female presents to [facility] for hospital follow-up with generalized weakness. Admitted 1/2/2024. Patient reported leg weakness bilaterally that improved with sitting down. Spinal imaging was performed which revealed compression fractures of L3 and likely L4/5. CT revealed compression fractures to be chronic. Ortho spine was consulted and calcium replacement was recommended. Symptoms improved while in hospital. Orthopaedics recommend outpatient follow-up visit.    MRI cervical spine with mild central canal stenosis and moderate to severe neuroforaminal stenosis. MRI brain with chronic microvascular change.    Not having pain at this time. No recurrent leg weakness since hospitalization, no urine or stool incontinence.    Has appointment with Spine clinic 1/30/2024   Incontinence?: No  Falls?: No    Review of Systems   Constitutional:  Negative for chills, diaphoresis, fatigue and fever.   Respiratory:  Positive for shortness of breath (Feels has been sorter of breath lately as she is out of nebulized albuterol). Negative for chest tightness.    Cardiovascular:  Negative for chest pain and palpitations.   Gastrointestinal:  Negative for abdominal pain, diarrhea, nausea and vomiting.   Musculoskeletal:  Negative for arthralgias, joint swelling and myalgias.   Skin:  Negative for rash and wound.   Neurological:  Negative for dizziness, light-headedness and headaches.       Objective:      Vitals:    01/14/25 0906   BP: 120/70   BP Location: Right arm   Patient Position: Sitting   Pulse: 100   Resp: 18   SpO2: 96%   Weight: 50.4 kg (111 lb 3.6 oz)   Height: 5' 3" (1.6 m)     Physical Exam  Vitals reviewed.   Constitutional:       General: She is not in acute distress.     Appearance: Normal appearance. She is not ill-appearing.   HENT:      Head: Normocephalic and atraumatic. " "  Eyes:      General:         Right eye: No discharge.         Left eye: No discharge.      Conjunctiva/sclera: Conjunctivae normal.   Cardiovascular:      Rate and Rhythm: Normal rate and regular rhythm.      Pulses: Normal pulses.      Heart sounds: No murmur heard.  Pulmonary:      Effort: Pulmonary effort is normal.      Breath sounds: Normal breath sounds.   Abdominal:      Palpations: Abdomen is soft.      Tenderness: There is no abdominal tenderness.   Musculoskeletal:         General: No deformity.      Cervical back: Neck supple. No rigidity.   Lymphadenopathy:      Cervical: No cervical adenopathy.   Skin:     General: Skin is warm and dry.      Coloration: Skin is not jaundiced.   Neurological:      General: No focal deficit present.      Mental Status: She is alert and oriented to person, place, and time.   Psychiatric:         Mood and Affect: Mood normal.         Behavior: Behavior normal.             Lab Results   Component Value Date     (L) 01/05/2025    K 3.7 01/05/2025     01/05/2025    CO2 21 (L) 01/05/2025    BUN 8 01/05/2025    CREATININE 0.8 01/05/2025    ANIONGAP 12 01/05/2025     No results found for: "HGBA1C"  Lab Results   Component Value Date    BNP 37 11/08/2024    BNP 54 08/02/2023    BNP 59 09/13/2022       Lab Results   Component Value Date    WBC 5.70 01/05/2025    HGB 12.4 01/05/2025    HCT 39.9 01/05/2025    HCT 30 (L) 11/07/2021    PLT 82 (L) 01/05/2025    GRAN 2.2 01/05/2025    GRAN 37.6 (L) 01/05/2025     Lab Results   Component Value Date    CHOL 170 05/11/2021    HDL 55 05/11/2021    LDLCALC 98.2 05/11/2021    TRIG 84 05/11/2021          Current Outpatient Medications:     albuterol (PROVENTIL/VENTOLIN HFA) 90 mcg/actuation inhaler, Inhale 2 puffs into the lungs every 4 (four) hours as needed for Wheezing. Rescue, Disp: 18 g, Rfl: 6    aspirin (ECOTRIN) 81 MG EC tablet, Take 1 tablet (81 mg total) by mouth once daily., Disp: 90 tablet, Rfl: 3    calcium-vitamin D3 " (OS-KAIA 500 + D3) 500 mg-5 mcg (200 unit) per tablet, Take 1 tablet by mouth 2 (two) times daily., Disp: 60 tablet, Rfl: 11    ergocalciferol (ERGOCALCIFEROL) 50,000 unit Cap, Take 1 capsule (50,000 Units total) by mouth every 7 days., Disp: 12 capsule, Rfl: 0    rosuvastatin (CRESTOR) 20 MG tablet, TAKE 1 TABLET(20 MG) BY MOUTH EVERY EVENING, Disp: 90 tablet, Rfl: 1    albuterol (PROVENTIL) 2.5 mg /3 mL (0.083 %) nebulizer solution, Take 3 mLs (2.5 mg total) by nebulization every 4 to 6 hours as needed for Wheezing or Shortness of Breath. Rescue, Disp: 90 mL, Rfl: 1    BEVESPI AEROSPHERE 9-4.8 mcg HFAA, Inhale 2 puffs into the lungs once daily. Controller, Disp: 10.7 g, Rfl: 11    carvediloL (COREG) 6.25 MG tablet, TAKE 1 TABLET(6.25 MG) BY MOUTH TWICE DAILY WITH MEALS, Disp: 180 tablet, Rfl: 3    diclofenac sodium (VOLTAREN) 1 % Gel, Apply 2 g topically 2 (two) times daily. (Patient not taking: Reported on 1/14/2025), Disp: 150 g, Rfl: 3        Assessment:       1. Wheezing    2. COPD, group A, by GOLD 2017 classification    3. Pulmonary hypertension, unspecified           Plan:       Hospital follow-up visit  Compression fracture lumbar spine  COPD, group A, by GOLD 2017 classification  Pulmonary hypertension, unspecified  Wheezing  -     albuterol (PROVENTIL) 2.5 mg /3 mL (0.083 %) nebulizer solution; Take 3 mLs (2.5 mg total) by nebulization every 4 to 6 hours as needed for Wheezing or Shortness of Breath. Rescue  Dispense: 90 mL; Refill: 1  -     albuterol (PROVENTIL) 2.5 mg /3 mL (0.083 %) nebulizer solution; Take 3 mLs (2.5 mg total) by nebulization every 4 to 6 hours as needed for Wheezing or Shortness of Breath. Rescue  Dispense: 90 mL; Refill: 1  -     BEVESPI AEROSPHERE 9-4.8 mcg HFAA; Inhale 2 puffs into the lungs once daily. Controller  Dispense: 10.7 g; Refill: 11  - Smoking cessation advised  - Keep follow-up with Spine clinic 1/30/2024    RTC in 6 months

## 2025-01-16 DIAGNOSIS — E78.5 HYPERLIPIDEMIA, UNSPECIFIED HYPERLIPIDEMIA TYPE: ICD-10-CM

## 2025-01-16 RX ORDER — ROSUVASTATIN CALCIUM 20 MG/1
20 TABLET, COATED ORAL NIGHTLY
Qty: 90 TABLET | Refills: 3 | Status: SHIPPED | OUTPATIENT
Start: 2025-01-16

## 2025-01-16 NOTE — TELEPHONE ENCOUNTER
No care due was identified.  Health [facility] Embedded Care Due Messages. Reference number: [id number].   1/16/2025 1:21:04 PM CST

## 2025-01-30 DIAGNOSIS — I47.10 SVT (SUPRAVENTRICULAR TACHYCARDIA): ICD-10-CM

## 2025-01-30 DIAGNOSIS — I10 BENIGN ESSENTIAL HTN: ICD-10-CM

## 2025-01-30 RX ORDER — CARVEDILOL 6.25 MG/1
6.25 TABLET ORAL 2 TIMES DAILY
Qty: 180 TABLET | Refills: 3 | Status: SHIPPED | OUTPATIENT
Start: 2025-01-30

## 2025-02-13 ENCOUNTER — TELEPHONE (OUTPATIENT)
Dept: PRIMARY CARE CLINIC | Facility: CLINIC | Age: 85
End: 2025-02-13
Payer: MEDICARE

## 2025-02-13 DIAGNOSIS — J44.9 COPD, GROUP A, BY GOLD 2017 CLASSIFICATION: Primary | ICD-10-CM

## 2025-02-13 RX ORDER — FLUTICASONE FUROATE, UMECLIDINIUM BROMIDE AND VILANTEROL TRIFENATATE 100; 62.5; 25 UG/1; UG/1; UG/1
1 POWDER RESPIRATORY (INHALATION) DAILY
Qty: 90 EACH | Refills: 3 | Status: SHIPPED | OUTPATIENT
Start: 2025-02-13

## 2025-02-13 NOTE — TELEPHONE ENCOUNTER
----- Message from Marce sent at 2/13/2025  2:06 PM CST -----  Contact: Jf with [employer] phone [phone]  Pharmacy is calling to clarify an RX.    RX name:  BEVESPI AEROSPHERE 9-4.8 mcg HFAA    What do they need to clarify:      Comments: Needs a prior authorization . Other alternative that does not need a prior authorization is Rx Trelggy Ellipta

## 2025-02-25 ENCOUNTER — EXTERNAL HOME HEALTH (OUTPATIENT)
Dept: HOME HEALTH SERVICES | Facility: HOSPITAL | Age: 85
End: 2025-02-25
Payer: MEDICARE

## 2025-02-26 ENCOUNTER — DOCUMENT SCAN (OUTPATIENT)
Dept: HOME HEALTH SERVICES | Facility: HOSPITAL | Age: 85
End: 2025-02-26
Payer: MEDICARE

## 2025-03-16 ENCOUNTER — HOSPITAL ENCOUNTER (OUTPATIENT)
Facility: HOSPITAL | Age: 85
Discharge: HOME | End: 2025-03-16
Attending: EMERGENCY MEDICINE | Admitting: HOSPITALIST
Payer: MEDICARE

## 2025-03-16 VITALS
TEMPERATURE: 98 F | RESPIRATION RATE: 16 BRPM | DIASTOLIC BLOOD PRESSURE: 82 MMHG | HEART RATE: 72 BPM | BODY MASS INDEX: 19.67 KG/M2 | OXYGEN SATURATION: 99 % | SYSTOLIC BLOOD PRESSURE: 182 MMHG | HEIGHT: 63 IN | WEIGHT: 111 LBS

## 2025-03-16 DIAGNOSIS — J44.9 COPD, GROUP A, BY GOLD 2017 CLASSIFICATION: ICD-10-CM

## 2025-03-16 DIAGNOSIS — J44.1 COPD EXACERBATION: ICD-10-CM

## 2025-03-16 DIAGNOSIS — R06.02 SHORTNESS OF BREATH: ICD-10-CM

## 2025-03-16 PROBLEM — F17.210 CIGARETTE NICOTINE DEPENDENCE WITHOUT COMPLICATION: Chronic | Status: ACTIVE | Noted: 2023-12-14

## 2025-03-16 PROBLEM — E78.5 HLD (HYPERLIPIDEMIA): Chronic | Status: ACTIVE | Noted: 2023-03-20

## 2025-03-16 LAB
ALBUMIN SERPL BCP-MCNC: 3.7 G/DL (ref 3.5–5.2)
ALP SERPL-CCNC: 123 U/L (ref 40–150)
ALT SERPL W/O P-5'-P-CCNC: 11 U/L (ref 10–44)
ANION GAP SERPL CALC-SCNC: 13 MMOL/L (ref 8–16)
ANION GAP SERPL CALC-SCNC: 13 MMOL/L (ref 8–16)
AST SERPL-CCNC: 20 U/L (ref 10–40)
BASOPHILS # BLD AUTO: 0.01 K/UL (ref 0–0.2)
BASOPHILS # BLD AUTO: 0.01 K/UL (ref 0–0.2)
BASOPHILS NFR BLD: 0.2 % (ref 0–1.9)
BASOPHILS NFR BLD: 0.2 % (ref 0–1.9)
BILIRUB SERPL-MCNC: 0.6 MG/DL (ref 0.1–1)
BIPAP: 0
BNP SERPL-MCNC: 35 PG/ML (ref 0–99)
BUN SERPL-MCNC: 12 MG/DL (ref 8–23)
BUN SERPL-MCNC: 13 MG/DL (ref 8–23)
CALCIUM SERPL-MCNC: 8.3 MG/DL (ref 8.7–10.5)
CALCIUM SERPL-MCNC: 8.6 MG/DL (ref 8.7–10.5)
CHLORIDE SERPL-SCNC: 95 MMOL/L (ref 95–110)
CHLORIDE SERPL-SCNC: 96 MMOL/L (ref 95–110)
CO2 SERPL-SCNC: 29 MMOL/L (ref 23–29)
CO2 SERPL-SCNC: 29 MMOL/L (ref 23–29)
CREAT SERPL-MCNC: 0.9 MG/DL (ref 0.5–1.4)
CREAT SERPL-MCNC: 0.9 MG/DL (ref 0.5–1.4)
DIFFERENTIAL METHOD BLD: ABNORMAL
DIFFERENTIAL METHOD BLD: ABNORMAL
EOSINOPHIL # BLD AUTO: 0 K/UL (ref 0–0.5)
EOSINOPHIL # BLD AUTO: 0 K/UL (ref 0–0.5)
EOSINOPHIL NFR BLD: 0 % (ref 0–8)
EOSINOPHIL NFR BLD: 0 % (ref 0–8)
ERYTHROCYTE [DISTWIDTH] IN BLOOD BY AUTOMATED COUNT: 12.4 % (ref 11.5–14.5)
ERYTHROCYTE [DISTWIDTH] IN BLOOD BY AUTOMATED COUNT: 12.5 % (ref 11.5–14.5)
EST. GFR  (NO RACE VARIABLE): >60 ML/MIN/1.73 M^2
EST. GFR  (NO RACE VARIABLE): >60 ML/MIN/1.73 M^2
ETHANOL SERPL-MCNC: <10 MG/DL
GLUCOSE SERPL-MCNC: 121 MG/DL (ref 70–110)
GLUCOSE SERPL-MCNC: 128 MG/DL (ref 70–110)
HCT VFR BLD AUTO: 37.7 % (ref 37–48.5)
HCT VFR BLD AUTO: 39.2 % (ref 37–48.5)
HCV AB SERPL QL IA: NORMAL
HGB BLD-MCNC: 12.9 G/DL (ref 12–16)
HGB BLD-MCNC: 13.4 G/DL (ref 12–16)
HIV 1+2 AB+HIV1 P24 AG SERPL QL IA: NORMAL
IMM GRANULOCYTES # BLD AUTO: 0.03 K/UL (ref 0–0.04)
IMM GRANULOCYTES # BLD AUTO: 0.05 K/UL (ref 0–0.04)
IMM GRANULOCYTES NFR BLD AUTO: 0.5 % (ref 0–0.5)
IMM GRANULOCYTES NFR BLD AUTO: 0.8 % (ref 0–0.5)
INFLUENZA A, MOLECULAR: NEGATIVE
INFLUENZA B, MOLECULAR: NEGATIVE
LYMPHOCYTES # BLD AUTO: 1.6 K/UL (ref 1–4.8)
LYMPHOCYTES # BLD AUTO: 2 K/UL (ref 1–4.8)
LYMPHOCYTES NFR BLD: 28.5 % (ref 18–48)
LYMPHOCYTES NFR BLD: 32.1 % (ref 18–48)
MAGNESIUM SERPL-MCNC: 1.6 MG/DL (ref 1.6–2.6)
MCH RBC QN AUTO: 30.6 PG (ref 27–31)
MCH RBC QN AUTO: 32.7 PG (ref 27–31)
MCHC RBC AUTO-ENTMCNC: 32.9 G/DL (ref 32–36)
MCHC RBC AUTO-ENTMCNC: 35.5 G/DL (ref 32–36)
MCV RBC AUTO: 92 FL (ref 82–98)
MCV RBC AUTO: 93 FL (ref 82–98)
MONOCYTES # BLD AUTO: 1.8 K/UL (ref 0.3–1)
MONOCYTES # BLD AUTO: 1.9 K/UL (ref 0.3–1)
MONOCYTES NFR BLD: 28.6 % (ref 4–15)
MONOCYTES NFR BLD: 32.5 % (ref 4–15)
NEUTROPHILS # BLD AUTO: 2.2 K/UL (ref 1.8–7.7)
NEUTROPHILS # BLD AUTO: 2.4 K/UL (ref 1.8–7.7)
NEUTROPHILS NFR BLD: 38.3 % (ref 38–73)
NEUTROPHILS NFR BLD: 38.3 % (ref 38–73)
NRBC BLD-RTO: 0 /100 WBC
NRBC BLD-RTO: 0 /100 WBC
OHS QRS DURATION: 104 MS
OHS QTC CALCULATION: 507 MS
PCO2 BLDA: 42.6 MMHG
PH SMN: 7.53 [PH]
PHOSPHATE SERPL-MCNC: 2 MG/DL (ref 2.7–4.5)
PLATELET # BLD AUTO: 154 K/UL (ref 150–450)
PLATELET # BLD AUTO: 155 K/UL (ref 150–450)
PMV BLD AUTO: 11.6 FL (ref 9.2–12.9)
PMV BLD AUTO: 12.1 FL (ref 9.2–12.9)
PO2 BLDA: 49.7 MMHG
POC BASE DEFICIT: 11.5 MMOL/L
POC HCO3: 35.1 MMOL/L
POC PERFORMED BY: NORMAL
POTASSIUM SERPL-SCNC: 3.2 MMOL/L (ref 3.5–5.1)
POTASSIUM SERPL-SCNC: 3.6 MMOL/L (ref 3.5–5.1)
PROT SERPL-MCNC: 6.7 G/DL (ref 6–8.4)
RBC # BLD AUTO: 4.1 M/UL (ref 4–5.4)
RBC # BLD AUTO: 4.21 M/UL (ref 4–5.4)
SARS-COV-2 RDRP RESP QL NAA+PROBE: NEGATIVE
SODIUM SERPL-SCNC: 137 MMOL/L (ref 136–145)
SODIUM SERPL-SCNC: 138 MMOL/L (ref 136–145)
SPECIMEN SOURCE: NORMAL
SPECIMEN SOURCE: NORMAL
TROPONIN I SERPL DL<=0.01 NG/ML-MCNC: 4 NG/L (ref 0–14)
TROPONIN I SERPL DL<=0.01 NG/ML-MCNC: 4 NG/L (ref 0–14)
WBC # BLD AUTO: 5.72 K/UL (ref 3.9–12.7)
WBC # BLD AUTO: 6.19 K/UL (ref 3.9–12.7)

## 2025-03-16 PROCEDURE — 63600175 PHARM REV CODE 636 W HCPCS: Mod: HCNC | Performed by: HOSPITALIST

## 2025-03-16 PROCEDURE — 87502 INFLUENZA DNA AMP PROBE: CPT | Mod: HCNC

## 2025-03-16 PROCEDURE — 85025 COMPLETE CBC W/AUTO DIFF WBC: CPT | Mod: HCNC

## 2025-03-16 PROCEDURE — 87635 SARS-COV-2 COVID-19 AMP PRB: CPT | Mod: HCNC | Performed by: EMERGENCY MEDICINE

## 2025-03-16 PROCEDURE — G0378 HOSPITAL OBSERVATION PER HR: HCPCS | Mod: HCNC

## 2025-03-16 PROCEDURE — 25000242 PHARM REV CODE 250 ALT 637 W/ HCPCS: Mod: HCNC

## 2025-03-16 PROCEDURE — 94640 AIRWAY INHALATION TREATMENT: CPT | Mod: HCNC,XB

## 2025-03-16 PROCEDURE — 85025 COMPLETE CBC W/AUTO DIFF WBC: CPT | Mod: 91,HCNC

## 2025-03-16 PROCEDURE — 25000003 PHARM REV CODE 250: Mod: HCNC

## 2025-03-16 PROCEDURE — 83880 ASSAY OF NATRIURETIC PEPTIDE: CPT | Mod: HCNC

## 2025-03-16 PROCEDURE — 84484 ASSAY OF TROPONIN QUANT: CPT | Mod: 91,HCNC

## 2025-03-16 PROCEDURE — 25000003 PHARM REV CODE 250: Mod: HCNC | Performed by: HOSPITALIST

## 2025-03-16 PROCEDURE — 94761 N-INVAS EAR/PLS OXIMETRY MLT: CPT | Mod: HCNC

## 2025-03-16 PROCEDURE — 82800 BLOOD PH: CPT | Mod: HCNC

## 2025-03-16 PROCEDURE — 96375 TX/PRO/DX INJ NEW DRUG ADDON: CPT | Mod: HCNC

## 2025-03-16 PROCEDURE — 63600175 PHARM REV CODE 636 W HCPCS: Mod: HCNC

## 2025-03-16 PROCEDURE — 99900035 HC TECH TIME PER 15 MIN (STAT): Mod: HCNC

## 2025-03-16 PROCEDURE — 96365 THER/PROPH/DIAG IV INF INIT: CPT | Mod: HCNC

## 2025-03-16 PROCEDURE — 80048 BASIC METABOLIC PNL TOTAL CA: CPT | Mod: HCNC,XB

## 2025-03-16 PROCEDURE — 80053 COMPREHEN METABOLIC PANEL: CPT | Mod: HCNC

## 2025-03-16 PROCEDURE — 83735 ASSAY OF MAGNESIUM: CPT | Mod: HCNC

## 2025-03-16 PROCEDURE — 93010 ELECTROCARDIOGRAM REPORT: CPT | Mod: HCNC,,, | Performed by: INTERNAL MEDICINE

## 2025-03-16 PROCEDURE — 63700000 PHARM REV CODE 250 ALT 637 W/O HCPCS: Mod: HCNC

## 2025-03-16 PROCEDURE — 86803 HEPATITIS C AB TEST: CPT | Mod: HCNC | Performed by: PHYSICIAN ASSISTANT

## 2025-03-16 PROCEDURE — 82077 ASSAY SPEC XCP UR&BREATH IA: CPT | Mod: HCNC

## 2025-03-16 PROCEDURE — 99285 EMERGENCY DEPT VISIT HI MDM: CPT | Mod: 25,HCNC

## 2025-03-16 PROCEDURE — 84100 ASSAY OF PHOSPHORUS: CPT | Mod: HCNC

## 2025-03-16 PROCEDURE — 93005 ELECTROCARDIOGRAM TRACING: CPT | Mod: HCNC

## 2025-03-16 PROCEDURE — 87389 HIV-1 AG W/HIV-1&-2 AB AG IA: CPT | Mod: HCNC | Performed by: PHYSICIAN ASSISTANT

## 2025-03-16 RX ORDER — PSYLLIUM HUSK 0.4 G
1 CAPSULE ORAL 2 TIMES DAILY
Status: DISCONTINUED | OUTPATIENT
Start: 2025-03-16 | End: 2025-03-16 | Stop reason: HOSPADM

## 2025-03-16 RX ORDER — TALC
6 POWDER (GRAM) TOPICAL NIGHTLY PRN
Status: DISCONTINUED | OUTPATIENT
Start: 2025-03-16 | End: 2025-03-16 | Stop reason: HOSPADM

## 2025-03-16 RX ORDER — MAGNESIUM SULFATE 1 G/100ML
1 INJECTION INTRAVENOUS ONCE
Status: COMPLETED | OUTPATIENT
Start: 2025-03-16 | End: 2025-03-16

## 2025-03-16 RX ORDER — PREDNISONE 20 MG/1
40 TABLET ORAL DAILY
Status: DISCONTINUED | OUTPATIENT
Start: 2025-03-17 | End: 2025-03-16 | Stop reason: HOSPADM

## 2025-03-16 RX ORDER — SODIUM,POTASSIUM PHOSPHATES 280-250MG
2 POWDER IN PACKET (EA) ORAL ONCE
Status: COMPLETED | OUTPATIENT
Start: 2025-03-16 | End: 2025-03-16

## 2025-03-16 RX ORDER — IPRATROPIUM BROMIDE AND ALBUTEROL SULFATE 2.5; .5 MG/3ML; MG/3ML
3 SOLUTION RESPIRATORY (INHALATION)
Status: DISCONTINUED | OUTPATIENT
Start: 2025-03-16 | End: 2025-03-16 | Stop reason: HOSPADM

## 2025-03-16 RX ORDER — SODIUM CHLORIDE 0.9 % (FLUSH) 0.9 %
3 SYRINGE (ML) INJECTION
Status: DISCONTINUED | OUTPATIENT
Start: 2025-03-16 | End: 2025-03-16 | Stop reason: HOSPADM

## 2025-03-16 RX ORDER — PREDNISONE 20 MG/1
40 TABLET ORAL DAILY
Qty: 8 TABLET | Refills: 0 | Status: SHIPPED | OUTPATIENT
Start: 2025-03-17 | End: 2025-03-21

## 2025-03-16 RX ORDER — IPRATROPIUM BROMIDE AND ALBUTEROL SULFATE 2.5; .5 MG/3ML; MG/3ML
3 SOLUTION RESPIRATORY (INHALATION) EVERY 4 HOURS PRN
Qty: 120 ML | Refills: 11 | Status: SHIPPED | OUTPATIENT
Start: 2025-03-16 | End: 2026-03-16

## 2025-03-16 RX ORDER — ATORVASTATIN CALCIUM 40 MG/1
80 TABLET, FILM COATED ORAL DAILY
Status: DISCONTINUED | OUTPATIENT
Start: 2025-03-16 | End: 2025-03-16 | Stop reason: HOSPADM

## 2025-03-16 RX ORDER — CEFTRIAXONE 1 G/1
1 INJECTION, POWDER, FOR SOLUTION INTRAMUSCULAR; INTRAVENOUS
Status: DISCONTINUED | OUTPATIENT
Start: 2025-03-16 | End: 2025-03-16 | Stop reason: HOSPADM

## 2025-03-16 RX ORDER — PREDNISONE 20 MG/1
60 TABLET ORAL
Status: COMPLETED | OUTPATIENT
Start: 2025-03-16 | End: 2025-03-16

## 2025-03-16 RX ORDER — CARVEDILOL 6.25 MG/1
6.25 TABLET ORAL 2 TIMES DAILY
Status: DISCONTINUED | OUTPATIENT
Start: 2025-03-16 | End: 2025-03-16 | Stop reason: HOSPADM

## 2025-03-16 RX ORDER — IPRATROPIUM BROMIDE AND ALBUTEROL SULFATE 2.5; .5 MG/3ML; MG/3ML
3 SOLUTION RESPIRATORY (INHALATION)
Status: COMPLETED | OUTPATIENT
Start: 2025-03-16 | End: 2025-03-16

## 2025-03-16 RX ORDER — SODIUM CHLORIDE 0.9 % (FLUSH) 0.9 %
10 SYRINGE (ML) INJECTION
Status: DISCONTINUED | OUTPATIENT
Start: 2025-03-16 | End: 2025-03-16 | Stop reason: HOSPADM

## 2025-03-16 RX ORDER — FLUTICASONE FUROATE, UMECLIDINIUM BROMIDE AND VILANTEROL TRIFENATATE 100; 62.5; 25 UG/1; UG/1; UG/1
1 POWDER RESPIRATORY (INHALATION) DAILY
Qty: 90 EACH | Refills: 3 | Status: SHIPPED | OUTPATIENT
Start: 2025-03-16

## 2025-03-16 RX ORDER — IBUPROFEN 200 MG
1 TABLET ORAL DAILY
Status: DISCONTINUED | OUTPATIENT
Start: 2025-03-16 | End: 2025-03-16 | Stop reason: HOSPADM

## 2025-03-16 RX ORDER — POTASSIUM CHLORIDE 750 MG/1
30 CAPSULE, EXTENDED RELEASE ORAL ONCE
Status: COMPLETED | OUTPATIENT
Start: 2025-03-16 | End: 2025-03-16

## 2025-03-16 RX ORDER — AZITHROMYCIN 250 MG/1
500 TABLET, FILM COATED ORAL EVERY 24 HOURS
Status: DISCONTINUED | OUTPATIENT
Start: 2025-03-16 | End: 2025-03-16 | Stop reason: HOSPADM

## 2025-03-16 RX ADMIN — IPRATROPIUM BROMIDE AND ALBUTEROL SULFATE 3 ML: 2.5; .5 SOLUTION RESPIRATORY (INHALATION) at 01:03

## 2025-03-16 RX ADMIN — CEFTRIAXONE 1 G: 1 INJECTION, POWDER, FOR SOLUTION INTRAMUSCULAR; INTRAVENOUS at 03:03

## 2025-03-16 RX ADMIN — Medication 1 TABLET: at 09:03

## 2025-03-16 RX ADMIN — CARVEDILOL 6.25 MG: 6.25 TABLET, FILM COATED ORAL at 09:03

## 2025-03-16 RX ADMIN — MAGNESIUM SULFATE 1 G: 500 INJECTION, SOLUTION INTRAMUSCULAR; INTRAVENOUS at 09:03

## 2025-03-16 RX ADMIN — IPRATROPIUM BROMIDE AND ALBUTEROL SULFATE 3 ML: 2.5; .5 SOLUTION RESPIRATORY (INHALATION) at 08:03

## 2025-03-16 RX ADMIN — IPRATROPIUM BROMIDE AND ALBUTEROL SULFATE 3 ML: 2.5; .5 SOLUTION RESPIRATORY (INHALATION) at 12:03

## 2025-03-16 RX ADMIN — POTASSIUM CHLORIDE 30 MEQ: 750 CAPSULE, EXTENDED RELEASE ORAL at 09:03

## 2025-03-16 RX ADMIN — PREDNISONE 60 MG: 20 TABLET ORAL at 03:03

## 2025-03-16 RX ADMIN — ATORVASTATIN CALCIUM 80 MG: 40 TABLET, FILM COATED ORAL at 09:03

## 2025-03-16 RX ADMIN — AZITHROMYCIN DIHYDRATE 500 MG: 250 TABLET ORAL at 09:03

## 2025-03-16 RX ADMIN — POTASSIUM & SODIUM PHOSPHATES POWDER PACK 280-160-250 MG 2 PACKET: 280-160-250 PACK at 09:03

## 2025-03-16 NOTE — ASSESSMENT & PLAN NOTE
Patient's blood pressure range in the last 24 hours was: BP  Min: 127/73  Max: 182/82.The patient's inpatient anti-hypertensive regimen is listed below:  Current Antihypertensives  carvediloL tablet 6.25 mg, 2 times daily, Oral    Plan  - BP is controlled, no changes needed to their regimen

## 2025-03-16 NOTE — DISCHARGE SUMMARY
[facility] - Emergency Dept  Hospital Medicine  Discharge Summary      Patient Name: Sangita Logan  MRN: [id number]  LUCAS: [id number]  Patient Class: OP- Observation  Admission Date: 3/16/2025  Hospital Length of Stay: 0 days  Discharge Date and Time:  03/16/2025 9:31 AM  Attending Physician: Christen Adams MD   Discharging Provider: Christen Adams MD  Primary Care Provider: Danish Pereira MD  Hospital Medicine Team: [facility] HOSP MED O Christen Adams MD  Primary Care Team: [facility] HOSP MED O    HPI:   Sangita Logan is an 84-year-old female with COPD, nicotine dependence, osteoporosis, HTN, SVT, and alcohol use who presented to [facility] ED 3/16/25 for shortness of breath, a fluttering sensation in her chest, and fatigue which started yesterday morning. She reports using multiple nebulized breathing treatments at home for relief. Her main concern during my interview is long she will need to stay in the hospital. She is not inrespiratory distress. Reports receiving ASA from EMS. Denies headache, diaphoresis, chest pain, abdominal pain, nausea, emesis, diarrhea, and calf pain.    In the ED, /75 HR 82 RR 20 sats adequate on ambient air, afebrile. Labs incuding VBG grossly unremarkable. Trop and BNP wnl. Ethanol < 10. Flu and COVID negative. EKG showed NSR. CXR showed hyperinflated lungs. Received a nebulizer treatment and prednisone.    The patient was admitted to Hospital Medicine for further workup and management.      * No surgery found *      Hospital Course:   Copd exacerbation improved with COPD pathway and medications. No arrhythmias noted on telemetry. Advised patient that albuterol can cause tachycardia and to exercise caution in the amount used. Pt is not interested in quitting smoking at this time. F/U with PCP and referred to Pulmonology. Rx refills were sent.      Goals of Care Treatment Preferences:  Code Status: Full Code         Consults:   Consults (From admission, onward)          Status Ordering  Provider     COPD PharmD consult  Once        Provider:  (Not yet assigned)    Acknowledged GRETCHEN OLIVA            Assessment & Plan  COPD exacerbation  Patient's COPD is controlled currently.  Patient is currently on COPD Pathway. Continue scheduled inhalers Steroids, Antibiotics, and Supplemental oxygen and monitor respiratory status closely.  Group A, by GOLD 2017 classification. Last visit 12/13/2023 with Dr. De La Torre.  PFTs 12/13/23  FVC: 1.97 (87.8 % predicted), FEV1: 1.14 (67.5 % predicted), FEV1/FVC:  58, TLC: 3.76 (81.6 % predicted), DLCO: 6.64 (36.9 % predicted)      Plan  - CTX and azithromycin  - Duo-nebs q6h PRN  - Prednisone 40mg daily for 5 days  - Wean supplemental O2 with goal SpO2 88-92%  - Emphasized importance of smoking cessation  - Needs Pulmonology follow-up as last visit was in 2023  Shortness of breath  See COPD exacerbation.    HTN (hypertension)  Patient's blood pressure range in the last 24 hours was: BP  Min: 127/73  Max: 182/82.The patient's inpatient anti-hypertensive regimen is listed below:  Current Antihypertensives  carvediloL tablet 6.25 mg, 2 times daily, Oral    Plan  - BP is controlled, no changes needed to their regimen  HLD (hyperlipidemia)  Chronic and stable.  Takes Crestor 20mg.    Plan  - Atorvastatin 80mg while inpatient  Cigarette nicotine dependence without complication  Dangers of cigarette smoking were reviewed with patient in detail. Patient was Counseled for 3-10 minutes. Nicotine replacement options were discussed. Nicotine replacement was discussed- prescribed  Final Active Diagnoses:    Diagnosis Date Noted POA    PRINCIPAL PROBLEM:  COPD exacerbation [J44.1] 02/27/2019 Yes    Cigarette nicotine dependence without complication [F17.210] 12/14/2023 Yes     Chronic    HLD (hyperlipidemia) [E78.5] 03/20/2023 Yes     Chronic    HTN (hypertension) [I10] 10/21/2021 Yes     Chronic    Shortness of breath [R06.02]  Yes      Problems Resolved During this Admission:        Discharged Condition: stable    Disposition: Home or Self Care    Follow Up:   Follow-up Information       Danish Pereira MD. Schedule an appointment as soon as possible for a visit.    Specialties: Family Medicine, Hospitalist  Contact information:  [address] [place] [address] [phone]               [facility] PULMONARY MEDICINE. Schedule an appointment as soon as possible for a visit.    Specialty: Pulmonology  Contact information:  [address]  [facility] [address]  [phone]                         Patient Instructions:      Ambulatory referral/consult to Pulmonology   Standing Status: Future   Referral Priority: Routine Referral Type: Consultation   Referral Reason: Specialty Services Required   Requested Specialty: Pulmonary Disease   Number of Visits Requested: 1     Diet Adult Regular     Notify your health care provider if you experience any of the following:  difficulty breathing or increased cough     Notify your health care provider if you experience any of the following:  increased confusion or weakness     Notify your health care provider if you experience any of the following:  persistent dizziness, light-headedness, or visual disturbances     Activity as tolerated       Significant Diagnostic Studies: N/A    Pending Diagnostic Studies:       None           Medications:  Reconciled Home Medications:      Medication List        START taking these medications      albuterol-ipratropium 2.5 mg-0.5 mg/3 mL nebulizer solution  Commonly known as: DUO-NEB  Take 3 mLs by nebulization every 4 (four) hours as needed for Wheezing or Shortness of Breath. Rescue     predniSONE 20 MG tablet  Commonly known as: DELTASONE  Take 2 tablets (40 mg total) by mouth once daily. for 4 days  Start taking on: March 17, 2025            CONTINUE taking these medications      * albuterol 90 mcg/actuation inhaler  Commonly known as: PROVENTIL/VENTOLIN HFA  Inhale 2 puffs into the lungs  every 4 (four) hours as needed for Wheezing. Rescue     * albuterol 2.5 mg /3 mL (0.083 %) nebulizer solution  Commonly known as: PROVENTIL  Take 3 mLs (2.5 mg total) by nebulization every 4 to 6 hours as needed for Wheezing or Shortness of Breath. Rescue     aspirin 81 MG EC tablet  Commonly known as: ECOTRIN  Take 1 tablet (81 mg total) by mouth once daily.     carvediloL 6.25 MG tablet  Commonly known as: COREG  Take 1 tablet (6.25 mg total) by mouth 2 (two) times daily.     ergocalciferol 50,000 unit Cap  Commonly known as: ERGOCALCIFEROL  Take 1 capsule (50,000 Units total) by mouth every 7 days.     OYSTER SHELL CALCIUM-VIT D3 500 mg-5 mcg (200 unit) per tablet  Generic drug: calcium-vitamin D3  Take 1 tablet by mouth 2 (two) times daily.     rosuvastatin 20 MG tablet  Commonly known as: CRESTOR  Take 1 tablet (20 mg total) by mouth every evening.     TRELEGY ELLIPTA 100-62.5-25 mcg Dsdv  Generic drug: fluticasone-umeclidin-vilanter  Inhale 1 puff into the lungs once daily.           * This list has 2 medication(s) that are the same as other medications prescribed for you. Read the directions carefully, and ask your doctor or other care provider to review them with you.                ASK your doctor about these medications      diclofenac sodium 1 % Gel  Commonly known as: VOLTAREN  Apply 2 g topically 2 (two) times daily.              Indwelling Lines/Drains at time of discharge:   Lines/Drains/Airways       None                   Time spent on the discharge of patient: 25 minutes of time spent on discharge, including examining the patient, providing discharge instructions, arranging follow up, and documentation.            Christen Adams MD  Department of Hospital Medicine  [facility] - Emergency Dept

## 2025-03-16 NOTE — HOSPITAL COURSE
Copd exacerbation improved with COPD pathway and medications. No arrhythmias noted on telemetry. Advised patient that albuterol can cause tachycardia and to exercise caution in the amount used. Pt is not interested in quitting smoking at this time. F/U with PCP and referred to Pulmonology. Rx refills were sent.

## 2025-03-16 NOTE — H&P
"[facility] - Emergency Dept  [facility] Medicine  History & Physical    Patient Name: Sangita Logan  MRN: [id number]  Patient Class: OP- Observation  Admission Date: 3/16/2025  Attending Physician: Christen Adams MD   Primary Care Provider: Danish Pereira MD         Patient information was obtained from patient, past medical records, and ER records.     Subjective:     Principal Problem:COPD exacerbation    Chief Complaint:   Chief Complaint   Patient presents with    Palpitations     EMS from home. Chest "fluttering" and MANRIQUE        HPI: Sangita Logan is an 84-year-old female with COPD, nicotine dependence, osteoporosis, HTN, SVT, and alcohol use who presented to [facility] ED 3/16/25 for shortness of breath, a fluttering sensation in her chest, and fatigue which started yesterday morning. She reports using multiple nebulized breathing treatments at home for relief. Her main concern during my interview is long she will need to stay in the hospital. She is not inrespiratory distress. Reports receiving ASA from EMS. Denies headache, diaphoresis, chest pain, abdominal pain, nausea, emesis, diarrhea, and calf pain.    In the ED, /75 HR 82 RR 20 sats adequate on ambient air, afebrile. Labs incuding VBG grossly unremarkable. Trop and BNP wnl. Ethanol < 10. Flu and COVID negative. EKG showed NSR. CXR showed hyperinflated lungs. Received a nebulizer treatment and prednisone.    The patient was admitted to Hospital Medicine for further workup and management.      Past Medical History:   Diagnosis Date    COPD (chronic obstructive pulmonary disease)     HTN (hypertension) 10/21/2021    Osteoporosis     Sinus disease        Past Surgical History:   Procedure Laterality Date    AUGMENTATION OF BREAST Bilateral     received at age 23, removed at age 76    Breast implants removal  2012    SINUS SURGERY      TONSILLECTOMY      Age 1       Review of patient's allergies indicates:   Allergen Reactions    No known drug " allergies        No current facility-administered medications on file prior to encounter.     Current Outpatient Medications on File Prior to Encounter   Medication Sig    albuterol (PROVENTIL) 2.5 mg /3 mL (0.083 %) nebulizer solution Take 3 mLs (2.5 mg total) by nebulization every 4 to 6 hours as needed for Wheezing or Shortness of Breath. Rescue    albuterol (PROVENTIL/VENTOLIN HFA) 90 mcg/actuation inhaler Inhale 2 puffs into the lungs every 4 (four) hours as needed for Wheezing. Rescue    aspirin (ECOTRIN) 81 MG EC tablet Take 1 tablet (81 mg total) by mouth once daily.    calcium-vitamin D3 (OS-KAIA 500 + D3) 500 mg-5 mcg (200 unit) per tablet Take 1 tablet by mouth 2 (two) times daily.    carvediloL (COREG) 6.25 MG tablet Take 1 tablet (6.25 mg total) by mouth 2 (two) times daily.    diclofenac sodium (VOLTAREN) 1 % Gel Apply 2 g topically 2 (two) times daily. (Patient not taking: Reported on 1/14/2025)    ergocalciferol (ERGOCALCIFEROL) 50,000 unit Cap Take 1 capsule (50,000 Units total) by mouth every 7 days.    fluticasone-umeclidin-vilanter (TRELEGY ELLIPTA) 100-62.5-25 mcg DsDv Inhale 1 puff into the lungs once daily.    rosuvastatin (CRESTOR) 20 MG tablet Take 1 tablet (20 mg total) by mouth every evening.     Family History       Problem Relation (Age of Onset)    Breast cancer Sister    No Known Problems Mother, Father          Tobacco Use    Smoking status: Every Day     Current packs/day: 0.50     Average packs/day: 0.5 packs/day for 30.0 years (15.0 ttl pk-yrs)     Types: Cigarettes     Passive exposure: Current    Smokeless tobacco: Never    Tobacco comments:     2 packs a week   Substance and Sexual Activity    Alcohol use: Yes     Alcohol/week: 3.0 standard drinks of alcohol     Types: 3 Glasses of wine per week     Comment: 4 times weekly    Drug use: Not Currently     Types: Marijuana    Sexual activity: Not Currently     Review of Systems   Constitutional:  Positive for fatigue. Negative for  chills and fever.   Eyes:  Negative for visual disturbance.   Respiratory:  Positive for shortness of breath. Negative for cough and wheezing.    Cardiovascular:  Positive for palpitations. Negative for chest pain and leg swelling.   Gastrointestinal:  Negative for abdominal distention, abdominal pain, diarrhea, nausea and vomiting.   Genitourinary:  Negative for dysuria.   Musculoskeletal:  Negative for arthralgias and myalgias.   Skin:  Negative for color change.   Neurological:  Negative for light-headedness and headaches.   Psychiatric/Behavioral:  Negative for confusion and decreased concentration.      Objective:     Vital Signs (Most Recent):  Temp: 97.4 °F (36.3 °C) (03/16/25 0034)  Pulse: 76 (03/16/25 0304)  Resp: 20 (03/16/25 0304)  BP: (!) 155/88 (03/16/25 0304)  SpO2: 96 % (03/16/25 0304) Vital Signs (24h Range):  Temp:  [97.4 °F (36.3 °C)] 97.4 °F (36.3 °C)  Pulse:  [76-86] 76  Resp:  [20-35] 20  SpO2:  [95 %-100 %] 96 %  BP: (127-160)/(73-92) 155/88     Weight: 50.3 kg (111 lb)  Body mass index is 19.66 kg/m².     Physical Exam  Vitals reviewed.   Constitutional:       General: She is not in acute distress.     Appearance: Normal appearance. She is not ill-appearing.   HENT:      Head: Normocephalic and atraumatic.      Mouth/Throat:      Mouth: Mucous membranes are moist.      Pharynx: Oropharynx is clear.   Eyes:      General: No scleral icterus.     Conjunctiva/sclera: Conjunctivae normal.   Cardiovascular:      Rate and Rhythm: Normal rate and regular rhythm.      Pulses: Normal pulses.      Heart sounds: No murmur heard.  Pulmonary:      Effort: Pulmonary effort is normal.      Breath sounds: Wheezing (expiratory, mild (bilateral)) present.   Abdominal:      General: Bowel sounds are normal. There is no distension.      Palpations: Abdomen is soft.      Tenderness: There is no abdominal tenderness. There is no guarding.   Musculoskeletal:      Right lower leg: No edema.      Left lower leg: No  edema.   Skin:     General: Skin is warm and dry.   Neurological:      General: No focal deficit present.      Mental Status: She is alert and oriented to person, place, and time.   Psychiatric:         Mood and Affect: Mood normal.         Behavior: Behavior normal.                Significant Labs: All pertinent labs within the past 24 hours have been reviewed.  CBC:   Recent Labs   Lab 03/16/25 0046   WBC 6.19   HGB 13.4   HCT 37.7        CMP:   Recent Labs   Lab 03/16/25 0046      K 3.6   CL 95   CO2 29   *   BUN 13   CREATININE 0.9   CALCIUM 8.6*   PROT 6.7   ALBUMIN 3.7   BILITOT 0.6   ALKPHOS 123   AST 20   ALT 11   ANIONGAP 13     Troponin:   Recent Labs   Lab 03/16/25 0046   TROPONINIHS 4       Significant Imaging: I have reviewed all pertinent imaging results/findings within the past 24 hours.  CXR: I have reviewed all pertinent results/findings within the past 24 hours and my personal findings are:  hyperinflated lung fields  Assessment/Plan:     * COPD exacerbation  Patient's COPD is controlled currently.  Patient is currently on COPD Pathway. Continue scheduled inhalers Steroids, Antibiotics, and Supplemental oxygen and monitor respiratory status closely.  Group A, by GOLD 2017 classification. Last visit 12/13/2023 with Dr. De La Torre.  PFTs 12/13/23  FVC: 1.97 (87.8 % predicted), FEV1: 1.14 (67.5 % predicted), FEV1/FVC:  58, TLC: 3.76 (81.6 % predicted), DLCO: 6.64 (36.9 % predicted)      Plan  - CTX and azithromycin  - Duo-nebs q6h PRN  - Prednisone 40mg daily for 5 days  - Wean supplemental O2 with goal SpO2 88-92%  - Emphasized importance of smoking cessation  - Needs Pulmonology follow-up as last visit was in 2023    Cigarette nicotine dependence without complication  Dangers of cigarette smoking were reviewed with patient in detail. Patient was Counseled for 3-10 minutes. Nicotine replacement options were discussed. Nicotine replacement was discussed- prescribed    HLD  (hyperlipidemia)  Chronic and stable.  Takes Crestor 20mg.    Plan  - Atorvastatin 80mg while inpatient    HTN (hypertension)  Patient's blood pressure range in the last 24 hours was: BP  Min: 127/73  Max: 160/75.The patient's inpatient anti-hypertensive regimen is listed below:  Current Antihypertensives  carvediloL tablet 6.25 mg, 2 times daily, Oral    Plan  - BP is controlled, no changes needed to their regimen    Shortness of breath  See COPD exacerbation.        VTE Risk Mitigation (From admission, onward)           Ordered     IP VTE HIGH RISK PATIENT  Once         03/16/25 0245     Place sequential compression device  Until discontinued         03/16/25 0245                       On 03/16/2025, patient should be placed in hospital observation services under my care.             Chris Torres MD  Department of Hospital Medicine  [facility] - Emergency Dept

## 2025-03-16 NOTE — ED NOTES
"Sangita Logan, an 84 y.o. female presents to the ED    Chief Complaint   Patient presents with    Palpitations     EMS from home. Chest "fluttering" and MANRIQUE     Review of patient's allergies indicates:   Allergen Reactions    No known drug allergies      Past Medical History:   Diagnosis Date    COPD (chronic obstructive pulmonary disease)     HTN (hypertension) 10/21/2021    Osteoporosis     Sinus disease        "

## 2025-03-16 NOTE — HPI
Sangita Logan is an 84-year-old female with COPD, nicotine dependence, osteoporosis, HTN, SVT, and alcohol use who presented to [facility] [place] ED 3/16/25 for shortness of breath, a fluttering sensation in her chest, and fatigue which started yesterday morning. She reports using multiple nebulized breathing treatments at home for relief. Her main concern during my interview is long she will need to stay in the hospital. She is not inrespiratory distress. Reports receiving ASA from EMS. Denies headache, diaphoresis, chest pain, abdominal pain, nausea, emesis, diarrhea, and calf pain.    In the ED, /75 HR 82 RR 20 sats adequate on ambient air, afebrile. Labs incuding VBG grossly unremarkable. Trop and BNP wnl. Ethanol < 10. Flu and COVID negative. EKG showed NSR. CXR showed hyperinflated lungs. Received a nebulizer treatment and prednisone.    The patient was admitted to Hospital Medicine for further workup and management.

## 2025-03-16 NOTE — PHARMACY MED REC
"  Admission Medication History     The home medication history was taken by Sabino Morrison.    You may go to "Admission" then "Reconcile Home Medications" tabs to review and/or act upon these items.     The home medication list has been updated by the Pharmacy department.   Please read ALL comments highlighted in yellow.   Please address this information as you see fit.    Feel free to contact us if you have any questions or require assistance.      The medications listed below were removed from the home medication list. Please reorder if appropriate:  Patient reports no longer taking the following medication(s):  ASPIRIN 81 MG  DICLOFENAC SODIUM 1 % GEL   ERGOCALCIFEROL 50,000 UNITS    Medications listed below were obtained from: Patient/family and Analytic software- [employer]  Current Outpatient Medications on File Prior to Encounter   Medication Sig    albuterol (PROVENTIL) 2.5 mg /3 mL (0.083 %) nebulizer solution   Take 3 mLs (2.5 mg total) by nebulization every 4 to 6 hours as needed for Wheezing or Shortness of Breath. Rescue    albuterol (PROVENTIL/VENTOLIN HFA) 90 mcg/actuation inhaler   Inhale 2 puffs into the lungs every 4 (four) hours as needed for Wheezing. Rescue    aspirin/salicylamide/caffeine (BC HEADACHE POWDER ORAL)   Take 1 packet by mouth daily as needed (Headache).    calcium-vitamin D3 (OS-KAIA 500 + D3) 500 mg-5 mcg (200 unit) per tablet   Take 1 tablet by mouth 2 (two) times daily.    carvediloL (COREG) 6.25 MG tablet   Take 1 tablet (6.25 mg total) by mouth 2 (two) times daily.    rosuvastatin (CRESTOR) 20 MG tablet Take 1 tablet (20 mg total) by mouth every evening. Last filled 2/16/25, #90 for 90 day supply. Patient doesn't remember if taking, however Rx previously filled.                 Sabino Morrison  EXT [id number]                .          "

## 2025-03-16 NOTE — ED PROVIDER NOTES
"Encounter Date: 3/16/2025       History     Chief Complaint   Patient presents with    Palpitations     EMS from home. Chest "fluttering" and MANRIQUE     84-year-old female with a past medical history of COPD, hypertension and osteoporosis presents with a chief complaint of palpitations.  The patient says that she has felt "very badly" since she woke up this morning.  She says that she is mostly felt fatigued and weak.  She also feels that she is mildly short of breath especially with exertion.  She says that she has a nebulizer at home but she has not used it.  She is denying any chest pain, fever, cough, abdominal pain, nausea, vomiting, diarrhea or dysuria.    The history is provided by the patient and the EMS personnel. No  was used.     Review of patient's allergies indicates:   Allergen Reactions    No known drug allergies      Past Medical History:   Diagnosis Date    COPD (chronic obstructive pulmonary disease)     HTN (hypertension) 10/21/2021    Osteoporosis     Sinus disease      Past Surgical History:   Procedure Laterality Date    AUGMENTATION OF BREAST Bilateral     received at age 23, removed at age 76    Breast implants removal  2012    SINUS SURGERY      TONSILLECTOMY      Age 1     Family History   Problem Relation Name Age of Onset    No Known Problems Mother      No Known Problems Father      Breast cancer Sister          dx in 30s    Blindness Neg Hx      Glaucoma Neg Hx      Macular degeneration Neg Hx      Retinal detachment Neg Hx       Social History[1]  Review of Systems    Physical Exam     Initial Vitals [03/16/25 0034]   BP Pulse Resp Temp SpO2   (!) 150/92 82 (!) 22 97.4 °F (36.3 °C) 97 %      MAP       --         Physical Exam    Nursing note and vitals reviewed.  Constitutional: She appears well-developed and well-nourished. She is not diaphoretic. No distress.   HENT:   Head: Normocephalic and atraumatic.   Eyes: Pupils are equal, round, and reactive to light.   Neck: " Neck supple.   Cardiovascular:  Normal rate, regular rhythm, normal heart sounds and intact distal pulses.           Pulmonary/Chest: No respiratory distress. She has wheezes. She has no rhonchi. She has no rales. She exhibits no tenderness.   Abdominal: Abdomen is soft. She exhibits no distension. There is no abdominal tenderness. There is no rebound and no guarding.   Musculoskeletal:         General: No tenderness or edema. Normal range of motion.      Cervical back: Neck supple.     Neurological: She is alert and oriented to person, place, and time.   Generalized weakness   Skin: Skin is warm and dry. Capillary refill takes less than 2 seconds. No rash noted. No erythema. No pallor.   Psychiatric: She has a normal mood and affect. Her behavior is normal. Judgment and thought content normal.         ED Course   Procedures  Labs Reviewed   CBC W/ AUTO DIFFERENTIAL - Abnormal       Result Value    WBC 6.19      RBC 4.10      Hemoglobin 13.4      Hematocrit 37.7      MCV 92      MCH 32.7 (*)     MCHC 35.5      RDW 12.4      Platelets 155      MPV 11.6      Immature Granulocytes 0.8 (*)     Gran # (ANC) 2.4      Immature Grans (Abs) 0.05 (*)     Lymph # 2.0      Mono # 1.8 (*)     Eos # 0.0      Baso # 0.01      nRBC 0      Gran % 38.3      Lymph % 32.1      Mono % 28.6 (*)     Eosinophil % 0.0      Basophil % 0.2      Differential Method Automated      Narrative:     Release to patient->Immediate   COMPREHENSIVE METABOLIC PANEL - Abnormal    Sodium 137      Potassium 3.6      Chloride 95      CO2 29      Glucose 128 (*)     BUN 13      Creatinine 0.9      Calcium 8.6 (*)     Total Protein 6.7      Albumin 3.7      Total Bilirubin 0.6      Alkaline Phosphatase 123      AST 20      ALT 11      eGFR >60.0      Anion Gap 13      Narrative:     ADD ON TROPONIN HIGH SENSITIVITY PER DR LINDA CROUCH/ORDER# [id number]  Release to patient->Immediate   BASIC METABOLIC PANEL - Abnormal    Sodium 138      Potassium 3.2 (*)      Chloride 96      CO2 29      Glucose 121 (*)     BUN 12      Creatinine 0.9      Calcium 8.3 (*)     Anion Gap 13      eGFR >60.0     PHOSPHORUS - Abnormal    Phosphorus 2.0 (*)    CBC W/ AUTO DIFFERENTIAL - Abnormal    WBC 5.72      RBC 4.21      Hemoglobin 12.9      Hematocrit 39.2      MCV 93      MCH 30.6      MCHC 32.9      RDW 12.5      Platelets 154      MPV 12.1      Immature Granulocytes 0.5      Gran # (ANC) 2.2      Immature Grans (Abs) 0.03      Lymph # 1.6      Mono # 1.9 (*)     Eos # 0.0      Baso # 0.01      nRBC 0      Gran % 38.3      Lymph % 28.5      Mono % 32.5 (*)     Eosinophil % 0.0      Basophil % 0.2      Differential Method Automated     INFLUENZA A & B BY MOLECULAR    Influenza A, Molecular Negative      Influenza B, Molecular Negative      Flu A & B Source Nasal swab     HEPATITIS C ANTIBODY    Hepatitis C Ab Non-reactive      Narrative:     ADD ON TROPONIN HIGH SENSITIVITY PER DR LINDA CROUCH/ORDER# [id number]  Release to patient->Immediate   HIV 1 / 2 ANTIBODY    HIV 1/2 Ag/Ab Non-reactive      Narrative:     ADD ON TROPONIN HIGH SENSITIVITY PER DR LINDA CROUCH/ORDER# [id number]  Release to patient->Immediate   B-TYPE NATRIURETIC PEPTIDE    BNP 35      Narrative:     ADD ON TROPONIN HIGH SENSITIVITY PER DR LINDA CROUCH/ORDER# [id number]  Release to patient->Immediate   SARS-COV-2 RNA AMPLIFICATION, QUAL    SARS-CoV-2 RNA, Amplification, Qual Negative     TROPONIN I HIGH SENSITIVITY   TROPONIN I HIGH SENSITIVITY    Troponin I High Sensitivity 4      Narrative:     ADD ON TROPONIN HIGH SENSITIVITY PER DR LINDA CROUCH/ORDER# [id number]  Release to patient->Immediate   ALCOHOL,MEDICAL (ETHANOL)    Alcohol, Serum <10     TROPONIN I HIGH SENSITIVITY    Troponin I High Sensitivity 4     MAGNESIUM    Magnesium 1.6            Imaging Results              X-Ray Chest 1 View (Final result)  Result time 03/16/25 01:57:27      Final result by Sophy Santos MD (03/16/25 01:57:27)                    Impression:      No acute abnormality.      Electronically signed by: Sophy Santos  Date:    03/16/2025  Time:    01:57               Narrative:    EXAMINATION:  XR CHEST 1 VIEW    CLINICAL HISTORY:  shortness of breath;    TECHNIQUE:  Single frontal view of the chest was performed.    COMPARISON:  01/02/2025 chest x-ray    FINDINGS:  Cardiac silhouette is stable and nonenlarged.  There is moderate.  Aortic atherosclerosis lungs appear clear.  There is no pleural effusion or pneumothorax.  Skinfold artifact noted over the left upper lung.                                       Medications   sodium chloride 0.9% flush 10 mL (has no administration in time range)   melatonin tablet 6 mg (has no administration in time range)   sodium chloride 0.9% flush 3 mL (has no administration in time range)   predniSONE tablet 40 mg (has no administration in time range)   albuterol-ipratropium 2.5 mg-0.5 mg/3 mL nebulizer solution 3 mL (has no administration in time range)   cefTRIAXone injection 1 g (1 g Intravenous Given 3/16/25 0307)     And   azithromycin tablet 500 mg (has no administration in time range)   carvediloL tablet 6.25 mg (has no administration in time range)   calcium-vitamin D3 500 mg-5 mcg (200 unit) per tablet 1 tablet (has no administration in time range)   atorvastatin tablet 80 mg (has no administration in time range)   nicotine 14 mg/24 hr 1 patch (has no administration in time range)   albuterol-ipratropium 2.5 mg-0.5 mg/3 mL nebulizer solution 3 mL (3 mLs Nebulization Given 3/16/25 0103)   predniSONE tablet 60 mg (60 mg Oral Given 3/16/25 0307)     Medical Decision Making  See ED course for remainder of care    Amount and/or Complexity of Data Reviewed  Labs: ordered. Decision-making details documented in ED Course.  Radiology: ordered.  ECG/medicine tests:  Decision-making details documented in ED Course.    Risk  OTC drugs.  Prescription drug management.               ED Course as of 03/16/25 0800    Sonali Mar 16, 2025   0055 WBC: 6.19  No leukocytosis [BP]   0757 84-year-old female in no acute distress.  Patient is tachypneic and mildly hypertensive, vital signs are otherwise within normal limits.  Physical exam is significant for a bilateral wheezing in the bases and dyspnea, is otherwise unremarkable.  Differential includes but is not limited to COPD versus pneumonia versus URI versus ACS, also considered PE but overall physical exam more consistent with COPD exacerbation.  She was given steroids and nebulizer treatments.  EKG was not concerning for ischemia troponin was negative, ACS, CHF exacerbation or PE seems unlikely.  Chest x-ray did not show pneumonia.  Patient said that she felt mildly better from a respiratory standpoint after her breathing treatments but still felt ill.  Will admit for COPD exacerbation. [BP]   0759 EKG 12-lead  Sinus rhythm at 108 beats per minute, right bundle-branch block, all other intervals within normal limits, no STEMI on my independent review [BP]      ED Course User Index  [BP] Ace Mcfadden MD                           Clinical Impression:  Final diagnoses:  [R06.02] Shortness of breath  [J44.1] COPD exacerbation          ED Disposition Condition    Observation Stable                  Ace Mcfadden MD  Resident  03/16/25 0800         [1]   Social History  Tobacco Use    Smoking status: Every Day     Current packs/day: 0.50     Average packs/day: 0.5 packs/day for 30.0 years (15.0 ttl pk-yrs)     Types: Cigarettes     Passive exposure: Current    Smokeless tobacco: Never    Tobacco comments:     2 packs a week   Substance Use Topics    Alcohol use: Yes     Alcohol/week: 3.0 standard drinks of alcohol     Types: 3 Glasses of wine per week     Comment: 4 times weekly    Drug use: Not Currently     Types: Marijuana        Ace Mcfadden MD  Resident  03/16/25 0800

## 2025-03-16 NOTE — ASSESSMENT & PLAN NOTE
Patient's blood pressure range in the last 24 hours was: BP  Min: 127/73  Max: 160/75.The patient's inpatient anti-hypertensive regimen is listed below:  Current Antihypertensives  carvediloL tablet 6.25 mg, 2 times daily, Oral    Plan  - BP is controlled, no changes needed to their regimen

## 2025-03-16 NOTE — ASSESSMENT & PLAN NOTE
Patient's COPD is controlled currently.  Patient is currently on COPD Pathway. Continue scheduled inhalers Steroids, Antibiotics, and Supplemental oxygen and monitor respiratory status closely.  Group A, by GOLD 2017 classification. Last visit 12/13/2023 with Dr. De La Torre.  PFTs 12/13/23  FVC: 1.97 (87.8 % predicted), FEV1: 1.14 (67.5 % predicted), FEV1/FVC:  58, TLC: 3.76 (81.6 % predicted), DLCO: 6.64 (36.9 % predicted)      Plan  - CTX and azithromycin  - Duo-nebs q6h PRN  - Prednisone 40mg daily for 5 days  - Wean supplemental O2 with goal SpO2 88-92%  - Emphasized importance of smoking cessation  - Needs Pulmonology follow-up as last visit was in 2023

## 2025-03-16 NOTE — SUBJECTIVE & OBJECTIVE
Past Medical History:   Diagnosis Date    COPD (chronic obstructive pulmonary disease)     HTN (hypertension) 10/21/2021    Osteoporosis     Sinus disease        Past Surgical History:   Procedure Laterality Date    AUGMENTATION OF BREAST Bilateral     received at age 23, removed at age 76    Breast implants removal  2012    SINUS SURGERY      TONSILLECTOMY      Age 1       Review of patient's allergies indicates:   Allergen Reactions    No known drug allergies        No current facility-administered medications on file prior to encounter.     Current Outpatient Medications on File Prior to Encounter   Medication Sig    albuterol (PROVENTIL) 2.5 mg /3 mL (0.083 %) nebulizer solution Take 3 mLs (2.5 mg total) by nebulization every 4 to 6 hours as needed for Wheezing or Shortness of Breath. Rescue    albuterol (PROVENTIL/VENTOLIN HFA) 90 mcg/actuation inhaler Inhale 2 puffs into the lungs every 4 (four) hours as needed for Wheezing. Rescue    aspirin (ECOTRIN) 81 MG EC tablet Take 1 tablet (81 mg total) by mouth once daily.    calcium-vitamin D3 (OS-KAIA 500 + D3) 500 mg-5 mcg (200 unit) per tablet Take 1 tablet by mouth 2 (two) times daily.    carvediloL (COREG) 6.25 MG tablet Take 1 tablet (6.25 mg total) by mouth 2 (two) times daily.    diclofenac sodium (VOLTAREN) 1 % Gel Apply 2 g topically 2 (two) times daily. (Patient not taking: Reported on 1/14/2025)    ergocalciferol (ERGOCALCIFEROL) 50,000 unit Cap Take 1 capsule (50,000 Units total) by mouth every 7 days.    fluticasone-umeclidin-vilanter (TRELEGY ELLIPTA) 100-62.5-25 mcg DsDv Inhale 1 puff into the lungs once daily.    rosuvastatin (CRESTOR) 20 MG tablet Take 1 tablet (20 mg total) by mouth every evening.     Family History       Problem Relation (Age of Onset)    Breast cancer Sister    No Known Problems Mother, Father          Tobacco Use    Smoking status: Every Day     Current packs/day: 0.50     Average packs/day: 0.5 packs/day for 30.0 years (15.0  ttl pk-yrs)     Types: Cigarettes     Passive exposure: Current    Smokeless tobacco: Never    Tobacco comments:     2 packs a week   Substance and Sexual Activity    Alcohol use: Yes     Alcohol/week: 3.0 standard drinks of alcohol     Types: 3 Glasses of wine per week     Comment: 4 times weekly    Drug use: Not Currently     Types: Marijuana    Sexual activity: Not Currently     Review of Systems   Constitutional:  Positive for fatigue. Negative for chills and fever.   Eyes:  Negative for visual disturbance.   Respiratory:  Positive for shortness of breath. Negative for cough and wheezing.    Cardiovascular:  Positive for palpitations. Negative for chest pain and leg swelling.   Gastrointestinal:  Negative for abdominal distention, abdominal pain, diarrhea, nausea and vomiting.   Genitourinary:  Negative for dysuria.   Musculoskeletal:  Negative for arthralgias and myalgias.   Skin:  Negative for color change.   Neurological:  Negative for light-headedness and headaches.   Psychiatric/Behavioral:  Negative for confusion and decreased concentration.      Objective:     Vital Signs (Most Recent):  Temp: 97.4 °F (36.3 °C) (03/16/25 0034)  Pulse: 76 (03/16/25 0304)  Resp: 20 (03/16/25 0304)  BP: (!) 155/88 (03/16/25 0304)  SpO2: 96 % (03/16/25 0304) Vital Signs (24h Range):  Temp:  [97.4 °F (36.3 °C)] 97.4 °F (36.3 °C)  Pulse:  [76-86] 76  Resp:  [20-35] 20  SpO2:  [95 %-100 %] 96 %  BP: (127-160)/(73-92) 155/88     Weight: 50.3 kg (111 lb)  Body mass index is 19.66 kg/m².     Physical Exam  Vitals reviewed.   Constitutional:       General: She is not in acute distress.     Appearance: Normal appearance. She is not ill-appearing.   HENT:      Head: Normocephalic and atraumatic.      Mouth/Throat:      Mouth: Mucous membranes are moist.      Pharynx: Oropharynx is clear.   Eyes:      General: No scleral icterus.     Conjunctiva/sclera: Conjunctivae normal.   Cardiovascular:      Rate and Rhythm: Normal rate and  regular rhythm.      Pulses: Normal pulses.      Heart sounds: No murmur heard.  Pulmonary:      Effort: Pulmonary effort is normal.      Breath sounds: Wheezing (expiratory, mild (bilateral)) present.   Abdominal:      General: Bowel sounds are normal. There is no distension.      Palpations: Abdomen is soft.      Tenderness: There is no abdominal tenderness. There is no guarding.   Musculoskeletal:      Right lower leg: No edema.      Left lower leg: No edema.   Skin:     General: Skin is warm and dry.   Neurological:      General: No focal deficit present.      Mental Status: She is alert and oriented to person, place, and time.   Psychiatric:         Mood and Affect: Mood normal.         Behavior: Behavior normal.                Significant Labs: All pertinent labs within the past 24 hours have been reviewed.  CBC:   Recent Labs   Lab 03/16/25 0046   WBC 6.19   HGB 13.4   HCT 37.7        CMP:   Recent Labs   Lab 03/16/25 0046      K 3.6   CL 95   CO2 29   *   BUN 13   CREATININE 0.9   CALCIUM 8.6*   PROT 6.7   ALBUMIN 3.7   BILITOT 0.6   ALKPHOS 123   AST 20   ALT 11   ANIONGAP 13     Troponin:   Recent Labs   Lab 03/16/25 0046   TROPONINIHS 4       Significant Imaging: I have reviewed all pertinent imaging results/findings within the past 24 hours.  CXR: I have reviewed all pertinent results/findings within the past 24 hours and my personal findings are:  hyperinflated lung fields

## 2025-03-17 ENCOUNTER — TELEPHONE (OUTPATIENT)
Dept: PRIMARY CARE CLINIC | Facility: CLINIC | Age: 85
End: 2025-03-17
Payer: MEDICARE

## 2025-03-17 NOTE — TELEPHONE ENCOUNTER
----- Message from Danish Pereira MD sent at 3/17/2025  8:29 AM CDT -----  Please schedule close hospital follow-up for patientMakrishna Pereira MD

## 2025-03-18 ENCOUNTER — PATIENT OUTREACH (OUTPATIENT)
Dept: ADMINISTRATIVE | Facility: CLINIC | Age: 85
End: 2025-03-18
Payer: MEDICARE

## 2025-03-21 ENCOUNTER — E-CONSULT (OUTPATIENT)
Dept: ENDOCRINOLOGY | Facility: CLINIC | Age: 85
End: 2025-03-21
Payer: MEDICARE

## 2025-03-21 ENCOUNTER — PATIENT MESSAGE (OUTPATIENT)
Dept: PRIMARY CARE CLINIC | Facility: CLINIC | Age: 85
End: 2025-03-21

## 2025-03-21 ENCOUNTER — OFFICE VISIT (OUTPATIENT)
Dept: PRIMARY CARE CLINIC | Facility: CLINIC | Age: 85
End: 2025-03-21
Payer: MEDICARE

## 2025-03-21 VITALS
HEART RATE: 84 BPM | WEIGHT: 112 LBS | HEIGHT: 63 IN | RESPIRATION RATE: 18 BRPM | SYSTOLIC BLOOD PRESSURE: 144 MMHG | OXYGEN SATURATION: 100 % | BODY MASS INDEX: 19.84 KG/M2 | DIASTOLIC BLOOD PRESSURE: 82 MMHG

## 2025-03-21 DIAGNOSIS — R53.82 CHRONIC FATIGUE: Primary | ICD-10-CM

## 2025-03-21 DIAGNOSIS — D69.2 SENILE PURPURA: ICD-10-CM

## 2025-03-21 DIAGNOSIS — F41.9 ANXIETY: ICD-10-CM

## 2025-03-21 DIAGNOSIS — F43.0 STRESS RESPONSE: ICD-10-CM

## 2025-03-21 PROCEDURE — 99999 PR PBB SHADOW E&M-EST. PATIENT-LVL IV: CPT | Mod: PBBFAC,HCNC,, | Performed by: STUDENT IN AN ORGANIZED HEALTH CARE EDUCATION/TRAINING PROGRAM

## 2025-03-21 RX ORDER — BUSPIRONE HYDROCHLORIDE 10 MG/1
TABLET ORAL
Qty: 60 TABLET | Refills: 1 | Status: SHIPPED | OUTPATIENT
Start: 2025-03-21
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
## 2025-03-21 NOTE — CONSULTS
[facility]  Response for E-Consult     Patient Name: Sangita Logan  MRN: [id number]  Primary Care Provider: Danish Pereira MD   Requesting Provider: Danish Pereira MD  E-Consult to Endocrinology  Consult performed by: Bridgette Woods MD  Consult ordered by: Danish Pereira MD          Recommendation:     TSH is normal.   Hypertensive with significantly elevated BP consistently in epic. Very unlikely to be adrenal insufficiency. Additionally, cannot evaluate for adrenal insufficiency in a patient on exogenous steroids of any kind or in someone with very recent exposure to steroids. She has prednisone on med list. Having symptoms while on high dose prednisone is even more evidence that it is not adrenal insufficiency causing her symptoms, as she is on a supraphysiologic dose.    If she has clinical signs or symptoms of adrenal insufficiency OFF steroids, could screen with labs below, but only if there is reasonable clinical suspicion.          ::::::::::::::::::::::::::::::::::General Information::::::::::::::::::::::::::::::::::::::::::::::::::::::::::::::::::::::    As for the fatigue, this is a very common patient concern and is rarely due to an endocrinopathy.  From an endocrine standpoint, we would evaluate for hypothyroidism in all and adrenal insufficiency only if suspected. (Signs and symptoms of adrenal Insufficiency would be unexplained weight loss, orthostatic symptoms,  hyponatremia).   If the TSH is normal can consider an 8 am cortisol or an ACTH stimulation test.   If normal,  no need to refer to endocrine.        There is no need to check a free T3, reverse T3 or ratios as the labs are not reliable nor do they impact care  There is no need to check thyroglobulin levels except in the context of thyroid cancer.  Anybody with intact thyroid tissue will have elevated thyroglobulin levels.  There is no need to check TPO antibodies, if the TSH is normal .         Lastly, consider evaluation for sleep apnea as this is a common cause of fatigue.        Total time of Consultation: 5 minute    I did not speak to the requesting provider verbally about this.     *This eConsult is based on the clinical data available to me and is furnished without benefit of a physical examination. The eConsult will need to be interpreted in light of any clinical issues or changes in patient status not available to me at the time of filing this eConsults. Significant changes in patient condition or level of acuity should result in immediate formal consultation and reevaluation. Please alert me if you have further questions.    Thank you for this eConsult referral.     MD Jarod Rosen [facility]

## 2025-04-01 ENCOUNTER — DOCUMENT SCAN (OUTPATIENT)
Dept: HOME HEALTH SERVICES | Facility: HOSPITAL | Age: 85
End: 2025-04-01
Payer: MEDICARE

## 2025-04-09 ENCOUNTER — DOCUMENT SCAN (OUTPATIENT)
Dept: HOME HEALTH SERVICES | Facility: HOSPITAL | Age: 85
End: 2025-04-09
Payer: MEDICARE

## 2025-04-11 ENCOUNTER — OFFICE VISIT (OUTPATIENT)
Dept: PULMONOLOGY | Facility: CLINIC | Age: 85
End: 2025-04-11
Payer: MEDICARE

## 2025-04-11 VITALS
BODY MASS INDEX: 19.84 KG/M2 | HEART RATE: 79 BPM | OXYGEN SATURATION: 97 % | DIASTOLIC BLOOD PRESSURE: 68 MMHG | SYSTOLIC BLOOD PRESSURE: 108 MMHG | WEIGHT: 112 LBS

## 2025-04-11 DIAGNOSIS — F17.210 CIGARETTE NICOTINE DEPENDENCE WITHOUT COMPLICATION: ICD-10-CM

## 2025-04-11 DIAGNOSIS — J44.9 MODERATE COPD (CHRONIC OBSTRUCTIVE PULMONARY DISEASE): Primary | ICD-10-CM

## 2025-04-11 PROCEDURE — 99999 PR PBB SHADOW E&M-EST. PATIENT-LVL IV: CPT | Mod: PBBFAC,HCNC,,

## 2025-04-11 RX ORDER — ALBUTEROL SULFATE 90 UG/1
2 INHALANT RESPIRATORY (INHALATION) EVERY 4 HOURS PRN
Qty: 18 G | Refills: 6 | Status: SHIPPED | OUTPATIENT
Start: 2025-04-11

## 2025-04-11 NOTE — ASSESSMENT & PLAN NOTE
Current cigarette use   36 total pack years  2023 Moderate airflow obstruction   Emphysema changes on imaging   1 recent exacerbation requiring hospitalization  Switched from bevespi to trelegy in the last month    -Continue triple therapy inhaler. She is doing well with this.   -Albuterol as needed prescribed to be used as needed  -Obtain PFT to reassess severity of obstruction  -Obtain 6 minute walk test to assess functional status    We discussed generalized weakness is a common symptom of COPD resulting from increased work of breathing, reduced physical activity and chronic hypoxemia.  We discussed pulmonary rehab and she is motivated to participate. Referral placed.

## 2025-04-11 NOTE — PROGRESS NOTES
"History and Physical Note  [facility]    Subjective:     Reason for visit: COPD     Patient ID:  Sangita Logan is a 84 y.o. female    Interval History 04/11/2025:  Patient is here as a referral from her recent hospitalization at [facility]. She was admitted on 03/16/2025 for a COPD exacerbation   03/16/2025 admitted to [facility] with COPD exacerbation. She was given was treated with azithromycin, prednisone for 5 days and duonebs. She reports great improvement in symptoms since that time.     Her main concern is generalized weakness. This really affects her ADL's. This has been an ongoing problem for years. She denies feelings of tiredness. She is able to ambulate and move despite weakness but has to decrease her activity level.       She has family that lives near-by locally. She lives in the [facility].   She reports post nasal drip and constant throat clearing.     Cough: none  Sputum: none  MMRC grade level: 2  Respiratory illness: negative viral panel  Last ED/UC visit: 03/15/2025 admitted for COPD exacerbation  Exercise: not very active; she walks a couple blocks a day;  Immunization: UTD seasonal vaccines  Current COPD treatment plan: access to nebulizer and trelegy since a month ago from the ED and has been very helpful for her symptoms.  Patient denies any active respiratory problems and tells me today in clinic "COPD doesn't bother me much."     Additional Pulmonary History:  Occupational: dance teacher; model prior;   Environmental Exposures: none  Exposure to Animals/Pets: none  Travel History: none  History of exposures to TB: none  Family History of Lung Cancer: none  Childhood Illnesses:  none  Tobacco/Smoking: 10 packs every 30 days; quit for a couple of years every previously; 3 years without a cigarette; she is not motivated to quit    Tobacco Use History[1]    Objective:     Vitals:    04/11/25 0905   BP: 108/68   BP Location: Left arm   Patient Position: Sitting "   Pulse: 79   SpO2: 97%   Weight: 50.8 kg (111 lb 15.9 oz)         Physical Exam  Constitutional:       Appearance: Normal appearance. She is well-developed.   HENT:      Head: Normocephalic.   Cardiovascular:      Rate and Rhythm: Normal rate.      Pulses: Normal pulses.      Heart sounds: Normal heart sounds, S1 normal and S2 normal.   Pulmonary:      Effort: Pulmonary effort is normal.      Breath sounds: Normal breath sounds. No decreased breath sounds.   Musculoskeletal:      Right lower leg: No edema.      Left lower leg: No edema.   Skin:     General: Skin is warm.      Capillary Refill: Capillary refill takes less than 2 seconds.      Findings: No rash.      Nails: There is no clubbing.   Neurological:      Mental Status: She is alert and oriented to person, place, and time. Mental status is at baseline.   Psychiatric:         Attention and Perception: Attention normal.         Mood and Affect: Mood and affect normal.         Speech: Speech normal.         Behavior: Behavior is cooperative.          Personal Diagnostic Review and Interpretation  CXR 03/16/2025  No pneumonia   No pleural effusion  No pneumothorax      Pertinent Studies Reviewed & Interpreted:     Pulmonary Function Tests:     PFT  12/13/2023    FVC 1.97 (87 % predicted),   FEV1 1.14 (67.5 % predicted),   FEV1/FVC  58 ratio   TLC 3.76 (81.6 % predicted),   DLCO 23.73 (35.9 % predicted),     Moderate obstruction  Normal lung volumes  Severely decreased diffusing capacity     6 Minute Walk Tests:   less than predicted  2019:  This qualifies them for oxygen therapy: No  O2 sats at rest: 97  O2 sats w/activity: 98  Appropriate HTN and tachycardia.     Echocardiograms:   TTE 12/2021  The left ventricle is normal in size with mild concentric hypertrophy and normal systolic function.  The estimated ejection fraction is 55%.  Grade I left ventricular diastolic dysfunction.  Normal right ventricular size with normal right ventricular systolic  function.  Mild aortic regurgitation.  Mild mitral regurgitation.  Mild tricuspid regurgitation.  Normal central venous pressure (3 mmHg).  The estimated PA systolic pressure is 21 mmHg.     Other Pertinent Laboratories:  Lab Results   Component Value Date    WBC 5.72 03/16/2025    RBC 4.21 03/16/2025    HGB 12.9 03/16/2025    MCV 93 03/16/2025    MCH 30.6 03/16/2025    MCHC 32.9 03/16/2025    RDW 12.5 03/16/2025     03/16/2025    MPV 12.1 03/16/2025    GRAN 2.2 03/16/2025    GRAN 38.3 03/16/2025    LYMPH 1.6 03/16/2025    LYMPH 28.5 03/16/2025    MONO 1.9 (H) 03/16/2025    MONO 32.5 (H) 03/16/2025    EOS 0.0 03/16/2025    BASO 0.01 03/16/2025     Assessment & Plan:       Plan:  Clinical impression is resonably certain and repeated evaluation prn +/- follow up will be needed as below.      1. Moderate COPD (chronic obstructive pulmonary disease)  Assessment & Plan:  Current cigarette use   36 total pack years  2023 Moderate airflow obstruction   Emphysema changes on imaging   1 recent exacerbation requiring hospitalization  Switched from bevespi to trelegy in the last month    -Continue triple therapy inhaler. She is doing well with this.   -Albuterol as needed prescribed to be used as needed  -Obtain PFT to reassess severity of obstruction  -Obtain 6 minute walk test to assess functional status    We discussed generalized weakness is a common symptom of COPD resulting from increased work of breathing, reduced physical activity and chronic hypoxemia.  We discussed pulmonary rehab and she is motivated to participate. Referral placed.     Orders:  -     Ambulatory referral/consult to Pulmonology  -     Complete PFT with bronchodilator; Future  -     Six Minute Walk Test to qualify for Home Oxygen; Future  -     albuterol (PROVENTIL/VENTOLIN HFA) 90 mcg/actuation inhaler; Inhale 2 puffs into the lungs every 4 (four) hours as needed for Wheezing. Rescue  Dispense: 18 g; Refill: 6  -     Ambulatory  referral/consult to Pulmonary Rehab; Future; Expected date: 04/18/2025    2. Cigarette nicotine dependence without complication  Overview:  36 total pack years  Current use: 10 packs every 30 days.  We discussed the impact of continued tobacco use, including worsening COPD symptoms and increased risk of cardiovascular events, among other health concerns. She is not interested in quitting at this time. While I will remain respectful of her autonomy and choices at this time, I strongly recommend smoking cessation for her long-term health and will continue to offer support and resources as appropriate.          Follow up in about 4 weeks (around 5/9/2025), or if symptoms worsen or fail to improve.    Discussed with patient above for education the following:      Patient Instructions   Pulmonary Rehab locations include:    1) [facility]: phone# [phone]; fax# [phone];    RETURN TO CLINIC IN 1 MONTHS     Total professional time spent for the encounter: 40 minutes  Time was spent preparing to see the patient, reviewing results of prior testing, obtaining and/or reviewing separately obtained history, performing a medically appropriate examination and interview, counseling and educating the patient/family, ordering medications/tests/procedures, referring and communicating with other health care professionals, documenting clinical information in the electronic health record, and independently interpreting results.    Cassia Galarza DNP         [1]   Social History  Tobacco Use   Smoking Status Every Day    Current packs/day: 0.50    Average packs/day: 0.5 packs/day for 71.3 years (35.6 ttl pk-yrs)    Types: Cigarettes    Start date: 1956    Passive exposure: Current   Smokeless Tobacco Never   Tobacco Comments    2 packs a week

## 2025-05-12 ENCOUNTER — OFFICE VISIT (OUTPATIENT)
Dept: PRIMARY CARE CLINIC | Facility: CLINIC | Age: 85
End: 2025-05-12
Payer: MEDICARE

## 2025-05-12 VITALS
WEIGHT: 116.31 LBS | DIASTOLIC BLOOD PRESSURE: 60 MMHG | HEIGHT: 63 IN | SYSTOLIC BLOOD PRESSURE: 106 MMHG | OXYGEN SATURATION: 96 % | BODY MASS INDEX: 20.61 KG/M2 | HEART RATE: 83 BPM

## 2025-05-12 DIAGNOSIS — R53.1 GENERAL WEAKNESS: Primary | ICD-10-CM

## 2025-05-12 DIAGNOSIS — F41.9 ANXIETY: ICD-10-CM

## 2025-05-12 DIAGNOSIS — J44.9 CHRONIC OBSTRUCTIVE PULMONARY DISEASE, UNSPECIFIED COPD TYPE: ICD-10-CM

## 2025-05-12 DIAGNOSIS — F43.0 STRESS RESPONSE: ICD-10-CM

## 2025-05-12 PROCEDURE — 1101F PT FALLS ASSESS-DOCD LE1/YR: CPT | Mod: CPTII,HCNC,S$GLB, | Performed by: STUDENT IN AN ORGANIZED HEALTH CARE EDUCATION/TRAINING PROGRAM

## 2025-05-12 PROCEDURE — 1126F AMNT PAIN NOTED NONE PRSNT: CPT | Mod: CPTII,HCNC,S$GLB, | Performed by: STUDENT IN AN ORGANIZED HEALTH CARE EDUCATION/TRAINING PROGRAM

## 2025-05-12 PROCEDURE — 99999 PR PBB SHADOW E&M-EST. PATIENT-LVL IV: CPT | Mod: PBBFAC,HCNC,, | Performed by: STUDENT IN AN ORGANIZED HEALTH CARE EDUCATION/TRAINING PROGRAM

## 2025-05-12 PROCEDURE — 3288F FALL RISK ASSESSMENT DOCD: CPT | Mod: CPTII,HCNC,S$GLB, | Performed by: STUDENT IN AN ORGANIZED HEALTH CARE EDUCATION/TRAINING PROGRAM

## 2025-05-12 PROCEDURE — 99213 OFFICE O/P EST LOW 20 MIN: CPT | Mod: HCNC,S$GLB,, | Performed by: STUDENT IN AN ORGANIZED HEALTH CARE EDUCATION/TRAINING PROGRAM

## 2025-05-12 PROCEDURE — 1159F MED LIST DOCD IN RCRD: CPT | Mod: CPTII,HCNC,S$GLB, | Performed by: STUDENT IN AN ORGANIZED HEALTH CARE EDUCATION/TRAINING PROGRAM

## 2025-05-12 PROCEDURE — 1160F RVW MEDS BY RX/DR IN RCRD: CPT | Mod: CPTII,HCNC,S$GLB, | Performed by: STUDENT IN AN ORGANIZED HEALTH CARE EDUCATION/TRAINING PROGRAM

## 2025-05-12 NOTE — TELEPHONE ENCOUNTER
Care Due:                  Date            Visit Type   Department     Provider  --------------------------------------------------------------------------------                                EP -                              PRIMARY      [facility]  Last Visit: 05-      CARE ([facility])   PRIMARY CARE   Danish Pereira  Next Visit: None Scheduled  None         None Found                                                            Last  Test          Frequency    Reason                     Performed    Due Date  --------------------------------------------------------------------------------    Lipid Panel.  12 months..  rosuvastatin.............  05- 05-    [facility] Embedded Care Due Messages. Reference number: [id number].   5/12/2025 5:09:28 PM CDT

## 2025-05-12 NOTE — PATIENT INSTRUCTIONS
Pulmonary Rehab locations include:    1) [facility]: phone# [phone]; fax# [phone];  Melatonin 1 mg nightly recommended

## 2025-05-12 NOTE — PROGRESS NOTES
"Subjective:       Patient ID: Sangita Logan is a 84 y.o. female.    Chief Complaint: Follow-up (Overall weakness)      HPI:  84 y.o. female presents to [facility] with ongoing weakness.    Endocrinlogy e-consulted 3/21/2025 and did not see Endocrine cause for current symptoms.    Saw Pulmonology 4/11/2025 and was referred to Pulmonary Rehab.    Today, patient reports that at times she can have insomnia, but feels not related to days when tired.    Difficulty falling asleep?: Yes  Difficulty staying asleep?: 4-5 hours at most  Bed time routine?: No specific  Attributing concerns?: None  Caffeine, coffee, soda, tea?: Very little coffee  Awakens to pee?: No  Snoring/Short of breath?: No  Number of pillows?: 1  Lights in bed?: Yes, can't be in total dark  Phone/TV in bed?: TV    Before COVID pandemic was very active and at time of pandemic had nothing to de and feels perhaps contributed.    Mood?: Feels overall pretty good. Patient is nervous with current political climate. Has good local support and stays social.    Hasn't scheduled to date with Pulmonary Rehab    Review of Systems   Constitutional:  Negative for chills, diaphoresis, fatigue and fever.   Respiratory:  Positive for shortness of breath. Negative for chest tightness.    Cardiovascular:  Negative for chest pain and palpitations.   Musculoskeletal:  Positive for arthralgias (Very mild at times and not desiring intervention).   Neurological:  Positive for weakness. Negative for dizziness.   Psychiatric/Behavioral:  Positive for sleep disturbance (Seems to be unrelated to weakness). Negative for dysphoric mood and suicidal ideas. The patient is not nervous/anxious.        Objective:      Vitals:    05/12/25 1307   BP: 106/60   BP Location: Right arm   Patient Position: Sitting   Pulse: 83   SpO2: 96%   Weight: 52.7 kg (116 lb 4.7 oz)   Height: 5' 3" (1.6 m)     Physical Exam  Vitals reviewed.   Constitutional:       General: She is not in acute " "distress.     Appearance: Normal appearance. She is not ill-appearing.   HENT:      Head: Normocephalic and atraumatic.   Eyes:      General:         Right eye: No discharge.         Left eye: No discharge.      Conjunctiva/sclera: Conjunctivae normal.   Cardiovascular:      Rate and Rhythm: Normal rate.   Pulmonary:      Effort: Pulmonary effort is normal.   Musculoskeletal:         General: No deformity.   Skin:     Coloration: Skin is not jaundiced or pale.   Neurological:      General: No focal deficit present.      Mental Status: She is alert and oriented to person, place, and time.   Psychiatric:         Mood and Affect: Mood normal.         Behavior: Behavior normal.             Lab Results   Component Value Date     03/16/2025    K 3.2 (L) 03/16/2025    CL 96 03/16/2025    CO2 29 03/16/2025    BUN 12 03/16/2025    CREATININE 0.9 03/16/2025    ANIONGAP 13 03/16/2025     No results found for: "HGBA1C"  Lab Results   Component Value Date    BNP 35 03/16/2025    BNP 37 11/08/2024    BNP 54 08/02/2023       Lab Results   Component Value Date    WBC 5.72 03/16/2025    HGB 12.9 03/16/2025    HCT 39.2 03/16/2025    HCT 30 (L) 11/07/2021     03/16/2025    GRAN 2.2 03/16/2025    GRAN 38.3 03/16/2025     Lab Results   Component Value Date    CHOL 170 05/11/2021    HDL 55 05/11/2021    LDLCALC 98.2 05/11/2021    TRIG 84 05/11/2021        Current Medications[1]        Assessment:       1. General weakness    2. Chronic obstructive pulmonary disease, unspecified COPD type           Plan:       General weakness  Chronic obstructive pulmonary disease, unspecified COPD type  - Will provide contact numbers for Pulmonary Rehab and encouraged participation  - Can consider PM&R referral in future    RTC in 6 months         [1]   Current Outpatient Medications:     albuterol (PROVENTIL) 2.5 mg /3 mL (0.083 %) nebulizer solution, Take 3 mLs (2.5 mg total) by nebulization every 4 to 6 hours as needed for Wheezing or " Shortness of Breath. Rescue, Disp: 90 mL, Rfl: 1    albuterol (PROVENTIL/VENTOLIN HFA) 90 mcg/actuation inhaler, Inhale 2 puffs into the lungs every 4 (four) hours as needed for Wheezing. Rescue, Disp: 18 g, Rfl: 6    albuterol-ipratropium (DUO-NEB) 2.5 mg-0.5 mg/3 mL nebulizer solution, Take 3 mLs by nebulization every 4 (four) hours as needed for Wheezing or Shortness of Breath. Rescue, Disp: 120 mL, Rfl: 11    aspirin/salicylamide/caffeine (BC HEADACHE POWDER ORAL), Take 1 packet by mouth daily as needed (Headache)., Disp: , Rfl:     busPIRone (BUSPAR) 10 MG tablet, Take 1/2 tablet (5 mg) three times daily for first week, then 1 tablet twice daily thereafter., Disp: 60 tablet, Rfl: 1    carvediloL (COREG) 6.25 MG tablet, Take 1 tablet (6.25 mg total) by mouth 2 (two) times daily., Disp: 180 tablet, Rfl: 3    fluticasone-umeclidin-vilanter (TRELEGY ELLIPTA) 100-62.5-25 mcg DsDv, Inhale 1 puff into the lungs once daily., Disp: 90 each, Rfl: 3    calcium-vitamin D3 (OS-KAIA 500 + D3) 500 mg-5 mcg (200 unit) per tablet, Take 1 tablet by mouth 2 (two) times daily. (Patient not taking: Reported on 5/12/2025), Disp: 60 tablet, Rfl: 11    rosuvastatin (CRESTOR) 20 MG tablet, Take 1 tablet (20 mg total) by mouth every evening. (Patient not taking: Reported on 5/12/2025), Disp: 90 tablet, Rfl: 3

## 2025-05-13 RX ORDER — BUSPIRONE HYDROCHLORIDE 10 MG/1
TABLET ORAL
Qty: 60 TABLET | Refills: 1 | Status: SHIPPED | OUTPATIENT
Start: 2025-05-13

## 2025-05-21 ENCOUNTER — RESULTS FOLLOW-UP (OUTPATIENT)
Dept: PRIMARY CARE CLINIC | Facility: CLINIC | Age: 85
End: 2025-05-21

## 2025-05-21 ENCOUNTER — OFFICE VISIT (OUTPATIENT)
Dept: PULMONOLOGY | Facility: CLINIC | Age: 85
End: 2025-05-21
Payer: MEDICARE

## 2025-05-21 VITALS
OXYGEN SATURATION: 98 % | DIASTOLIC BLOOD PRESSURE: 87 MMHG | HEART RATE: 69 BPM | SYSTOLIC BLOOD PRESSURE: 159 MMHG | BODY MASS INDEX: 19.92 KG/M2 | WEIGHT: 112.44 LBS

## 2025-05-21 DIAGNOSIS — J44.9 MODERATE COPD (CHRONIC OBSTRUCTIVE PULMONARY DISEASE): Primary | ICD-10-CM

## 2025-05-21 DIAGNOSIS — R91.1 PULMONARY NODULE: ICD-10-CM

## 2025-05-21 DIAGNOSIS — F17.210 CIGARETTE NICOTINE DEPENDENCE WITHOUT COMPLICATION: ICD-10-CM

## 2025-05-21 PROCEDURE — 99999 PR PBB SHADOW E&M-EST. PATIENT-LVL III: CPT | Mod: PBBFAC,HCNC,,

## 2025-05-21 NOTE — ASSESSMENT & PLAN NOTE
Current cigarette use   36 total pack years  2025 Moderate-severe airflow obstruction with airtrapping  Emphysema changes on imaging       -Continue triple therapy inhaler. She is doing well with this.   -Albuterol as needed prescribed to be used as needed  -Patient would benefit from supplemental oxygen with activity    We discussed pulmonary rehab and she is motivated to participate. Referral approved. Number provided for appt.

## 2025-05-21 NOTE — PROGRESS NOTES
"History and Physical Note  [facility]    Subjective:     Reason for visit: COPD     Patient ID:  Sangita Logan is a 84 y.o. female    Interval History 05/21/2025:  Patient presents today for follow up of COPD and weakness.  She reports experiencing weakness with fluctuating severity since age 81. Due to weakness, she has reduced her walking activity.   She continues Trelegy inhaler daily and has albuterol inhaler as needed for shortness of breath and coughing. Today's PFT showing severe obstruction with air trapping.     Interval History 04/11/2025:  Patient is here as a referral from her recent hospitalization at [facility]. She was admitted on 03/16/2025 for a COPD exacerbation   03/16/2025 admitted to [facility] with COPD exacerbation. She was given was treated with azithromycin, prednisone for 5 days and duonebs. She reports great improvement in symptoms since that time.     Her main concern is generalized weakness. This really affects her ADL's. This has been an ongoing problem for years. She denies feelings of tiredness. She is able to ambulate and move despite weakness but has to decrease her activity level.       She has family that lives near-by locally. She lives in the [facility].   She reports post nasal drip and constant throat clearing.     Cough: none  Sputum: none  MMRC grade level: 2  Respiratory illness: negative viral panel  Last ED/UC visit: 03/15/2025 admitted for COPD exacerbation  Exercise: not very active; she walks a couple blocks a day;  Immunization: UTD seasonal vaccines  Current COPD treatment plan: access to nebulizer and trelegy since a month ago from the ED and has been very helpful for her symptoms.  Patient denies any active respiratory problems and tells me today in clinic "COPD doesn't bother me much."     Additional Pulmonary History:  Occupational: dance teacher; model prior;   Environmental Exposures: none  Exposure to Animals/Pets: none  Travel " History: none  History of exposures to TB: none  Family History of Lung Cancer: none  Childhood Illnesses:  none  Tobacco/Smoking: 10 packs every 30 days; quit for a couple of years every previously; 3 years without a cigarette; she is not motivated to quit    Tobacco Use History[1]    Objective:     Vitals:    05/21/25 1127   BP: (!) 159/87   BP Location: Left arm   Patient Position: Sitting   Pulse: 69   SpO2: 98%   Weight: 51 kg (112 lb 7 oz)         Physical Exam  Constitutional:       Appearance: Normal appearance. She is well-developed.   HENT:      Head: Normocephalic.   Cardiovascular:      Rate and Rhythm: Normal rate.      Pulses: Normal pulses.      Heart sounds: Normal heart sounds, S1 normal and S2 normal.   Pulmonary:      Effort: Pulmonary effort is normal.      Breath sounds: Normal breath sounds. No decreased breath sounds.   Musculoskeletal:      Right lower leg: No edema.      Left lower leg: No edema.   Skin:     General: Skin is warm.      Capillary Refill: Capillary refill takes less than 2 seconds.      Findings: No rash.      Nails: There is no clubbing.   Neurological:      Mental Status: She is alert and oriented to person, place, and time. Mental status is at baseline.   Psychiatric:         Attention and Perception: Attention normal.         Mood and Affect: Mood and affect normal.         Speech: Speech normal.         Behavior: Behavior is cooperative.        Personal Diagnostic Review and Interpretation  CXR 03/16/2025  No pneumonia   No pleural effusion  No pneumothorax    Pertinent Studies Reviewed & Interpreted:     Pulmonary Function Tests:   PFT  12/13/2023    FVC 1.97 (87 % predicted),   FEV1 1.14 (67.5 % predicted),   FEV1/FVC  58 ratio   TLC 3.76 (81.6 % predicted),   DLCO 23.73 (35.9 % predicted),     Moderate obstruction  Normal lung volumes  Severely decreased diffusing capacity     6 Minute Walk Tests:   2025  This qualifies them for oxygen therapy:yes   O2 sats at rest:  97  O2 sats w/activity: 87  Appropriate HTN and tachycardia.     Echocardiograms:   TTE 12/2021  The left ventricle is normal in size with mild concentric hypertrophy and normal systolic function.  The estimated ejection fraction is 55%.  Grade I left ventricular diastolic dysfunction.  Normal right ventricular size with normal right ventricular systolic function.  Mild aortic regurgitation.  Mild mitral regurgitation.  Mild tricuspid regurgitation.  Normal central venous pressure (3 mmHg).  The estimated PA systolic pressure is 21 mmHg.     Other Pertinent Laboratories:  Lab Results   Component Value Date    WBC 5.72 03/16/2025    RBC 4.21 03/16/2025    HGB 12.9 03/16/2025    MCV 93 03/16/2025    MCH 30.6 03/16/2025    MCHC 32.9 03/16/2025    RDW 12.5 03/16/2025     03/16/2025    MPV 12.1 03/16/2025    GRAN 2.2 03/16/2025    GRAN 38.3 03/16/2025    LYMPH 1.6 03/16/2025    LYMPH 28.5 03/16/2025    MONO 1.9 (H) 03/16/2025    MONO 32.5 (H) 03/16/2025    EOS 0.0 03/16/2025    BASO 0.01 03/16/2025     Assessment & Plan:       Plan:  Clinical impression is resonably certain and repeated evaluation prn +/- follow up will be needed as below.      1. Moderate COPD (chronic obstructive pulmonary disease)  Assessment & Plan:  Current cigarette use   36 total pack years  2025 Moderate-severe airflow obstruction with airtrapping  Emphysema changes on imaging       -Continue triple therapy inhaler. She is doing well with this.   -Albuterol as needed prescribed to be used as needed  -Patient would benefit from supplemental oxygen with activity    We discussed pulmonary rehab and she is motivated to participate. Referral approved. Number provided for appt.       2. Cigarette nicotine dependence without complication  Overview:  36 total pack years  Current use: 10 packs every 30 days.  We discussed the impact of continued tobacco use, including worsening COPD symptoms and increased risk of cardiovascular events, among other  health concerns. She is not interested in quitting at this time. While I will remain respectful of her autonomy and choices at this time, I strongly recommend smoking cessation for her long-term health and will continue to offer support and resources as appropriate.    Orders:  -     OXYGEN FOR HOME USE    3. Pulmonary nodule          Follow up in about 3 months (around 8/21/2025), or if symptoms worsen or fail to improve.    Discussed with patient above for education the following:      Patient Instructions   Pulmonary Rehab locations include:    1) [facility]: phone# [phone]; fax# [phone];    RETURN TO CLINIC IN 3 MONTHS     Total professional time spent for the encounter: 43 minutes  Time was spent preparing to see the patient, reviewing results of prior testing, obtaining and/or reviewing separately obtained history, performing a medically appropriate examination and interview, counseling and educating the patient/family, ordering medications/tests/procedures, referring and communicating with other health care professionals, documenting clinical information in the electronic health record, and independently interpreting results.    Cassia Galarza DNP       [1]   Social History  Tobacco Use   Smoking Status Every Day    Current packs/day: 0.50    Average packs/day: 0.5 packs/day for 71.4 years (35.7 ttl pk-yrs)    Types: Cigarettes    Start date: 1956    Passive exposure: Current   Smokeless Tobacco Never   Tobacco Comments    2 packs a week

## 2025-07-03 ENCOUNTER — PATIENT MESSAGE (OUTPATIENT)
Dept: ADMINISTRATIVE | Facility: HOSPITAL | Age: 85
End: 2025-07-03
Payer: MEDICARE

## 2025-07-10 DIAGNOSIS — F41.9 ANXIETY: ICD-10-CM

## 2025-07-10 DIAGNOSIS — F43.0 STRESS RESPONSE: ICD-10-CM

## 2025-07-10 RX ORDER — BUSPIRONE HYDROCHLORIDE 10 MG/1
TABLET ORAL
Qty: 60 TABLET | Refills: 1 | Status: SHIPPED | OUTPATIENT
Start: 2025-07-10

## 2025-07-10 NOTE — TELEPHONE ENCOUNTER
Refill Routing Note   Medication(s) are not appropriate for processing by [facility] for the following reason(s):        Outside of protocol    [facility] action(s):  Route               Appointments  past 12m or future 3m with PCP    Date Provider   Last Visit   5/12/2025 Danish Pereira MD   Next Visit   Visit date not found Danish Pereira MD   ED visits in past 90 days: 0        Note composed:3:36 PM 07/10/2025

## 2025-07-10 NOTE — TELEPHONE ENCOUNTER
No care due was identified.  [facility] Embedded Care Due Messages. Reference number: [id number].   7/10/2025 10:23:25 AM CDT